# Patient Record
Sex: MALE | Race: WHITE | Employment: OTHER | URBAN - NONMETROPOLITAN AREA
[De-identification: names, ages, dates, MRNs, and addresses within clinical notes are randomized per-mention and may not be internally consistent; named-entity substitution may affect disease eponyms.]

---

## 2020-02-18 ENCOUNTER — HOSPITAL ENCOUNTER (OUTPATIENT)
Dept: MRI IMAGING | Age: 65
Discharge: HOME | End: 2020-02-18
Payer: MEDICARE

## 2020-02-18 PROCEDURE — 73221 MRI JOINT UPR EXTREM W/O DYE: CPT

## 2020-04-23 ENCOUNTER — HOSPITAL ENCOUNTER (EMERGENCY)
Age: 65
Discharge: HOME | End: 2020-04-23
Attending: EMERGENCY MEDICINE
Payer: MEDICARE

## 2020-04-23 VITALS
DIASTOLIC BLOOD PRESSURE: 86 MMHG | WEIGHT: 160 LBS | HEIGHT: 65 IN | BODY MASS INDEX: 26.66 KG/M2 | RESPIRATION RATE: 19 BRPM | TEMPERATURE: 97.9 F | OXYGEN SATURATION: 98 % | SYSTOLIC BLOOD PRESSURE: 126 MMHG | HEART RATE: 73 BPM

## 2020-04-23 LAB
ALBUMIN SERPL-MCNC: 4.1 G/DL (ref 3.5–5.1)
ALP BLD-CCNC: 65 U/L (ref 38–126)
ALT SERPL-CCNC: 20 U/L (ref 11–66)
ANION GAP SERPL CALCULATED.3IONS-SCNC: 13 MEQ/L (ref 8–16)
AST SERPL-CCNC: 21 U/L (ref 5–40)
BASOPHILS # BLD: 0.4 %
BASOPHILS ABSOLUTE: 0 THOU/MM3 (ref 0–0.1)
BILIRUB SERPL-MCNC: 0.2 MG/DL (ref 0.3–1.2)
BILIRUBIN DIRECT: < 0.2 MG/DL (ref 0–0.3)
BUN BLDV-MCNC: 17 MG/DL (ref 7–22)
CALCIUM SERPL-MCNC: 8.7 MG/DL (ref 8.5–10.5)
CHLORIDE BLD-SCNC: 99 MEQ/L (ref 98–111)
CO2: 22 MEQ/L (ref 23–33)
CREAT SERPL-MCNC: 0.7 MG/DL (ref 0.4–1.2)
EKG ATRIAL RATE: 74 BPM
EKG P AXIS: 53 DEGREES
EKG P-R INTERVAL: 132 MS
EKG Q-T INTERVAL: 408 MS
EKG QRS DURATION: 98 MS
EKG QTC CALCULATION (BAZETT): 452 MS
EKG R AXIS: 25 DEGREES
EKG T AXIS: 87 DEGREES
EKG VENTRICULAR RATE: 74 BPM
EOSINOPHIL # BLD: 0.2 %
EOSINOPHILS ABSOLUTE: 0 THOU/MM3 (ref 0–0.4)
ERYTHROCYTE [DISTWIDTH] IN BLOOD BY AUTOMATED COUNT: 12.4 % (ref 11.5–14.5)
ERYTHROCYTE [DISTWIDTH] IN BLOOD BY AUTOMATED COUNT: 39.1 FL (ref 35–45)
GFR SERPL CREATININE-BSD FRML MDRD: > 90 ML/MIN/1.73M2
GLUCOSE BLD-MCNC: 242 MG/DL (ref 70–108)
HCT VFR BLD CALC: 47.5 % (ref 42–52)
HEMOGLOBIN: 16 GM/DL (ref 14–18)
IMMATURE GRANS (ABS): 0.05 THOU/MM3 (ref 0–0.07)
IMMATURE GRANULOCYTES: 0.4 %
LIPASE: 9.1 U/L (ref 5.6–51.3)
LYMPHOCYTES # BLD: 7.6 %
LYMPHOCYTES ABSOLUTE: 0.9 THOU/MM3 (ref 1–4.8)
MCH RBC QN AUTO: 29.5 PG (ref 26–33)
MCHC RBC AUTO-ENTMCNC: 33.7 GM/DL (ref 32.2–35.5)
MCV RBC AUTO: 87.6 FL (ref 80–94)
MONOCYTES # BLD: 3.7 %
MONOCYTES ABSOLUTE: 0.4 THOU/MM3 (ref 0.4–1.3)
NUCLEATED RED BLOOD CELLS: 0 /100 WBC
OSMOLALITY CALCULATION: 277.8 MOSMOL/KG (ref 275–300)
PLATELET # BLD: 210 THOU/MM3 (ref 130–400)
PLATELET ESTIMATE: ADEQUATE
PMV BLD AUTO: 9.6 FL (ref 9.4–12.4)
POTASSIUM REFLEX MAGNESIUM: 3.8 MEQ/L (ref 3.5–5.2)
PRO-BNP: 38.8 PG/ML (ref 0–900)
RBC # BLD: 5.42 MILL/MM3 (ref 4.7–6.1)
REASON FOR REJECTION: NORMAL
REJECTED TEST: NORMAL
SCAN OF BLOOD SMEAR: NORMAL
SEG NEUTROPHILS: 87.7 %
SEGMENTED NEUTROPHILS ABSOLUTE COUNT: 10 THOU/MM3 (ref 1.8–7.7)
SODIUM BLD-SCNC: 134 MEQ/L (ref 135–145)
TOTAL PROTEIN: 6.7 G/DL (ref 6.1–8)
TROPONIN T: < 0.01 NG/ML
WBC # BLD: 11.4 THOU/MM3 (ref 4.8–10.8)

## 2020-04-23 PROCEDURE — 85025 COMPLETE CBC W/AUTO DIFF WBC: CPT

## 2020-04-23 PROCEDURE — 83690 ASSAY OF LIPASE: CPT

## 2020-04-23 PROCEDURE — 84484 ASSAY OF TROPONIN QUANT: CPT

## 2020-04-23 PROCEDURE — 36415 COLL VENOUS BLD VENIPUNCTURE: CPT

## 2020-04-23 PROCEDURE — 93005 ELECTROCARDIOGRAM TRACING: CPT | Performed by: EMERGENCY MEDICINE

## 2020-04-23 PROCEDURE — 99282 EMERGENCY DEPT VISIT SF MDM: CPT

## 2020-04-23 PROCEDURE — 80048 BASIC METABOLIC PNL TOTAL CA: CPT

## 2020-04-23 PROCEDURE — 83880 ASSAY OF NATRIURETIC PEPTIDE: CPT

## 2020-04-23 PROCEDURE — 80076 HEPATIC FUNCTION PANEL: CPT

## 2020-04-23 PROCEDURE — 2709999900 HC NON-CHARGEABLE SUPPLY

## 2020-04-23 NOTE — ED PROVIDER NOTES
[address] [facility] [address] [address] EMERGENCY DEPT      CHIEF COMPLAINT       Chief Complaint   Patient presents with    Loss of Consciousness    Hypotension       Nurses Notes reviewed and I agree except as noted in the HPI. HISTORY OF PRESENT ILLNESS    Aldair Moncada is a 59 y.o. male who presents with complaint of lightheadedness, feeling like he is going to pass out. Patient that he has chronic neck and back pain. States that he took muscle relaxants, pain medicine, and did not eat or drink since yesterday afternoon. Patient said that he did not sleep well last night either, and has been extremely tired. He denies any chest pain, belly pain, did not fall or hit his head or neck. Onset: Acute  Duration: Prior to arrival  Timing: Constant  Location of Pain: Chronic neck and back pain  Intesity/severity: Moderate pain  Modifying Factors: Pain medicine, dehydration, low blood sugar, fatigue  Relieved by;  Previous Episodes; Tx Before arrival: Ate food prior to arrival.  REVIEW OF SYSTEMS      Review of Systems   Constitutional: Negative for fever, chills, diaphoresis and fatigue. HENT: Negative for congestion, drooling, facial swelling and sore throat. Eyes: Negative for photophobia, pain and discharge. Respiratory: Negative for cough, shortness of breath, wheezing and stridor. Cardiovascular: Negative for chest pain, palpitations and leg swelling. Transient lightheadedness. Gastrointestinal: Negative for abdominal pain, blood in stool and abdominal distention. Genitourinary: Negative for dysuria, urgency, hematuria and difficulty urinating. Musculoskeletal: Negative for gait problem, neck pain and neck stiffness. Skin; No rash, No itching  Neurological: Negative for seizures, weakness and numbness. Psychiatric/Behavioral: Negative for hallucinations, confusion and agitation.      PAST MEDICAL HISTORY    has a past medical history of Hiatal hernia, PUD (peptic ulcer disease), and Rectal

## 2020-04-24 PROCEDURE — 93010 ELECTROCARDIOGRAM REPORT: CPT | Performed by: INTERNAL MEDICINE

## 2020-04-24 NOTE — ED NOTES
Pt comes to the ED after a visit with his doctor. Pt states that while he was at the doctors appointment, he passed out and blood pressure dropped. Pt states that he does not remember this happening. EKG obtained. PT denies any pain. Pt states that he feels weak, but not dizzy. VS obtained. Blood collected and sent to lab. Will monitor.      Teo Orr RN  04/23/20 2011

## 2020-06-17 ENCOUNTER — HOSPITAL ENCOUNTER (OUTPATIENT)
Dept: MRI IMAGING | Age: 65
Discharge: HOME | End: 2020-06-17
Payer: MEDICARE

## 2020-06-17 PROCEDURE — 70551 MRI BRAIN STEM W/O DYE: CPT

## 2020-07-10 ENCOUNTER — HOSPITAL ENCOUNTER (OUTPATIENT)
Dept: MRI IMAGING | Age: 65
Discharge: HOME | End: 2020-07-10
Payer: MEDICARE

## 2020-07-10 PROCEDURE — 72148 MRI LUMBAR SPINE W/O DYE: CPT

## 2020-07-10 PROCEDURE — 72141 MRI NECK SPINE W/O DYE: CPT

## 2020-08-03 ENCOUNTER — HOSPITAL ENCOUNTER (OUTPATIENT)
Dept: INTERVENTIONAL RADIOLOGY/VASCULAR | Age: 65
Discharge: HOME | End: 2020-08-03
Payer: MEDICARE

## 2020-08-03 PROCEDURE — 93880 EXTRACRANIAL BILAT STUDY: CPT

## 2020-09-02 ENCOUNTER — HOSPITAL ENCOUNTER (OUTPATIENT)
Dept: ULTRASOUND IMAGING | Age: 65
Discharge: HOME | End: 2020-09-02
Payer: MEDICARE

## 2020-09-02 PROCEDURE — 76705 ECHO EXAM OF ABDOMEN: CPT

## 2020-09-02 PROCEDURE — 76857 US EXAM PELVIC LIMITED: CPT

## 2020-09-28 ENCOUNTER — HOSPITAL ENCOUNTER (OUTPATIENT)
Age: 65
Setting detail: SPECIMEN
Discharge: HOME | End: 2020-09-28
Payer: COMMERCIAL

## 2020-09-28 LAB
ABSOLUTE EOS #: 0.17 K/UL (ref 0–0.44)
ABSOLUTE IMMATURE GRANULOCYTE: <0.03 K/UL (ref 0–0.3)
ABSOLUTE LYMPH #: 1.82 K/UL (ref 1.1–3.7)
ABSOLUTE MONO #: 0.46 K/UL (ref 0.1–1.2)
ALBUMIN SERPL-MCNC: 4.4 G/DL (ref 3.5–5.2)
ALBUMIN/GLOBULIN RATIO: 1.5 (ref 1–2.5)
ALP BLD-CCNC: 65 U/L (ref 40–129)
ALT SERPL-CCNC: 23 U/L (ref 5–41)
ANION GAP SERPL CALCULATED.3IONS-SCNC: 13 MMOL/L (ref 9–17)
AST SERPL-CCNC: 25 U/L
BASOPHILS # BLD: 1 % (ref 0–2)
BASOPHILS ABSOLUTE: 0.06 K/UL (ref 0–0.2)
BILIRUB SERPL-MCNC: 0.61 MG/DL (ref 0.3–1.2)
BUN BLDV-MCNC: 19 MG/DL (ref 8–23)
BUN/CREAT BLD: NORMAL (ref 9–20)
CALCIUM SERPL-MCNC: 9.3 MG/DL (ref 8.6–10.4)
CHLORIDE BLD-SCNC: 105 MMOL/L (ref 98–107)
CHOLESTEROL/HDL RATIO: 5.2
CHOLESTEROL: 206 MG/DL
CO2: 22 MMOL/L (ref 20–31)
CREAT SERPL-MCNC: 0.84 MG/DL (ref 0.7–1.2)
DIFFERENTIAL TYPE: ABNORMAL
EOSINOPHILS RELATIVE PERCENT: 3 % (ref 1–4)
GFR AFRICAN AMERICAN: >60 ML/MIN
GFR NON-AFRICAN AMERICAN: >60 ML/MIN
GFR SERPL CREATININE-BSD FRML MDRD: NORMAL ML/MIN/{1.73_M2}
GFR SERPL CREATININE-BSD FRML MDRD: NORMAL ML/MIN/{1.73_M2}
GLUCOSE BLD-MCNC: 82 MG/DL (ref 70–99)
HCT VFR BLD CALC: 51.3 % (ref 40.7–50.3)
HDLC SERPL-MCNC: 40 MG/DL
HEMOGLOBIN: 16.5 G/DL (ref 13–17)
IMMATURE GRANULOCYTES: 0 %
LDL CHOLESTEROL: 136 MG/DL (ref 0–130)
LYMPHOCYTES # BLD: 33 % (ref 24–43)
MCH RBC QN AUTO: 28.9 PG (ref 25.2–33.5)
MCHC RBC AUTO-ENTMCNC: 32.2 G/DL (ref 28.4–34.8)
MCV RBC AUTO: 89.8 FL (ref 82.6–102.9)
MONOCYTES # BLD: 8 % (ref 3–12)
NRBC AUTOMATED: 0 PER 100 WBC
PDW BLD-RTO: 12.5 % (ref 11.8–14.4)
PLATELET # BLD: 252 K/UL (ref 138–453)
PLATELET ESTIMATE: ABNORMAL
PMV BLD AUTO: 9.9 FL (ref 8.1–13.5)
POTASSIUM SERPL-SCNC: 4.2 MMOL/L (ref 3.7–5.3)
PROSTATE SPECIFIC ANTIGEN: 1.96 UG/L
RBC # BLD: 5.71 M/UL (ref 4.21–5.77)
RBC # BLD: ABNORMAL 10*6/UL
SEG NEUTROPHILS: 55 % (ref 36–65)
SEGMENTED NEUTROPHILS ABSOLUTE COUNT: 2.99 K/UL (ref 1.5–8.1)
SODIUM BLD-SCNC: 140 MMOL/L (ref 135–144)
TOTAL PROTEIN: 7.3 G/DL (ref 6.4–8.3)
TRIGL SERPL-MCNC: 152 MG/DL
TSH SERPL DL<=0.05 MIU/L-ACNC: 1.43 MIU/L (ref 0.3–5)
VLDLC SERPL CALC-MCNC: ABNORMAL MG/DL (ref 1–30)
WBC # BLD: 5.5 K/UL (ref 3.5–11.3)
WBC # BLD: ABNORMAL 10*3/UL

## 2021-09-10 ENCOUNTER — APPOINTMENT (OUTPATIENT)
Dept: CT IMAGING | Age: 66
End: 2021-09-10
Payer: MEDICARE

## 2021-09-10 ENCOUNTER — HOSPITAL ENCOUNTER (EMERGENCY)
Age: 66
Discharge: HOME | End: 2021-09-10
Attending: FAMILY MEDICINE
Payer: MEDICARE

## 2021-09-10 ENCOUNTER — APPOINTMENT (OUTPATIENT)
Dept: GENERAL RADIOLOGY | Age: 66
End: 2021-09-10
Payer: MEDICARE

## 2021-09-10 VITALS
WEIGHT: 155 LBS | BODY MASS INDEX: 24.91 KG/M2 | OXYGEN SATURATION: 98 % | DIASTOLIC BLOOD PRESSURE: 88 MMHG | TEMPERATURE: 98.3 F | RESPIRATION RATE: 16 BRPM | SYSTOLIC BLOOD PRESSURE: 162 MMHG | HEART RATE: 62 BPM | HEIGHT: 66 IN

## 2021-09-10 DIAGNOSIS — R11.0 NAUSEA: ICD-10-CM

## 2021-09-10 DIAGNOSIS — I16.0 HYPERTENSIVE URGENCY: Primary | ICD-10-CM

## 2021-09-10 LAB
ALBUMIN SERPL-MCNC: 4.9 G/DL (ref 3.5–5.1)
ALP BLD-CCNC: 87 U/L (ref 38–126)
ALT SERPL-CCNC: 33 U/L (ref 11–66)
ANION GAP SERPL CALCULATED.3IONS-SCNC: 14 MEQ/L (ref 8–16)
APTT: 35.2 SECONDS (ref 22–38)
AST SERPL-CCNC: 28 U/L (ref 5–40)
BASOPHILS # BLD: 0.9 %
BASOPHILS ABSOLUTE: 0.1 THOU/MM3 (ref 0–0.1)
BILIRUB SERPL-MCNC: 0.4 MG/DL (ref 0.3–1.2)
BILIRUBIN URINE: NEGATIVE
BLOOD, URINE: NEGATIVE
BUN BLDV-MCNC: 19 MG/DL (ref 7–22)
CALCIUM SERPL-MCNC: 9.9 MG/DL (ref 8.5–10.5)
CHARACTER, URINE: CLEAR
CHLORIDE BLD-SCNC: 103 MEQ/L (ref 98–111)
CO2: 22 MEQ/L (ref 23–33)
COLOR: YELLOW
CREAT SERPL-MCNC: 0.8 MG/DL (ref 0.4–1.2)
EKG ATRIAL RATE: 68 BPM
EKG P AXIS: 40 DEGREES
EKG P-R INTERVAL: 140 MS
EKG Q-T INTERVAL: 420 MS
EKG QRS DURATION: 94 MS
EKG QTC CALCULATION (BAZETT): 446 MS
EKG R AXIS: 12 DEGREES
EKG T AXIS: 79 DEGREES
EKG VENTRICULAR RATE: 68 BPM
EOSINOPHIL # BLD: 1.2 %
EOSINOPHILS ABSOLUTE: 0.1 THOU/MM3 (ref 0–0.4)
ERYTHROCYTE [DISTWIDTH] IN BLOOD BY AUTOMATED COUNT: 12.7 % (ref 11.5–14.5)
ERYTHROCYTE [DISTWIDTH] IN BLOOD BY AUTOMATED COUNT: 39.9 FL (ref 35–45)
GFR SERPL CREATININE-BSD FRML MDRD: > 90 ML/MIN/1.73M2
GLUCOSE BLD-MCNC: 105 MG/DL (ref 70–108)
GLUCOSE URINE: NEGATIVE MG/DL
HCT VFR BLD CALC: 53 % (ref 42–52)
HEMOGLOBIN: 17.8 GM/DL (ref 14–18)
IMMATURE GRANS (ABS): 0.08 THOU/MM3 (ref 0–0.07)
IMMATURE GRANULOCYTES: 1.2 %
INR BLD: 1.02 (ref 0.85–1.13)
KETONES, URINE: NEGATIVE
LEUKOCYTE ESTERASE, URINE: NEGATIVE
LIPASE: 32.7 U/L (ref 5.6–51.3)
LYMPHOCYTES # BLD: 21.7 %
LYMPHOCYTES ABSOLUTE: 1.4 THOU/MM3 (ref 1–4.8)
MCH RBC QN AUTO: 29.1 PG (ref 26–33)
MCHC RBC AUTO-ENTMCNC: 33.6 GM/DL (ref 32.2–35.5)
MCV RBC AUTO: 86.7 FL (ref 80–94)
MONOCYTES # BLD: 7.1 %
MONOCYTES ABSOLUTE: 0.5 THOU/MM3 (ref 0.4–1.3)
NITRITE, URINE: NEGATIVE
NUCLEATED RED BLOOD CELLS: 0 /100 WBC
OSMOLALITY CALCULATION: 280.2 MOSMOL/KG (ref 275–300)
PH UA: 7 (ref 5–9)
PLATELET # BLD: 213 THOU/MM3 (ref 130–400)
PMV BLD AUTO: 8.6 FL (ref 9.4–12.4)
POTASSIUM REFLEX MAGNESIUM: 3.9 MEQ/L (ref 3.5–5.2)
PRO-BNP: 23 PG/ML (ref 0–900)
PROTEIN UA: NEGATIVE
RBC # BLD: 6.11 MILL/MM3 (ref 4.7–6.1)
SEG NEUTROPHILS: 67.9 %
SEGMENTED NEUTROPHILS ABSOLUTE COUNT: 4.5 THOU/MM3 (ref 1.8–7.7)
SODIUM BLD-SCNC: 139 MEQ/L (ref 135–145)
SPECIFIC GRAVITY, URINE: 1.01 (ref 1–1.03)
TOTAL PROTEIN: 7.8 G/DL (ref 6.1–8)
TROPONIN T: < 0.01 NG/ML
TROPONIN T: < 0.01 NG/ML
UROBILINOGEN, URINE: 0.2 EU/DL (ref 0–1)
WBC # BLD: 6.6 THOU/MM3 (ref 4.8–10.8)

## 2021-09-10 PROCEDURE — 85025 COMPLETE CBC W/AUTO DIFF WBC: CPT

## 2021-09-10 PROCEDURE — 81003 URINALYSIS AUTO W/O SCOPE: CPT

## 2021-09-10 PROCEDURE — 2580000003 HC RX 258: Performed by: FAMILY MEDICINE

## 2021-09-10 PROCEDURE — 85730 THROMBOPLASTIN TIME PARTIAL: CPT

## 2021-09-10 PROCEDURE — 96372 THER/PROPH/DIAG INJ SC/IM: CPT

## 2021-09-10 PROCEDURE — 83880 ASSAY OF NATRIURETIC PEPTIDE: CPT

## 2021-09-10 PROCEDURE — 93005 ELECTROCARDIOGRAM TRACING: CPT | Performed by: FAMILY MEDICINE

## 2021-09-10 PROCEDURE — 96361 HYDRATE IV INFUSION ADD-ON: CPT

## 2021-09-10 PROCEDURE — 80053 COMPREHEN METABOLIC PANEL: CPT

## 2021-09-10 PROCEDURE — 85610 PROTHROMBIN TIME: CPT

## 2021-09-10 PROCEDURE — 70498 CT ANGIOGRAPHY NECK: CPT

## 2021-09-10 PROCEDURE — 6360000004 HC RX CONTRAST MEDICATION: Performed by: FAMILY MEDICINE

## 2021-09-10 PROCEDURE — 84484 ASSAY OF TROPONIN QUANT: CPT

## 2021-09-10 PROCEDURE — 83690 ASSAY OF LIPASE: CPT

## 2021-09-10 PROCEDURE — 6370000000 HC RX 637 (ALT 250 FOR IP): Performed by: FAMILY MEDICINE

## 2021-09-10 PROCEDURE — 70450 CT HEAD/BRAIN W/O DYE: CPT

## 2021-09-10 PROCEDURE — 96375 TX/PRO/DX INJ NEW DRUG ADDON: CPT

## 2021-09-10 PROCEDURE — 2500000003 HC RX 250 WO HCPCS: Performed by: FAMILY MEDICINE

## 2021-09-10 PROCEDURE — 70496 CT ANGIOGRAPHY HEAD: CPT

## 2021-09-10 PROCEDURE — 6360000002 HC RX W HCPCS: Performed by: FAMILY MEDICINE

## 2021-09-10 PROCEDURE — 96374 THER/PROPH/DIAG INJ IV PUSH: CPT

## 2021-09-10 PROCEDURE — 36415 COLL VENOUS BLD VENIPUNCTURE: CPT

## 2021-09-10 PROCEDURE — 99284 EMERGENCY DEPT VISIT MOD MDM: CPT

## 2021-09-10 PROCEDURE — 71045 X-RAY EXAM CHEST 1 VIEW: CPT

## 2021-09-10 RX ORDER — 0.9 % SODIUM CHLORIDE 0.9 %
1000 INTRAVENOUS SOLUTION INTRAVENOUS ONCE
Status: COMPLETED | OUTPATIENT
Start: 2021-09-10 | End: 2021-09-10

## 2021-09-10 RX ORDER — PROMETHAZINE HYDROCHLORIDE 25 MG/ML
25 INJECTION, SOLUTION INTRAMUSCULAR; INTRAVENOUS ONCE
Status: COMPLETED | OUTPATIENT
Start: 2021-09-10 | End: 2021-09-10

## 2021-09-10 RX ORDER — ONDANSETRON 2 MG/ML
4 INJECTION INTRAMUSCULAR; INTRAVENOUS ONCE
Status: COMPLETED | OUTPATIENT
Start: 2021-09-10 | End: 2021-09-10

## 2021-09-10 RX ORDER — LABETALOL 20 MG/4 ML (5 MG/ML) INTRAVENOUS SYRINGE
10 ONCE
Status: COMPLETED | OUTPATIENT
Start: 2021-09-10 | End: 2021-09-10

## 2021-09-10 RX ORDER — CYCLOBENZAPRINE HCL 10 MG
10 TABLET ORAL ONCE
Status: COMPLETED | OUTPATIENT
Start: 2021-09-10 | End: 2021-09-10

## 2021-09-10 RX ADMIN — CYCLOBENZAPRINE HYDROCHLORIDE 10 MG: 10 TABLET, FILM COATED ORAL at 10:29

## 2021-09-10 RX ADMIN — IOPAMIDOL 80 ML: 755 INJECTION, SOLUTION INTRAVENOUS at 10:39

## 2021-09-10 RX ADMIN — ONDANSETRON 4 MG: 2 INJECTION INTRAMUSCULAR; INTRAVENOUS at 13:15

## 2021-09-10 RX ADMIN — SODIUM CHLORIDE 1000 ML: 9 INJECTION, SOLUTION INTRAVENOUS at 14:32

## 2021-09-10 RX ADMIN — PROMETHAZINE HYDROCHLORIDE 25 MG: 25 INJECTION INTRAMUSCULAR; INTRAVENOUS at 14:34

## 2021-09-10 RX ADMIN — LABETALOL 20 MG/4 ML (5 MG/ML) INTRAVENOUS SYRINGE 10 MG: at 13:15

## 2021-09-10 NOTE — ED NOTES
Pt resting in bed watching television, respirations unlabored. Call light in reach.      Joyce Moser RN  09/10/21 7195

## 2021-09-10 NOTE — ED NOTES
Presents to ER with complaints of nausea and fatigue that has been ongoing for 3 days. Pt states the nausea really increased this morning along with his  BP. States he checks his BP occasionally at home and it normally runs 529 systolic. Denies taking any BP medications. EKG completed.        Kathleen Moser, RN  09/10/21 6421

## 2021-09-10 NOTE — ED PROVIDER NOTES
EMERGENCY DEPARTMENT ENCOUNTER     CHIEF COMPLAINT   Chief Complaint   Patient presents with    Nausea    Hypertension        HPI   Lavell Gilmore is a 77 y.o. male with no prior medical issues (no hypertension) who presents with headache, nausea and not feeling well in general, and denying chest pain or sob, onset was today. The duration has been ongoing. Pt has no prior CAD or ACS. He has no prior long distance travel. His headache is more dizziness, and also is not worst of life or focal in nature. He has no vision disturbance. REVIEW OF SYSTEMS   Cardiac: denies dyspnea on exertion; neg Chest Pain, Denies syncope   Neuro: +headache, +dizziness; not feeling well  Respiratory: denies sob and dyspnea; Denies cough or hemoptysis   GI: +nausea; Denies Vomiting or Diarrhea   General: Denies Fever   All other review of systems are reviewed and are otherwise negative. PAST MEDICAL & SURGICAL HISTORY   Past Medical History:   Diagnosis Date    Hiatal hernia     EGD    PUD (peptic ulcer disease)     LONG TIME  AGO    Rectal bleeding     2o11- colonoscopy - dr Martha Gerardo      Past Surgical History:   Procedure Laterality Date    EYE SURGERY  plate in right eye    MANDIBLE SURGERY  1979    NECK SURGERY  2001    plate in neck         CURRENT MEDICATIONS   Current Outpatient Rx   Medication Sig Dispense Refill    gabapentin (NEURONTIN) 800 MG tablet Take 800 mg by mouth 3 times daily as needed      HYDROcodone-acetaminophen (NORCO) 7.5-325 MG per tablet Take 1 tablet by mouth daily as needed for Pain Take as needed for pain.  30 tablet 0    baclofen (LIORESAL) 10 MG tablet Take 1 tablet by mouth 3 times daily 90 tablet 5    butalbital-aspirin-caffeine (FIORINAL) -40 MG per capsule Take 1 capsule by mouth every 4 hours as needed for Headaches 180 capsule 5    vitamin B-12 (CYANOCOBALAMIN) 100 MCG tablet Take 50 mcg by mouth daily      omeprazole (PRILOSEC) 20 MG capsule take 1 capsule by mouth twice a day (Patient taking differently: Daily take 1 capsule by mouth twice a day) 60 capsule 11    gabapentin (NEURONTIN) 400 MG capsule Take 1 capsule by mouth 3 times daily 90 capsule 5    Handicap Placard MISC by Does not apply route Duration 5 years 1 each 0    acetaminophen (TYLENOL) 325 MG tablet Take 650 mg by mouth every 6 hours as needed for Pain      lidocaine (LIDODERM) 5 % Place 1 patch onto the skin daily 12 hours on, 12 hours off. 30 patch 5    sucralfate (CARAFATE) 1 GM/10ML suspension Take 10 mLs by mouth 2 times daily as needed 600 mL 2    cyclobenzaprine (FLEXERIL) 10 MG tablet Take 1 tablet by mouth every 8 hours as needed for Muscle spasms 30 tablet 5    Glucosamine-Chondroitin (MOVE FREE PO) Take by mouth 2 times daily      Multiple Vitamins-Minerals (THERAPEUTIC MULTIVITAMIN-MINERALS) tablet Take 1 tablet by mouth daily      Ascorbic Acid (VITAMIN C) 500 MG CAPS Take 500 mg by mouth daily      Cholecalciferol (VITAMIN D3) 2000 UNITS CAPS Take by mouth      CALCIUM & MAGNESIUM CARBONATES PO Take by mouth          ALLERGIES   No Known Allergies     SOCIAL & FAMILY HISTORY   Social History     Socioeconomic History    Marital status:      Spouse name: None    Number of children: None    Years of education: None    Highest education level: None   Occupational History    None   Tobacco Use    Smoking status: Never Smoker    Smokeless tobacco: Never Used   Substance and Sexual Activity    Alcohol use: No    Drug use: No    Sexual activity: None   Other Topics Concern    None   Social History Narrative    None     Social Determinants of Health     Financial Resource Strain:     Difficulty of Paying Living Expenses:    Food Insecurity:     Worried About Running Out of Food in the Last Year:     Ran Out of Food in the Last Year:    Transportation Needs:     Lack of Transportation (Medical):      Lack of Transportation (Non-Medical):    Physical Activity:     Days of Exercise per Week:     Minutes of Exercise per Session:    Stress:     Feeling of Stress :    Social Connections:     Frequency of Communication with Friends and Family:     Frequency of Social Gatherings with Friends and Family:     Attends Restorationist Services:     Active Member of Clubs or Organizations:     Attends Club or Organization Meetings:     Marital Status:    Intimate Partner Violence:     Fear of Current or Ex-Partner:     Emotionally Abused:     Physically Abused:     Sexually Abused:       Family History   Problem Relation Age of Onset    Heart Disease Mother     Heart Disease Father     Other Father         CHF        PHYSICAL EXAM   VITAL SIGNS: BP (!) 162/88   Pulse 62   Temp 98.3 °F (36.8 °C) (Oral)   Resp 16   Ht 5' 6\" (1.676 m)   Wt 155 lb (70.3 kg)   SpO2 98%   BMI 25.02 kg/m²    Constitutional: Well developed, well nourished, mild acute distress   HENT: Atraumatic, dry mucus membranes   Eyes: no nystagmus noted; neg alternating eye cover exam  Neck: supple, no JVD   Respiratory: Lungs Clear, no retractions   Cardiovascular: Reg rate, normal rhythm, no murmurs   Vascular: Radial pulses 2+ equal bilaterally   GI: Soft, nontender, normal bowel sounds   Musculoskeletal: No edema, no deformities   Integument: Skin warm and dry, no petechiae   Neurologic: Alert & oriented, normal speech, no facial droop, no slurred speech, moving all extremities, no truncal ataxia noted  Psych: Pleasant affect, no hallucinations     EKG (interpreted by me) 9/10/21 9:11  Rhythm: Sinus   Rate: 68 BPM   Axis: normal   Conduction: normal   ST/T Segments: nonacute without obvious signs of ischemia    RADIOLOGY/PROCEDURES   CT Head WO Contrast   Final Result    Normal CT appearance of the brain. **This report has been created using voice recognition software. It may contain minor errors which are inherent in voice recognition technology. **      Final report electronically signed by  Clayton Dominique on 9/10/2021 11:00 AM      CTA HEAD W WO CONTRAST   Final Result       1. Mild smooth atherosclerosis of the left carotid bulb. There is a 44% stenosis at the origin of the left internal carotid artery. 2. No significant stenosis of the right carotid bulb. 3. Normal CTA of the head. **This report has been created using voice recognition software. It may contain minor errors which are inherent in voice recognition technology. **      Final report electronically signed by Dr. Clayton Dominique on 9/10/2021 11:14 AM      CTA NECK W WO CONTRAST   Final Result       1. Mild smooth atherosclerosis of the left carotid bulb. There is a 44% stenosis at the origin of the left internal carotid artery. 2. No significant stenosis of the right carotid bulb. 3. Normal CTA of the head. **This report has been created using voice recognition software. It may contain minor errors which are inherent in voice recognition technology. **      Final report electronically signed by Dr. Clayton Dominique on 9/10/2021 11:14 AM      XR CHEST PORTABLE   Final Result   No acute cardiopulmonary disease            **This report has been created using voice recognition software. It may contain minor errors which are inherent in voice recognition technology. **      Final report electronically signed by Dr. Nilson Alfaro on 9/10/2021 10:47 AM           LABS   Labs Reviewed   COMPREHENSIVE METABOLIC PANEL W/ REFLEX TO MG FOR LOW K - Abnormal; Notable for the following components:       Result Value    CO2 22 (*)     All other components within normal limits   CBC WITH AUTO DIFFERENTIAL - Abnormal; Notable for the following components:    RBC 6.11 (*)     Hematocrit 53.0 (*)     MPV 8.6 (*)     Immature Grans (Abs) 0.08 (*)     All other components within normal limits   LIPASE   TROPONIN   URINE RT REFLEX TO CULTURE   PROTIME-INR   APTT   BRAIN NATRIURETIC PEPTIDE   ANION GAP   OSMOLALITY   GLOMERULAR FILTRATION RATE, ESTIMATED   TROPONIN        ED COURSE & MEDICAL DECISION MAKING   Pertinent Labs & Imaging studies reviewed and interpreted. (See chart for details)   See chart for details of medications given during the ED stay. Vitals:    09/10/21 1442   BP: (!) 162/88   Pulse: 62   Resp: 16   Temp:    SpO2: 98%        Differential Diagnosis: Acute Coronary Syndrome, Congestive Heart Failure, Myocardial Infarction, Pulmonary Embolus, Thoracic Dissection, Pneumonia, Pneumothorax, other. FINAL IMPRESSION   1. Hypertensive urgency    2. Nausea         Plan:  MDM - initially I had some concerns for a CVA event but pt did not have any focal deficits or obvious ataxia. But he felt dizzy and his blood pressure was over 347 mm Hg systolic, so CTA was ordered, with negative findings. At this time I have low suspicion for a posterior stroke. Pt will be discharged home now since his BP has normalized, nausea relieved and dc home with anticipatory guidance.     Electronically signed by: Rahul Jennings MD, 9/10/2021 4:36 PM   (This note was completed with a voice recognition program)         Nataly Monahan MD  09/10/21 5993
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
## 2021-09-14 ENCOUNTER — OFFICE VISIT (OUTPATIENT)
Dept: INTERNAL MEDICINE CLINIC | Age: 66
End: 2021-09-14
Payer: MEDICARE

## 2021-09-14 VITALS
SYSTOLIC BLOOD PRESSURE: 154 MMHG | TEMPERATURE: 97.6 F | BODY MASS INDEX: 24.86 KG/M2 | DIASTOLIC BLOOD PRESSURE: 88 MMHG | WEIGHT: 154 LBS | HEART RATE: 72 BPM

## 2021-09-14 DIAGNOSIS — M54.2 NECK PAIN: ICD-10-CM

## 2021-09-14 DIAGNOSIS — I10 ESSENTIAL HYPERTENSION: Primary | ICD-10-CM

## 2021-09-14 DIAGNOSIS — F41.9 ANXIETY: ICD-10-CM

## 2021-09-14 DIAGNOSIS — M53.3 PAIN IN THE COCCYX: ICD-10-CM

## 2021-09-14 PROCEDURE — G8420 CALC BMI NORM PARAMETERS: HCPCS | Performed by: STUDENT IN AN ORGANIZED HEALTH CARE EDUCATION/TRAINING PROGRAM

## 2021-09-14 PROCEDURE — 4040F PNEUMOC VAC/ADMIN/RCVD: CPT | Performed by: STUDENT IN AN ORGANIZED HEALTH CARE EDUCATION/TRAINING PROGRAM

## 2021-09-14 PROCEDURE — 99204 OFFICE O/P NEW MOD 45 MIN: CPT | Performed by: STUDENT IN AN ORGANIZED HEALTH CARE EDUCATION/TRAINING PROGRAM

## 2021-09-14 PROCEDURE — 3017F COLORECTAL CA SCREEN DOC REV: CPT | Performed by: STUDENT IN AN ORGANIZED HEALTH CARE EDUCATION/TRAINING PROGRAM

## 2021-09-14 PROCEDURE — 1036F TOBACCO NON-USER: CPT | Performed by: STUDENT IN AN ORGANIZED HEALTH CARE EDUCATION/TRAINING PROGRAM

## 2021-09-14 PROCEDURE — 1123F ACP DISCUSS/DSCN MKR DOCD: CPT | Performed by: STUDENT IN AN ORGANIZED HEALTH CARE EDUCATION/TRAINING PROGRAM

## 2021-09-14 PROCEDURE — G8427 DOCREV CUR MEDS BY ELIG CLIN: HCPCS | Performed by: STUDENT IN AN ORGANIZED HEALTH CARE EDUCATION/TRAINING PROGRAM

## 2021-09-14 RX ORDER — DULOXETIN HYDROCHLORIDE 30 MG/1
30 CAPSULE, DELAYED RELEASE ORAL DAILY
Qty: 60 CAPSULE | Refills: 0 | Status: SHIPPED | OUTPATIENT
Start: 2021-09-14 | End: 2021-09-27 | Stop reason: SDUPTHER

## 2021-09-14 RX ORDER — GABAPENTIN 600 MG/1
600 TABLET ORAL 2 TIMES DAILY
COMMUNITY

## 2021-09-14 RX ORDER — DONEPEZIL HYDROCHLORIDE 10 MG/1
10 TABLET, FILM COATED ORAL NIGHTLY
COMMUNITY

## 2021-09-14 SDOH — ECONOMIC STABILITY: FOOD INSECURITY: WITHIN THE PAST 12 MONTHS, THE FOOD YOU BOUGHT JUST DIDN'T LAST AND YOU DIDN'T HAVE MONEY TO GET MORE.: NEVER TRUE

## 2021-09-14 SDOH — ECONOMIC STABILITY: FOOD INSECURITY: WITHIN THE PAST 12 MONTHS, YOU WORRIED THAT YOUR FOOD WOULD RUN OUT BEFORE YOU GOT MONEY TO BUY MORE.: NEVER TRUE

## 2021-09-14 ASSESSMENT — ENCOUNTER SYMPTOMS
NAUSEA: 1
COUGH: 0
ABDOMINAL PAIN: 1
EYE ITCHING: 0
CONSTIPATION: 0
CHEST TIGHTNESS: 0
EYE PAIN: 0
TROUBLE SWALLOWING: 0
BACK PAIN: 1
SORE THROAT: 0
SINUS PRESSURE: 0
SHORTNESS OF BREATH: 0
BLOOD IN STOOL: 0
COLOR CHANGE: 0
DIARRHEA: 0
SINUS PAIN: 0

## 2021-09-14 ASSESSMENT — PATIENT HEALTH QUESTIONNAIRE - PHQ9
2. FEELING DOWN, DEPRESSED OR HOPELESS: 0
SUM OF ALL RESPONSES TO PHQ9 QUESTIONS 1 & 2: 0
1. LITTLE INTEREST OR PLEASURE IN DOING THINGS: 0
SUM OF ALL RESPONSES TO PHQ QUESTIONS 1-9: 0

## 2021-09-14 ASSESSMENT — SOCIAL DETERMINANTS OF HEALTH (SDOH): HOW HARD IS IT FOR YOU TO PAY FOR THE VERY BASICS LIKE FOOD, HOUSING, MEDICAL CARE, AND HEATING?: NOT VERY HARD

## 2021-09-23 ENCOUNTER — NURSE TRIAGE (OUTPATIENT)
Dept: OTHER | Facility: CLINIC | Age: 66
End: 2021-09-23

## 2021-09-23 NOTE — TELEPHONE ENCOUNTER
Reason for Disposition   Systolic BP  >= 193 OR Diastolic >= 162    Answer Assessment - Initial Assessment Questions  1. BLOOD PRESSURE: \"What is the blood pressure? \" \"Did you take at least two measurements 5 minutes apart? \"      Most recent 177/97 at 1520. Has consistently been between 140s-180s    2. ONSET: \"When did you take your blood pressure? \"      Has had problems with high blood pressure for a while- was seen in ED for hypertensive crisis    3. HOW: \"How did you obtain the blood pressure? \" (e.g., visiting nurse, automatic home BP monitor)      Automatic upper arm cuff    4. HISTORY: \"Do you have a history of high blood pressure? \"      History of HTN, not on blood pressure medications    5. MEDICATIONS: Gracy Sheppardr you taking any medications for blood pressure? \" \"Have you missed any doses recently? \"      Not on any medications    6. OTHER SYMPTOMS: \"Do you have any symptoms? \" (e.g., headache, chest pain, blurred vision, difficulty breathing, weakness)      Mild headache    7. PREGNANCY: \"Is there any chance you are pregnant? \" \"When was your last menstrual period? \"      n/a    Protocols used: HIGH BLOOD PRESSURE-ADULT-AH    Received call from Dondra Schirmer at [facility] with Red Flag Complaint. Brief description of triage: Patient experiencing elevated blood pressure readings. Was seen in ED 9/10/21 for hypertensive emergency and had follow up with PCP 09/14/21. Patient experiencing a lot of stress at home, states that is likely contributing to elevated blood pressures. Was started on Cymbalta to see if that helped symptoms but BP still elevated and pt still experiencing intermittent headaches and nausea. Triage indicates for patient to be seen within 24 hours/[facility] as back up    Care advice provided, patient verbalizes understanding; denies any other questions or concerns; instructed to call back for any new or worsening symptoms.     Writer provided warm transfer to Chaim Falk at [facility] for appointment scheduling. Attention Provider: Thank you for allowing me to participate in the care of your patient. The patient was connected to triage in response to information provided to the ECC/PSC. Please do not respond through this encounter as the response is not directed to a shared pool.

## 2021-09-24 ENCOUNTER — TELEPHONE (OUTPATIENT)
Dept: INTERNAL MEDICINE CLINIC | Age: 66
End: 2021-09-24

## 2021-09-24 NOTE — TELEPHONE ENCOUNTER
Patient was transferred from the [facility] ([facility]) stating the Nurse triage at [facility] wanting the patient to be  Scheduled for an appt with Dr. Michael Jarrell due to elevated BP. I scheduled the appt for 9/27/21 @ 2:20pm. When the patient was connected with me he stated he did not need an appt he was only calling to say his BP was still elevated and what should he do. I spoke with Guzman Johnson  and was told to tell the patient to go to the Urgent Care. I told the patient I spoke with Bettina Haynes and she stated he should go to the Urgent Care. The patient states he thinks he will make it thru the weekend for his scheduled appt.

## 2021-09-27 ENCOUNTER — OFFICE VISIT (OUTPATIENT)
Dept: INTERNAL MEDICINE CLINIC | Age: 66
End: 2021-09-27
Payer: MEDICARE

## 2021-09-27 VITALS
DIASTOLIC BLOOD PRESSURE: 98 MMHG | HEART RATE: 64 BPM | SYSTOLIC BLOOD PRESSURE: 158 MMHG | TEMPERATURE: 97.2 F | HEIGHT: 66 IN | WEIGHT: 158.2 LBS | BODY MASS INDEX: 25.43 KG/M2

## 2021-09-27 DIAGNOSIS — M53.3 PAIN IN THE COCCYX: ICD-10-CM

## 2021-09-27 DIAGNOSIS — I10 ESSENTIAL HYPERTENSION: Primary | ICD-10-CM

## 2021-09-27 DIAGNOSIS — F41.9 ANXIETY: ICD-10-CM

## 2021-09-27 DIAGNOSIS — M54.2 NECK PAIN: ICD-10-CM

## 2021-09-27 PROCEDURE — G8417 CALC BMI ABV UP PARAM F/U: HCPCS | Performed by: STUDENT IN AN ORGANIZED HEALTH CARE EDUCATION/TRAINING PROGRAM

## 2021-09-27 PROCEDURE — 1036F TOBACCO NON-USER: CPT | Performed by: STUDENT IN AN ORGANIZED HEALTH CARE EDUCATION/TRAINING PROGRAM

## 2021-09-27 PROCEDURE — G8427 DOCREV CUR MEDS BY ELIG CLIN: HCPCS | Performed by: STUDENT IN AN ORGANIZED HEALTH CARE EDUCATION/TRAINING PROGRAM

## 2021-09-27 PROCEDURE — 3017F COLORECTAL CA SCREEN DOC REV: CPT | Performed by: STUDENT IN AN ORGANIZED HEALTH CARE EDUCATION/TRAINING PROGRAM

## 2021-09-27 PROCEDURE — 4040F PNEUMOC VAC/ADMIN/RCVD: CPT | Performed by: STUDENT IN AN ORGANIZED HEALTH CARE EDUCATION/TRAINING PROGRAM

## 2021-09-27 PROCEDURE — 99214 OFFICE O/P EST MOD 30 MIN: CPT | Performed by: STUDENT IN AN ORGANIZED HEALTH CARE EDUCATION/TRAINING PROGRAM

## 2021-09-27 PROCEDURE — 1123F ACP DISCUSS/DSCN MKR DOCD: CPT | Performed by: STUDENT IN AN ORGANIZED HEALTH CARE EDUCATION/TRAINING PROGRAM

## 2021-09-27 RX ORDER — MULTIVITAMIN WITH IRON
500 TABLET ORAL DAILY
COMMUNITY

## 2021-09-27 RX ORDER — DULOXETIN HYDROCHLORIDE 30 MG/1
30 CAPSULE, DELAYED RELEASE ORAL DAILY
Qty: 90 CAPSULE | Refills: 1 | Status: SHIPPED | OUTPATIENT
Start: 2021-09-27 | End: 2022-03-14 | Stop reason: SDUPTHER

## 2021-09-27 RX ORDER — AMLODIPINE BESYLATE 5 MG/1
5 TABLET ORAL DAILY
Qty: 30 TABLET | Refills: 2 | Status: SHIPPED | OUTPATIENT
Start: 2021-09-27 | End: 2021-11-01

## 2021-09-27 RX ORDER — VITAMIN B COMPLEX
1 CAPSULE ORAL DAILY
COMMUNITY

## 2021-09-27 NOTE — PROGRESS NOTES
Attending supervising physicians attestation statement:      I was present with the resident physician during the history and exam.  I Discussed the findings and plans with the resident physician  personally   After examining the patient, and agree with the resident'S note as outlined . Pt with anxiety and HA and elevated BP, which was noted on several visits, and confirmed on the 2 nd BP reading today. So recommend pharmacotherapy, norvasc due to bradycardia and relaxation techniques recommended. Ambulatory BP readings recommended, and RTO 3 weeks . Physical exam unremarkable, check TSH, T4 levels.        Electronically signed by Darcie Ching MD on 9/27/2021 at 3:52 PM

## 2021-09-27 NOTE — PATIENT INSTRUCTIONS
Please get labs first thing in the morning   Come into office for blood pressure check Monday-Thursday 8-4

## 2021-09-27 NOTE — PROGRESS NOTES
mouth daily      b complex vitamins capsule Take 1 capsule by mouth daily      DULoxetine (CYMBALTA) 30 MG extended release capsule Take 1 capsule by mouth daily 90 capsule 1    amLODIPine (NORVASC) 5 MG tablet Take 1 tablet by mouth daily 30 tablet 2    gabapentin (NEURONTIN) 600 MG tablet Take 600 mg by mouth 2 times daily.  donepezil (ARICEPT) 10 MG tablet Take 10 mg by mouth nightly      omeprazole (PRILOSEC) 20 MG capsule take 1 capsule by mouth twice a day (Patient taking differently: Daily take 1 capsule by mouth twice a day) 60 capsule 11    acetaminophen (TYLENOL) 325 MG tablet Take 650 mg by mouth every 6 hours as needed for Pain      cyclobenzaprine (FLEXERIL) 10 MG tablet Take 1 tablet by mouth every 8 hours as needed for Muscle spasms 30 tablet 5    Glucosamine-Chondroitin (MOVE FREE PO) Take by mouth 2 times daily      Multiple Vitamins-Minerals (THERAPEUTIC MULTIVITAMIN-MINERALS) tablet Take 1 tablet by mouth daily      Ascorbic Acid (VITAMIN C) 500 MG CAPS Take 500 mg by mouth daily      Cholecalciferol (VITAMIN D3) 2000 UNITS CAPS Take by mouth      Handicap Placard MISC by Does not apply route Duration 5 years 1 each 0     No current facility-administered medications for this visit. No Known Allergies      Family History   Problem Relation Age of Onset    Heart Disease Mother     Heart Disease Father     Other Father         CHF       Review of Systems - General ROS: negative for - chills or fever  Psychological ROS: admits to anxiety without depression   Hematological and Lymphatic ROS: No history of blood clots or bleeding disorder.    Respiratory ROS: no cough, shortness of breath, or wheezing  Cardiovascular ROS: no chest pain or dyspnea on exertion, tolerates a flight of stairs, vacuuming  Gastrointestinal ROS: chronic abdominal pain, no change in bowel habits, or black or bloody stools  Genito-Urinary ROS: no dysuria, trouble voiding, or hematuria  Musculoskeletal ROS: negative for - muscle pain or muscular weakness   Neurological ROS: admits to headaches. Denies  numbness/tingling, seizures or weakness  Dermatological ROS: negative for - rash or skin lesion changes    Blood pressure (!) 158/98, pulse 64, temperature 97.2 °F (36.2 °C), height 5' 5.98\" (1.676 m), weight 158 lb 3.2 oz (71.8 kg). Physical Examination: General appearance -alert, well appearing, and in no distress  Mental status - alert, oriented to person, place, and time  Head - atraumatic, normocephalic  Eyes - pupils equal and reactive to light, extraocular muscles intact  Ears - external ears are normal, hearing is grossly normal  Mouth - oral pharynx clear, mucous membranes moist  Neck - supple, no significant adenopathy  Chest - clear to auscultation, no wheezes, rales or rhonchi, symmetric air entry  Heart - normal rate, regular rhythm, normal S1, S2, no murmurs, rubs, clicks or gallops  Abdomen -soft, nontender, nondistended  Neurological - alert, oriented,  motor and sensation are grossly intact bilateral upper and lower extremities. Extremities - peripheral pulses normal, no pedal edema, no clubbing or cyanosis  Skin - warm and dry    Diagnostic data:   I have reviewed recent diagnostic testing including labs with patient today. Assessment and Plan:     Diagnosis Orders   1. Essential hypertension  amLODIPine (NORVASC) 5 MG tablet    TSH    T4   2. Anxiety  DULoxetine (CYMBALTA) 30 MG extended release capsule    TSH    T4   3. Neck pain  DULoxetine (CYMBALTA) 30 MG extended release capsule   4. Pain in the coccyx  DULoxetine (CYMBALTA) 30 MG extended release capsule       Hypertension: Patient has 2 blood pressure cuffs at home. At home blood pressure highest 182/110 lowerst was 129/75. Patient admits to headaches with associated nauseas when systolic blood pressure > 170, this occurs 5 times a week. Currently in office 150/68.  Patient states he recently had stroke and brain ansyerym requiring stenting. Given heart rate of 64 and blood pressure 150/68 with elevated blood presures at home will start CCB. Decrease caffeine intake if possible. - Will start on Amlodipine 5 mg daily    - Come in once a week for blood pressure    - Will reassess in 3 weeks     Anxiety: Patient states he is upset at wife and further family issues. Patient was placed on Cymbalta 9/14/21. States he does not tell a difference yet but is willing to give current dose full three weeks before deciding if he wants to increase dose or change medication. Patient states he is trying deep breathing techniques to try calm himself down. Patient has increase anxiety with elevated blood pressure which further exacerbates anxiety. Patient has had difficulty sleeping for past 4 days, this is due to anxiety. Patient taking 10 mg melatonin without relief. - Continue Cymbalta 30 mg daily. Neck and Coccyx Pain   Patient reports previous coccyx pain that resolved for about a year and that came back after a fall 3 weeks ago. States that an x-ray obtained at [facility] did not show any fracture. Significantly improved since starting physical therapy. Patient is still receiving physical therapy treatments.    -Continue physical therapy     Follow up in 3 weeks for hypertension and anxiety     Electronically signed by Delilah Potts DO on 9/27/2021 at 3:40 PM   Internal medicine resident, PGY 2    This case was discussed with Dr. Stephanie Addison.

## 2021-11-01 ENCOUNTER — OFFICE VISIT (OUTPATIENT)
Dept: INTERNAL MEDICINE CLINIC | Age: 66
End: 2021-11-01
Payer: MEDICARE

## 2021-11-01 VITALS
HEIGHT: 66 IN | WEIGHT: 154.2 LBS | TEMPERATURE: 97.3 F | SYSTOLIC BLOOD PRESSURE: 142 MMHG | HEART RATE: 62 BPM | BODY MASS INDEX: 24.78 KG/M2 | DIASTOLIC BLOOD PRESSURE: 86 MMHG

## 2021-11-01 DIAGNOSIS — F41.9 ANXIETY: Primary | ICD-10-CM

## 2021-11-01 DIAGNOSIS — I10 ESSENTIAL HYPERTENSION: ICD-10-CM

## 2021-11-01 PROCEDURE — G8484 FLU IMMUNIZE NO ADMIN: HCPCS | Performed by: INTERNAL MEDICINE

## 2021-11-01 PROCEDURE — 3017F COLORECTAL CA SCREEN DOC REV: CPT | Performed by: INTERNAL MEDICINE

## 2021-11-01 PROCEDURE — G8427 DOCREV CUR MEDS BY ELIG CLIN: HCPCS | Performed by: INTERNAL MEDICINE

## 2021-11-01 PROCEDURE — 4040F PNEUMOC VAC/ADMIN/RCVD: CPT | Performed by: INTERNAL MEDICINE

## 2021-11-01 PROCEDURE — G8420 CALC BMI NORM PARAMETERS: HCPCS | Performed by: INTERNAL MEDICINE

## 2021-11-01 PROCEDURE — 99214 OFFICE O/P EST MOD 30 MIN: CPT | Performed by: INTERNAL MEDICINE

## 2021-11-01 PROCEDURE — 1036F TOBACCO NON-USER: CPT | Performed by: INTERNAL MEDICINE

## 2021-11-01 PROCEDURE — 1123F ACP DISCUSS/DSCN MKR DOCD: CPT | Performed by: INTERNAL MEDICINE

## 2021-11-01 NOTE — PATIENT INSTRUCTIONS
- May stop amlodipine   - Start taking Cymbalta 30 mg PO every other day instead of daily.  If not responding well, may resume back to daily   - Monitor BP with home cuff daily, lifestyle modification with exercise daily, DASH diet

## 2021-11-01 NOTE — PROGRESS NOTES
OFFICE VISIT NOTE     Patient - Tai Ramos   MRN -  [id number]      - 1955      Date of evaluation -  2021  Primary Care Physician - Emeli Pulido MD     Chief Complaint:      Chief Complaint   Patient presents with    Hypertension     3 week follow up--pt stoppped amlodipine d/t nausea    Anxiety      History Of Presenting Illness:     Tai Ramos is a 77 y.o. male with a past medical history of GERD, cervical & lumbar spondylosis, chronic neck and back pain who presents for follow up regarding hypertension & anxiety. He has no complaints today and states that this is the best he's ever felt before. He has not been compliant with his prescribed amlodipine that was started last visit. Does not want to take any BP meds at this time because he feels that he does not need them and does not feel like he runs high enough. Also, he tried amlodipine once and it made him severely nauseous that lasted 3-4 days. Patient checks his own BP at home with cuff and it usually runs [id number] systolic. Patient was placed on Cymbalta on 21 d/t long standing history of anxiety. TSH & T4 were normal since last visit. He tells me that the cymbalta has worked wonders for him and he's been very calm lately. He reports that he does not mind cutting back on it. Last visit he was more anxious because he \"had too much going on\" in his life. I told patient he may cut back on it if he'd like. He also has a chronic coccygeal pain that resolved for about a year and that came back after a fall in 2021. [facility] obtained at [facility] did not show any fracture. He tells me that this pain has significantly improved since starting physical therapy. Patient is still receiving physical therapy treatments and completes PT with his last session tomorrow 21.     Past Medical History    Past Medical History      Diagnosis Date    Hiatal hernia     EGD    PUD (peptic ulcer disease)     LONG TIME AGO    Rectal bleeding     2o11- colonoscopy - dr Lindsey Levin      Past Surgical History        Procedure Laterality Date    EYE SURGERY  plate in right eye   [address] [place] [address]    plate in neck      Medications    Current Medications   Current Outpatient Medications   Medication Sig Dispense Refill    magnesium (MAGNESIUM-OXIDE) 250 MG TABS tablet Take 500 mg by mouth daily      b complex vitamins capsule Take 1 capsule by mouth daily      DULoxetine (CYMBALTA) 30 MG extended release capsule Take 1 capsule by mouth daily 90 capsule 1    gabapentin (NEURONTIN) 600 MG tablet Take 600 mg by mouth 2 times daily.  donepezil (ARICEPT) 10 MG tablet Take 10 mg by mouth nightly      omeprazole (PRILOSEC) 20 MG capsule take 1 capsule by mouth twice a day (Patient taking differently: Daily take 1 capsule by mouth twice a day) 60 capsule 11    acetaminophen (TYLENOL) 325 MG tablet Take 650 mg by mouth every 6 hours as needed for Pain      cyclobenzaprine (FLEXERIL) 10 MG tablet Take 1 tablet by mouth every 8 hours as needed for Muscle spasms 30 tablet 5    Glucosamine-Chondroitin (MOVE FREE PO) Take by mouth 2 times daily      Multiple Vitamins-Minerals (THERAPEUTIC MULTIVITAMIN-MINERALS) tablet Take 1 tablet by mouth daily      Ascorbic Acid (VITAMIN C) 500 MG CAPS Take 500 mg by mouth daily      Cholecalciferol (VITAMIN D3) 2000 UNITS CAPS Take by mouth      Handicap Placard MISC by Does not apply route Duration 5 years 1 each 0     No current facility-administered medications for this visit. Allergies    Patient has no known allergies.     Review of Systems:   Constitutional:  No Weight Change, No Fever, No Chills, No Night Sweats, No Fatigue, No Malaise   ENT/Mouth:  No Ear Pain, No Nasal Congestion, No Sinus Pain, No Hoarseness, No sore throat, No Rhinorrhea, No Swallowing Difficulty   Eyes:  No Eye Pain, No Swelling, No Redness, No Foreign Body, No Discharge, No Vision Changes   Cardiovascular:  No Chest Pain, No Palpitations   Respiratory:  No Cough, No Sputum, No Wheezing, No SOB, No Dyspnea on Exertion, No Orthopnea,  Gastrointestinal:  No Nausea, No Vomiting, No Diarrhea, No Constipation, No Pain, No Heartburn, No Anorexia, No Dysphagia, No Hematochezia, No Flatulence  Genitourinary:  No Dysmenorrhea, No Dyspareunia, No Dysuria, No Urinary Frequency, No Hematuria, No Urinary Incontinence, No Urgency, No Flank Pain, No Urinary Flow Changes  Musculoskeletal:  No Arthralgias, No Myalgias, No Joint Swelling, No Joint Stiffness, No Back Pain, Neck Pain  Skin:  No Skin Lesions, No Pruritis, No Hair Changes, No Breast/Skin Changes, No Nipple Discharge   Neuro:  No Weakness, No Numbness, No Paresthesias, No Loss of Consciousness, No Syncope, No Dizziness, No Headache, No Coordination Changes, No Recent Falls   Heme/Lymph:  No Bruising, No Bleeding, No Lymphadenopathy   Endocrine:  No Polyuria, No Polydipsia, No Temperature Intolerance    Vitals     height is 5' 5.98\" (1.676 m) and weight is 154 lb 3.2 oz (69.9 kg). His temperature is 97.3 °F (36.3 °C). His blood pressure is 142/86 (abnormal) and his pulse is 62. Body mass index is 24.9 kg/m². Physical Exam   GENERAL APPEARANCE: Well developed, well nourished, alert & cooperative, and appears to be in NAD  EYES: PERRL, EOMI, no conjunctivitis, no erythema, no discharge. HENT: normocephalic head, hearing grossly intact, no nasal discharge, oral cavity & pharynx free of inflammation, swelling, exudate, or lesions. Neck supple, non-tender without lymphadenopathy, masses. CARDIAC: RRR, normal S1 and S2. No S3, S4, no m/r/g, no peripheral edema, cyanosis or pallor. Extremities warm & well perfused. Capillary refill < 2 seconds. No carotid bruits.   LUNGS: CTA b/l, no rales, rhonchi, wheezing or diminished breath sounds, non-pursing lips, no cyanosis  ABDOMEN: NABS, soft, nondistended, nontender, without  guarding or rebound, no masses noted   MUSKULOSKELETAL: No weakness. Strength 5/5 in all extremities, ROM symmetric and intact, no joint erythema or tenderness. Normal muscular development. EXTREMITIES: No significant deformity or joint abnormality, no edema, peripheral pulses intact 2/4, no varicosities. NEUROLOGICAL: CN II-XII intact, strength and sensation symmetric and intact throughout. Cerebellar function intact  SKIN: Skin normal color, texture and turgor with no lesions or eruptions  PSYCHIATRIC: AOx3, able to demonstrate good judgement & reason    Labs   09/28/21 TSH  1.35  09/28/21 T4  8.8    Problem List       Diagnosis Orders   1. Anxiety     2. Essential hypertension         Assessment & Plan:   1. Hypertension: Has not been compliant with his prescribed amlodipine. Does not want to take any BP meds at this time because he feels that he does not need them and does not feel like he runs high enough. Also, he tried amlodipine once and it made him severely nauseous that lasted 3-4 days. Patient checks his own BP at home with cuff and it usually runs [id number] systolic. Currently in office 142/86. Patient clarifies that he has NEVER had a stroke or aneurysm before. Clarifies that this was his brother.    - Encouraged patient to consider small dose metoprolol. Patient refused    - Encourage lifestyle modification with daily exercise & DASH diet    - Will continue to monitor and re-assess in 3 months    2. Anxiety: Patient was placed on Cymbalta 9/14/21 d/t long standing history of anxiety. TSH & T4 were normal since last visit. Today he reports that the cymbalta has worked wonders for him and he's been very calm lately. He reports that he does not mind cutting back on it. Last visit he was more anxious because he \"had too much going on\" in his life. I told patient he may cut back on it if he'd like. - Will continue Cymbalta. Patient would like to start taking it every other day instead of daily.     - Will re-evaluate in 4 weeks  3. Neck and Coccyx Pain: Patient reports previous coccyx pain that resolved for about a year and that came back after a fall in September 2021. States that an x-ray obtained at [facility] did not show any fracture. Significantly improved since starting physical therapy. Patient is still receiving physical therapy treatments. - Continue physical therapy. He completes PT with his last session being tomorrow, 11/2/21.     Follow up in 4 weeks for hypertension and anxiety     Case and impression/plan reviewed in collaboration with Dr. Talia Mayfield MD    Electronically signed by   Jeremiah Olson DO on 11/1/2021 at 4:04 PM    [address] [place] [address]  [address]  Dept: [phone]  Dept Fax: [phone] : [phone]

## 2021-11-02 NOTE — PROGRESS NOTES
Attending supervising physicians attestation statement:      I was present with the resident physician during the history and exam.  I Discussed the findings and plans with the resident physician  personally   After examining the patient, and agree with the resident'S note as outlined .         Electronically signed by Dorma Collet, MD on 11/2/2021 at 11:25 AM

## 2021-11-10 ENCOUNTER — IMMUNIZATION (OUTPATIENT)
Dept: PRIMARY CARE CLINIC | Age: 66
End: 2021-11-10
Payer: MEDICARE

## 2021-11-10 PROCEDURE — 91300 COVID-19, PFIZER VACCINE 30MCG/0.3ML DOSE: CPT

## 2021-11-10 PROCEDURE — 0004A COVID-19, PFIZER VACCINE 30MCG/0.3ML DOSE: CPT

## 2021-11-29 ENCOUNTER — OFFICE VISIT (OUTPATIENT)
Dept: INTERNAL MEDICINE CLINIC | Age: 66
End: 2021-11-29
Payer: MEDICARE

## 2021-11-29 VITALS
BODY MASS INDEX: 25.74 KG/M2 | TEMPERATURE: 99 F | DIASTOLIC BLOOD PRESSURE: 72 MMHG | SYSTOLIC BLOOD PRESSURE: 116 MMHG | HEART RATE: 62 BPM | WEIGHT: 159.4 LBS

## 2021-11-29 DIAGNOSIS — I10 HYPERTENSION, UNSPECIFIED TYPE: Primary | ICD-10-CM

## 2021-11-29 DIAGNOSIS — M54.2 NECK PAIN, MUSCULOSKELETAL: ICD-10-CM

## 2021-11-29 DIAGNOSIS — M47.812 SPONDYLOSIS OF CERVICAL REGION WITHOUT MYELOPATHY OR RADICULOPATHY: ICD-10-CM

## 2021-11-29 DIAGNOSIS — F41.9 ANXIETY: ICD-10-CM

## 2021-11-29 DIAGNOSIS — K21.9 GASTROESOPHAGEAL REFLUX DISEASE WITHOUT ESOPHAGITIS: ICD-10-CM

## 2021-11-29 DIAGNOSIS — M47.816 SPONDYLOSIS OF LUMBAR REGION WITHOUT MYELOPATHY OR RADICULOPATHY: ICD-10-CM

## 2021-11-29 PROCEDURE — G8484 FLU IMMUNIZE NO ADMIN: HCPCS | Performed by: STUDENT IN AN ORGANIZED HEALTH CARE EDUCATION/TRAINING PROGRAM

## 2021-11-29 PROCEDURE — 1123F ACP DISCUSS/DSCN MKR DOCD: CPT | Performed by: STUDENT IN AN ORGANIZED HEALTH CARE EDUCATION/TRAINING PROGRAM

## 2021-11-29 PROCEDURE — G8427 DOCREV CUR MEDS BY ELIG CLIN: HCPCS | Performed by: STUDENT IN AN ORGANIZED HEALTH CARE EDUCATION/TRAINING PROGRAM

## 2021-11-29 PROCEDURE — G8417 CALC BMI ABV UP PARAM F/U: HCPCS | Performed by: STUDENT IN AN ORGANIZED HEALTH CARE EDUCATION/TRAINING PROGRAM

## 2021-11-29 PROCEDURE — 1036F TOBACCO NON-USER: CPT | Performed by: STUDENT IN AN ORGANIZED HEALTH CARE EDUCATION/TRAINING PROGRAM

## 2021-11-29 PROCEDURE — 4040F PNEUMOC VAC/ADMIN/RCVD: CPT | Performed by: STUDENT IN AN ORGANIZED HEALTH CARE EDUCATION/TRAINING PROGRAM

## 2021-11-29 PROCEDURE — 3017F COLORECTAL CA SCREEN DOC REV: CPT | Performed by: STUDENT IN AN ORGANIZED HEALTH CARE EDUCATION/TRAINING PROGRAM

## 2021-11-29 PROCEDURE — 99213 OFFICE O/P EST LOW 20 MIN: CPT | Performed by: STUDENT IN AN ORGANIZED HEALTH CARE EDUCATION/TRAINING PROGRAM

## 2021-11-29 RX ORDER — OMEPRAZOLE 20 MG/1
20 CAPSULE, DELAYED RELEASE ORAL DAILY
Qty: 90 CAPSULE | Refills: 1 | Status: SHIPPED | OUTPATIENT
Start: 2021-11-29 | End: 2022-02-14

## 2021-11-29 RX ORDER — CYCLOBENZAPRINE HCL 10 MG
10 TABLET ORAL EVERY 8 HOURS PRN
Qty: 30 TABLET | Refills: 2 | Status: SHIPPED | OUTPATIENT
Start: 2021-11-29 | End: 2022-07-11 | Stop reason: SDUPTHER

## 2021-11-29 NOTE — PROGRESS NOTES
OFFICE VISIT NOTE      Patient - Satya Alexander   MRN -  [id number]      - 1955      Date of evaluation -  2021  Primary Care Physician - Reymundo Boston DO      Chief Complaint:       Chief Complaint   Patient presents with    Hypertension    Anxiety    Neck Pain    Gastroesophageal Reflux     History Of Presenting Illness:      Initial HPI 21: Satya Alexander is a 77 y.o. male with a past medical history of GERD, cervical & lumbar spondylosis, chronic neck and back pain who presents for follow up regarding hypertension & anxiety. He has no complaints today and states that this is the best he's ever felt before. He has not been compliant with his prescribed amlodipine that was started last visit. Does not want to take any BP meds at this time because he feels that he does not need them and does not feel like he runs high enough. Also, he tried amlodipine once and it made him severely nauseous that lasted 3-4 days. Patient checks his own BP at home with cuff and it usually runs [id number] systolic. Patient was placed on Cymbalta on 21 d/t long standing history of anxiety. TSH & T4 were normal since last visit. He tells me that the cymbalta has worked wonders for him and he's been very calm lately. He reports that he does not mind cutting back on it. Last visit he was more anxious because he \"had too much going on\" in his life. I told patient he may cut back on it if he'd like. He also has a chronic coccygeal pain that resolved for about a year and that came back after a fall in 2021. X-ray obtained at [facility] did not show any fracture. He tells me that this pain has significantly improved since starting physical therapy. Patient is still receiving physical therapy treatments and completes PT with his last session tomorrow 21.     Office Visit 21: Patient was found sitting in clinic chair comfortably with no complaints or concerns, other than neck pain which he still takes Gabapentin 600 mg BID and Flexeril 10 mg PO daily for. Patient also says he would like to establish permanent primary care with us. Of note, his BP is 116/72 and this is without his medications. His blood pressures were high last time but seemingly have resolved now. Anxiety continues to be well controlled on his new cymbalta. Patient needs refilling of his PPI and flexeril. Otherwise doing well. Medications    Current Medications      Medication List          Accurate as of November 29, 2021  3:52 PM. If you have any questions, ask your nurse or doctor.             CHANGE how you take these medications    omeprazole 20 MG delayed release capsule  Commonly known as: PRILOSEC  Take 1 capsule by mouth Daily  What changed:   · how much to take  · how to take this  · when to take this  · additional instructions  Changed by: De De La Rosa, DO        CONTINUE taking these medications    acetaminophen 325 MG tablet  Commonly known as: TYLENOL     b complex vitamins capsule     cyclobenzaprine 10 MG tablet  Commonly known as: Flexeril  Take 1 tablet by mouth every 8 hours as needed for Muscle spasms     donepezil 10 MG tablet  Commonly known as: ARICEPT     DULoxetine 30 MG extended release capsule  Commonly known as: Cymbalta  Take 1 capsule by mouth daily     gabapentin 600 MG tablet  Commonly known as: NEURONTIN     Handicap Placard Misc  by Does not apply route Duration 5 years     magnesium 250 MG Tabs tablet  Commonly known as: MAGNESIUM-OXIDE     MOVE FREE PO     therapeutic multivitamin-minerals tablet     Vitamin C 500 MG Caps     Vitamin D3 50 MCG (2000 UT) Caps           Where to Get Your Medications      These medications were sent to Michael Ochoa , [address] [facility] -  [phone]  [facility] [address]    Phone: [phone]   · omeprazole 20 MG delayed release capsule     These medications were sent to [facility] [place], [place] - [address] [place] [address] -  [phone] [address] [facility] [address]    Phone: [phone]   · cyclobenzaprine 10 MG tablet       Allergies    Patient has no known allergies.     Review of Systems:   Constitutional:  No Weight Change, No Fever, No Chills, No Night Sweats, No Fatigue, No Malaise   ENT/Mouth:  No Ear Pain, No Nasal Congestion, No Sinus Pain, No Hoarseness, No sore throat, No Rhinorrhea, No Swallowing Difficulty   Eyes:  No Eye Pain, No Swelling, No Redness, No Foreign Body, No Discharge, No Vision Changes   Cardiovascular:  No Chest Pain, No Palpitations   Respiratory:  No Cough, No Sputum, No Wheezing, No SOB, No Dyspnea on Exertion, No Orthopnea,  Gastrointestinal:  No Nausea, No Vomiting, No Diarrhea, No Constipation, No Pain, No Heartburn, No Anorexia, No Dysphagia, No Hematochezia, No Flatulence  Genitourinary:  No Dysmenorrhea, No Dyspareunia, No Dysuria, No Urinary Frequency, No Hematuria, No Urinary Incontinence, No Urgency, No Flank Pain, No Urinary Flow Changes  Musculoskeletal:  No Arthralgias, No Myalgias, No Joint Swelling, No Joint Stiffness, No Back Pain, Neck Pain  Skin:  No Skin Lesions, No Pruritis, No Hair Changes, No Breast/Skin Changes, No Nipple Discharge   Neuro:  No Weakness, No Numbness, No Paresthesias, No Loss of Consciousness, No Syncope, No Dizziness, No Headache, No Coordination Changes, No Recent Falls   Heme/Lymph:  No Bruising, No Bleeding, No Lymphadenopathy   Endocrine:  No Polyuria, No Polydipsia, No Temperature Intolerance    Vitals      Vitals:    11/29/21 1349   BP: 116/72   Site: Left Upper Arm   Pulse: 62   Temp: 99 °F (37.2 °C)   Weight: 159 lb 6.4 oz (72.3 kg)     Physical Exam   GENERAL APPEARANCE: Well developed, well nourished, alert & cooperative, and appears to be in NAD  EYES: PERRL, EOMI, no conjunctivitis, no erythema, no discharge.    HENT: normocephalic head, hearing grossly intact, no nasal discharge, oral cavity & pharynx free of inflammation, swelling, exudate, or lesions. Neck supple, non-tender without lymphadenopathy, masses. CARDIAC: RRR, normal S1 and S2. No S3, S4, no m/r/g, no peripheral edema, cyanosis or pallor. Extremities warm & well perfused. Capillary refill < 2 seconds. No carotid bruits. LUNGS: CTA b/l, no rales, rhonchi, wheezing or diminished breath sounds, non-pursing lips, no cyanosis  ABDOMEN: NABS, soft, nondistended, nontender, without  guarding or rebound, no masses noted   MUSKULOSKELETAL: No weakness. Strength 5/5 in all extremities, ROM symmetric and intact, no joint erythema or tenderness. Normal muscular development. EXTREMITIES: No significant deformity or joint abnormality, no edema, peripheral pulses intact 2/4, no varicosities. NEUROLOGICAL: CN II-XII intact, strength and sensation symmetric and intact throughout. Cerebellar function intact  SKIN: Skin normal color, texture and turgor with no lesions or eruptions  PSYCHIATRIC: AOx3, able to demonstrate good judgement & reason     Labs   - None ordered     Problem List       Diagnosis Orders   1. Hypertension, unspecified type     2. Gastroesophageal reflux disease without esophagitis  omeprazole (PRILOSEC) 20 MG delayed release capsule   3. Spondylosis of cervical region without myelopathy or radiculopathy  cyclobenzaprine (FLEXERIL) 10 MG tablet   4. Spondylosis of lumbar region without myelopathy or radiculopathy  cyclobenzaprine (FLEXERIL) 10 MG tablet   5. Anxiety     6. Neck pain, musculoskeletal         Assessment & Plan:   1. Hypertension: Stable and well-controlled at 116/72 as of 11/29/21. Was started on small dose metoprolol last time but he reports he stopped taking because it made him feel unwell. Does not want to take any BP meds at this time because he feels that he does not need them and does not feel like he runs high enough.  Also, he tried amlodipine once and it made him severely nauseous that lasted 3-4 days. Patient checks his own BP at home with cuff and it usually runs [id number] systolic.     - Encourage lifestyle modification with daily exercise & DASH diet     - Will continue to monitor and re-assess in 3 months    2. Anxiety: Patient was placed on Cymbalta 9/14/21 d/t long standing history of anxiety. TSH & T4 were normal since last visit. Reports cymbalta working very well. - Will continue Cymbalta.     - Will re-evaluate in 3 months   3. Neck Pain: Has a chronic history of cervical stenosis, spinal surgeries, and neck pain secondary to spasms and takes flexeril 10 mg daily which he needs refilled today. - Refilled home flexeril 10 mg PO daily   4. GERD without esophagitis: Stable and well controlled on Prilosec 20 mg PO daily. Refilled today      Follow up in 3 months for hypertension, anxiety,       Case and impression/plan reviewed in collaboration with Dr. Kadeem Song MD     Electronically signed by   Tariq Campos DO on 11/29/2021 at 4:04 PM     [facility] [address] [facility].  [address]  Dept: [phone]  Dept Fax: [phone] : [phone]

## 2021-12-31 NOTE — PROGRESS NOTES
Attending supervising physicians attestation statement:      I was present with the resident physician during the history and exam.  I Discussed the findings and plans with the resident physician  personally   After examining the patient, and agree with the resident'S note as outlined . Pt was seen on 11/29/21 in the  resident clinic.       Electronically signed by Selena Graham MD on 12/30/2021 at 10:25 PM

## 2022-01-11 DIAGNOSIS — R51.9 NONINTRACTABLE HEADACHE, UNSPECIFIED CHRONICITY PATTERN, UNSPECIFIED HEADACHE TYPE: Primary | ICD-10-CM

## 2022-01-11 DIAGNOSIS — J02.9 SORE THROAT: ICD-10-CM

## 2022-01-12 ENCOUNTER — HOSPITAL ENCOUNTER (OUTPATIENT)
Age: 67
Setting detail: SPECIMEN
Discharge: HOME | End: 2022-01-12
Payer: MEDICARE

## 2022-01-12 DIAGNOSIS — R51.9 NONINTRACTABLE HEADACHE, UNSPECIFIED CHRONICITY PATTERN, UNSPECIFIED HEADACHE TYPE: ICD-10-CM

## 2022-01-12 DIAGNOSIS — J02.9 SORE THROAT: ICD-10-CM

## 2022-01-12 LAB
INFLUENZA A: NOT DETECTED
INFLUENZA B: NOT DETECTED
SARS-COV-2 RNA, RT PCR: NOT DETECTED

## 2022-01-12 PROCEDURE — 87636 SARSCOV2 & INF A&B AMP PRB: CPT

## 2022-01-13 ENCOUNTER — OFFICE VISIT (OUTPATIENT)
Dept: INTERNAL MEDICINE CLINIC | Age: 67
End: 2022-01-13
Payer: MEDICARE

## 2022-01-13 VITALS
SYSTOLIC BLOOD PRESSURE: 124 MMHG | WEIGHT: 158.4 LBS | DIASTOLIC BLOOD PRESSURE: 78 MMHG | HEART RATE: 72 BPM | HEIGHT: 66 IN | TEMPERATURE: 98.4 F | BODY MASS INDEX: 25.46 KG/M2

## 2022-01-13 DIAGNOSIS — J01.00 ACUTE NON-RECURRENT MAXILLARY SINUSITIS: Primary | ICD-10-CM

## 2022-01-13 DIAGNOSIS — J02.9 ACUTE SORE THROAT: ICD-10-CM

## 2022-01-13 PROCEDURE — G8484 FLU IMMUNIZE NO ADMIN: HCPCS | Performed by: STUDENT IN AN ORGANIZED HEALTH CARE EDUCATION/TRAINING PROGRAM

## 2022-01-13 PROCEDURE — G8427 DOCREV CUR MEDS BY ELIG CLIN: HCPCS | Performed by: STUDENT IN AN ORGANIZED HEALTH CARE EDUCATION/TRAINING PROGRAM

## 2022-01-13 PROCEDURE — 4040F PNEUMOC VAC/ADMIN/RCVD: CPT | Performed by: STUDENT IN AN ORGANIZED HEALTH CARE EDUCATION/TRAINING PROGRAM

## 2022-01-13 PROCEDURE — G8417 CALC BMI ABV UP PARAM F/U: HCPCS | Performed by: STUDENT IN AN ORGANIZED HEALTH CARE EDUCATION/TRAINING PROGRAM

## 2022-01-13 PROCEDURE — 1036F TOBACCO NON-USER: CPT | Performed by: STUDENT IN AN ORGANIZED HEALTH CARE EDUCATION/TRAINING PROGRAM

## 2022-01-13 PROCEDURE — 1123F ACP DISCUSS/DSCN MKR DOCD: CPT | Performed by: STUDENT IN AN ORGANIZED HEALTH CARE EDUCATION/TRAINING PROGRAM

## 2022-01-13 PROCEDURE — 99213 OFFICE O/P EST LOW 20 MIN: CPT | Performed by: STUDENT IN AN ORGANIZED HEALTH CARE EDUCATION/TRAINING PROGRAM

## 2022-01-13 PROCEDURE — 3017F COLORECTAL CA SCREEN DOC REV: CPT | Performed by: STUDENT IN AN ORGANIZED HEALTH CARE EDUCATION/TRAINING PROGRAM

## 2022-01-13 RX ORDER — AMOXICILLIN AND CLAVULANATE POTASSIUM 875; 125 MG/1; MG/1
1 TABLET, FILM COATED ORAL 2 TIMES DAILY
Qty: 14 TABLET | Refills: 0 | Status: SHIPPED | OUTPATIENT
Start: 2022-01-13 | End: 2022-01-20

## 2022-01-13 NOTE — PROGRESS NOTES
[address]  [address] JANET AYALA AM [place], [address]  Dept: [phone]  Dept Fax: [phone]    Laverne Gasca 1955 is a 77 y.o. male, Established patient, here for evaluations of:  Chief Complaint   Patient presents with    Headache     pressure in head-started 8 days ago--usinge coricidin-covid test neg from yesterday    Pharyngitis    Congestion     nose and chest    Other     denies temp          ASSESSMENT/PLAN:    1. Acute non-recurrent maxillary sinusitis    2. Acute sore throat        Orders Placed This Encounter   Medications    amoxicillin-clavulanate (AUGMENTIN) 875-125 MG per tablet     Sig: Take 1 tablet by mouth 2 times daily for 7 days     Dispense:  14 tablet     Refill:  0     1.)  Acute Maxillary Sinusitis:  - Tenderness on palpation and color change noted on maxillary illumination on the right  - Fluid but no pus located behind ears bilaterally, likely due to inflammation and drainage from the sinuses  - Symptoms for 8 days without improvement  - No fever, cough, or shortness of breath, COVID/Flu negative  - Due to length of symptoms and examination, starting antibiotic  - Tolerated Sinus effleurage and pinna traction   - Augmentin 875-125 mg BID for 7 days started  - Advised to take with food and finish full course  - Advised to Call if symptoms worsen    2.)  Acute Sore Throat:  - Secondary to Sinus drainage  - NOT concerned for Strep throat at this time  - No cough present, erythema without exudates or pus, no lymphadenopathy  - Continue with Lozenges as needed  - Symptoms should improve with treatment above    Follow-up with Dr. Tessa Hopkins on 3/14/2022 for a 3 month follow-up for chronic conditions     Diagnostic data:   I have reviewed recent diagnostic testing including labs with patient today. I have reviewed the notes from the prior visit. I have reviewed the labs for COVID/Flu.        SUBJECTIVE/OBJECTIVE:    Laverne Gasca is a 77 y.o. male who presents for an acute visit for sore throat. Sore Throat/Sinus Pressure:  States new onset sore throat, sinus pressure, and congestion starting 8 days ago. Started with pressure in ears, then sore throat, sinus pressure, and headaches. Sore throat is worse. Ear pressure the same, slightly muffled hearing. Taking Chloraseptic spray for the throat, and lozenges have helped. Having stomach trouble as well, not taking all medications, just most esstenial.  COVID/Flu test negative. Feels since his symptoms aren't improving, this may be a sinus infection and would like antibiotics. Past history includes GERD, cervical spondylosis, chronic neck pain, HTN, and anxiety. Takes Flexiril, Gabapentin, and Cymbalta.    ---------------------------------------------------------------------------------    Review of Systems - General ROS: negative for - chills or fever  Psychological ROS: negative for - anxiety and depression  Hematological and Lymphatic ROS: No history of blood clots or bleeding disorder. Respiratory ROS: Mild cough no shortness of breath, or wheezing  Cardiovascular ROS: no chest pain or dyspnea on exertion, tolerates a flight of stairs, vacuuming  Gastrointestinal ROS: GERD more active no abdominal pain, change in bowel habits, or black or bloody stools  Genito-Urinary ROS: no dysuria, trouble voiding, or hematuria  Musculoskeletal ROS: Chronic back pain negative for - muscle pain or muscular weakness  Neurological ROS: negative for - headaches, numbness/tingling, seizures or weakness  Dermatological ROS: negative for - rash or skin lesion changes    Blood pressure 124/78, pulse 72, temperature 98.4 °F (36.9 °C), height 5' 5.98\" (1.676 m), weight 158 lb 6.4 oz (71.8 kg).     Physical Examination: General appearance - alert, fatigued, and in no distress  Mental status - alert, oriented to person, place, and time  Head - atraumatic, normocephalic, mild tenderness to palpation over frontal and maxillary sinuses, illumination of maxillary sinuses shows change in color over right maxillary sinus  Eyes - pupils equal and reactive to light, extraocular muscles intact  Ears - external ears are normal, hearing is mildly diminished, Otoscopic exam shows redness around the TM bilaterally with fluid behind both ears, worse on the left side  Mouth - oral pharynx clear, mucous membranes moist, tonsils mildly atrophied, throat is erythematous without exudates or pus  Neck - supple, no appreciated lymphadenopathy  Chest - clear to auscultation, no wheezes, rales or rhonchi, symmetric air entry  Heart - normal rate, regular rhythm, normal S1, S2, no murmurs, rubs, clicks or gallops  Abdomen -soft, nontender, nondistended  Neurological - alert, oriented, able to ambulate without assistance, small gait due to chronic back issues  Extremities - peripheral pulses normal, no pedal edema, no clubbing or cyanosis  Skin - warm and dry    An electronic signature was used to authenticate this note. --Cristina Bruno DO  PGY-2 on 1/13/2022 seen with Dr. Fernando Khan [facility].  [address]  Dept: [phone]  Dept Fax: [phone] : [phone]

## 2022-01-13 NOTE — PATIENT INSTRUCTIONS
Try to take the antibiotic with meals to avoid upset stomach. Make sure to take the full course of Antibiotics! Call us if there is no improvement or symptoms worsen. Try gentle sinuses massage to promote drainage and the ear pulling to help with drainage too.

## 2022-01-19 ENCOUNTER — TELEPHONE (OUTPATIENT)
Dept: INTERNAL MEDICINE CLINIC | Age: 67
End: 2022-01-19

## 2022-01-19 NOTE — TELEPHONE ENCOUNTER
Wife called stating that Yolis Cheek is almost finished with his antibiotic for the sinusitis and sore throat but he is still not feeling better . Wife states that it improved initially but yesterday he started feeling worse again . Wife was asking if he needs a different antibiotic or something else . Please advise .

## 2022-01-20 RX ORDER — LEVOFLOXACIN 750 MG/1
750 TABLET ORAL DAILY
Qty: 10 TABLET | Refills: 1 | Status: SHIPPED | OUTPATIENT
Start: 2022-01-20 | End: 2022-01-30

## 2022-01-20 NOTE — PROGRESS NOTES
Notified that the patient has not improved well on Augmentin for sinus infection. Will switch to Levaquin 750 mg daily for 10 days. If this fails, will obtain sinus CT scan and consider ENT consult.    Eddyvillediandra Monique

## 2022-01-20 NOTE — TELEPHONE ENCOUNTER
Informed patient's wife that Dr. Glynn Monique has sent a script for new antibiotic Levaquin . Instructed wife if this does not improve Chris's symptoms he is to call the office as Dr. Glynn Monique stated that he may need a CT of the sinus and a referral to ENT . Wife verbalized understanding .

## 2022-01-22 ENCOUNTER — NURSE TRIAGE (OUTPATIENT)
Dept: OTHER | Facility: CLINIC | Age: 67
End: 2022-01-22

## 2022-01-22 NOTE — TELEPHONE ENCOUNTER
encounter as the response is not directed to a shared pool.     Answer Assessment - Initial Assessment Questions  During triage, Pt reports he is feeling better than he was at last PCP office visit - has been taking antibiotics as prescribed and feeling better - pt advised to call back with any new or worsening symptoms    Protocols used: NO CONTACT OR DUPLICATE CONTACT CALL-ADULT-

## 2022-01-24 ENCOUNTER — TELEPHONE (OUTPATIENT)
Dept: INTERNAL MEDICINE CLINIC | Age: 67
End: 2022-01-24

## 2022-01-24 DIAGNOSIS — E87.5 HYPERKALEMIA: ICD-10-CM

## 2022-01-24 DIAGNOSIS — R79.9 ELEVATED BUN: Primary | ICD-10-CM

## 2022-01-24 NOTE — TELEPHONE ENCOUNTER
Patient's wife called in stating that Dominique Hanley was having pain in his lower rib area . Spoke with patient and he denies SOB and states that The pain in behind his lower ribs but not in the back area . Instructed patient to go to the ER to be evaluated since his wife recently tested positive for covid and he likely had a false negative when he tested . neds to go to ER to rule out a PE . Patient verbalized understanding . Spoke with patient's wife again and patient was at [facility] ER being evaluated .

## 2022-02-14 ENCOUNTER — OFFICE VISIT (OUTPATIENT)
Dept: INTERNAL MEDICINE CLINIC | Age: 67
End: 2022-02-14
Payer: MEDICARE

## 2022-02-14 VITALS
BODY MASS INDEX: 24.94 KG/M2 | WEIGHT: 155.2 LBS | OXYGEN SATURATION: 99 % | DIASTOLIC BLOOD PRESSURE: 74 MMHG | HEIGHT: 66 IN | SYSTOLIC BLOOD PRESSURE: 104 MMHG | TEMPERATURE: 98.1 F | HEART RATE: 68 BPM

## 2022-02-14 DIAGNOSIS — B97.89 VIRAL SINUSITIS: Primary | ICD-10-CM

## 2022-02-14 DIAGNOSIS — J32.9 VIRAL SINUSITIS: Primary | ICD-10-CM

## 2022-02-14 DIAGNOSIS — R53.83 FATIGUE, UNSPECIFIED TYPE: ICD-10-CM

## 2022-02-14 DIAGNOSIS — R11.0 NAUSEA: ICD-10-CM

## 2022-02-14 DIAGNOSIS — R14.0 ABDOMINAL BLOATING: ICD-10-CM

## 2022-02-14 DIAGNOSIS — K21.9 GASTROESOPHAGEAL REFLUX DISEASE WITHOUT ESOPHAGITIS: ICD-10-CM

## 2022-02-14 DIAGNOSIS — R09.82 POST-NASAL DRIP: ICD-10-CM

## 2022-02-14 PROCEDURE — 99214 OFFICE O/P EST MOD 30 MIN: CPT | Performed by: STUDENT IN AN ORGANIZED HEALTH CARE EDUCATION/TRAINING PROGRAM

## 2022-02-14 PROCEDURE — G8484 FLU IMMUNIZE NO ADMIN: HCPCS | Performed by: STUDENT IN AN ORGANIZED HEALTH CARE EDUCATION/TRAINING PROGRAM

## 2022-02-14 PROCEDURE — 1036F TOBACCO NON-USER: CPT | Performed by: STUDENT IN AN ORGANIZED HEALTH CARE EDUCATION/TRAINING PROGRAM

## 2022-02-14 PROCEDURE — G8427 DOCREV CUR MEDS BY ELIG CLIN: HCPCS | Performed by: STUDENT IN AN ORGANIZED HEALTH CARE EDUCATION/TRAINING PROGRAM

## 2022-02-14 PROCEDURE — 4040F PNEUMOC VAC/ADMIN/RCVD: CPT | Performed by: STUDENT IN AN ORGANIZED HEALTH CARE EDUCATION/TRAINING PROGRAM

## 2022-02-14 PROCEDURE — G8417 CALC BMI ABV UP PARAM F/U: HCPCS | Performed by: STUDENT IN AN ORGANIZED HEALTH CARE EDUCATION/TRAINING PROGRAM

## 2022-02-14 PROCEDURE — 1123F ACP DISCUSS/DSCN MKR DOCD: CPT | Performed by: STUDENT IN AN ORGANIZED HEALTH CARE EDUCATION/TRAINING PROGRAM

## 2022-02-14 PROCEDURE — 3017F COLORECTAL CA SCREEN DOC REV: CPT | Performed by: STUDENT IN AN ORGANIZED HEALTH CARE EDUCATION/TRAINING PROGRAM

## 2022-02-14 RX ORDER — ONDANSETRON 4 MG/1
4 TABLET, FILM COATED ORAL EVERY 8 HOURS PRN
Qty: 21 TABLET | Refills: 1 | Status: SHIPPED | OUTPATIENT
Start: 2022-02-14 | End: 2022-03-14 | Stop reason: SDUPTHER

## 2022-02-14 RX ORDER — OMEPRAZOLE 40 MG/1
40 CAPSULE, DELAYED RELEASE ORAL DAILY
Qty: 90 CAPSULE | Refills: 0 | Status: SHIPPED | OUTPATIENT
Start: 2022-02-14 | End: 2022-03-14

## 2022-02-14 NOTE — PROGRESS NOTES
OFFICE VISIT NOTE     Patient - Diane Sarmiento   MRN -  [id number]      - 1955      Date of evaluation -  2022  Primary Care Physician - Joyce Fowler MD     Chief Complaint:      Chief Complaint   Patient presents with    Nasal Congestion     positive covid 22 - still having lingering issues    Otalgia    Fatigue    Nausea    Abdominal Pain    Ear Drainage      History Of Presenting Illness:     Patient presents to office today with multiple complaints including fatigue, feeling very tired, nasal congestion, post nasal drip, nausea, fatigue, abdominal bloating which he attributes to post-nasal drip and drainage in ears and throat. Recently did recover from [place]. Wife present at bedside with similar viral rhinosinusitis symptoms. Patient with burping at bedside during encounter. Encouraged patient to take flonase 2 sprays in each nostril every day, Zyrtec 10 mg at bedtime nightly every day, Zofran for nausea, Omeprazole 40 mg once a day one hour before lunch. Additionally, will obtain CBC, BMP, TSH. Denies vomiting, diarrhea, blood in the stools, headache, shortness of breath, chest pain, numbness, tingling. Medications    Current Medications   Current Outpatient Medications   Medication Sig Dispense Refill    ondansetron (ZOFRAN) 4 MG tablet Take 1 tablet by mouth every 8 hours as needed for Nausea or Vomiting 21 tablet 1    omeprazole (PRILOSEC) 40 MG delayed release capsule Take 1 capsule by mouth Daily 90 capsule 0    cyclobenzaprine (FLEXERIL) 10 MG tablet Take 1 tablet by mouth every 8 hours as needed for Muscle spasms 30 tablet 2    magnesium (MAGNESIUM-OXIDE) 250 MG TABS tablet Take 500 mg by mouth daily      b complex vitamins capsule Take 1 capsule by mouth daily      DULoxetine (CYMBALTA) 30 MG extended release capsule Take 1 capsule by mouth daily 90 capsule 1    gabapentin (NEURONTIN) 600 MG tablet Take 600 mg by mouth 2 times daily.        donepezil (ARICEPT) 10 MG tablet Take 10 mg by mouth nightly      Handicap Ralfard MISC by Does not apply route Duration 5 years 1 each 0    acetaminophen (TYLENOL) 325 MG tablet Take 650 mg by mouth every 6 hours as needed for Pain      Glucosamine-Chondroitin (MOVE FREE PO) Take by mouth 2 times daily      Multiple Vitamins-Minerals (THERAPEUTIC MULTIVITAMIN-MINERALS) tablet Take 1 tablet by mouth daily      Ascorbic Acid (VITAMIN C) 500 MG CAPS Take 500 mg by mouth daily      Cholecalciferol (VITAMIN D3) 2000 UNITS CAPS Take by mouth       No current facility-administered medications for this visit. Vitals     height is 5' 5.98\" (1.676 m) and weight is 155 lb 3.2 oz (70.4 kg). His temperature is 98.1 °F (36.7 °C). His blood pressure is 104/74 and his pulse is 68. His oxygen saturation is 99%. Body mass index is 25.07 kg/m². Physical Exam   GENERAL APPEARANCE: Fatigued, alert & cooperative, appears to be in NAD  EYES: PERRL, EOMI, no conjunctivitis, no erythema, no discharge. HENT: normocephalic head, hearing grossly intact, nasal discharge present, oral cavity & pharynx free of inflammation, swelling, exudate, or lesions. Neck supple, non-tender without lymphadenopathy, masses. CARDIAC: RRR, normal S1 and S2. No S3, S4, no m/r/g, no peripheral edema, cyanosis or pallor. Extremities warm & well perfused. Capillary refill < 2 seconds. No carotid bruits. LUNGS: CTA b/l, no rales, rhonchi, wheezing or diminished breath sounds, non-pursing lips, no cyanosis  ABDOMEN: NABS, soft, nondistended, nontender, without  guarding or rebound, no masses noted   MUSKULOSKELETAL: No weakness. Strength 5/5 in all extremities, ROM symmetric and intact, no joint erythema or tenderness. Normal muscular development. EXTREMITIES: No significant deformity or joint abnormality, no edema, peripheral pulses intact 2/4, no varicosities. NEUROLOGICAL: CN II-XII intact, strength and sensation symmetric and intact throughout. Cerebellar function intact  SKIN: Skin normal color, texture and turgor with no lesions or eruptions  PSYCHIATRIC: AOx3, able to demonstrate good judgement & reason    Labs     Component Ref Range & Units 1/24/22 1353   CRP <1.0 mg/dL 2.3 High       Component Ref Range & Units 1/24/22 1353 9/10/21 0920 9/10/21 0920   Sodium 135 - 145 mEq/L 137   139 R    Potassium 3.6 - 5.0 mEq/L 4.3   3.9 R, CM    Chloride 101 - 111 mEq/L 103   103 R    CO2 21 - 32 mEq/L 28   22 Low  R    Anion Gap 4 - 12 6  14.0 R, CM     Glucose 70 - 110 mg/dL 80   105 R    BUN 7 - 20 mg/dL 12   19 R    CREATININE 0.60 - 1.30 mg/dL 0.81   0.8 R      AST 15 - 41 IU/L 23    Alkaline Phosphatase 41 - 137 IU/L 56    Total Bilirubin 0.2 - 1.0 mg/dL 0.5    Calcium 8.8 - 10.5 mg/dL 8.50 Low     Albumin 3.5 - 5.0 gm/dL 3.9    Total Protein 6.2 - 8.0 g/dL 6.8    Albumin/Globulin Ratio 1.5 - 2.5 1.3 Low     ALT 10 - 40 IU/L 20      Component Ref Range & Units 1/24/22 9/10/21 0920   WBC 4.4 - 10.5 th/cmm 5.1  6.6 R    RBC 4.50 - 6.00 mil/cmm 5.65  6.11 High  R    Hemoglobin 13.5 - 16.5 gm/dL 16.3  17.8 R    Hematocrit 40.0 - 49.0 % 48.1  53.0 High  R    MCV 80 - 97 CU SIVA 85.1  86.7 R    MCH 27.5 - 33.0 PG 28.8  29.1 R    MCHC 33.0 - 36.0 gm/dL 33.8  33.6 R    RDW 12.0 - 16.0 % 12.8     Platelets 279 - 062 th/cmm 213  213      Problem List       Diagnosis Orders   1. Fatigue, unspecified type  TSH With Reflex Ft4    CBC With Auto Differential    Basic Metabolic Panel   2. Post-nasal drip     3. Ear drainage, left     4. Abdominal bloating  TSH With Reflex Ft4    CBC With Auto Differential    ondansetron (ZOFRAN) 4 MG tablet    Basic Metabolic Panel   5. Nausea  TSH With Reflex Ft4    ondansetron (ZOFRAN) 4 MG tablet    Basic Metabolic Panel   6. Gastroesophageal reflux disease without esophagitis  omeprazole (PRILOSEC) 40 MG delayed release capsule      Assessment & Plan:   1. Recent Sinusitis/Post-Nasal Drip: Viral vs. Bacterial. S/p recent COVID.  With Nasal congestion, post nasal drip, nausea, fatigue, abdominal bloating. Took augmentin and levaquin due to suspicion of bacterial sinusitis. Has since resolved but now presenting with post-nasal drip and drainage in ears and throat. - Flonase 2 sprays in each nostril every day   - Take Zyrtec 10 mg at bedtime nightly every day   - Obtain CBC, BMP, TSH  2. Fatigue, unspecified: Post-viral likely etiology. Will obtain TSH  3. Nausea: Onset 1 month since onset of post-nasal drip s/p COVID infection and viral sinusitis. Patient complaining of nausea, bloating, burping, and mild epigastric pain that improves with eating.    - Omeprazole increased to 40 mg daily    - Start taking Zofran daily   4. Abdominal Bloating: Patient with burping at bedside during encounter. Will need referral to GI next visit if all workup negative and if not improving. Patient told to decrease carbonated beverages   5. GERD: In setting of new abdominal symptoms, will double omeprazole to 40 mg daily    Return to clinic in 4 weeks    Case and impression/plan reviewed in collaboration with Dr. Moody Almanza MD    Electronically signed by   Luis F Soto DO on 2/14/2022 at 3:15 PM    [address] [place] [address] W.  [address]  Dept: [phone]  Dept Fax: [phone] : [phone]

## 2022-02-14 NOTE — PATIENT INSTRUCTIONS
- Flonase 2 sprays in each nostril every day  - Take Zyrtec 10 mg at bedtime nightly every day  - Take Zofran 4 mg for nausea. May take 8 hours apart. No more than 3 total per day.  Can take scheduled for 1 week, then wean off    - Take Omeprazole 40 mg once a day one hour before lunch  - Obtain CBC, BMP, TSH

## 2022-02-15 NOTE — TELEPHONE ENCOUNTER
Wife called asking if Álvaro Montes could have a script for the generic Zyrtec . Dr. Leola Aparicio sent in a script for her but not Álvaro Montes and it will only cost them 52 cents with a script .

## 2022-02-20 RX ORDER — CETIRIZINE HYDROCHLORIDE 10 MG/1
10 TABLET ORAL DAILY
Qty: 30 TABLET | Refills: 2 | Status: SHIPPED | OUTPATIENT
Start: 2022-02-20 | End: 2022-03-14 | Stop reason: SDUPTHER

## 2022-03-14 ENCOUNTER — OFFICE VISIT (OUTPATIENT)
Dept: INTERNAL MEDICINE CLINIC | Age: 67
End: 2022-03-14
Payer: MEDICARE

## 2022-03-14 VITALS
TEMPERATURE: 97.2 F | DIASTOLIC BLOOD PRESSURE: 82 MMHG | BODY MASS INDEX: 25.19 KG/M2 | SYSTOLIC BLOOD PRESSURE: 114 MMHG | HEART RATE: 70 BPM | WEIGHT: 156 LBS

## 2022-03-14 DIAGNOSIS — J01.41 ACUTE RECURRENT PANSINUSITIS: ICD-10-CM

## 2022-03-14 DIAGNOSIS — K59.04 CHRONIC IDIOPATHIC CONSTIPATION: ICD-10-CM

## 2022-03-14 DIAGNOSIS — T73.2XXD FATIGUE DUE TO EXPOSURE, SUBSEQUENT ENCOUNTER: ICD-10-CM

## 2022-03-14 DIAGNOSIS — R14.0 ABDOMINAL BLOATING: ICD-10-CM

## 2022-03-14 DIAGNOSIS — Z87.19 HX OF HIATAL HERNIA: ICD-10-CM

## 2022-03-14 DIAGNOSIS — K21.9 GASTROESOPHAGEAL REFLUX DISEASE WITHOUT ESOPHAGITIS: Primary | ICD-10-CM

## 2022-03-14 PROCEDURE — G8484 FLU IMMUNIZE NO ADMIN: HCPCS | Performed by: STUDENT IN AN ORGANIZED HEALTH CARE EDUCATION/TRAINING PROGRAM

## 2022-03-14 PROCEDURE — 3017F COLORECTAL CA SCREEN DOC REV: CPT | Performed by: STUDENT IN AN ORGANIZED HEALTH CARE EDUCATION/TRAINING PROGRAM

## 2022-03-14 PROCEDURE — 99214 OFFICE O/P EST MOD 30 MIN: CPT | Performed by: STUDENT IN AN ORGANIZED HEALTH CARE EDUCATION/TRAINING PROGRAM

## 2022-03-14 PROCEDURE — G8417 CALC BMI ABV UP PARAM F/U: HCPCS | Performed by: STUDENT IN AN ORGANIZED HEALTH CARE EDUCATION/TRAINING PROGRAM

## 2022-03-14 PROCEDURE — 1123F ACP DISCUSS/DSCN MKR DOCD: CPT | Performed by: STUDENT IN AN ORGANIZED HEALTH CARE EDUCATION/TRAINING PROGRAM

## 2022-03-14 PROCEDURE — 4040F PNEUMOC VAC/ADMIN/RCVD: CPT | Performed by: STUDENT IN AN ORGANIZED HEALTH CARE EDUCATION/TRAINING PROGRAM

## 2022-03-14 PROCEDURE — 1036F TOBACCO NON-USER: CPT | Performed by: STUDENT IN AN ORGANIZED HEALTH CARE EDUCATION/TRAINING PROGRAM

## 2022-03-14 PROCEDURE — G8427 DOCREV CUR MEDS BY ELIG CLIN: HCPCS | Performed by: STUDENT IN AN ORGANIZED HEALTH CARE EDUCATION/TRAINING PROGRAM

## 2022-03-14 RX ORDER — OMEPRAZOLE 20 MG/1
CAPSULE, DELAYED RELEASE ORAL
COMMUNITY
Start: 2022-02-17 | End: 2022-07-11

## 2022-03-14 RX ORDER — ONDANSETRON 4 MG/1
4 TABLET, FILM COATED ORAL EVERY 8 HOURS PRN
Qty: 21 TABLET | Refills: 1 | Status: SHIPPED | OUTPATIENT
Start: 2022-03-14 | End: 2022-07-11

## 2022-03-14 RX ORDER — FLUTICASONE PROPIONATE 50 MCG
2 SPRAY, SUSPENSION (ML) NASAL DAILY
Qty: 16 G | Refills: 5 | Status: SHIPPED | OUTPATIENT
Start: 2022-03-14 | End: 2022-07-11

## 2022-03-14 RX ORDER — ASPIRIN 81 MG/1
TABLET ORAL
COMMUNITY
Start: 2022-01-24

## 2022-03-14 RX ORDER — CETIRIZINE HYDROCHLORIDE 10 MG/1
10 TABLET ORAL DAILY
Qty: 30 TABLET | Refills: 2 | Status: SHIPPED | OUTPATIENT
Start: 2022-03-14 | End: 2022-07-11

## 2022-03-14 RX ORDER — DULOXETIN HYDROCHLORIDE 30 MG/1
30 CAPSULE, DELAYED RELEASE ORAL DAILY
Qty: 90 CAPSULE | Refills: 1 | Status: SHIPPED | OUTPATIENT
Start: 2022-03-14 | End: 2022-07-11 | Stop reason: SDUPTHER

## 2022-03-14 NOTE — PATIENT INSTRUCTIONS
Start   - Peptobismol as directed  - Mylicon-80 as directed    Continue   - Omeprazole 40 mg daily  - Probiotic

## 2022-03-14 NOTE — PROGRESS NOTES
Patient Name: Bianca Kramer  YOB: 1955  MRN: [id number]  Office visit date: 3/14/2022    Chief Complaint: Hypertension, Gastroesophageal Reflux, Anxiety, and Other (Covid Long Hamiller)      Subjective/Objective:    HPI:     Bianca Kramer is a 77 y.o. male who presents for an established patient visit. He is here for evaluation of Hypertension, Gastroesophageal Reflux, Anxiety, and Other (Covid Long Hauler)    Gastroesophageal reflux disease without esophagitis: not in control. During last visit in Feb 2022, Omeprazole was increased from 20mg to 40mg BID. Patient reported his symptom was improved which he has been reduced Omeprazole to 20mg daily. Patient has been experienced worsening abdominal pain and burning sensation. Had seen by GI doctor Dr. Cole Jenkins in the past.    Abdominal bloating: worsening with food. Denied any diarrhea, blood in stool, mucus stool. Acute recurrent pansinusitis: reported a lot of nasal drip, cough, pan sinus pain. Denied any fever    Fatigue due to exposure, subsequent encounter: COVID positive Jan 2022, s/p 3 covid vaccine. Has been experience continue fatigue after recovery from COVID infection. Denied any fever, chill, hematochezia, hemoptysis, hematuria. Reported SOB with exertion which resolve with rest.    Chronic idiopathic constipation: reported 1 episode with blood in stool which hx of chronic constipation. Reported painful with bowel movement sometime. Denied any weight loss, family history with Gi cancer. Has been seen GI Dr. Cole Jenkins for years.  Reported colonoscopy in the past.      Past Medical History:   Diagnosis Date    Hiatal hernia     EGD    PUD (peptic ulcer disease)     LONG TIME  AGO    Rectal bleeding     2o11- colonoscopy - dr Suresh Rowley       Past Surgical History:   Procedure Laterality Date    EYE SURGERY  plate in right eye    MANDIBLE SURGERY  1979    NECK SURGERY  2001    plate in neck        Current Outpatient Medications   Medication Sig Dispense Refill    aspirin 81 MG EC tablet Aspirin (Ecotrin Low Strength) 81 mg Tablet,Delayed Release (Dr/Ec) Active 81 MG PO Daily January 24th, 2022 2:15pm      omeprazole (PRILOSEC) 20 MG delayed release capsule       UNABLE TO FIND Caprylic Acid capsule      cetirizine (ZYRTEC) 10 MG tablet Take 1 tablet by mouth daily 30 tablet 2    DULoxetine (CYMBALTA) 30 MG extended release capsule Take 1 capsule by mouth daily 90 capsule 1    ondansetron (ZOFRAN) 4 MG tablet Take 1 tablet by mouth every 8 hours as needed for Nausea or Vomiting 21 tablet 1    fluticasone (FLONASE) 50 MCG/ACT nasal spray 2 sprays by Each Nostril route daily 16 g 5    cyclobenzaprine (FLEXERIL) 10 MG tablet Take 1 tablet by mouth every 8 hours as needed for Muscle spasms 30 tablet 2    magnesium (MAGNESIUM-OXIDE) 250 MG TABS tablet Take 500 mg by mouth daily      b complex vitamins capsule Take 1 capsule by mouth daily      gabapentin (NEURONTIN) 600 MG tablet Take 600 mg by mouth 2 times daily.  donepezil (ARICEPT) 10 MG tablet Take 10 mg by mouth nightly      Handicap Placard MISC by Does not apply route Duration 5 years 1 each 0    acetaminophen (TYLENOL) 325 MG tablet Take 650 mg by mouth every 6 hours as needed for Pain      Glucosamine-Chondroitin (MOVE FREE PO) Take by mouth 2 times daily      Multiple Vitamins-Minerals (THERAPEUTIC MULTIVITAMIN-MINERALS) tablet Take 1 tablet by mouth daily      Ascorbic Acid (VITAMIN C) 500 MG CAPS Take 500 mg by mouth daily      Cholecalciferol (VITAMIN D3) 2000 UNITS CAPS Take by mouth       No current facility-administered medications for this visit.        No Known Allergies    Social History     Socioeconomic History    Marital status:      Spouse name: Not on file    Number of children: Not on file    Years of education: Not on file    Highest education level: Not on file   Occupational History    Not on file   Tobacco Use    Smoking status: Never Smoker    Smokeless tobacco: Never Used   Substance and Sexual Activity    Alcohol use: No     Comment: \"Once in a [employer]    Drug use: No    Sexual activity: Not on file   Other Topics Concern    Not on file   Social History Narrative    Not on file     Social Determinants of Health     Financial Resource Strain: Low Risk     Difficulty of Paying Living Expenses: Not very hard   Food Insecurity: No Food Insecurity    Worried About Running Out of Food in the Last Year: Never true    Elyse of Food in the Last Year: Never true   Transportation Needs:     Lack of Transportation (Medical): Not on file    Lack of Transportation (Non-Medical): Not on file   Physical Activity:     Days of Exercise per Week: Not on file    Minutes of Exercise per Session: Not on file   Stress:     Feeling of Stress : Not on file   Social Connections:     Frequency of Communication with Friends and Family: Not on file    Frequency of Social Gatherings with Friends and Family: Not on file    Attends Yazidism Services: Not on file    Active Member of [address] [place] [address] [employer] or Organizations: Not on file    Attends Club or Organization Meetings: Not on file    Marital Status: Not on file   Intimate Partner Violence:     Fear of Current or Ex-Partner: Not on file    Emotionally Abused: Not on file    Physically Abused: Not on file    Sexually Abused: Not on file   Housing Stability:     Unable to Pay for Housing in the Last Year: Not on file    Number of [place] in the Last Year: Not on file    Unstable Housing in the Last Year: Not on file       Family History   Problem Relation Age of Onset    Heart Disease Mother     Heart Disease Father     Other Father         CHF       Review of Systems  Constitutional: Negative for appetite change, chills, diaphoresis, fever and unexpected weight change.    HENT: Negative for congestion, dental problem, mouth sores, nosebleed  Respiratory: Negative for choking, chest tightness, wheezing and stridor. Cardiovascular: Negative for chest pain and palpitations. Gastrointestinal: Negative for anal bleeding, diarrhea, nausea and vomiting. Genitourinary: Negative for dysuria, flank pain, hematuria and urgency. Psychiatric/Behavioral: Negative for agitation, behavioral problems, confusion, decreased concentration, dysphoric mood and hallucinations. Vitals:    03/14/22 1309   BP: 114/82   Site: Right Upper Arm   Pulse: 70   Temp: 97.2 °F (36.2 °C)   Weight: 156 lb (70.8 kg)       Wt Readings from Last 3 Encounters:   03/14/22 156 lb (70.8 kg)   02/14/22 155 lb 3.2 oz (70.4 kg)   01/13/22 158 lb 6.4 oz (71.8 kg)       BP Readings from Last 3 Encounters:   03/14/22 114/82   02/14/22 104/74   01/13/22 124/78       Physical Exam  Vitals reviewed. Constitutional:       General: He is not in acute distress. Appearance: Normal appearance. He is normal weight. He is not ill-appearing, toxic-appearing or diaphoretic. HENT:      Head: Normocephalic and atraumatic. Nose: Rhinorrhea present. No congestion. Mouth: Mucous membranes are moist.      Pharynx: Oropharynx is clear. No oropharyngeal exudate or posterior oropharyngeal erythema. Neck:      Vascular: No carotid bruit. Cardiovascular:      Rate and Rhythm: Normal rate and regular rhythm. Pulses: Normal pulses. Heart sounds: Normal heart sounds. No murmur heard. No friction rub. No gallop. Pulmonary:      Effort: No respiratory distress. Breath sounds: No stridor, rales, wheezing or rhonchi. Chest:      Chest wall: No tenderness. Abdominal:      General: Abdomen is flat. Bowel sounds are normal. There is no distension. Palpations: Abdomen is soft. There is no mass. Tenderness: There is no abdominal tenderness. There is no right CVA tenderness, left CVA tenderness, guarding or rebound. Hernia: No hernia is present.    Musculoskeletal:         General: No swelling, tenderness, deformity or signs of injury. Normal range of motion. Cervical back: Normal range of motion and neck supple. No rigidity or tenderness. Right lower leg: No edema. Left lower leg: No edema. Lymphadenopathy:      Cervical: No cervical adenopathy. Diagnostic data:  I have reviewed recent diagnostic testing including labs with the patient today. No results found for this visit on 03/14/22. Controlled Substances Monitoring:       Laboratory/imaging studies ordered this visit: CBC, BMP, TSH within normal limited    Assessment/Plan:  1. Gastroesophageal reflux disease without esophagitis  -     Cont PPI 40mg daily  -     Follow up with GI Dr. Dylan Aguila     2. Abdominal bloating: start Pepto-bismal, mylicon. 4. Acute recurrent pansinusitis  -     cetirizine (ZYRTEC) 10 MG tablet; Take 1 tablet by mouth daily, Disp-30 tablet, R-2Normal  -     fluticasone (FLONASE) 50 MCG/ACT nasal spray; 2 sprays by Each Nostril route daily, Disp-16 g, R-5Normal  5. Fatigue due to exposure, subsequent encounter        -     CBC, BMP, TSH within normal limit  -     DULoxetine (CYMBALTA) 30 MG extended release capsule; Take 1 capsule by mouth daily, Disp-90 capsule, R-1Normal    6. Chronic idiopathic constipation       -    Encourage fluid intake, fiber intake       -    Continue fiber-corn, Probiotic       -    Recommend follow with GI Dr. Dylan Aguila    The patient is in agreement with and verbalizes understanding of the plan of care today and has no additional questions or complaints. The patient was instructed to follow-up in 3 months or to contact our office sooner if problems should arise. Laboratory studies will be completed prior to next visit.     All findings, labs and other data reviewed, assessment and plan created with review by Dr. Keira Leon MD.    Electronically signed by: Cynthia Marie DO on 3/14/2022 at 2:23 PM  (Please note that portions of this note were completed with a voice recognition program and electronically transcribed. Efforts were made to edit the dictations but occasionally words are mis-transcribed. This transcription may contain errors not detected in proofreading.  This transcription was electronically signed.)

## 2022-06-08 ENCOUNTER — HOSPITAL ENCOUNTER (OUTPATIENT)
Dept: INTERVENTIONAL RADIOLOGY/VASCULAR | Age: 67
Discharge: HOME | End: 2022-06-08
Payer: MEDICARE

## 2022-06-08 DIAGNOSIS — I65.23 BILATERAL CAROTID ARTERY OCCLUSION: ICD-10-CM

## 2022-06-08 PROCEDURE — 93880 EXTRACRANIAL BILAT STUDY: CPT

## 2022-07-11 ENCOUNTER — OFFICE VISIT (OUTPATIENT)
Dept: INTERNAL MEDICINE CLINIC | Age: 67
End: 2022-07-11
Payer: MEDICARE

## 2022-07-11 VITALS
BODY MASS INDEX: 25.07 KG/M2 | SYSTOLIC BLOOD PRESSURE: 122 MMHG | HEART RATE: 74 BPM | TEMPERATURE: 97.8 F | DIASTOLIC BLOOD PRESSURE: 80 MMHG | WEIGHT: 155.2 LBS

## 2022-07-11 DIAGNOSIS — Z76.89 NEED FOR REFERRAL TO PHYSICAL THERAPIST: ICD-10-CM

## 2022-07-11 DIAGNOSIS — F41.9 ANXIETY: ICD-10-CM

## 2022-07-11 DIAGNOSIS — M47.816 SPONDYLOSIS OF LUMBAR REGION WITHOUT MYELOPATHY OR RADICULOPATHY: ICD-10-CM

## 2022-07-11 DIAGNOSIS — M47.812 SPONDYLOSIS OF CERVICAL REGION WITHOUT MYELOPATHY OR RADICULOPATHY: Primary | ICD-10-CM

## 2022-07-11 PROCEDURE — 99213 OFFICE O/P EST LOW 20 MIN: CPT | Performed by: STUDENT IN AN ORGANIZED HEALTH CARE EDUCATION/TRAINING PROGRAM

## 2022-07-11 PROCEDURE — G8417 CALC BMI ABV UP PARAM F/U: HCPCS | Performed by: STUDENT IN AN ORGANIZED HEALTH CARE EDUCATION/TRAINING PROGRAM

## 2022-07-11 PROCEDURE — 1036F TOBACCO NON-USER: CPT | Performed by: STUDENT IN AN ORGANIZED HEALTH CARE EDUCATION/TRAINING PROGRAM

## 2022-07-11 PROCEDURE — 1123F ACP DISCUSS/DSCN MKR DOCD: CPT | Performed by: STUDENT IN AN ORGANIZED HEALTH CARE EDUCATION/TRAINING PROGRAM

## 2022-07-11 PROCEDURE — G8427 DOCREV CUR MEDS BY ELIG CLIN: HCPCS | Performed by: STUDENT IN AN ORGANIZED HEALTH CARE EDUCATION/TRAINING PROGRAM

## 2022-07-11 PROCEDURE — 3017F COLORECTAL CA SCREEN DOC REV: CPT | Performed by: STUDENT IN AN ORGANIZED HEALTH CARE EDUCATION/TRAINING PROGRAM

## 2022-07-11 RX ORDER — OMEPRAZOLE 40 MG/1
CAPSULE, DELAYED RELEASE ORAL
COMMUNITY
Start: 2022-06-13 | End: 2022-08-23 | Stop reason: DRUGHIGH

## 2022-07-11 RX ORDER — FAMOTIDINE 40 MG/1
TABLET, FILM COATED ORAL
COMMUNITY
Start: 2022-06-13

## 2022-07-11 RX ORDER — CYCLOBENZAPRINE HCL 10 MG
10 TABLET ORAL EVERY 8 HOURS PRN
Qty: 30 TABLET | Refills: 2 | Status: SHIPPED | OUTPATIENT
Start: 2022-07-11 | End: 2022-07-19 | Stop reason: SDUPTHER

## 2022-07-11 RX ORDER — DULOXETIN HYDROCHLORIDE 30 MG/1
30 CAPSULE, DELAYED RELEASE ORAL DAILY
Qty: 90 CAPSULE | Refills: 1 | Status: SHIPPED | OUTPATIENT
Start: 2022-07-11 | End: 2022-10-24 | Stop reason: SDUPTHER

## 2022-07-11 ASSESSMENT — PATIENT HEALTH QUESTIONNAIRE - PHQ9
SUM OF ALL RESPONSES TO PHQ QUESTIONS 1-9: 0
2. FEELING DOWN, DEPRESSED OR HOPELESS: 0
1. LITTLE INTEREST OR PLEASURE IN DOING THINGS: 0
SUM OF ALL RESPONSES TO PHQ9 QUESTIONS 1 & 2: 0
SUM OF ALL RESPONSES TO PHQ QUESTIONS 1-9: 0

## 2022-07-11 NOTE — PROGRESS NOTES
OFFICE VISIT NOTE     Patient - Christi Jimenez   MRN -  [id number]      - 1955      Date of evaluation -  2022  Primary Care Physician - Karl Hayes MD     Chief Complaint:      Chief Complaint   Patient presents with    Anxiety    Referral - General     physical therapy      History Of Presenting Illness:     Patient presents to office today for chronic disease visit. Says he's overall been doing very well but was working outside in the yard and strained his neck. At baseline he already has cervical stenosis and a history of lumbar and cervical pain. He is here today requesting a referral to physical therapy. He says he's worked on and off with them for almost 5 years now. Says the name of his physical therapist was Alina Standing down in [address] [place] [address] in [place], [place]. His BP today is 122/80 mmHg. He otherwise well with no complaints or concerns. Medications    Current Medications   Current Outpatient Medications   Medication Sig Dispense Refill    famotidine (PEPCID) 40 MG tablet take 1 tablet by mouth every evening      omeprazole (PRILOSEC) 40 MG delayed release capsule take 1 capsule by mouth every morning      cyclobenzaprine (FLEXERIL) 10 MG tablet Take 1 tablet by mouth every 8 hours as needed for Muscle spasms 30 tablet 2    DULoxetine (CYMBALTA) 30 MG extended release capsule Take 1 capsule by mouth daily 90 capsule 1    aspirin 81 MG EC tablet Aspirin (Ecotrin Low Strength) 81 mg Tablet,Delayed Release (Dr/Ec) Active 81 MG PO Daily 2022 2:15pm      magnesium (MAGNESIUM-OXIDE) 250 MG TABS tablet Take 500 mg by mouth daily      b complex vitamins capsule Take 1 capsule by mouth daily      gabapentin (NEURONTIN) 600 MG tablet Take 600 mg by mouth 2 times daily.        donepezil (ARICEPT) 10 MG tablet Take 10 mg by mouth nightly      Handicap Placard MISC by Does not apply route Duration 5 years 1 each 0    acetaminophen (TYLENOL) 325 MG tablet Take without myelopathy or radiculopathy  cyclobenzaprine (FLEXERIL) 10 MG tablet    External Referral To Physical Therapy   3. Anxiety     4. Need for referral to physical therapist        Assessment & Plan:     1. Spondylosis of Cervical and Lumbar: Will prescribe some Flexeril for muscle relaxing benefits. Patient will return to [facility] physical therapy center per his request for strength training and myofascial techniques on his neck. Previously saw Marixa Solis for this. 2. Anxiety: Continue home Cymbalta. Well controlled     Return to clinic in 3 months     Case and impression/plan reviewed in collaboration with Dr. Bar Ross MD    Electronically signed by   De De La Rosa DO on 7/11/2022 at 4:43 PM    [address]  [address]  Dept: [phone]  Dept Fax: [phone] : [phone]

## 2022-07-18 DIAGNOSIS — M47.816 SPONDYLOSIS OF LUMBAR REGION WITHOUT MYELOPATHY OR RADICULOPATHY: ICD-10-CM

## 2022-07-18 DIAGNOSIS — M47.812 SPONDYLOSIS OF CERVICAL REGION WITHOUT MYELOPATHY OR RADICULOPATHY: ICD-10-CM

## 2022-07-19 RX ORDER — CYCLOBENZAPRINE HCL 10 MG
10 TABLET ORAL EVERY 8 HOURS PRN
Qty: 30 TABLET | Refills: 2 | Status: SHIPPED | OUTPATIENT
Start: 2022-07-19 | End: 2022-10-24 | Stop reason: SDUPTHER

## 2022-08-15 ENCOUNTER — TELEPHONE (OUTPATIENT)
Dept: INTERNAL MEDICINE CLINIC | Age: 67
End: 2022-08-15

## 2022-08-15 NOTE — TELEPHONE ENCOUNTER
Wife called in saying that pt is having a lot of trouble with his neck and Dr Hari Slaughter had mentioned to them that if the therapy did not work then he would send a script in for some steroids. Pt would like that if that is still a recommendation. Do you want him brought in for another appt?

## 2022-08-16 RX ORDER — METHYLPREDNISOLONE 4 MG/1
TABLET ORAL
Qty: 1 KIT | Refills: 0 | Status: SHIPPED | OUTPATIENT
Start: 2022-08-16 | End: 2022-08-23 | Stop reason: ALTCHOICE

## 2022-08-18 NOTE — TELEPHONE ENCOUNTER
I spoke to pt and he has started medication and is helping.   I scheduled an appt with dr Kristin Jason on 8/23/22 as Dr Dannielle Torres schedule is full on 8/22/22

## 2022-08-23 ENCOUNTER — OFFICE VISIT (OUTPATIENT)
Dept: INTERNAL MEDICINE CLINIC | Age: 67
End: 2022-08-23
Payer: MEDICARE

## 2022-08-23 VITALS
DIASTOLIC BLOOD PRESSURE: 82 MMHG | HEART RATE: 76 BPM | WEIGHT: 161.2 LBS | TEMPERATURE: 98.2 F | SYSTOLIC BLOOD PRESSURE: 128 MMHG | HEIGHT: 66 IN | BODY MASS INDEX: 25.91 KG/M2

## 2022-08-23 DIAGNOSIS — M54.2 NECK PAIN, MUSCULOSKELETAL: ICD-10-CM

## 2022-08-23 DIAGNOSIS — F41.9 ANXIETY: ICD-10-CM

## 2022-08-23 DIAGNOSIS — M47.812 SPONDYLOSIS OF CERVICAL REGION WITHOUT MYELOPATHY OR RADICULOPATHY: Primary | ICD-10-CM

## 2022-08-23 PROCEDURE — G8417 CALC BMI ABV UP PARAM F/U: HCPCS | Performed by: STUDENT IN AN ORGANIZED HEALTH CARE EDUCATION/TRAINING PROGRAM

## 2022-08-23 PROCEDURE — 99214 OFFICE O/P EST MOD 30 MIN: CPT | Performed by: STUDENT IN AN ORGANIZED HEALTH CARE EDUCATION/TRAINING PROGRAM

## 2022-08-23 PROCEDURE — G8427 DOCREV CUR MEDS BY ELIG CLIN: HCPCS | Performed by: STUDENT IN AN ORGANIZED HEALTH CARE EDUCATION/TRAINING PROGRAM

## 2022-08-23 PROCEDURE — 3017F COLORECTAL CA SCREEN DOC REV: CPT | Performed by: STUDENT IN AN ORGANIZED HEALTH CARE EDUCATION/TRAINING PROGRAM

## 2022-08-23 PROCEDURE — 1123F ACP DISCUSS/DSCN MKR DOCD: CPT | Performed by: STUDENT IN AN ORGANIZED HEALTH CARE EDUCATION/TRAINING PROGRAM

## 2022-08-23 PROCEDURE — 1036F TOBACCO NON-USER: CPT | Performed by: STUDENT IN AN ORGANIZED HEALTH CARE EDUCATION/TRAINING PROGRAM

## 2022-08-23 RX ORDER — PREDNISONE 20 MG/1
20 TABLET ORAL PRN
Qty: 30 TABLET | Refills: 0 | Status: SHIPPED | OUTPATIENT
Start: 2022-08-23 | End: 2022-09-22

## 2022-08-23 RX ORDER — OMEPRAZOLE 20 MG/1
20 CAPSULE, DELAYED RELEASE ORAL EVERY MORNING
COMMUNITY

## 2022-08-23 ASSESSMENT — ENCOUNTER SYMPTOMS
VOMITING: 0
NAUSEA: 0
BACK PAIN: 0
CHEST TIGHTNESS: 0
SHORTNESS OF BREATH: 0
ABDOMINAL PAIN: 0
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
## 2022-10-24 ENCOUNTER — OFFICE VISIT (OUTPATIENT)
Dept: INTERNAL MEDICINE CLINIC | Age: 67
End: 2022-10-24
Payer: MEDICARE

## 2022-10-24 VITALS
TEMPERATURE: 97.8 F | BODY MASS INDEX: 25.81 KG/M2 | OXYGEN SATURATION: 99 % | SYSTOLIC BLOOD PRESSURE: 128 MMHG | HEART RATE: 68 BPM | WEIGHT: 159.8 LBS | DIASTOLIC BLOOD PRESSURE: 82 MMHG

## 2022-10-24 DIAGNOSIS — M47.812 SPONDYLOSIS OF CERVICAL REGION WITHOUT MYELOPATHY OR RADICULOPATHY: ICD-10-CM

## 2022-10-24 DIAGNOSIS — M47.816 SPONDYLOSIS OF LUMBAR REGION WITHOUT MYELOPATHY OR RADICULOPATHY: ICD-10-CM

## 2022-10-24 PROCEDURE — G8417 CALC BMI ABV UP PARAM F/U: HCPCS | Performed by: STUDENT IN AN ORGANIZED HEALTH CARE EDUCATION/TRAINING PROGRAM

## 2022-10-24 PROCEDURE — 1123F ACP DISCUSS/DSCN MKR DOCD: CPT | Performed by: STUDENT IN AN ORGANIZED HEALTH CARE EDUCATION/TRAINING PROGRAM

## 2022-10-24 PROCEDURE — G8427 DOCREV CUR MEDS BY ELIG CLIN: HCPCS | Performed by: STUDENT IN AN ORGANIZED HEALTH CARE EDUCATION/TRAINING PROGRAM

## 2022-10-24 PROCEDURE — 3017F COLORECTAL CA SCREEN DOC REV: CPT | Performed by: STUDENT IN AN ORGANIZED HEALTH CARE EDUCATION/TRAINING PROGRAM

## 2022-10-24 PROCEDURE — 99213 OFFICE O/P EST LOW 20 MIN: CPT | Performed by: STUDENT IN AN ORGANIZED HEALTH CARE EDUCATION/TRAINING PROGRAM

## 2022-10-24 PROCEDURE — 1036F TOBACCO NON-USER: CPT | Performed by: STUDENT IN AN ORGANIZED HEALTH CARE EDUCATION/TRAINING PROGRAM

## 2022-10-24 PROCEDURE — G8484 FLU IMMUNIZE NO ADMIN: HCPCS | Performed by: STUDENT IN AN ORGANIZED HEALTH CARE EDUCATION/TRAINING PROGRAM

## 2022-10-24 RX ORDER — DULOXETIN HYDROCHLORIDE 30 MG/1
30 CAPSULE, DELAYED RELEASE ORAL DAILY
Qty: 90 CAPSULE | Refills: 1 | Status: SHIPPED | OUTPATIENT
Start: 2022-10-24 | End: 2023-04-22

## 2022-10-24 RX ORDER — ACETAMINOPHEN AND CODEINE PHOSPHATE 300; 15 MG/1; MG/1
1 TABLET ORAL 2 TIMES DAILY PRN
Qty: 28 TABLET | Refills: 0 | Status: SHIPPED | OUTPATIENT
Start: 2022-10-24 | End: 2022-11-07

## 2022-10-24 RX ORDER — CYCLOBENZAPRINE HCL 10 MG
10 TABLET ORAL EVERY 8 HOURS PRN
Qty: 30 TABLET | Refills: 2 | Status: SHIPPED | OUTPATIENT
Start: 2022-10-24

## 2022-10-24 SDOH — ECONOMIC STABILITY: FOOD INSECURITY: WITHIN THE PAST 12 MONTHS, YOU WORRIED THAT YOUR FOOD WOULD RUN OUT BEFORE YOU GOT MONEY TO BUY MORE.: NEVER TRUE

## 2022-10-24 SDOH — ECONOMIC STABILITY: FOOD INSECURITY: WITHIN THE PAST 12 MONTHS, THE FOOD YOU BOUGHT JUST DIDN'T LAST AND YOU DIDN'T HAVE MONEY TO GET MORE.: NEVER TRUE

## 2022-10-24 ASSESSMENT — SOCIAL DETERMINANTS OF HEALTH (SDOH): HOW HARD IS IT FOR YOU TO PAY FOR THE VERY BASICS LIKE FOOD, HOUSING, MEDICAL CARE, AND HEATING?: NOT HARD AT ALL

## 2022-10-24 NOTE — PROGRESS NOTES
OFFICE VISIT NOTE     Patient - Douglas Pierre   MRN -  [id number]      - 1955      Date of evaluation -  10/24/2022  Primary Care Physician - Sonia Landaverde MD     Chief Complaint:      Chief Complaint   Patient presents with    Anxiety    Neck Pain    Other     Spondylosis of cervical region without myelopathy or radiculopathy      History Of Presenting Illness:     HPI:  Patient presents to office today for chronic disease visit. Says he's overall been doing very well but was working outside in the yard and strained his neck. At baseline he already has cervical stenosis and a history of lumbar and cervical pain. He is here today requesting a referral to physical therapy. He says he's worked on and off with them for almost 5 years now. Says the name of his physical therapist was Lillie reynolds in [address] [place] [address] in [place], [place]. His BP today is 122/80 mmHg. He otherwise well with no complaints or concerns. Office Visit 22:   See Dr. Miri Chavira note     Office Visit 10/24/22:   Patient here for continued neck pain. Was given trial of prednisone last time but he only intermittently took and says they did not help. Because of persistent symptoms, we will order C-spine MRI without contrast and Tylenol #2 for breakthrough pain and will reassess at next visit depending on what MRI shows, may need referral to Ortho for possible corticosteroid injections.      Medications    Current Medications   Current Outpatient Medications   Medication Sig Dispense Refill    omeprazole (PRILOSEC) 20 MG delayed release capsule Take 20 mg by mouth every morning      cyclobenzaprine (FLEXERIL) 10 MG tablet Take 1 tablet by mouth every 8 hours as needed for Muscle spasms 30 tablet 2    famotidine (PEPCID) 40 MG tablet take 1 tablet by mouth every evening      DULoxetine (CYMBALTA) 30 MG extended release capsule Take 1 capsule by mouth daily 90 capsule 1    aspirin 81 MG EC tablet Aspirin (Ecotrin Low Strength) 81 mg Tablet,Delayed Release (Dr/Ec) Active 81 MG PO Daily January 24th, 2022 2:15pm      magnesium (MAGNESIUM-OXIDE) 250 MG TABS tablet Take 500 mg by mouth daily      b complex vitamins capsule Take 1 capsule by mouth daily      gabapentin (NEURONTIN) 600 MG tablet Take 600 mg by mouth 2 times daily. donepezil (ARICEPT) 10 MG tablet Take 10 mg by mouth nightly      Handicap Placard MISC by Does not apply route Duration 5 years 1 each 0    acetaminophen (TYLENOL) 325 MG tablet Take 650 mg by mouth every 6 hours as needed for Pain      Glucosamine-Chondroitin (MOVE FREE PO) Take by mouth 2 times daily      Multiple Vitamins-Minerals (THERAPEUTIC MULTIVITAMIN-MINERALS) tablet Take 1 tablet by mouth daily       No current facility-administered medications for this visit. Vitals     weight is 159 lb 12.8 oz (72.5 kg). His temperature is 97.8 °F (36.6 °C). His blood pressure is 128/82 and his pulse is 68. His oxygen saturation is 99%. Body mass index is 25.81 kg/m². Physical Exam   GENERAL APPEARANCE:alert & cooperative, appears to be in NAD  EYES: PERRL, EOMI, no conjunctivitis, no erythema, no discharge. HENT: normocephalic head, hearing grossly intact, oral cavity & pharynx free of inflammation, swelling, exudate, or lesions. Neck supple, neck collar in place, non-tender without lymphadenopathy, masses. CARDIAC: RRR, normal S1 and S2. No S3, S4, no m/r/g, no peripheral edema, cyanosis or pallor. Extremities warm & well perfused. Capillary refill < 2 seconds. No carotid bruits. LUNGS: CTA b/l, no rales, rhonchi, wheezing or diminished breath sounds, non-pursing lips, no cyanosis  ABDOMEN: NABS, soft, nondistended, nontender, without  guarding or rebound, no masses noted   MUSKULOSKELETAL: No weakness. Strength 5/5 in all extremities, ROM symmetric and intact, no joint erythema or tenderness. Normal muscular development.   EXTREMITIES: No significant deformity or joint abnormality, no edema, peripheral pulses intact 2/4, no varicosities. NEUROLOGICAL: CN II-XII intact, strength and sensation symmetric and intact throughout. Cerebellar function intact  SKIN: Skin normal color, texture and turgor with no lesions or eruptions  PSYCHIATRIC: AOx3, able to demonstrate good judgement & reason    Problem List       Diagnosis Orders   1. Spondylosis of cervical region without myelopathy or radiculopathy        2. Spondylosis of lumbar region without myelopathy or radiculopathy           Assessment & Plan:     Spondylosis of Cervical and Lumbar: Continue Flexeril, physical therapy, prednisone. Will add tylenol #2 for breakthrough pain. Check MRI C-spine without contrast and reassess in 2 weeks. Depending on what MRI shows, may need to refer to Ortho at next visit. Anxiety: Continue home Cymbalta. Well controlled     Return to clinic in 2 weeks    Case and impression/plan reviewed in collaboration with Dr. Cristobal Gray MD    Electronically signed by   Jazlyn Oliver DO on 10/24/2022 at 1:35 PM    [address] [place] [address]  [address]  Dept: [phone]  Dept Fax: [phone] : [phone]

## 2022-10-25 ENCOUNTER — HOSPITAL ENCOUNTER (OUTPATIENT)
Dept: MRI IMAGING | Age: 67
Discharge: HOME | End: 2022-10-25
Payer: MEDICARE

## 2022-10-25 DIAGNOSIS — M47.812 SPONDYLOSIS OF CERVICAL REGION WITHOUT MYELOPATHY OR RADICULOPATHY: ICD-10-CM

## 2022-10-25 PROCEDURE — 72141 MRI NECK SPINE W/O DYE: CPT

## 2022-11-07 ENCOUNTER — OFFICE VISIT (OUTPATIENT)
Dept: INTERNAL MEDICINE CLINIC | Age: 67
End: 2022-11-07
Payer: MEDICARE

## 2022-11-07 VITALS
BODY MASS INDEX: 24.94 KG/M2 | WEIGHT: 155.2 LBS | SYSTOLIC BLOOD PRESSURE: 110 MMHG | DIASTOLIC BLOOD PRESSURE: 74 MMHG | HEART RATE: 62 BPM | HEIGHT: 66 IN | TEMPERATURE: 97.8 F

## 2022-11-07 DIAGNOSIS — M47.816 SPONDYLOSIS OF LUMBAR REGION WITHOUT MYELOPATHY OR RADICULOPATHY: ICD-10-CM

## 2022-11-07 DIAGNOSIS — M47.812 SPONDYLOSIS OF CERVICAL REGION WITHOUT MYELOPATHY OR RADICULOPATHY: Primary | ICD-10-CM

## 2022-11-07 DIAGNOSIS — G44.86 CERVICOGENIC HEADACHE: ICD-10-CM

## 2022-11-07 DIAGNOSIS — M48.02 CERVICAL STENOSIS OF SPINAL CANAL: ICD-10-CM

## 2022-11-07 DIAGNOSIS — K92.1 MELENA: ICD-10-CM

## 2022-11-07 DIAGNOSIS — B37.0 ORAL CANDIDIASIS: ICD-10-CM

## 2022-11-07 PROCEDURE — G8427 DOCREV CUR MEDS BY ELIG CLIN: HCPCS

## 2022-11-07 PROCEDURE — 1123F ACP DISCUSS/DSCN MKR DOCD: CPT

## 2022-11-07 PROCEDURE — G8417 CALC BMI ABV UP PARAM F/U: HCPCS

## 2022-11-07 PROCEDURE — 1036F TOBACCO NON-USER: CPT

## 2022-11-07 PROCEDURE — G8484 FLU IMMUNIZE NO ADMIN: HCPCS

## 2022-11-07 PROCEDURE — 3017F COLORECTAL CA SCREEN DOC REV: CPT

## 2022-11-07 PROCEDURE — 99214 OFFICE O/P EST MOD 30 MIN: CPT

## 2022-11-07 RX ORDER — ERGOTAMINE TARTRATE AND CAFFEINE 1; 100 MG/1; MG/1
TABLET, FILM COATED ORAL
Qty: 30 TABLET | Refills: 1 | Status: SHIPPED | OUTPATIENT
Start: 2022-11-07

## 2022-11-07 RX ORDER — LIDOCAINE 50 MG/G
1 PATCH TOPICAL DAILY
Qty: 15 PATCH | Refills: 0 | Status: SHIPPED | OUTPATIENT
Start: 2022-11-07 | End: 2022-11-17

## 2022-11-07 RX ORDER — CLOTRIMAZOLE 10 MG/1
10 LOZENGE ORAL; TOPICAL
Qty: 70 TABLET | Refills: 0 | Status: SHIPPED | OUTPATIENT
Start: 2022-11-07 | End: 2022-11-17

## 2022-11-07 NOTE — PATIENT INSTRUCTIONS
Start taking Mycelex lozenges, take 1 lozenge 5 times per day. Do this 14 days. Please call the office if your symptoms do not improve within 3-4 days. Please call in 1 week if the cafergot is not effective. Apply lidocaine patches to your neck to help with the pain. You have referred to orthopedic surgery at the [address] If you do not hear from their office, please call to schedule an appointment. Please return within 2 months. Please discuss your episode of dark stools with your GI doctor, Dr. Shahab Dorantes.

## 2022-11-07 NOTE — PROGRESS NOTES
1955    Chief Complaint   Patient presents with    2 Week Follow-Up     F/u MRI cervical spine        Pt is a 79 y.o. male who presents for a follow-up visit. Previously seen by Dr. Sadaf Mcdonald. Was last seen on 10/24 for cervical neck pain. Started on flexeril, prednisone, Tylenol #2. Says these did not help much. Says he had some black stools that started after taking the Tylenol #2, resolved after stopping the medication. Says he has a history of gastric ulcers. Reports a sore throat for the past month, pain with swallowing. Denies dysphagia for solid food or liquids. Reports persistent headaches associated with photophobia. Had MRI cervical spine on 10/25 showing moderate left and mild right-sided foraminal stenosis at C3-C4, mild canal stenosis and mild-to-moderate bilateral foraminal stenosis C2-C3, C4-C5. Will refer patient to [facility]. Past Medical History:   Diagnosis Date    Hiatal hernia     EGD    PUD (peptic ulcer disease)     LONG TIME  [facility]    Rectal bleeding     2o11- colonoscopy - dr Darwin Moreira       Past Surgical History:   Procedure Laterality Date    EYE SURGERY  plate in right eye    [address]    plate in neck        Current Outpatient Medications   Medication Sig Dispense Refill    lidocaine (LIDODERM) 5 % Place 1 patch onto the skin daily for 15 days 12 hours on, 12 hours off. 15 patch 0    clotrimazole (MYCELEX) 10 MG meri Take 1 tablet by mouth 5 times daily for 14 days 70 tablet 0    ergotamine-caffeine (CAFERGOT) 1-100 MG per tablet Two tablets at onset of attack; then 1 tablet every 30 minutes as needed; maximum: 6 tablets per attack; do not exceed 10 tablets/week.  30 tablet 1    cyclobenzaprine (FLEXERIL) 10 MG tablet Take 1 tablet by mouth every 8 hours as needed for Muscle spasms 30 tablet 2    DULoxetine (CYMBALTA) 30 MG extended release capsule Take 1 capsule by mouth daily 90 capsule 1    acetaminophen-codeine (TYLENOL #2) 300-15 MG per tablet Take 1 tablet by mouth 2 times daily as needed for Pain (severe neck pain) for up to 14 days. 28 tablet 0    omeprazole (PRILOSEC) 20 MG delayed release capsule Take 40 mg by mouth every morning      famotidine (PEPCID) 40 MG tablet take 1 tablet by mouth every evening      aspirin 81 MG EC tablet Aspirin (Ecotrin Low Strength) 81 mg Tablet,Delayed Release (Dr/Ec) Active 81 MG PO Daily January 24th, 2022 2:15pm      magnesium (MAGNESIUM-OXIDE) 250 MG TABS tablet Take 500 mg by mouth daily      b complex vitamins capsule Take 1 capsule by mouth daily      gabapentin (NEURONTIN) 600 MG tablet Take 600 mg by mouth 2 times daily. donepezil (ARICEPT) 10 MG tablet Take 10 mg by mouth nightly      Handicap Placard MISC by Does not apply route Duration 5 years 1 each 0    acetaminophen (TYLENOL) 325 MG tablet Take 650 mg by mouth every 6 hours as needed for Pain      Glucosamine-Chondroitin (MOVE FREE PO) Take by mouth 2 times daily      Multiple Vitamins-Minerals (THERAPEUTIC MULTIVITAMIN-MINERALS) tablet Take 1 tablet by mouth daily       No current facility-administered medications for this visit.        No Known Allergies    Social History     Socioeconomic History    Marital status:      Spouse name: Not on file    Number of children: Not on file    Years of education: Not on file    Highest education level: Not on file   Occupational History    Not on file   Tobacco Use    Smoking status: Never    Smokeless tobacco: Never   Substance and Sexual Activity    Alcohol use: No     Comment: \"Once in a [place] Moon\"    Drug use: No    Sexual activity: Not on file   Other Topics Concern    Not on file   Social History Narrative    Not on file     Social Determinants of Health     Financial Resource Strain: Low Risk     Difficulty of Paying Living Expenses: Not hard at all   Food Insecurity: No Food Insecurity    Worried About [address] in the Last Year: Never true    [address] in the Last Year: Never true   Transportation Needs: Not on file   Physical Activity: Not on file   Stress: Not on file   Social Connections: Not on file   Intimate Partner Violence: Not on file   Housing Stability: Not on file       Family History   Problem Relation Age of Onset    Heart Disease Mother     Heart Disease Father     Other Father         CHF       Review of Systems - General ROS: negative for - chills or fever  Psychological ROS: negative for - anxiety and depression  Hematological and Lymphatic ROS: No history of blood clots or bleeding disorder. Respiratory ROS: no cough, shortness of breath, or wheezing  Cardiovascular ROS: no chest pain or dyspnea on exertion, tolerates a flight of stairs, vacuuming  Gastrointestinal ROS: no abdominal pain, change in bowel habits. Reported black stools that resolved. Genito-Urinary ROS: no dysuria, trouble voiding, or hematuria  Musculoskeletal ROS: cervical neck pain  Neurological ROS: negative for - numbness/tingling, seizures or weakness. Positive for headaches  Dermatological ROS: negative for - rash or skin lesion changes    Blood pressure 110/74, pulse 62, temperature 97.8 °F (36.6 °C), height 5' 6\" (1.676 m), weight 155 lb 3.2 oz (70.4 kg). Physical Examination: General appearance -alert, well appearing, and in no distress. Accompanied by wife. Sitting on exam table comfortably. Mental status - alert, oriented to person, place, and time  Head - atraumatic, normocephalic  Eyes - pupils equal and reactive to light, extraocular muscles intact  Ears - external ears are normal, hearing is grossly normal  Mouth - oral pharynx white patches present, erythema, mucous membranes moist  Neck - supple, no significant adenopathy. Cervical collar in place.    Chest - clear to auscultation, no wheezes, rales or rhonchi, symmetric air entry  Heart - normal rate, regular rhythm, normal S1, S2, no murmurs, rubs, clicks or gallops  Abdomen -soft, nontender, nondistended  Neurological - alert, oriented, cranial nerves II-XII intact, motor and sensation are grossly intact bilateral upper and lower extremities. Extremities - peripheral pulses normal, no pedal edema, no clubbing or cyanosis  Skin - warm and dry    Diagnostic data:   I have reviewed recent diagnostic testing including labs with patient today. Assessment and Plan:     Diagnosis Orders   1. Spondylosis of cervical region without myelopathy or radiculopathy  External Referral To Orthopedic Surgery    lidocaine (LIDODERM) 5 %      2. Oral candidiasis  clotrimazole (MYCELEX) 10 MG meri      3. Cervical stenosis of spinal canal  External Referral To Orthopedic Surgery      4. Spondylosis of lumbar region without myelopathy or radiculopathy  External Referral To Orthopedic Surgery      5. Cervicogenic headache  ergotamine-caffeine (CAFERGOT) 1-100 MG per tablet      6. Melena          Spondylosis of cervical and lumbar region: patient states prednisone and tylenol #2 did not help. Developed melena after starting Tylenol #2 so he stopped taking. Advised patient to continue taking flexeril. Prescribed lidocaine patches. MRI cervical spine showing moderate left and mild right-sided foraminal stenosis at C3-C4, mild canal stenosis and mild-to-moderate bilateral foraminal stenosis C2-C3, C4-C5. Referred to [facility] for further evaluation and management. Oral candidiasis: prescribed Mycelex meri for 14 days. Advised patient to call if symptoms not improved within 3-4 days. Persistent headaches, likely related to cervical neck pain: prescribed Cafergot. Advised patient to call within 1 week if medication has not helped. Reported episode of melena, resolved: Had one episode of black stools after he started taking Tylenol #2, says resolved once he stopped medication, has a history of gastric ulcers. Advised patient to follow up with GI, Dr. Montero [facility]. Follow up within 2 months after seeing [facility]. Follows with Dr. Kiley Sanchez Lola San MD  PGY-1 Internal Medicine Resident      Staffed with: Dr. Hanh Nails. [address] [facility]INE  [address]  Dept: [phone]  Dept Fax: [phone]: [phone]

## 2022-11-10 ENCOUNTER — TELEPHONE (OUTPATIENT)
Dept: INTERNAL MEDICINE CLINIC | Age: 67
End: 2022-11-10

## 2022-11-10 NOTE — TELEPHONE ENCOUNTER
Insurance does not cover Lidoderm 5% patches. Wife Simran Bishop is going to buy 4% over the counter.

## 2022-11-14 ENCOUNTER — TELEPHONE (OUTPATIENT)
Dept: INTERNAL MEDICINE CLINIC | Age: 67
End: 2022-11-14

## 2022-11-14 NOTE — TELEPHONE ENCOUNTER
Patient was seen in the office 11/7 and dx: oral candidiasis, tx clotriamzole meri x14 days. Patient sore throat without change. Do you want to see him in the office this week?

## 2022-11-17 ENCOUNTER — OFFICE VISIT (OUTPATIENT)
Dept: INTERNAL MEDICINE CLINIC | Age: 67
End: 2022-11-17
Payer: MEDICARE

## 2022-11-17 VITALS
HEIGHT: 66 IN | TEMPERATURE: 98 F | BODY MASS INDEX: 25.75 KG/M2 | HEART RATE: 72 BPM | WEIGHT: 160.2 LBS | DIASTOLIC BLOOD PRESSURE: 76 MMHG | SYSTOLIC BLOOD PRESSURE: 130 MMHG

## 2022-11-17 DIAGNOSIS — B37.0 ORAL THRUSH: ICD-10-CM

## 2022-11-17 DIAGNOSIS — R13.10 DYSPHAGIA, UNSPECIFIED TYPE: Primary | ICD-10-CM

## 2022-11-17 PROCEDURE — G8417 CALC BMI ABV UP PARAM F/U: HCPCS | Performed by: STUDENT IN AN ORGANIZED HEALTH CARE EDUCATION/TRAINING PROGRAM

## 2022-11-17 PROCEDURE — 1123F ACP DISCUSS/DSCN MKR DOCD: CPT | Performed by: STUDENT IN AN ORGANIZED HEALTH CARE EDUCATION/TRAINING PROGRAM

## 2022-11-17 PROCEDURE — 99213 OFFICE O/P EST LOW 20 MIN: CPT | Performed by: STUDENT IN AN ORGANIZED HEALTH CARE EDUCATION/TRAINING PROGRAM

## 2022-11-17 PROCEDURE — 3017F COLORECTAL CA SCREEN DOC REV: CPT | Performed by: STUDENT IN AN ORGANIZED HEALTH CARE EDUCATION/TRAINING PROGRAM

## 2022-11-17 PROCEDURE — G8427 DOCREV CUR MEDS BY ELIG CLIN: HCPCS | Performed by: STUDENT IN AN ORGANIZED HEALTH CARE EDUCATION/TRAINING PROGRAM

## 2022-11-17 PROCEDURE — 1036F TOBACCO NON-USER: CPT | Performed by: STUDENT IN AN ORGANIZED HEALTH CARE EDUCATION/TRAINING PROGRAM

## 2022-11-17 PROCEDURE — G8484 FLU IMMUNIZE NO ADMIN: HCPCS | Performed by: STUDENT IN AN ORGANIZED HEALTH CARE EDUCATION/TRAINING PROGRAM

## 2022-11-17 RX ORDER — FLUCONAZOLE 100 MG/1
100 TABLET ORAL DAILY
Qty: 7 TABLET | Refills: 0 | Status: SHIPPED | OUTPATIENT
Start: 2022-11-17 | End: 2022-11-22

## 2022-11-17 NOTE — PROGRESS NOTES
[address] [facility]   [facility] [address]  [address] [facility], [address] [facility]  Dept: [phone]  Dept Fax: [phone]    This is an established patient, here for an evaluation of the following chief complaint(s):  Pharyngitis (Pt thinks he has thrush. Was on steroids for his neck)    ASSESSMENT AND PLAN     1. Dysphagia, unspecified type    2. Oral thrush      # Dysphagia/Globus Sensation -States to having a sensation of something being stuck in the back of his throat. Has had this for over a month now. Is able to eat and drink without issue. No unintentional weight loss or unexplained fevers. No associated dysphonia, nausea/vomiting. He presented earlier in the month with these symptoms and was found to have some yeast identified by black light in the oropharynx. Was prescribed Mycelex meri which did not alleviate his symptoms. Prior Tx/Therapy: N/A  Current Tx/Therapy: N/A    Plan: Will obtain a modified barium swallow study to assess swallowing and esophageal anatomical abnormalities. # Oral Thrush -Presented on 11/7/22 with symptoms of sore throat. Black light examination at that time revealed yeast in the oropharynx. He was prescribed Clotrimazole which did not improve symptoms. Examination during today's visit revealed persistence of fungus in the oropharynx along the soft palate. Prior Tx/Therapy: Clotrimazole meri (did not resolve symptoms)  Current Tx/Therapy: None    Plan: Will treat with Diflucan, 100 mg qd for 7 days of treatment. Orders Placed This Encounter   Procedures    FL MODIFIED BARIUM SWALLOW W VIDEO     New Prescriptions    FLUCONAZOLE (DIFLUCAN) 100 MG TABLET    Take 1 tablet by mouth daily for 7 days     The risks and benefits of my recommendations, as well as other treatment options (if indicated) were discussed with the patient today. Questions were answered. Follow up: 3 weeks and as needed.     DANDRE Corcoran (1955) is a pleasant 79 y.o. male here for a follow up visit for problems stated above. Interval History & Review of Systems  Lozenges have not helped   Right ear feels painful - this has been going on since the thrush - one month ago. Has hearing loss and tinnitus in both ears but this is chronic/no changes. The chiropractor has helped with his headaches     He states that he has a globus type sensation  No thyromegaly on physical exam  I discussed a barium swallow study - seems like this could be all esophageal   No family history of cancer. Non smoker. Social drinker. Wants a second opinion for the spine surgery. I told him to discuss this with Dr. Jessica Pascual    Current Medications:  Outpatient Medications Marked as Taking for the 11/17/22 encounter (Office Visit) with Aaron Aponte MD   Medication Sig Dispense Refill    fluconazole (DIFLUCAN) 100 MG tablet Take 1 tablet by mouth daily for 7 days 7 tablet 0    ergotamine-caffeine (CAFERGOT) 1-100 MG per tablet Two tablets at onset of attack; then 1 tablet every 30 minutes as needed; maximum: 6 tablets per attack; do not exceed 10 tablets/week. 30 tablet 1    cyclobenzaprine (FLEXERIL) 10 MG tablet Take 1 tablet by mouth every 8 hours as needed for Muscle spasms 30 tablet 2    DULoxetine (CYMBALTA) 30 MG extended release capsule Take 1 capsule by mouth daily 90 capsule 1    omeprazole (PRILOSEC) 20 MG delayed release capsule Take 40 mg by mouth every morning      famotidine (PEPCID) 40 MG tablet take 1 tablet by mouth every evening      aspirin 81 MG EC tablet Aspirin (Ecotrin Low Strength) 81 mg Tablet,Delayed Release (Dr/Ec) Active 81 MG PO Daily January 24th, 2022 2:15pm      magnesium (MAGNESIUM-OXIDE) 250 MG TABS tablet Take 500 mg by mouth daily      b complex vitamins capsule Take 1 capsule by mouth daily      gabapentin (NEURONTIN) 600 MG tablet Take 600 mg by mouth 2 times daily.        donepezil (ARICEPT) 10 MG tablet Take 10 mg by mouth nightly      acetaminophen (TYLENOL) 325 MG tablet Take 650 mg by mouth every 6 hours as needed for Pain      Glucosamine-Chondroitin (MOVE FREE PO) Take by mouth 2 times daily      Multiple Vitamins-Minerals (THERAPEUTIC MULTIVITAMIN-MINERALS) tablet Take 1 tablet by mouth daily       PMH: has a past medical history of Hiatal hernia, PUD (peptic ulcer disease), and Rectal bleeding. PSH: has a past surgical history that includes Mandible surgery (1979); eye surgery (plate in right eye); and Neck surgery (2001). FH:family history includes Heart Disease in his father and mother; Other in his father. SH: reports that he has never smoked. He has never used smokeless tobacco. He reports that he does not drink alcohol and does not use drugs. Allergies:has No Known Allergies. PHYSICAL EXAMINATION     Vitals:    11/17/22 1259   BP: 130/76   Pulse: 72   Temp: 98 °F (36.7 °C)     Body mass index is 25.87 kg/m². General appearance: alert, well appearing, and in no distress. Head; normocephalic, atraumatic. PHILLIP. ENT- ENT exam normal, no neck nodes or sinus tenderness. Oropharyngeal exam - mucous membranes moist, pharynx normal without lesions. Black light pen examination shows some fungi along the soft palate  Neck exam - supple, no significant adenopathy. CVS exam: normal rate, regular rhythm, normal S1, S2, no murmurs, rubs, clicks or gallops. Chest: clear to auscultation, no wheezes, rales or rhonchi, symmetric air entry. Abdominal exam: soft, nontender, nondistended, no masses or organomegaly. Exam of extremities: peripheral pulses normal, no pedal edema, no clubbing or cyanosis  Musculoskeletal exam: no joint tenderness, deformity or swelling. Neurological exam reveals alert, oriented, normal speech, no focal findings or movement disorder noted. Skin exam - normal coloration and turgor, no rashes, no suspicious skin lesions noted. Mental Status: alert, oriented to person, place, and time.     MOST RECENT DIAGNOSTIC DATA     The Following Labs Were Reviewed Today:      Radiology last 30 days:  MRI CERVICAL SPINE WO CONTRAST    Result Date: 10/25/2022   1. Status post anterior interbody fusion between C5 and C7 and straightening of the normal cervical lordosis. 2. Degenerative changes resulting in mild canal stenosis but no cord compression moderate left and mild right-sided foraminal stenosis at C3-4. 3. There is mild canal stenosis but no cord compression and mild-to-moderate bilateral foraminal stenosis at C2-C3 and C4-5. 4. Otherwise negative MRI scan of the cervical spine. . **This report has been created using voice recognition software. It may contain minor errors which are inherent in voice recognition technology. ** Final report electronically signed by DR Odom Page on 10/25/2022 8:41 AM    Electronically signed by Tien Barnard MD on 11/17/22  Internal Medicine Resident PGY-3  [address] [place] [facility], [address]  Case and plan reviewed with attending physician, Dr. Eliza Kang DO

## 2022-11-17 NOTE — PATIENT INSTRUCTIONS
If your ear pain gets worse or you start getting a fever, please call our office right away. If it is Friday or the weekend, please go to the ED. Speak with Dr. Zacarias Dozier and his office if you decide on getting a second opinion about your spine.

## 2022-11-22 ENCOUNTER — OFFICE VISIT (OUTPATIENT)
Dept: INTERNAL MEDICINE CLINIC | Age: 67
End: 2022-11-22
Payer: MEDICARE

## 2022-11-22 VITALS
DIASTOLIC BLOOD PRESSURE: 70 MMHG | SYSTOLIC BLOOD PRESSURE: 110 MMHG | TEMPERATURE: 97.3 F | BODY MASS INDEX: 25.71 KG/M2 | HEART RATE: 70 BPM | WEIGHT: 160 LBS | HEIGHT: 66 IN

## 2022-11-22 DIAGNOSIS — K21.9 GASTROESOPHAGEAL REFLUX DISEASE, UNSPECIFIED WHETHER ESOPHAGITIS PRESENT: ICD-10-CM

## 2022-11-22 DIAGNOSIS — B37.0 THRUSH: ICD-10-CM

## 2022-11-22 DIAGNOSIS — M48.02 CERVICAL STENOSIS OF SPINAL CANAL: ICD-10-CM

## 2022-11-22 DIAGNOSIS — K44.9 HIATAL HERNIA: ICD-10-CM

## 2022-11-22 DIAGNOSIS — J02.9 SORE THROAT: Primary | ICD-10-CM

## 2022-11-22 DIAGNOSIS — M47.812 SPONDYLOSIS OF CERVICAL REGION WITHOUT MYELOPATHY OR RADICULOPATHY: ICD-10-CM

## 2022-11-22 PROCEDURE — G8417 CALC BMI ABV UP PARAM F/U: HCPCS | Performed by: STUDENT IN AN ORGANIZED HEALTH CARE EDUCATION/TRAINING PROGRAM

## 2022-11-22 PROCEDURE — G8427 DOCREV CUR MEDS BY ELIG CLIN: HCPCS | Performed by: STUDENT IN AN ORGANIZED HEALTH CARE EDUCATION/TRAINING PROGRAM

## 2022-11-22 PROCEDURE — 99213 OFFICE O/P EST LOW 20 MIN: CPT | Performed by: STUDENT IN AN ORGANIZED HEALTH CARE EDUCATION/TRAINING PROGRAM

## 2022-11-22 PROCEDURE — 1036F TOBACCO NON-USER: CPT | Performed by: STUDENT IN AN ORGANIZED HEALTH CARE EDUCATION/TRAINING PROGRAM

## 2022-11-22 PROCEDURE — G8484 FLU IMMUNIZE NO ADMIN: HCPCS | Performed by: STUDENT IN AN ORGANIZED HEALTH CARE EDUCATION/TRAINING PROGRAM

## 2022-11-22 PROCEDURE — 1123F ACP DISCUSS/DSCN MKR DOCD: CPT | Performed by: STUDENT IN AN ORGANIZED HEALTH CARE EDUCATION/TRAINING PROGRAM

## 2022-11-22 PROCEDURE — 3017F COLORECTAL CA SCREEN DOC REV: CPT | Performed by: STUDENT IN AN ORGANIZED HEALTH CARE EDUCATION/TRAINING PROGRAM

## 2022-11-22 RX ORDER — AMOXICILLIN AND CLAVULANATE POTASSIUM 875; 125 MG/1; MG/1
1 TABLET, FILM COATED ORAL 2 TIMES DAILY
Qty: 14 TABLET | Refills: 0 | Status: SHIPPED | OUTPATIENT
Start: 2022-11-22 | End: 2022-11-29

## 2022-11-22 ASSESSMENT — ENCOUNTER SYMPTOMS
ABDOMINAL PAIN: 0
NAUSEA: 0
SHORTNESS OF BREATH: 0
SORE THROAT: 1
VOICE CHANGE: 0
TROUBLE SWALLOWING: 0
CONSTIPATION: 0
BACK PAIN: 0
VOMITING: 0
WHEEZING: 0
SINUS PRESSURE: 0
DIARRHEA: 0
COUGH: 0

## 2022-11-22 NOTE — PROGRESS NOTES
Vianney Parks (:  1955) is a 79 y.o. male,Established patient, here for evaluation of the following chief complaint(s): Thrush (Follow up from last week-no better)     ASSESSMENT/PLAN:  1. Sore throat  -     amoxicillin-clavulanate (AUGMENTIN) 875-125 MG per tablet; Take 1 tablet by mouth 2 times daily for 7 days, Disp-14 tablet, R-0Normal  -     Culture, Throat  2. Thrush  -     Culture, Throat  3. Spondylosis of cervical region without myelopathy or radiculopathy  -Return in 1 week for pre-operative assessement  4. Cervical stenosis of spinal canal  -Continue to wear C-collar as needed. 5. Gastroesophageal reflux disease, unspecified whether esophagitis present  -Continue omeprazole  6. Hiatal hernia  -Discussed referral to GI after C-spine surgery to address GERD and hiatal hernia. Return in about 1 week (around 2022). Subjective   SUBJECTIVE/OBJECTIVE:  DANDRE Parks is a 79 y.o. male who is here for a follow up visit for oral thrust/sore throat. He has been treated with Mycelex meri and diflucan without resolution. He is still complaining of sore throat. Denies fevers, chills, diaphoresis, night sweats, sinus pressure pain, sinus drainage, sneezing, dysphagia or trouble swallowing. No changes in appetite. Does report severe acid reflux which has been going on for years and has a known hiatal hernia. He is wearing a cervical collar as he has cervical stenosis and is scheduled for surgery with [facility] early 2022. He reports constant headaches for which he takes tylenol everyday. He was prescribed cafergot however has not been taking this. Denies debility from headaches. Review of Systems   Constitutional:  Negative for appetite change, chills, diaphoresis, fatigue and fever. HENT:  Positive for sore throat. Negative for congestion, sinus pressure, sneezing, trouble swallowing and voice change.     Respiratory:  Negative for cough, shortness of breath and wheezing. Cardiovascular:  Negative for chest pain, palpitations and leg swelling. Gastrointestinal:  Negative for abdominal pain, constipation, diarrhea, nausea and vomiting. Acid reflux   Genitourinary:  Negative for frequency, hematuria and urgency. Musculoskeletal:  Positive for neck pain and neck stiffness. Negative for back pain. Skin:  Negative for rash. Neurological:  Positive for headaches. Negative for dizziness, syncope, weakness, light-headedness and numbness. Psychiatric/Behavioral:  The patient is not nervous/anxious. Objective   Physical Exam  Vitals reviewed. Constitutional:       General: He is not in acute distress. Appearance: Normal appearance. He is normal weight. He is not ill-appearing, toxic-appearing or diaphoretic. HENT:      Head: Normocephalic and atraumatic. Right Ear: External ear normal.      Left Ear: External ear normal.      Nose: Nose normal.      Mouth/Throat:      Mouth: Mucous membranes are moist.      Pharynx: Oropharynx is clear. No oropharyngeal exudate or posterior oropharyngeal erythema. Eyes:      Extraocular Movements: Extraocular movements intact. Conjunctiva/sclera: Conjunctivae normal.      Pupils: Pupils are equal, round, and reactive to light. Neck:      Comments: Wearing a cervical collar, ROM not tested  Cardiovascular:      Rate and Rhythm: Normal rate and regular rhythm. Pulses: Normal pulses. Heart sounds: Normal heart sounds. No murmur heard. No friction rub. No gallop. Pulmonary:      Effort: Pulmonary effort is normal.      Breath sounds: No wheezing, rhonchi or rales. Abdominal:      General: Abdomen is flat. Bowel sounds are normal.      Palpations: Abdomen is soft. Tenderness: There is no abdominal tenderness. There is no guarding or rebound. Musculoskeletal:         General: Normal range of motion. Right lower leg: No edema. Left lower leg: No edema.    Skin: General: Skin is warm and dry. Capillary Refill: Capillary refill takes less than 2 seconds. Neurological:      General: No focal deficit present. Mental Status: He is alert and oriented to person, place, and time. Mental status is at baseline. Psychiatric:         Mood and Affect: Mood normal.         Behavior: Behavior normal.         Thought Content: Thought content normal.         Judgment: Judgment normal.        An electronic signature was used to authenticate this note.     --Onel Enter, DO   Case and plan developed in coordination with Dr. Chloe Agustin

## 2022-11-24 LAB — THROAT/NOSE CULTURE: NORMAL

## 2022-11-29 ENCOUNTER — OFFICE VISIT (OUTPATIENT)
Dept: INTERNAL MEDICINE CLINIC | Age: 67
End: 2022-11-29
Payer: MEDICARE

## 2022-11-29 VITALS
DIASTOLIC BLOOD PRESSURE: 84 MMHG | OXYGEN SATURATION: 96 % | SYSTOLIC BLOOD PRESSURE: 126 MMHG | HEART RATE: 80 BPM | TEMPERATURE: 98.1 F | WEIGHT: 160.4 LBS | BODY MASS INDEX: 25.9 KG/M2

## 2022-11-29 DIAGNOSIS — K21.9 GASTROESOPHAGEAL REFLUX DISEASE, UNSPECIFIED WHETHER ESOPHAGITIS PRESENT: ICD-10-CM

## 2022-11-29 DIAGNOSIS — J02.9 SORE THROAT: Primary | ICD-10-CM

## 2022-11-29 DIAGNOSIS — M48.02 CERVICAL STENOSIS OF SPINAL CANAL: ICD-10-CM

## 2022-11-29 PROBLEM — K27.9 PEPTIC ULCER: Status: ACTIVE | Noted: 2017-04-04

## 2022-11-29 PROBLEM — M25.512 PAIN IN LEFT SHOULDER: Status: ACTIVE | Noted: 2020-02-06

## 2022-11-29 PROBLEM — M43.00 SPONDYLOLYSIS: Status: ACTIVE | Noted: 2017-10-23

## 2022-11-29 PROBLEM — M99.59 INTERVERTEBRAL DISC STENOSIS OF NEURAL CANAL: Status: ACTIVE | Noted: 2018-02-05

## 2022-11-29 PROBLEM — M47.817 SPONDYLOSIS OF LUMBOSACRAL SPINE WITHOUT MYELOPATHY: Status: ACTIVE | Noted: 2017-04-04

## 2022-11-29 PROBLEM — U07.1 COVID-19: Status: ACTIVE | Noted: 2022-01-24

## 2022-11-29 PROCEDURE — 99213 OFFICE O/P EST LOW 20 MIN: CPT | Performed by: STUDENT IN AN ORGANIZED HEALTH CARE EDUCATION/TRAINING PROGRAM

## 2022-11-29 PROCEDURE — G8484 FLU IMMUNIZE NO ADMIN: HCPCS | Performed by: STUDENT IN AN ORGANIZED HEALTH CARE EDUCATION/TRAINING PROGRAM

## 2022-11-29 PROCEDURE — 3017F COLORECTAL CA SCREEN DOC REV: CPT | Performed by: STUDENT IN AN ORGANIZED HEALTH CARE EDUCATION/TRAINING PROGRAM

## 2022-11-29 PROCEDURE — 1123F ACP DISCUSS/DSCN MKR DOCD: CPT | Performed by: STUDENT IN AN ORGANIZED HEALTH CARE EDUCATION/TRAINING PROGRAM

## 2022-11-29 PROCEDURE — 1036F TOBACCO NON-USER: CPT | Performed by: STUDENT IN AN ORGANIZED HEALTH CARE EDUCATION/TRAINING PROGRAM

## 2022-11-29 PROCEDURE — G8427 DOCREV CUR MEDS BY ELIG CLIN: HCPCS | Performed by: STUDENT IN AN ORGANIZED HEALTH CARE EDUCATION/TRAINING PROGRAM

## 2022-11-29 PROCEDURE — G8417 CALC BMI ABV UP PARAM F/U: HCPCS | Performed by: STUDENT IN AN ORGANIZED HEALTH CARE EDUCATION/TRAINING PROGRAM

## 2022-11-29 ASSESSMENT — ENCOUNTER SYMPTOMS
WHEEZING: 0
SORE THROAT: 1
TROUBLE SWALLOWING: 0
RHINORRHEA: 0
ABDOMINAL PAIN: 0
COUGH: 0
ABDOMINAL DISTENTION: 0
DIARRHEA: 0
SHORTNESS OF BREATH: 0
CHOKING: 0
CONSTIPATION: 0
SINUS PRESSURE: 0
NAUSEA: 0
VOMITING: 0
VOICE CHANGE: 1

## 2022-11-29 NOTE — PROGRESS NOTES
Lanette Pastor (:  1955) is a 79 y.o. male,Established patient, here for evaluation of the following chief complaint(s):     ASSESSMENT/PLAN:  1. Sore throat  -     [facility]. [facility]ose [facility] Throat  2. Gastroesophageal reflux disease, unspecified whether esophagitis present  -Continue Pepcid and Prilosec. Recommended following up with GI for evaluation of GERD and hiatal hernia however he is currently resistant. 3. Cervical stenosis of spinal canal  Follow-up in 6 weeks after your surgery with Dr. Ulisses Nettles. Continue Tylenol for pain. Return in about 6 weeks (around 1/10/2023). Subjective   SUBJECTIVE/OBJECTIVE:  HPI    Lanette Pastor is a 79 y.o. male who is here for a follow up visit for his intractable sore throat. He has been treated with Mycelex meri, diflucan & augmentin over the last 4 weeks without resolution. He is still complaining of sore throat with a globus sensation and intermittent hoarse voice. Denies fevers, chills, diaphoresis, night sweats, sinus pressure pain, sinus drainage, sneezing, dysphagia or trouble swallowing. No changes in appetite. Does report severe acid reflux which has been going on for years and has a known hiatal hernia. He is wearing a cervical collar as he has cervical stenosis and is scheduled for surgery with Dr. Ulisses Nettles on 2022. He is very hesitant about getting this surgery and has a second opinion on 22 with Dr. Britany Bañuelos in [place], [place]. Review of Systems   Constitutional:  Negative for appetite change, chills, diaphoresis, fatigue, fever and unexpected weight change. HENT:  Positive for dental problem (has dental plate for front two tooth gaps), sore throat and voice change (intermittent hoarseness). Negative for congestion, drooling, rhinorrhea, sinus pressure, sneezing and trouble swallowing. Respiratory:  Negative for cough, choking, shortness of breath and wheezing.     Cardiovascular:  Negative for chest pain, palpitations and leg swelling. Gastrointestinal:  Negative for abdominal distention, abdominal pain, constipation, diarrhea, nausea and vomiting. Genitourinary:  Negative for dysuria, frequency, hematuria and urgency. Musculoskeletal:  Positive for neck pain and neck stiffness. Neurological:  Negative for dizziness, syncope, weakness, light-headedness, numbness and headaches. Psychiatric/Behavioral:  The patient is not nervous/anxious. Objective   Physical Exam  Vitals reviewed. Constitutional:       General: He is not in acute distress. Appearance: Normal appearance. He is normal weight. He is not ill-appearing, toxic-appearing or diaphoretic. HENT:      Head: Normocephalic and atraumatic. Right Ear: External ear normal.      Left Ear: External ear normal.      Nose: Nose normal. No congestion or rhinorrhea. Mouth/Throat:      Mouth: Mucous membranes are moist.      Pharynx: Oropharynx is clear. No oropharyngeal exudate or posterior oropharyngeal erythema. Comments: Intermittent hoarse voice noted  Eyes:      Extraocular Movements: Extraocular movements intact. Conjunctiva/sclera: Conjunctivae normal.      Pupils: Pupils are equal, round, and reactive to light. Neck:      Comments: Wearing neck brace  Cardiovascular:      Rate and Rhythm: Normal rate and regular rhythm. Pulses: Normal pulses. Heart sounds: Normal heart sounds. No murmur heard. No friction rub. No gallop. Pulmonary:      Effort: Pulmonary effort is normal. No respiratory distress. Breath sounds: Normal breath sounds. No stridor. No wheezing, rhonchi or rales. Abdominal:      General: Abdomen is flat. Bowel sounds are normal.      Palpations: Abdomen is soft. Tenderness: There is no abdominal tenderness. There is no guarding or rebound. Musculoskeletal:         General: Normal range of motion. Right lower leg: No edema. Left lower leg: No edema. Skin:     General: Skin is warm and dry. Capillary Refill: Capillary refill takes less than 2 seconds. Neurological:      General: No focal deficit present. Mental Status: He is alert and oriented to person, place, and time. Mental status is at baseline. Psychiatric:         Mood and Affect: Mood normal.         Behavior: Behavior normal.         Thought Content: Thought content normal.         Judgment: Judgment normal.        An electronic signature was used to authenticate this note.     --Taj Neumann DO   Case and plan developed in coordination with Dr. Enrique Moy

## 2022-12-06 ENCOUNTER — HOSPITAL ENCOUNTER (OUTPATIENT)
Dept: GENERAL RADIOLOGY | Age: 67
Discharge: HOME | End: 2022-12-06
Payer: MEDICARE

## 2022-12-06 DIAGNOSIS — R13.10 DYSPHAGIA, UNSPECIFIED TYPE: ICD-10-CM

## 2022-12-06 PROCEDURE — 92611 MOTION FLUOROSCOPY/SWALLOW: CPT

## 2022-12-06 PROCEDURE — 74230 X-RAY XM SWLNG FUNCJ C+: CPT

## 2022-12-06 PROCEDURE — 2500000003 HC RX 250 WO HCPCS: Performed by: STUDENT IN AN ORGANIZED HEALTH CARE EDUCATION/TRAINING PROGRAM

## 2022-12-06 RX ADMIN — BARIUM SULFATE 30 ML: 0.81 POWDER, FOR SUSPENSION ORAL at 09:06

## 2022-12-06 RX ADMIN — BARIUM SULFATE 10 ML: 400 PASTE ORAL at 09:06

## 2022-12-06 NOTE — DISCHARGE SUMMARY
[address] [facility] RADIOLOGY  Modified Barium Swallow    SLP Individual Minutes  Time In: 9322  Time Out: 5928  Minutes: 23  Timed Code Treatment Minutes: 0 Minutes       Date: 2022  Patient Name: Bel Wood      CSN: [id number]   : 1955  (79 y.o.)  Gender: male   Referring Physician:  Dr. Geoffrey Garcia   Diagnosis: Dysphagia, unspecified type   Precautions: Soft cervical collar in place  History of Present Illness/Injury: Patient with scheduled OP MBS at [facility] referred by Dr. Geoffrey Garcia. Per chart review of most recent medical records and patient report; patient with recent hx of \"losing voice inconsistently and airway swelling. \" Patient reporting, Randolph Rod can't figure it out. \" Patient confirmed hx of thrush with treatment; however, patient reporting \"now they don't think it is that. \" Patient also confirming, \"hx of GERD my whole life since I was a kid, I have been on omeprizol forever. \" Per physician order; patient reporting \"persistent globus sensation. \" Patient reporting, \"I had a neck surgery scheduled for 22 but I got a second opinion and now I am going to hold. I have and ENT appointment in February and I want that one first.\" Patient reporting, Criss Xiang the next neck surgery he was going to take out the old hardware for  and put new in the upper portion, but I decided not now. \" MBS ordered to further evaluate function and integrity of the swallow. Patient has a past medical history of GERD (gastroesophageal reflux disease), Hiatal hernia, PUD (peptic ulcer disease), and Rectal bleeding. Current Diet: Regular diet with thin liquids     Pain: No pain reported. SUBJECTIVE:  Patient ambulated independently within the fluorsoscopy suite; soft cervical collar in place. Patient reporting, he does not eat with soft collar in place; therefore, ST asked patient politely to remove it if patient feels comfortable.  Patient reporting, \"I only where it while driving and walker d/t the impact/compression of my cervical spine in the upper portion. \" Patient's wife Ana Juan with patient; education provided to both patient and wife Ana Juan to follow MBS. OBJECTIVE:    Respiratory Status:  Room Air    Behavioral Observation:  Alert and Cooperative; very engaged     PATIENT WAS EVALUATED USING:  Barium: Thin Liquids, Puree, Soft Solids, Coarse Solids, and Mixed Consistency    ORAL PHASE LUIS SCORE: (Dysphagia outcome and severity scale)  7 = Normal in all situations    PHARYNGEAL PHASE LUIS SCORE: (Dysphagia outcome and severity scale)  5 = Mild Dysphagia - may need one consistency restricted - May have one or more of the following: Aspiration with thin - cough to clear, Airway penetration midway to the vocal cords with one or more consistency or to the vocal folds with one consistency, but clears spontaneously - Residue in the pharynx clears spontaneously    EVIDENCE FOR LARYNGEAL PENETRATION AND/OR ASPIRATION:  No evidence of laryngeal penetration  No evidence of aspiration    PENETRATION-ASPIRATION SCALE (PAS): Thin Liquids: 1 = Material does not enter the airway  Puree:  1 = Material does not enter the airway  Soft Solid:  1 = Material does not enter the airway  Mixed Consistencies: 1 = Material does not enter the airway  Hard Solid: 1 = Material does not enter the airway    ESOPHAGEAL PHASE:   No significant findings and See Radiology Report for details    ATTEMPTED TECHNIQUES:  Small Bolus Size Effective    Straw Effective    Cup Effective    Large Drinks Effective    Consecutive Drinks Effective    Chin Tuck Not Attempted    Head Turn Not Attempted    Spoon Presentations Not Attempted    Volitional Cough Not Attempted    Spontaneous Cough Not Attempted           DIAGNOSTIC IMPRESSIONS:  [facility] and mild pharyngeal dysphagia as evidence by the findings outlined above.   Patient with evidence of hard ware beginning at C7 and lower (unable to visualize past C7); patient confirming Corinne Reyes is old from 2001. \" Patient confirming \"we are holding on the next neck surgery and taking care of this first.\" Patient with few missing teeth from upper dentition; patient reporting \"I tried to remove the upper plate I had thinking maybe that had something to do with the airway swelling as maybe it is an allergic reaction. \" Upon completion of OME; lingual/labial structures presented to be intact without edema and functional, pink in collar overall. Patient consumed PO trials demonstrating with functional oral mastication/bolus formation, timely swallow, no penetration or aspiration noted with all consistencies evaluated. WFL TBR, hyolaryngeal elevation/anterior excursion and epiglottic inversion. Old hardware did not impact function of the swallow or movement of the bolus. During consumption of hard solid texture; slow movement of bolus noted through the UES with evidence of reflex noted x1 of hard solid material- spontaneously cleared with additional swallow. Mild residue remaining within the valleculae following hard solids; cleared spontaneously with use of thin liquid wash. ST recommending regular diet with thin liquids. Patient did demonstrate with intermittent hoarse vocal quality before and after study was completed; ST highly recommending ongoing OP ENT to further evaluate vocal fold function, tissue integrity and airway. Patient's wife confirmed ENT appointment in February. Patient and wife highly appreciative of evaluation and ST education. No further speech therapy services recommended.      Diet Recommendations:  Regular diet with thin liquids   Strategies:  Full Upright Position, Small Bite/Sip, and Removal of soft collar when consuming PO intake     Rehabilitation Potential: good    EDUCATION:  Learner: Patient and Significant Other  Education:  Reviewed results and recommendations of this evaluation, Reviewed diet and strategies, Reviewed

## 2022-12-12 ENCOUNTER — TELEPHONE (OUTPATIENT)
Dept: INTERNAL MEDICINE CLINIC | Age: 67
End: 2022-12-12

## 2022-12-12 NOTE — TELEPHONE ENCOUNTER
Patients wife called and thrush has returned full force. Patient cannot get into ENT until February 2 at 9:30A. Advised patient to schedule with [facility]. Patient scheduled for 12/14/22.

## 2022-12-27 ENCOUNTER — TELEPHONE (OUTPATIENT)
Dept: INTERNAL MEDICINE CLINIC | Age: 67
End: 2022-12-27

## 2022-12-27 NOTE — TELEPHONE ENCOUNTER
Patient wife called stating that Juvenal Landon started having cough , fever , chills on 12/25 and he tested positive for Covid with a home Covid test . Patient is asking if Paxlovid can be called in for him . Please advise .

## 2023-01-04 ENCOUNTER — TELEPHONE (OUTPATIENT)
Dept: INTERNAL MEDICINE CLINIC | Age: 68
End: 2023-01-04

## 2023-01-04 NOTE — TELEPHONE ENCOUNTER
Received call from wife, patient is hoarse and has sore throat again. Patient does not see Dr. Cris Gonzales until 2/13. Wife is wondering if you have any suggestions or a different referral?     Please advise.

## 2023-01-05 NOTE — TELEPHONE ENCOUNTER
Wife called to update, ended up getting an appt with  -she can't remember name- at [facility] for this issue on 1/11. Cancelled appointment with Dr. Minna Pickard. She will update us on appt 1/12.

## 2023-01-15 ENCOUNTER — HOSPITAL ENCOUNTER (EMERGENCY)
Age: 68
Discharge: HOME | End: 2023-01-15
Attending: EMERGENCY MEDICINE
Payer: MEDICARE

## 2023-01-15 ENCOUNTER — APPOINTMENT (OUTPATIENT)
Dept: CT IMAGING | Age: 68
End: 2023-01-15
Payer: MEDICARE

## 2023-01-15 VITALS
RESPIRATION RATE: 18 BRPM | SYSTOLIC BLOOD PRESSURE: 116 MMHG | BODY MASS INDEX: 25.71 KG/M2 | HEIGHT: 66 IN | OXYGEN SATURATION: 99 % | DIASTOLIC BLOOD PRESSURE: 100 MMHG | HEART RATE: 80 BPM | WEIGHT: 160 LBS | TEMPERATURE: 97.5 F

## 2023-01-15 DIAGNOSIS — R10.32 LEFT LOWER QUADRANT ABDOMINAL PAIN: ICD-10-CM

## 2023-01-15 DIAGNOSIS — R11.2 NAUSEA AND VOMITING, UNSPECIFIED VOMITING TYPE: Primary | ICD-10-CM

## 2023-01-15 LAB
ALBUMIN SERPL-MCNC: 4.7 G/DL (ref 3.5–5.1)
ALP BLD-CCNC: 84 U/L (ref 38–126)
ALT SERPL-CCNC: 23 U/L (ref 11–66)
ANION GAP SERPL CALCULATED.3IONS-SCNC: 12 MEQ/L (ref 8–16)
AST SERPL-CCNC: 21 U/L (ref 5–40)
BASOPHILS # BLD: 0.6 %
BASOPHILS ABSOLUTE: 0.1 THOU/MM3 (ref 0–0.1)
BILIRUB SERPL-MCNC: 0.6 MG/DL (ref 0.3–1.2)
BILIRUBIN DIRECT: < 0.2 MG/DL (ref 0–0.3)
BUN BLDV-MCNC: 16 MG/DL (ref 7–22)
CALCIUM SERPL-MCNC: 9.6 MG/DL (ref 8.5–10.5)
CHLORIDE BLD-SCNC: 99 MEQ/L (ref 98–111)
CO2: 26 MEQ/L (ref 23–33)
CREAT SERPL-MCNC: 0.8 MG/DL (ref 0.4–1.2)
EOSINOPHIL # BLD: 0.6 %
EOSINOPHILS ABSOLUTE: 0.1 THOU/MM3 (ref 0–0.4)
ERYTHROCYTE [DISTWIDTH] IN BLOOD BY AUTOMATED COUNT: 11.9 % (ref 11.5–14.5)
ERYTHROCYTE [DISTWIDTH] IN BLOOD BY AUTOMATED COUNT: 38.1 FL (ref 35–45)
GFR SERPL CREATININE-BSD FRML MDRD: > 60 ML/MIN/1.73M2
GLUCOSE BLD-MCNC: 130 MG/DL (ref 70–108)
HCT VFR BLD CALC: 52.7 % (ref 42–52)
HEMOGLOBIN: 17.8 GM/DL (ref 14–18)
IMMATURE GRANS (ABS): 0.05 THOU/MM3 (ref 0–0.07)
IMMATURE GRANULOCYTES: 0.6 %
LIPASE: 18.2 U/L (ref 5.6–51.3)
LYMPHOCYTES # BLD: 12.4 %
LYMPHOCYTES ABSOLUTE: 1.1 THOU/MM3 (ref 1–4.8)
MCH RBC QN AUTO: 29.5 PG (ref 26–33)
MCHC RBC AUTO-ENTMCNC: 33.8 GM/DL (ref 32.2–35.5)
MCV RBC AUTO: 87.3 FL (ref 80–94)
MONOCYTES # BLD: 6 %
MONOCYTES ABSOLUTE: 0.5 THOU/MM3 (ref 0.4–1.3)
NUCLEATED RED BLOOD CELLS: 0 /100 WBC
OSMOLALITY CALCULATION: 276.8 MOSMOL/KG (ref 275–300)
PLATELET # BLD: 277 THOU/MM3 (ref 130–400)
PMV BLD AUTO: 8.7 FL (ref 9.4–12.4)
POTASSIUM SERPL-SCNC: 3.8 MEQ/L (ref 3.5–5.2)
RBC # BLD: 6.04 MILL/MM3 (ref 4.7–6.1)
SEG NEUTROPHILS: 79.8 %
SEGMENTED NEUTROPHILS ABSOLUTE COUNT: 7.1 THOU/MM3 (ref 1.8–7.7)
SODIUM BLD-SCNC: 137 MEQ/L (ref 135–145)
TOTAL PROTEIN: 7.7 G/DL (ref 6.1–8)
TROPONIN T: < 0.01 NG/ML
WBC # BLD: 8.9 THOU/MM3 (ref 4.8–10.8)

## 2023-01-15 PROCEDURE — 74177 CT ABD & PELVIS W/CONTRAST: CPT

## 2023-01-15 PROCEDURE — 36415 COLL VENOUS BLD VENIPUNCTURE: CPT

## 2023-01-15 PROCEDURE — 99285 EMERGENCY DEPT VISIT HI MDM: CPT

## 2023-01-15 PROCEDURE — 93010 ELECTROCARDIOGRAM REPORT: CPT | Performed by: NUCLEAR MEDICINE

## 2023-01-15 PROCEDURE — 6370000000 HC RX 637 (ALT 250 FOR IP): Performed by: EMERGENCY MEDICINE

## 2023-01-15 PROCEDURE — 6360000002 HC RX W HCPCS: Performed by: EMERGENCY MEDICINE

## 2023-01-15 PROCEDURE — 82248 BILIRUBIN DIRECT: CPT

## 2023-01-15 PROCEDURE — 80053 COMPREHEN METABOLIC PANEL: CPT

## 2023-01-15 PROCEDURE — 93005 ELECTROCARDIOGRAM TRACING: CPT | Performed by: EMERGENCY MEDICINE

## 2023-01-15 PROCEDURE — 96374 THER/PROPH/DIAG INJ IV PUSH: CPT

## 2023-01-15 PROCEDURE — 2500000003 HC RX 250 WO HCPCS: Performed by: EMERGENCY MEDICINE

## 2023-01-15 PROCEDURE — 85025 COMPLETE CBC W/AUTO DIFF WBC: CPT

## 2023-01-15 PROCEDURE — 84484 ASSAY OF TROPONIN QUANT: CPT

## 2023-01-15 PROCEDURE — 2580000003 HC RX 258: Performed by: EMERGENCY MEDICINE

## 2023-01-15 PROCEDURE — 96375 TX/PRO/DX INJ NEW DRUG ADDON: CPT

## 2023-01-15 PROCEDURE — A4216 STERILE WATER/SALINE, 10 ML: HCPCS | Performed by: EMERGENCY MEDICINE

## 2023-01-15 PROCEDURE — 6360000004 HC RX CONTRAST MEDICATION: Performed by: EMERGENCY MEDICINE

## 2023-01-15 PROCEDURE — 83690 ASSAY OF LIPASE: CPT

## 2023-01-15 RX ORDER — METOCLOPRAMIDE 10 MG/1
10 TABLET ORAL 3 TIMES DAILY PRN
Qty: 12 TABLET | Refills: 0 | Status: SHIPPED | OUTPATIENT
Start: 2023-01-15

## 2023-01-15 RX ORDER — ONDANSETRON 2 MG/ML
4 INJECTION INTRAMUSCULAR; INTRAVENOUS ONCE
Status: COMPLETED | OUTPATIENT
Start: 2023-01-15 | End: 2023-01-15

## 2023-01-15 RX ORDER — 0.9 % SODIUM CHLORIDE 0.9 %
1000 INTRAVENOUS SOLUTION INTRAVENOUS ONCE
Status: COMPLETED | OUTPATIENT
Start: 2023-01-15 | End: 2023-01-15

## 2023-01-15 RX ORDER — MORPHINE SULFATE 4 MG/ML
4 INJECTION, SOLUTION INTRAMUSCULAR; INTRAVENOUS ONCE
Status: COMPLETED | OUTPATIENT
Start: 2023-01-15 | End: 2023-01-15

## 2023-01-15 RX ORDER — SUCRALFATE 1 G/1
1 TABLET ORAL 4 TIMES DAILY
Qty: 30 TABLET | Refills: 0 | Status: SHIPPED | OUTPATIENT
Start: 2023-01-15

## 2023-01-15 RX ADMIN — ONDANSETRON 4 MG: 2 INJECTION INTRAMUSCULAR; INTRAVENOUS at 06:28

## 2023-01-15 RX ADMIN — Medication: at 06:42

## 2023-01-15 RX ADMIN — SODIUM CHLORIDE 1000 ML: 9 INJECTION, SOLUTION INTRAVENOUS at 07:30

## 2023-01-15 RX ADMIN — FAMOTIDINE 20 MG: 10 INJECTION INTRAVENOUS at 06:28

## 2023-01-15 RX ADMIN — IOPAMIDOL 80 ML: 755 INJECTION, SOLUTION INTRAVENOUS at 07:50

## 2023-01-15 RX ADMIN — MORPHINE SULFATE 4 MG: 4 INJECTION, SOLUTION INTRAMUSCULAR; INTRAVENOUS at 06:42

## 2023-01-15 ASSESSMENT — PAIN - FUNCTIONAL ASSESSMENT
PAIN_FUNCTIONAL_ASSESSMENT: 0-10
PAIN_FUNCTIONAL_ASSESSMENT: 0-10

## 2023-01-15 ASSESSMENT — PAIN SCALES - GENERAL: PAINLEVEL_OUTOF10: 4

## 2023-01-15 NOTE — ED TRIAGE NOTES
Patient presents to ED with c/o abdominal pain and vomiting. Patient states he has acid reflux and gallbladder issues. Patient reports he had an episode of green/yellow emesis and has had changes in bowel habbits. Patient alert and oriented x4.

## 2023-01-15 NOTE — ED PROVIDER NOTES
[facility],7[address] DEPT  EMERGENCY DEPARTMENT ENCOUNTER          Pt Name: Fela Link  MRN: [id number]  [place] 1955  Date of evaluation: 1/15/2023  Physician: Aundrea Vazquez MD, Steve Roy [facility]      CHIEF COMPLAINT       Chief Complaint   Patient presents with    Abdominal Pain         HISTORY OF PRESENT ILLNESS    HPI  Fela Link is a 79 y.o. male who presents to the emergency department from home, as a walk in to the ED lobby for evaluation of vomiting and abdominal pain. Patient states he has chronic nausea, abdominal pain, sensation of reflux and has seen PCP and ENT, and received treatment for thrush but states his symptoms have continued. Today he woke up, had a small breakfast and immediately started vomiting multiple times. After vomiting states his abdominal pain got slightly worse. He is unable to tell me where his pain is exactly. The patient has no other acute complaints at this time.       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    Spondylolysis M43.00    Spondylosis of lumbosacral spine without myelopathy M47.817     Past Medical History:   Diagnosis Date    GERD (gastroesophageal reflux disease)     Hiatal hernia     EGD    PUD (peptic ulcer disease)     LONG TIME  AGO    Rectal bleeding     2o11- colonoscopy - dr Amisha Forrester     Past Surgical History:   Procedure Laterality Date    EYE SURGERY  plate in right eye    [address]    plate in neck          MEDICATIONS Current Facility-Administered Medications:     0.9 % sodium chloride bolus, 1,000 mL, IntraVENous, Once, Sarah Abarca MD    Current Outpatient Medications:     ergotamine-caffeine (CAFERGOT) 1-100 MG per tablet, Two tablets at onset of attack; then 1 tablet every 30 minutes as needed; maximum: 6 tablets per attack; do not exceed 10 tablets/week., Disp: 30 tablet, Rfl: 1    cyclobenzaprine (FLEXERIL) 10 MG tablet, Take 1 tablet by mouth every 8 hours as needed for Muscle spasms, Disp: 30 tablet, Rfl: 2    DULoxetine (CYMBALTA) 30 MG extended release capsule, Take 1 capsule by mouth daily, Disp: 90 capsule, Rfl: 1    omeprazole (PRILOSEC) 20 MG delayed release capsule, Take 40 mg by mouth every morning, Disp: , Rfl:     famotidine (PEPCID) 40 MG tablet, take 1 tablet by mouth every evening (Patient not taking: Reported on 12/5/2022), Disp: , Rfl:     aspirin 81 MG EC tablet, Aspirin (Ecotrin Low Strength) 81 mg Tablet,Delayed Release (Dr/Ec) Active 81 MG PO Daily January 24th, 2022 2:15pm (Patient not taking: No sig reported), Disp: , Rfl:     magnesium (MAGNESIUM-OXIDE) 250 MG TABS tablet, Take 500 mg by mouth daily, Disp: , Rfl:     b complex vitamins capsule, Take 1 capsule by mouth daily, Disp: , Rfl:     gabapentin (NEURONTIN) 600 MG tablet, Take 600 mg by mouth 2 times daily.  , Disp: , Rfl:     donepezil (ARICEPT) 10 MG tablet, Take 10 mg by mouth nightly, Disp: , Rfl:     Handicap Placard MISC, by Does not apply route Duration 5 years, Disp: 1 each, Rfl: 0    acetaminophen (TYLENOL) 325 MG tablet, Take 650 mg by mouth every 6 hours as needed for Pain, Disp: , Rfl:     Glucosamine-Chondroitin (MOVE FREE PO), Take by mouth 2 times daily, Disp: , Rfl:     Multiple Vitamins-Minerals (THERAPEUTIC MULTIVITAMIN-MINERALS) tablet, Take 1 tablet by mouth daily, Disp: , Rfl:     Previous Medications    ACETAMINOPHEN (TYLENOL) 325 MG TABLET    Take 650 mg by mouth every 6 hours as needed for Pain    ASPIRIN 81 MG EC TABLET    Aspirin (Ecotrin Low Strength) 81 mg Tablet,Delayed Release (Dr/Ec) Active 81 MG PO Daily January 24th, 2022 2:15pm    B COMPLEX VITAMINS CAPSULE    Take 1 capsule by mouth daily    CYCLOBENZAPRINE (FLEXERIL) 10 MG TABLET    Take 1 tablet by mouth every 8 hours as needed for Muscle spasms    DONEPEZIL (ARICEPT) 10 MG TABLET    Take 10 mg by mouth nightly    DULOXETINE (CYMBALTA) 30 MG EXTENDED RELEASE CAPSULE    Take 1 capsule by mouth daily    ERGOTAMINE-CAFFEINE (CAFERGOT) 1-100 MG PER TABLET    Two tablets at onset of attack; then 1 tablet every 30 minutes as needed; maximum: 6 tablets per attack; do not exceed 10 tablets/week. FAMOTIDINE (PEPCID) 40 MG TABLET    take 1 tablet by mouth every evening    GABAPENTIN (NEURONTIN) 600 MG TABLET    Take 600 mg by mouth 2 times daily.      GLUCOSAMINE-CHONDROITIN (MOVE FREE PO)    Take by mouth 2 times daily    HANDICAP PLACARD MISC    by Does not apply route Duration 5 years    MAGNESIUM (MAGNESIUM-OXIDE) 250 MG TABS TABLET    Take 500 mg by mouth daily    MULTIPLE VITAMINS-MINERALS (THERAPEUTIC MULTIVITAMIN-MINERALS) TABLET    Take 1 tablet by mouth daily    OMEPRAZOLE (PRILOSEC) 20 MG DELAYED RELEASE CAPSULE    Take 40 mg by mouth every morning         SOCIAL HISTORY     Social History     Social History Narrative    Not on file     Social History     Tobacco Use    Smoking status: Never    Smokeless tobacco: Never   Substance Use Topics    Alcohol use: No     Comment: \"Once in a [employer]"    Drug use: No         ALLERGIES   No Known Allergies      FAMILY HISTORY     Family History   Problem Relation Age of Onset    Heart Disease Mother     Heart Disease Father     Other Father         CHF         PHYSICAL EXAM     ED Triage Vitals [01/15/23 0530]   BP Temp Temp Source Heart Rate Resp SpO2 Height Weight   (!) 144/96 97.5 °F (36.4 °C) Oral 74 17 99 % 5' 6\" (1.676 m) 160 lb (72.6 kg)     Initial vital signs and nursing assessment reviewed and abnormal from hypertension . Body mass index is 25.82 kg/m². Pulsoximetry is normal per my interpretation. Additional Vital Signs:  Vitals:    01/15/23 0637   BP: (!) 182/94   Pulse: 64   Resp: 17   Temp:    SpO2: 96%       Physical Exam  Vitals and nursing note reviewed. Constitutional:       General: He is not in acute distress. Appearance: He is well-developed. Comments: Hoarse voice   HENT:      Head: Normocephalic and atraumatic. Right Ear: External ear normal.      Left Ear: External ear normal.      Nose: Nose normal.      Mouth/Throat:      Mouth: Mucous membranes are moist.   Eyes:      Conjunctiva/sclera: Conjunctivae normal.      Pupils: Pupils are equal, round, and reactive to light. Cardiovascular:      Rate and Rhythm: Normal rate and regular rhythm. Heart sounds: Normal heart sounds. No murmur heard. No friction rub. No gallop. Pulmonary:      Effort: Pulmonary effort is normal. No respiratory distress. Breath sounds: Normal breath sounds. No stridor. No wheezing or rales. Abdominal:      General: Abdomen is flat. Bowel sounds are normal.      Palpations: Abdomen is soft. Tenderness: There is abdominal tenderness in the left lower quadrant. There is no guarding or rebound. Negative signs include Betancourt's sign. Hernia: No hernia is present. Musculoskeletal:      Cervical back: Neck supple. Skin:     General: Skin is warm and dry. Neurological:      Mental Status: He is alert and oriented to person, place, and time. ED RESULTS   Laboratory results (none if blank):  Labs Reviewed   CBC WITH AUTO DIFFERENTIAL - Abnormal; Notable for the following components:       Result Value    Hematocrit 52.7 (*)     MPV 8.7 (*)     All other components within normal limits   BASIC METABOLIC PANEL   HEPATIC FUNCTION PANEL   LIPASE   TROPONIN     All laboratory results are individually reviewed and interpreted by me.   See ED course below for results interpretation if applicable. (Any cultures that may have been sent were not resulted at the time of this patient ED visit)      Radiologic studies results available at the moment of this note (None if blank):  CT ABDOMEN PELVIS W IV CONTRAST Additional Contrast? None    (Results Pending)     See ED course below for my interpretation if applicable. All radiology images independently reviewed by me in addition to interpretation provided by the radiologist.      EKG interpretation (none if blank):  normal EKG, normal sinus rhythm, No ectopy, unchanged from previous tracings on external record review  All EKG results are individually reviewed and interpreted by me. All EKGs are also interpreted by our Cardiology department, final interpretation may not be available as of the writing of this note. MEDICAL DECISION MAKING   Initial Assessment Summary:   79years old male with undifferentiated abdominal pain that is not able to characterize and multiple episodes of vomiting after eating breakfast today. Symptoms may be concerning for diverticulitis of the patient attributes his symptoms to reflux or gallbladder disease. He has no pain in the right upper quadrant today. Please see ED course section below for continuation and resolution of this initial assessment if applicable.   MDM  Number of Diagnoses or Management Options  Left lower quadrant abdominal pain: new, needed workup     Amount and/or Complexity of Data Reviewed  Clinical lab tests: ordered and reviewed  Tests in the radiology section of CPT®: ordered and reviewed  Tests in the medicine section of CPT®: ordered and reviewed  Decide to obtain previous medical records or to obtain history from someone other than the patient: yes (Patient's wife)  Obtain history from someone other than the patient: yes  Review and summarize past medical records: yes  Discuss the patient with other providers: no  Independent visualization of images, tracings, or specimens: yes    Risk of Complications, Morbidity, and/or Mortality  Presenting problems: moderate  Diagnostic procedures: moderate  Management options: moderate           Comorbid conditions pertinent to this ED encounter:  GERD      Differential Diagnosis includes but is not limited to: Undifferentiated abdominal pain  Rule out diverticulitis  Possible gastroenteritis  Possible GERD       Plan:   Imaging  IV line, labs  IV fluids, symptomatic treatment with analgesics, antihistamine and antiemetics       Decision Rules/Clinical Scores utilized:  BMI. Code Status:  Not addressed during this ED visit    Social determinants of health impacting treatment or disposition:  Not Applicable. PREVIOUS RECORDS  AND EXTERNAL INFORMATION REVIEWED   History obtained from: spouse, chart review, and the patient. Pertinent previous and/or external records reviewed: This is this patient's [facility] visit to [facility] ED, no previous records available on EMR. Patient called his PCP on January 4, 2023 for hoarse voice. He has several calls to his PCP for similar symptoms on November 29, November 22.     Case discussed with specialties other than Emergency Medicine: Not Applicable      ED COURSE   ED Medications administered this visit (None if left blank):   Medications   0.9 % sodium chloride bolus (has no administration in time range)   famotidine (PEPCID) 20 mg in sodium chloride (PF) 0.9 % 10 mL injection (20 mg IntraVENous Given 1/15/23 7182)   ondansetron (ZOFRAN) injection 4 mg (4 mg IntraVENous Given 1/15/23 1832)   aluminum & magnesium hydroxide-simethicone (MAALOX) 30 mL, lidocaine viscous hcl (XYLOCAINE) 5 mL (GI COCKTAIL) ( Oral Given 1/15/23 7327)   morphine injection 4 mg (4 mg IntraVENous Given 1/15/23 5579)                 CRITICAL CARE:  None    PROCEDURES: (None if blank)  Procedures:       MEDICATION CHANGES     New Prescriptions    No medications on file         FINAL DISPOSITION     Shared Decision-Making was performed, disposition discussed with the patient/family and questions answered. Outpatient follow up (If applicable):  No follow-up provider specified. Not applicable          FINAL DIAGNOSES:  Final diagnoses:   Left lower quadrant abdominal pain       Condition: condition: good  Dispo: Transfer of care to Dr. Mary Norris, pending lab results, imaging and reevaluation for final disposition      This transcription was electronically signed. It was dictated by use of voice recognition software and electronically transcribed. The transcription may contain errors not detected in proofreading.         Jacqueline Xiong MD  01/15/23 8390

## 2023-01-15 NOTE — DISCHARGE INSTRUCTIONS
Return the ED if you have any new or changing symptoms such as abdominal pain, fever, chills, vomiting, blood in your stool, or you have any other concerns.

## 2023-01-16 ENCOUNTER — TELEPHONE (OUTPATIENT)
Dept: INTERNAL MEDICINE CLINIC | Age: 68
End: 2023-01-16

## 2023-01-16 LAB
EKG ATRIAL RATE: 61 BPM
EKG P AXIS: 38 DEGREES
EKG P-R INTERVAL: 144 MS
EKG Q-T INTERVAL: 446 MS
EKG QRS DURATION: 98 MS
EKG QTC CALCULATION (BAZETT): 448 MS
EKG R AXIS: 22 DEGREES
EKG T AXIS: 89 DEGREES
EKG VENTRICULAR RATE: 61 BPM

## 2023-01-17 ENCOUNTER — TELEPHONE (OUTPATIENT)
Dept: INTERNAL MEDICINE CLINIC | Age: 68
End: 2023-01-17

## 2023-02-06 NOTE — H&P
[facility]  Sedation/Analgesia History & Physical    Patient: Gregory Barrios: 1955  [facility] Rec#: [id number] Acc#: [id number]   Provider Performing Procedure: Eugenia Lopez MD  Primary Care Physician: Kolby Rodriguez MD    PRE-PROCEDURE   Brief History/Pre-Procedure Diagnosis:The patient is a 79 y.o.,  male with significant past medical history of chronic GERD. Getting  BRAVO test today off of PPI's. MEDICAL HISTORY  []CAD/Valve  []Liver Disease  []Lung Disease []Diabetes  []Hypertension []Renal Disease  [x]Additional information:       has a past medical history of GERD (gastroesophageal reflux disease), Hiatal hernia, PUD (peptic ulcer disease), and Rectal bleeding. SURGICAL HISTORY   has a past surgical history that includes Mandible surgery (1979); eye surgery (plate in right eye); and Neck surgery (2001). Additional information:       ALLERGIES   Allergies as of 01/23/2023    (No Known Allergies)     Additional information:       MEDICATIONS       Current Facility-Administered Medications:     sodium chloride flush 0.9 % injection 5-40 mL, 5-40 mL, IntraVENous, 2 times per day, Brijesh Morgan MD    sodium chloride flush 0.9 % injection 5-40 mL, 5-40 mL, IntraVENous, PRN, Brijesh Morgan MD    0.9 % sodium chloride infusion, 25 mL, IntraVENous, PRN, Brijesh Morgan MD    lactated ringers IV soln infusion, , IntraVENous, Continuous, Brijesh Morgan MD, Last Rate: 75 mL/hr at 02/10/23 0846, New Bag at 02/10/23 0846    Facility-Administered Medications Ordered in Other Encounters:     ondansetron (ZOFRAN) injection, , IntraVENous, PRN, MARGAUX Bourne CRNA, 4 mg at 02/10/23 6011  Prior to Admission medications    Medication Sig Start Date End Date Taking? Authorizing Provider   sucralfate (CARAFATE) 1 GM tablet Take 1 tablet by mouth 4 times daily Dissolve into a slurry in 4 ounces of liquid.  2/9/23   Marcos Tanner, DO   metoclopramide (REGLAN) 10 MG tablet Take 1 tablet by mouth 3 times daily as needed (nausea and vomiting) 1/15/23   Vicky Duncan,    ergotamine-caffeine (CAFERGOT) 1-100 MG per tablet Two tablets at onset of attack; then 1 tablet every 30 minutes as needed; maximum: 6 tablets per attack; do not exceed 10 tablets/week. Patient not taking: Reported on 2/9/2023 11/7/22   Azalea Fisher MD   cyclobenzaprine (FLEXERIL) 10 MG tablet Take 1 tablet by mouth every 8 hours as needed for Muscle spasms  Patient not taking: Reported on 2/9/2023 10/24/22   Esa Travis DO   DULoxetine (CYMBALTA) 30 MG extended release capsule Take 1 capsule by mouth daily  Patient not taking: Reported on 2/9/2023 10/24/22 4/22/23  Esa Travis DO   omeprazole (PRILOSEC) 20 MG delayed release capsule Take 40 mg by mouth every morning    Historical Provider, MD   famotidine (PEPCID) 40 MG tablet  6/13/22   Historical Provider, MD   aspirin 81 MG EC tablet  1/24/22   Historical Provider, MD   magnesium (MAGNESIUM-OXIDE) 250 MG TABS tablet Take 500 mg by mouth daily    Historical Provider, MD   b complex vitamins capsule Take 1 capsule by mouth daily  Patient not taking: Reported on 2/9/2023    Historical Provider, MD   gabapentin (NEURONTIN) 600 MG tablet Take 600 mg by mouth 2 times daily.    Patient not taking: Reported on 2/9/2023    Historical Provider, MD   donepezil (ARICEPT) 10 MG tablet Take 10 mg by mouth nightly  Patient not taking: Reported on 2/9/2023    Historical Provider, MD   Handicap Placard [address] [facility] by Does not apply route Duration 5 years 8/16/16   Juliana Wells MD   acetaminophen (TYLENOL) 325 MG tablet Take 650 mg by mouth every 6 hours as needed for Pain  Patient not taking: Reported on 2/9/2023    Historical Provider, MD   Glucosamine-Chondroitin (MOVE FREE PO) Take by mouth 2 times daily  Patient not taking: Reported on 2/9/2023    Historical Provider, MD   Multiple Vitamins-Minerals (THERAPEUTIC MULTIVITAMIN-MINERALS) tablet Take 1 tablet by mouth daily  Patient not taking: No sig reported    Historical Provider, MD     Additional information:       PHYSICAL:    height is 5' 6\" (1.676 m) and weight is 156 lb 14.4 oz (71.2 kg). His temporal temperature is 97.6 °F (36.4 °C). His blood pressure is 144/93 (abnormal) and his pulse is 65. His respiration is 18 and oxygen saturation is 99%. Heart:  [x]Regular rate and rhythm  []Other:    Lungs:  [x]Clear    []Other:    Abdomen: [x]Soft    []Other:    Mental Status: [x]Alert & Oriented  []Other:      VITAL SIGNS   Patient Vitals for the past 24 hrs:   BP Temp Temp src Pulse Resp SpO2 Height Weight   02/10/23 0820 (!) 144/93 97.6 °F (36.4 °C) Temporal 65 18 99 % 5' 6\" (1.676 m) 156 lb 14.4 oz (71.2 kg)       PLANNED PROCEDURE   [x]EGD  []Colonoscopy []Flex Sigmoid  []ERCP []EUS   []Cystoscopy  [] CATH [] BRONCH       Consent: I have discussed with the patient and/or the patient representative the indication, alternatives, and the possible risks and/or complications of the planned procedure and the anesthesia methods. The patient and/or patient representative appear to understand and agree to proceed. SEDATION ( see Anesthesia note)    ASA Classification: Class 2 - A normal healthy patient with mild systemic disease    Airway Assessment: normal    Monitoring and Safety: The patient will be placed on a cardiac monitor and vital signs, pulse oximetry and level of consciousness will be continuously evaluated throughout the procedure. The patient will be closely monitored until recovery from the medications is complete and the patient has returned to baseline status. Respiratory therapy will be on standby during the procedure. [x]Pre-procedure diagnostic studies complete and results available. Comment:    [x]Previous sedation/anesthesia experiences assessed. Comment:    [x]The patient is an appropriate candidate to undergo the planned procedure sedation and anesthesia.  (Refer to nursing sedation/analgesia documentation record)  [x]Formulation and discussion of sedation/procedure plan, risks, and expectations with patient and/or responsible adult completed. [x]Patient examined immediately prior to the procedure.  (Refer to nursing sedation/analgesia documentation record)    Isaura Lorenzana MD   Electronically signed 2/10/2023 at 9:49 AM

## 2023-02-06 NOTE — PROCEDURES
[address] [address] Endoscopy     EGD with BRAVO Report    Patient: Lore Fitch  : 1955  Acct#: [de-identified]     BRIEF HISTORY AND INDICATIONS:    The patient is a 79 y.o.,  male with significant past medical history of chronic GERD. He has a large hiatal hernia and has been off of PPI's x one week, here for BRAVO today. PREMEDICATION: GENERAL anesthesia was used, see Anesthesia note for details. INSTRUMENT:  Olympus GIF H-180 gastroscope    The risks and benefits of upper endoscopy with biopsy and dilation were  described to the patient, including but not limited to bleeding, infection, poking a hole someplace requiring surgery to fix it, having reaction to medication, and death. The patient understood these risks and provided informed consent. The patient was placed in the left lateral decubitus position. Conscious sedation was administered . The patient was continuously monitored to ensure adequate sedation and patient safety. A forward-viewing Olympus endoscope was lubricated and inserted through the mouth into the oropharynx. Under direct visualization, the upper esophagus was intubated. The scope was advanced to the esophagus and stomach to second portion of duodenum. Scope was slowly withdrawn with good views of mucosal surfaces. The scope was retroflexed in the fundus. Findings and maneuvers are listed in impression below. The patient tolerated the procedure well. The scope was removed. The patient was removed to the recovery area. There were no immediate complications. ESOPHAGUS:  The Bravo probe catheter was placed at 31 cm  from the incisors. Confirmation with EGD scope. The Bravo capsule was deployed  And EGD confirmed placement of the caspule in the esophagus.    Esophagus revealed a hiatal hernia    STOMACH:  Normal       DUODENUM:  The pylorus was normal and the duodenal bulb distended well and  appeared normal.  Post bulbar area was well-visualized and was normal.          IMPRESSION:      1. Three cm sliding hiatal hernia, with NO esophagitis. He did have a hx of esophagitis on EGD last month. Marley Bernal has been having nausea, and has not been referred yet to a Surgeon for [facility] procedure, although, he is interested in it. No evidence of Gastroparesis. 2.   A capsule was placed in the distal esophagus at 31 cm from the incisors, as the gastroesophageal junction tip of gastric folds was 37 cm.       3.   Otherwise , normal exam to the 2nd portion of the duodenum. RECOMMENDATIONS:    1. Follow directions for BRAVO, 48 hours Ambulatory probe detector. 2. Make a follow up appt. With Pawan Cooper CNP in one month , not earlier than that to go over results . 3. If probe is negative , then pt. May have other cause of symptoms and may benefit from hiatal hernia repair as he is doing better with PPI's and Sucralfate.      Specimens: were not obtained    EBL : NONE    (The following sections must be completed)  Post-Sedation Vital Signs: Vital signs were reviewed and were stable after the procedure (see flow sheet for vitals)            Post-Sedation Exam: Lungs: clear to auscultation bilaterally and Cardiovascular: regular rate and rhythm           Complications: none        Eric Biswas MD, Tom Zabala

## 2023-02-09 ENCOUNTER — OFFICE VISIT (OUTPATIENT)
Dept: INTERNAL MEDICINE CLINIC | Age: 68
End: 2023-02-09

## 2023-02-09 ENCOUNTER — HOSPITAL ENCOUNTER (OUTPATIENT)
Age: 68
Discharge: HOME | End: 2023-02-09
Payer: MEDICARE

## 2023-02-09 ENCOUNTER — HOSPITAL ENCOUNTER (OUTPATIENT)
Dept: GENERAL RADIOLOGY | Age: 68
Discharge: HOME | End: 2023-02-09
Payer: MEDICARE

## 2023-02-09 VITALS
SYSTOLIC BLOOD PRESSURE: 104 MMHG | WEIGHT: 159.8 LBS | BODY MASS INDEX: 25.68 KG/M2 | HEIGHT: 66 IN | DIASTOLIC BLOOD PRESSURE: 64 MMHG | HEART RATE: 80 BPM

## 2023-02-09 DIAGNOSIS — K21.9 GASTROESOPHAGEAL REFLUX DISEASE, UNSPECIFIED WHETHER ESOPHAGITIS PRESENT: ICD-10-CM

## 2023-02-09 DIAGNOSIS — D68.9 CLOTTING DISORDER (HCC): ICD-10-CM

## 2023-02-09 DIAGNOSIS — Z01.818 PRE-OP EXAM: ICD-10-CM

## 2023-02-09 DIAGNOSIS — Z01.818 PRE-OP EXAM: Primary | ICD-10-CM

## 2023-02-09 LAB
ANION GAP SERPL CALC-SCNC: 12 MEQ/L (ref 8–16)
APTT PPP: 33.3 SECONDS (ref 22–38)
BASOPHILS ABSOLUTE: 0.1 THOU/MM3 (ref 0–0.1)
BASOPHILS NFR BLD AUTO: 0.9 %
BUN SERPL-MCNC: 16 MG/DL (ref 7–22)
CALCIUM SERPL-MCNC: 9.5 MG/DL (ref 8.5–10.5)
CHLORIDE SERPL-SCNC: 103 MEQ/L (ref 98–111)
CO2 SERPL-SCNC: 26 MEQ/L (ref 23–33)
CREAT SERPL-MCNC: 1 MG/DL (ref 0.4–1.2)
DEPRECATED RDW RBC AUTO: 38.6 FL (ref 35–45)
EOSINOPHIL NFR BLD AUTO: 1.3 %
EOSINOPHILS ABSOLUTE: 0.1 THOU/MM3 (ref 0–0.4)
ERYTHROCYTE [DISTWIDTH] IN BLOOD BY AUTOMATED COUNT: 12.3 % (ref 11.5–14.5)
GFR SERPL CREATININE-BSD FRML MDRD: > 60 ML/MIN/1.73M2
GLUCOSE SERPL-MCNC: 91 MG/DL (ref 70–108)
HCT VFR BLD AUTO: 48.6 % (ref 42–52)
HGB BLD-MCNC: 16.7 GM/DL (ref 14–18)
IMM GRANULOCYTES # BLD AUTO: 0.04 THOU/MM3 (ref 0–0.07)
IMM GRANULOCYTES NFR BLD AUTO: 0.6 %
INR PPP: 1.02 (ref 0.85–1.13)
LYMPHOCYTES ABSOLUTE: 1.8 THOU/MM3 (ref 1–4.8)
LYMPHOCYTES NFR BLD AUTO: 27.3 %
MAGNESIUM SERPL-MCNC: 2.5 MG/DL (ref 1.6–2.4)
MCH RBC QN AUTO: 29.7 PG (ref 26–33)
MCHC RBC AUTO-ENTMCNC: 34.4 GM/DL (ref 32.2–35.5)
MCV RBC AUTO: 86.3 FL (ref 80–94)
MONOCYTES ABSOLUTE: 0.6 THOU/MM3 (ref 0.4–1.3)
MONOCYTES NFR BLD AUTO: 9.5 %
NEUTROPHILS NFR BLD AUTO: 60.4 %
NRBC BLD AUTO-RTO: 0 /100 WBC
PLATELET # BLD AUTO: 252 THOU/MM3 (ref 130–400)
PMV BLD AUTO: 8.5 FL (ref 9.4–12.4)
POTASSIUM SERPL-SCNC: 4.2 MEQ/L (ref 3.5–5.2)
RBC # BLD AUTO: 5.63 MILL/MM3 (ref 4.7–6.1)
SEGMENTED NEUTROPHILS ABSOLUTE COUNT: 4 THOU/MM3 (ref 1.8–7.7)
SODIUM SERPL-SCNC: 141 MEQ/L (ref 135–145)
WBC # BLD AUTO: 6.7 THOU/MM3 (ref 4.8–10.8)

## 2023-02-09 PROCEDURE — 80048 BASIC METABOLIC PNL TOTAL CA: CPT

## 2023-02-09 PROCEDURE — 36415 COLL VENOUS BLD VENIPUNCTURE: CPT

## 2023-02-09 PROCEDURE — 85730 THROMBOPLASTIN TIME PARTIAL: CPT

## 2023-02-09 PROCEDURE — 85610 PROTHROMBIN TIME: CPT

## 2023-02-09 PROCEDURE — 71046 X-RAY EXAM CHEST 2 VIEWS: CPT

## 2023-02-09 PROCEDURE — 83735 ASSAY OF MAGNESIUM: CPT

## 2023-02-09 PROCEDURE — 85025 COMPLETE CBC W/AUTO DIFF WBC: CPT

## 2023-02-09 RX ORDER — SUCRALFATE 1 G/1
1 TABLET ORAL 4 TIMES DAILY
Qty: 30 TABLET | Refills: 0 | Status: SHIPPED | OUTPATIENT
Start: 2023-02-09

## 2023-02-09 SDOH — ECONOMIC STABILITY: FOOD INSECURITY: WITHIN THE PAST 12 MONTHS, THE FOOD YOU BOUGHT JUST DIDN'T LAST AND YOU DIDN'T HAVE MONEY TO GET MORE.: NEVER TRUE

## 2023-02-09 SDOH — ECONOMIC STABILITY: HOUSING INSECURITY
IN THE LAST 12 MONTHS, WAS THERE A TIME WHEN YOU DID NOT HAVE A STEADY PLACE TO SLEEP OR SLEPT IN A SHELTER (INCLUDING NOW)?: NO

## 2023-02-09 SDOH — ECONOMIC STABILITY: FOOD INSECURITY: WITHIN THE PAST 12 MONTHS, YOU WORRIED THAT YOUR FOOD WOULD RUN OUT BEFORE YOU GOT MONEY TO BUY MORE.: NEVER TRUE

## 2023-02-09 SDOH — ECONOMIC STABILITY: INCOME INSECURITY: HOW HARD IS IT FOR YOU TO PAY FOR THE VERY BASICS LIKE FOOD, HOUSING, MEDICAL CARE, AND HEATING?: NOT HARD AT ALL

## 2023-02-09 ASSESSMENT — PATIENT HEALTH QUESTIONNAIRE - PHQ9
SUM OF ALL RESPONSES TO PHQ9 QUESTIONS 1 & 2: 0
2. FEELING DOWN, DEPRESSED OR HOPELESS: 0
SUM OF ALL RESPONSES TO PHQ QUESTIONS 1-9: 0
1. LITTLE INTEREST OR PLEASURE IN DOING THINGS: 0
SUM OF ALL RESPONSES TO PHQ QUESTIONS 1-9: 0

## 2023-02-09 NOTE — PROGRESS NOTES
1955    Chief Complaint   Patient presents with    Pre-op Exam     Dr. Fantasma Vigil / cervical fusion 2/20/23 / [facility]        Pt is a 79 y.o. male who presents for a preoperative medical consultation at the request of Dr. Hanley Burkitt prior to Anterior Cervical Fusion of C3-C5. Pt complains of neck pain which is debilitating. Pain is improved minimally. Pain is worsened by activity but is constant. Pt has failed conservative measures such as anti-inflammatory and pain medications and physical therapy, therefore she wishes to proceed with surgical intervention. He is scheduled to have an EGD tomorrow 2/10/23 to have his worsening reflux evaluated with Dr. Jak Camacho. He does have a history of hiatal hernia which are likely contributing to his symptoms. Reactions to anesthesia: nauseous - requires IV anti-emetic prior. History of excessive bleeding: none    History of blood clots: none    History of blood transfusions: none      Reactions to blood transfusion: none     Past Medical History:   Diagnosis Date    GERD (gastroesophageal reflux disease)     Hiatal hernia     EGD    PUD (peptic ulcer disease)     LONG TIME  AGO    Rectal bleeding     2o11- colonoscopy - dr Deana Bernheim       Past Surgical History:   Procedure Laterality Date    EYE SURGERY  plate in right eye    [address]    plate in neck        Current Outpatient Medications   Medication Sig Dispense Refill    sucralfate (CARAFATE) 1 GM tablet Take 1 tablet by mouth 4 times daily Dissolve into a slurry in 4 ounces of liquid.  30 tablet 0    metoclopramide (REGLAN) 10 MG tablet Take 1 tablet by mouth 3 times daily as needed (nausea and vomiting) 12 tablet 0    omeprazole (PRILOSEC) 20 MG delayed release capsule Take 40 mg by mouth every morning      famotidine (PEPCID) 40 MG tablet       Handicap Placard MISC by Does not apply route Duration 5 years 1 each 0    ergotamine-caffeine (CAFERGOT) 1-100 MG per tablet Two tablets at onset of attack; then 1 tablet every 30 minutes as needed; maximum: 6 tablets per attack; do not exceed 10 tablets/week. (Patient not taking: Reported on 2/9/2023) 30 tablet 1    cyclobenzaprine (FLEXERIL) 10 MG tablet Take 1 tablet by mouth every 8 hours as needed for Muscle spasms (Patient not taking: Reported on 2/9/2023) 30 tablet 2    DULoxetine (CYMBALTA) 30 MG extended release capsule Take 1 capsule by mouth daily (Patient not taking: Reported on 2/9/2023) 90 capsule 1    aspirin 81 MG EC tablet Aspirin (Ecotrin Low Strength) 81 mg Tablet,Delayed Release (Dr/Ec) Active 81 MG PO Daily January 24th, 2022 2:15pm (Patient not taking: Reported on 2/9/2023)      magnesium (MAGNESIUM-OXIDE) 250 MG TABS tablet Take 500 mg by mouth daily (Patient not taking: Reported on 2/9/2023)      b complex vitamins capsule Take 1 capsule by mouth daily (Patient not taking: Reported on 2/9/2023)      gabapentin (NEURONTIN) 600 MG tablet Take 600 mg by mouth 2 times daily. (Patient not taking: Reported on 2/9/2023)      donepezil (ARICEPT) 10 MG tablet Take 10 mg by mouth nightly (Patient not taking: Reported on 2/9/2023)      acetaminophen (TYLENOL) 325 MG tablet Take 650 mg by mouth every 6 hours as needed for Pain (Patient not taking: Reported on 2/9/2023)      Glucosamine-Chondroitin (MOVE FREE PO) Take by mouth 2 times daily (Patient not taking: Reported on 2/9/2023)      Multiple Vitamins-Minerals (THERAPEUTIC MULTIVITAMIN-MINERALS) tablet Take 1 tablet by mouth daily (Patient not taking: Reported on 2/9/2023)       No current facility-administered medications for this visit.        No Known Allergies    Social History     Socioeconomic History    Marital status:      Spouse name: Not on file    Number of children: Not on file    Years of education: Not on file    Highest education level: Not on file   Occupational History    Not on file   Tobacco Use    Smoking status: Never    Smokeless tobacco: Never   Substance and Sexual Activity    Alcohol use: No     Comment: \"Once in a [place] Moon\"    Drug use: No    Sexual activity: Not on file   Other Topics Concern    Not on file   Social History Narrative    Not on file     Social Determinants of Health     Financial Resource Strain: Low Risk     Difficulty of Paying Living Expenses: Not hard at all   Food Insecurity: No Food Insecurity    Worried About [address] in the Last Year: Never true    [address] in the Last Year: Never true   Transportation Needs: Unknown    Lack of Transportation (Medical): Not on file    Lack of Transportation (Non-Medical): No   Physical Activity: Not on file   Stress: Not on file   Social Connections: Not on file   Intimate Partner Violence: Not on file   Housing Stability: Unknown    Unable to Pay for Housing in the Last Year: Not on file    Number of Places Lived in the Last Year: Not on file    Unstable Housing in the Last Year: No       Family History   Problem Relation Age of Onset    Heart Disease Mother     Heart Disease Father     Other Father         CHF       Review of Systems - General ROS: negative for - chills or fever  Psychological ROS: negative for - anxiety and depression  Hematological and Lymphatic ROS: No history of blood clots or bleeding disorder.    Respiratory ROS: no cough, shortness of breath, or wheezing  Cardiovascular ROS: no chest pain or dyspnea on exertion, tolerates a flight of stairs, limited on household chores due to neck pain   Gastrointestinal ROS: endorses abdominal pain, no change in bowel habits, or black or bloody stools  Genito-Urinary ROS: no dysuria, trouble voiding, or hematuria  Musculoskeletal ROS: negative for - muscle pain or muscular weakness, positive neck pain  Neurological ROS: negative for - headaches, numbness/tingling, seizures or weakness  Dermatological ROS: negative for - rash or skin lesion changes    Blood pressure 104/64, pulse 80, height 5' 6\" (1.676 m), weight 159 lb 12.8 oz (72.5 kg). Physical Examination: General appearance -alert, well appearing, and in no distress, wearing neck brace   Mental status - alert, oriented to person, place, and time  Head - atraumatic, normocephalic  Eyes - pupils equal and reactive to light, extraocular muscles intact  Ears - external ears are normal, hearing is grossly normal  Mouth - oral pharynx clear, mucous membranes moist  Neck - supple, no significant adenopathy; limited extension of neck   Chest - clear to auscultation, no wheezes, rales or rhonchi, symmetric air entry  Heart - normal rate, regular rhythm, normal S1, S2, no murmurs, rubs, clicks or gallops  Abdomen -soft, nontender, nondistended  Neurological - alert, oriented, cranial nerves II-XII intact, motor and sensation are grossly intact bilateral upper and lower extremities. Extremities - peripheral pulses normal, no pedal edema, no clubbing or cyanosis  Skin - warm and dry    Diagnostic data:   I have reviewed recent diagnostic testing including labs, EKG. Please see EKG for interpretation. Assessment and Plan:    Patient is an acceptable surgical candidate for planned procedure pending labs and cxr. Cardiac risk assessment:  Patient has minimal clinical predictors of cardiac risk and tolerates less than 4 mets of activity. Patient is scheduled for an elective intermediate risk surgery and is considered at an acceptable cardiac risk based on ACC/AHA criteria. Diagnosis Orders   1. Pre-op exam  EKG 12 Lead - Clinic Performed    CBC with Auto Differential    Basic Metabolic Panel    Magnesium    APTT    Protime-INR    XR CHEST STANDARD (2 VW)      2. Clotting disorder (HCC)  APTT    Protime-INR      3. Gastroesophageal reflux disease, unspecified whether esophagitis present  sucralfate (CARAFATE) 1 GM tablet        Defer DVT prophylaxis to primary surgical service. Allergies: Patient has no known allergies. Electronically signed by Shawnee Zepeda DO on 2/9/2023 at 3:50 PM  Case and plan was discussed with Dr. Kiko Mendosa.

## 2023-02-10 ENCOUNTER — ANESTHESIA (OUTPATIENT)
Dept: ENDOSCOPY | Age: 68
End: 2023-02-10
Payer: MEDICARE

## 2023-02-10 ENCOUNTER — ANESTHESIA EVENT (OUTPATIENT)
Dept: ENDOSCOPY | Age: 68
End: 2023-02-10
Payer: MEDICARE

## 2023-02-10 ENCOUNTER — HOSPITAL ENCOUNTER (OUTPATIENT)
Age: 68
Setting detail: OUTPATIENT SURGERY
Discharge: HOME | End: 2023-02-10
Attending: INTERNAL MEDICINE | Admitting: INTERNAL MEDICINE
Payer: MEDICARE

## 2023-02-10 VITALS
TEMPERATURE: 96.4 F | BODY MASS INDEX: 25.22 KG/M2 | WEIGHT: 156.9 LBS | HEIGHT: 66 IN | HEART RATE: 66 BPM | DIASTOLIC BLOOD PRESSURE: 97 MMHG | OXYGEN SATURATION: 93 % | SYSTOLIC BLOOD PRESSURE: 171 MMHG | RESPIRATION RATE: 16 BRPM

## 2023-02-10 PROCEDURE — 3609017100 HC EGD: Performed by: INTERNAL MEDICINE

## 2023-02-10 PROCEDURE — 7100000001 HC PACU RECOVERY - ADDTL 15 MIN: Performed by: INTERNAL MEDICINE

## 2023-02-10 PROCEDURE — 3700000001 HC ADD 15 MINUTES (ANESTHESIA): Performed by: INTERNAL MEDICINE

## 2023-02-10 PROCEDURE — 7100000010 HC PHASE II RECOVERY - FIRST 15 MIN: Performed by: INTERNAL MEDICINE

## 2023-02-10 PROCEDURE — 2580000003 HC RX 258: Performed by: INTERNAL MEDICINE

## 2023-02-10 PROCEDURE — 2500000003 HC RX 250 WO HCPCS: Performed by: NURSE ANESTHETIST, CERTIFIED REGISTERED

## 2023-02-10 PROCEDURE — 6360000002 HC RX W HCPCS

## 2023-02-10 PROCEDURE — 7100000000 HC PACU RECOVERY - FIRST 15 MIN: Performed by: INTERNAL MEDICINE

## 2023-02-10 PROCEDURE — 3700000000 HC ANESTHESIA ATTENDED CARE: Performed by: INTERNAL MEDICINE

## 2023-02-10 PROCEDURE — 2580000003 HC RX 258: Performed by: NURSE ANESTHETIST, CERTIFIED REGISTERED

## 2023-02-10 PROCEDURE — 6360000002 HC RX W HCPCS: Performed by: NURSE ANESTHETIST, CERTIFIED REGISTERED

## 2023-02-10 RX ORDER — ONDANSETRON 2 MG/ML
INJECTION INTRAMUSCULAR; INTRAVENOUS PRN
Status: DISCONTINUED | OUTPATIENT
Start: 2023-02-10 | End: 2023-02-10 | Stop reason: SDUPTHER

## 2023-02-10 RX ORDER — DEXAMETHASONE SODIUM PHOSPHATE 4 MG/ML
INJECTION, SOLUTION INTRA-ARTICULAR; INTRALESIONAL; INTRAMUSCULAR; INTRAVENOUS; SOFT TISSUE PRN
Status: DISCONTINUED | OUTPATIENT
Start: 2023-02-10 | End: 2023-02-10 | Stop reason: SDUPTHER

## 2023-02-10 RX ORDER — LIDOCAINE HYDROCHLORIDE 20 MG/ML
INJECTION, SOLUTION INFILTRATION; PERINEURAL PRN
Status: DISCONTINUED | OUTPATIENT
Start: 2023-02-10 | End: 2023-02-10 | Stop reason: SDUPTHER

## 2023-02-10 RX ORDER — SODIUM CHLORIDE 9 MG/ML
25 INJECTION, SOLUTION INTRAVENOUS PRN
Status: DISCONTINUED | OUTPATIENT
Start: 2023-02-10 | End: 2023-02-10 | Stop reason: HOSPADM

## 2023-02-10 RX ORDER — DROPERIDOL 2.5 MG/ML
0.62 INJECTION, SOLUTION INTRAMUSCULAR; INTRAVENOUS ONCE
Status: COMPLETED | OUTPATIENT
Start: 2023-02-10 | End: 2023-02-10

## 2023-02-10 RX ORDER — PROPOFOL 10 MG/ML
INJECTION, EMULSION INTRAVENOUS PRN
Status: DISCONTINUED | OUTPATIENT
Start: 2023-02-10 | End: 2023-02-10 | Stop reason: SDUPTHER

## 2023-02-10 RX ORDER — SODIUM CHLORIDE, SODIUM LACTATE, POTASSIUM CHLORIDE, CALCIUM CHLORIDE 600; 310; 30; 20 MG/100ML; MG/100ML; MG/100ML; MG/100ML
INJECTION, SOLUTION INTRAVENOUS CONTINUOUS
Status: DISCONTINUED | OUTPATIENT
Start: 2023-02-10 | End: 2023-02-10 | Stop reason: HOSPADM

## 2023-02-10 RX ORDER — SUCCINYLCHOLINE/SOD CL,ISO/PF 200MG/10ML
SYRINGE (ML) INTRAVENOUS PRN
Status: DISCONTINUED | OUTPATIENT
Start: 2023-02-10 | End: 2023-02-10 | Stop reason: SDUPTHER

## 2023-02-10 RX ORDER — SODIUM CHLORIDE 9 MG/ML
INJECTION, SOLUTION INTRAVENOUS CONTINUOUS PRN
Status: DISCONTINUED | OUTPATIENT
Start: 2023-02-10 | End: 2023-02-10 | Stop reason: SDUPTHER

## 2023-02-10 RX ORDER — SODIUM CHLORIDE 0.9 % (FLUSH) 0.9 %
5-40 SYRINGE (ML) INJECTION PRN
Status: DISCONTINUED | OUTPATIENT
Start: 2023-02-10 | End: 2023-02-10 | Stop reason: HOSPADM

## 2023-02-10 RX ORDER — DROPERIDOL 2.5 MG/ML
INJECTION, SOLUTION INTRAMUSCULAR; INTRAVENOUS
Status: COMPLETED
Start: 2023-02-10 | End: 2023-02-10

## 2023-02-10 RX ORDER — SODIUM CHLORIDE 0.9 % (FLUSH) 0.9 %
5-40 SYRINGE (ML) INJECTION EVERY 12 HOURS SCHEDULED
Status: DISCONTINUED | OUTPATIENT
Start: 2023-02-10 | End: 2023-02-10 | Stop reason: HOSPADM

## 2023-02-10 RX ADMIN — SODIUM CHLORIDE, POTASSIUM CHLORIDE, SODIUM LACTATE AND CALCIUM CHLORIDE: 600; 310; 30; 20 INJECTION, SOLUTION INTRAVENOUS at 08:46

## 2023-02-10 RX ADMIN — LIDOCAINE HYDROCHLORIDE 100 MG: 20 INJECTION, SOLUTION INFILTRATION; PERINEURAL at 09:51

## 2023-02-10 RX ADMIN — DEXAMETHASONE SODIUM PHOSPHATE 8 MG: 4 INJECTION, SOLUTION INTRAMUSCULAR; INTRAVENOUS at 09:57

## 2023-02-10 RX ADMIN — DROPERIDOL 0.62 MG: 2.5 INJECTION, SOLUTION INTRAMUSCULAR; INTRAVENOUS at 10:15

## 2023-02-10 RX ADMIN — Medication 140 MG: at 09:51

## 2023-02-10 RX ADMIN — ONDANSETRON 4 MG: 2 INJECTION INTRAMUSCULAR; INTRAVENOUS at 09:43

## 2023-02-10 RX ADMIN — SODIUM CHLORIDE: 9 INJECTION, SOLUTION INTRAVENOUS at 09:42

## 2023-02-10 RX ADMIN — PROPOFOL 200 MG: 10 INJECTION, EMULSION INTRAVENOUS at 09:51

## 2023-02-10 ASSESSMENT — PAIN SCALES - GENERAL: PAINLEVEL_OUTOF10: 3

## 2023-02-10 ASSESSMENT — PAIN DESCRIPTION - LOCATION
LOCATION: CHEST
LOCATION: CHEST

## 2023-02-10 ASSESSMENT — PAIN - FUNCTIONAL ASSESSMENT: PAIN_FUNCTIONAL_ASSESSMENT: 0-10

## 2023-02-10 NOTE — ANESTHESIA POSTPROCEDURE EVALUATION
Department of Anesthesiology  Postprocedure Note    Patient: Javon Kenney  MRN: [id number]  YOB: 1955  Date of evaluation: 2/10/2023      Procedure Summary     Date: 02/10/23 Room / Location: [place] [address] / [facility]    Anesthesia Start: 0942 Anesthesia Stop: 1021    Procedure: EGD, POSS BIOPSIES,  BRAVO Diagnosis:       Hiatal hernia      (Hiatal hernia [K44.9])    Surgeons: Marisol Guerrreo MD Responsible Provider: Hola Xiao MD    Anesthesia Type: General ASA Status: 2          Anesthesia Type: General    Gael Phase I: Gael Score: 10    Gael Phase II:        Anesthesia Post Evaluation    Patient location during evaluation: bedside  Patient participation: complete - patient participated  Level of consciousness: awake  Pain score: 7  Airway patency: patent  Nausea & Vomiting: no nausea and no vomiting  Complications: no  Cardiovascular status: hemodynamically stable  Respiratory status: acceptable  Hydration status: stable

## 2023-02-10 NOTE — PROGRESS NOTES
EGD complete, photos taken, no specimens taken by pt tolerated procedure well was done with general anesthesia    Bravo capsule placed at  31 cm scj  Pt sent home with [place] 01 monitor    Scope Number   used.       Pt taken to pacu and report given  to pacu rn

## 2023-02-10 NOTE — PROGRESS NOTES
Recovery mode. Denies discomfort, passing gas, taking fluids. Dr. Jak Camacho discussed findings and plan of care with patient and . Discharge instructions provided, understanding verbalized.

## 2023-02-10 NOTE — DISCHARGE INSTRUCTIONS
IMPRESSION:      1. Three cm sliding hiatal hernia, with NO esophagitis. He did have a hx of esophagitis on EGD last month. Violet Moreno has been having nausea, and has not been referred yet to a Surgeon for [facility] procedure, although, he is interested in it. No evidence of Gastroparesis. 2.   A capsule was placed in the distal esophagus at 31 cm from the incisors, as the gastroesophageal junction tip of gastric folds was 37 cm.       3.   Otherwise , normal exam to the 2nd portion of the duodenum. RECOMMENDATIONS:    1. Follow directions for BRAVO, 48 hours Ambulatory probe detector. 2. Make a follow up appt. With Adelso Saenz CNP in one month , not earlier than that to go over results . 3. If probe is negative , then pt. May have other cause of symptoms or may be symptomatic from paraesophageal hernia.

## 2023-02-10 NOTE — PROGRESS NOTES
Pt admitted to endo room 5. Consent signed and verified. Recorder number [id number] given to patient. Holter monitor loan agreement signed.

## 2023-02-10 NOTE — PROGRESS NOTES
1012 Awake and oriented on arrival to PACU , HOB elevated , denies any pain but c/o nausea and having dry heaves  1015 medicated with Droperidol 0.625 mg IV  1030 nausea better no longer having dry heaves  1045 pt denies any pain or nausea   1050 meets criteria for discharge , transported to Endo

## 2023-02-10 NOTE — ANESTHESIA PRE PROCEDURE
Department of Anesthesiology  Preprocedure Note       Name:  Nancy Beebe   Age:  79 y.o.  :  1955                                          MRN:  [id number]         Date:  2/10/2023      Surgeon: Monique Lane):  Willian Arambula MD    Procedure: Procedure(s):  EGD, POSS BIOPSIES,  BRAVO    Medications prior to admission:   Prior to Admission medications    Medication Sig Start Date End Date Taking? Authorizing Provider   sucralfate (CARAFATE) 1 GM tablet Take 1 tablet by mouth 4 times daily Dissolve into a slurry in 4 ounces of liquid. 23   Marcos Tanner, DO   metoclopramide (REGLAN) 10 MG tablet Take 1 tablet by mouth 3 times daily as needed (nausea and vomiting) 1/15/23   Lesa Esqueda, DO   ergotamine-caffeine (CAFERGOT) 1-100 MG per tablet Two tablets at onset of attack; then 1 tablet every 30 minutes as needed; maximum: 6 tablets per attack; do not exceed 10 tablets/week.   Patient not taking: Reported on 2023   Paolo Doctor, MD   cyclobenzaprine (FLEXERIL) 10 MG tablet Take 1 tablet by mouth every 8 hours as needed for Muscle spasms  Patient not taking: Reported on 2023 10/24/22   Radha Miranda DO   DULoxetine (CYMBALTA) 30 MG extended release capsule Take 1 capsule by mouth daily  Patient not taking: Reported on 2023 10/24/22 4/22/23  Radha Miranda DO   omeprazole (PRILOSEC) 20 MG delayed release capsule Take 40 mg by mouth every morning    Historical Provider, MD   famotidine (PEPCID) 40 MG tablet  22   Historical Provider, MD   aspirin 81 MG EC tablet Aspirin (Ecotrin Low Strength) 81 mg Tablet,Delayed Release (Dr/Ec) Active 81 MG PO Daily 2022 2:15pm  Patient not taking: Reported on 2023   Historical Provider, MD   magnesium (MAGNESIUM-OXIDE) 250 MG TABS tablet Take 500 mg by mouth daily  Patient not taking: Reported on 2023    Historical Provider, MD   b complex vitamins capsule Take 1 capsule by mouth daily  Patient not taking: Reported on 2/9/2023    Historical Provider, MD   gabapentin (NEURONTIN) 600 MG tablet Take 600 mg by mouth 2 times daily. Patient not taking: Reported on 2/9/2023    Historical Provider, MD   donepezil (ARICEPT) 10 MG tablet Take 10 mg by mouth nightly  Patient not taking: Reported on 2/9/2023    Historical Provider, MD   Broderick Ambriz [address] [facility] by Does not apply route Duration 5 years 8/16/16   Stacy Fuchs MD   acetaminophen (TYLENOL) 325 MG tablet Take 650 mg by mouth every 6 hours as needed for Pain  Patient not taking: Reported on 2/9/2023    Historical Provider, MD   Glucosamine-Chondroitin (MOVE FREE PO) Take by mouth 2 times daily  Patient not taking: Reported on 2/9/2023    Historical Provider, MD   Multiple Vitamins-Minerals (THERAPEUTIC MULTIVITAMIN-MINERALS) tablet Take 1 tablet by mouth daily  Patient not taking: Reported on 2/9/2023    Historical Provider, MD       Current medications:    No current facility-administered medications for this encounter.        Allergies:  No Known Allergies    Problem List:    Patient Active Problem List   Diagnosis Code    Spondylosis of cervical region without myelopathy or radiculopathy M47.812    Spondylosis of lumbar region without myelopathy or radiculopathy M47.816    Peptic ulcer K27.9    Gastroesophageal reflux disease K21.9    Cervicogenic headache G44.86    Anxiety F41.9    Body mass index 25.0-25.9, adult Z68.25    COVID-19 U07.1    Pain in left shoulder M25.512    Intervertebral disc stenosis of neural canal M99.59    Spondylolysis M43.00    Spondylosis of lumbosacral spine without myelopathy M47.817       Past Medical History:        Diagnosis Date    GERD (gastroesophageal reflux disease)     Hiatal hernia     EGD    PUD (peptic ulcer disease)     LONG TIME  AGO    Rectal bleeding     2o11- colonoscopy - dr Jeanne Ojeda       Past Surgical History:        Procedure Laterality Date    EYE SURGERY  plate in right eye    MANDIBLE SURGERY  1979    NECK SURGERY  2001    plate in neck        Social History:    Social History     Tobacco Use    Smoking status: Never    Smokeless tobacco: Never   Substance Use Topics    Alcohol use: No     Comment: \"Once in a [place]\"                                Counseling given: Not Answered      Vital Signs (Current): There were no vitals filed for this visit. BP Readings from Last 3 Encounters:   02/09/23 104/64   01/15/23 (!) 116/100   11/29/22 126/84       NPO Status:                                                                                 BMI:   Wt Readings from Last 3 Encounters:   02/09/23 159 lb 12.8 oz (72.5 kg)   01/15/23 160 lb (72.6 kg)   11/29/22 160 lb 6.4 oz (72.8 kg)     There is no height or weight on file to calculate BMI.    CBC:   Lab Results   Component Value Date/Time    WBC 6.7 02/09/2023 04:26 PM    RBC 5.63 02/09/2023 04:26 PM    RBC 5.20 07/09/2011 08:24 AM    HGB 16.7 02/09/2023 04:26 PM    HCT 48.6 02/09/2023 04:26 PM    MCV 86.3 02/09/2023 04:26 PM    RDW 12.8 01/24/2022 01:00 PM     02/09/2023 04:26 PM       CMP:   Lab Results   Component Value Date/Time     02/09/2023 04:26 PM    K 4.2 02/09/2023 04:26 PM    K 3.9 09/10/2021 09:20 AM     02/09/2023 04:26 PM    CO2 26 02/09/2023 04:26 PM    BUN 16 02/09/2023 04:26 PM    CREATININE 1.0 02/09/2023 04:26 PM    GFRAA >60 09/28/2020 09:31 AM    AGRATIO 1.3 01/24/2022 01:53 PM    LABGLOM >60 02/09/2023 04:26 PM    GLUCOSE 91 02/09/2023 04:26 PM    GLUCOSE 80 01/24/2022 01:53 PM    PROT 7.7 01/15/2023 05:53 AM    CALCIUM 9.5 02/09/2023 04:26 PM    BILITOT 0.6 01/15/2023 05:53 AM    ALKPHOS 84 01/15/2023 05:53 AM    AST 21 01/15/2023 05:53 AM    ALT 23 01/15/2023 05:53 AM       POC Tests: No results for input(s): POCGLU, POCNA, POCK, POCCL, POCBUN, POCHEMO, POCHCT in the last 72 hours.     Coags:   Lab Results   Component Value Date/Time    INR 1.02 02/09/2023 04:26 PM    APTT 33.3 02/09/2023 04:26 PM       HCG (If Applicable): No results found for: PREGTESTUR, PREGSERUM, HCG, HCGQUANT     ABGs: No results found for: PHART, PO2ART, DXY5PTJ, SNO8DRU, BEART, G2TVMIIH     Type & Screen (If Applicable):  No results found for: LABABO, LABRH    Drug/Infectious Status (If Applicable):  No results found for: HIV, HEPCAB    COVID-19 Screening (If Applicable):   Lab Results   Component Value Date/Time    COVID19 Positive 01/24/2022 01:07 PM    COVID19 NOT DETECTED 01/12/2022 12:15 PM           Anesthesia Evaluation  Patient summary reviewed  Airway: Mallampati: II  TM distance: >3 FB   Neck ROM: full  Mouth opening: > = 3 FB   Dental: normal exam         Pulmonary:normal exam  breath sounds clear to auscultation                             Cardiovascular:  Exercise tolerance: good (>4 METS),           Rhythm: regular  Rate: normal                    Neuro/Psych:   (+) headaches: migraine headaches,             GI/Hepatic/Renal:   (+) GERD: no interval change,           Endo/Other:              Pt had no PAT visit       Abdominal:       Abdomen: soft. Vascular: Other Findings:           Anesthesia Plan      MAC     ASA 2       Induction: intravenous. Anesthetic plan and risks discussed with patient. Plan discussed with CRNA.                     MARGAUX Dyer - SEAN   2/10/2023

## 2023-03-03 ENCOUNTER — OFFICE VISIT (OUTPATIENT)
Dept: BARIATRICS/WEIGHT MGMT | Age: 68
End: 2023-03-03
Payer: MEDICARE

## 2023-03-03 VITALS
DIASTOLIC BLOOD PRESSURE: 78 MMHG | SYSTOLIC BLOOD PRESSURE: 116 MMHG | HEART RATE: 88 BPM | WEIGHT: 153 LBS | BODY MASS INDEX: 24.59 KG/M2 | TEMPERATURE: 97.9 F | HEIGHT: 66 IN

## 2023-03-03 DIAGNOSIS — K21.9 GASTROESOPHAGEAL REFLUX DISEASE WITHOUT ESOPHAGITIS: Primary | ICD-10-CM

## 2023-03-03 DIAGNOSIS — R10.31 RIGHT LOWER QUADRANT ABDOMINAL PAIN: ICD-10-CM

## 2023-03-03 DIAGNOSIS — R10.11 RUQ ABDOMINAL PAIN: ICD-10-CM

## 2023-03-03 DIAGNOSIS — R11.2 NAUSEA AND VOMITING, UNSPECIFIED VOMITING TYPE: ICD-10-CM

## 2023-03-03 PROCEDURE — G8427 DOCREV CUR MEDS BY ELIG CLIN: HCPCS | Performed by: SURGERY

## 2023-03-03 PROCEDURE — 1036F TOBACCO NON-USER: CPT | Performed by: SURGERY

## 2023-03-03 PROCEDURE — G8420 CALC BMI NORM PARAMETERS: HCPCS | Performed by: SURGERY

## 2023-03-03 PROCEDURE — 3017F COLORECTAL CA SCREEN DOC REV: CPT | Performed by: SURGERY

## 2023-03-03 PROCEDURE — 99203 OFFICE O/P NEW LOW 30 MIN: CPT | Performed by: SURGERY

## 2023-03-03 PROCEDURE — G8484 FLU IMMUNIZE NO ADMIN: HCPCS | Performed by: SURGERY

## 2023-03-03 PROCEDURE — 1123F ACP DISCUSS/DSCN MKR DOCD: CPT | Performed by: SURGERY

## 2023-03-03 RX ORDER — PANTOPRAZOLE SODIUM 40 MG/1
TABLET, DELAYED RELEASE ORAL
COMMUNITY
Start: 2023-02-09

## 2023-03-03 RX ORDER — ONDANSETRON 4 MG/1
TABLET, FILM COATED ORAL EVERY 8 HOURS PRN
COMMUNITY

## 2023-03-05 ASSESSMENT — ENCOUNTER SYMPTOMS
WHEEZING: 0
EYE REDNESS: 0
SHORTNESS OF BREATH: 0
CONSTIPATION: 0
STRIDOR: 0
CHOKING: 0
RHINORRHEA: 0
CHEST TIGHTNESS: 0
COUGH: 1
SINUS PRESSURE: 0
PHOTOPHOBIA: 0
TROUBLE SWALLOWING: 1
RECTAL PAIN: 0
BLOOD IN STOOL: 0
COLOR CHANGE: 0
APNEA: 0
VOMITING: 0
SORE THROAT: 1
DIARRHEA: 0
BACK PAIN: 1
ALLERGIC/IMMUNOLOGIC NEGATIVE: 1
ABDOMINAL DISTENTION: 0
ABDOMINAL PAIN: 1
EYE PAIN: 0
ANAL BLEEDING: 0
VOICE CHANGE: 1
EYE ITCHING: 0
FACIAL SWELLING: 0
EYE DISCHARGE: 0
NAUSEA: 1

## 2023-03-05 NOTE — PROGRESS NOTES
Margo Gamble (:  1955)     ASSESSMENT:  1. Hiatal Hernia  2. GERD  3. Nausea  4. Epigastric & Right-sided abdominal pain    PLAN:  Gallbladder ultrasound and HIDA scan  Esophagram with marshmallow/bagel challenge  Follow-up with GI as directed  Reviewed pH Bravo testing with patient. Question if patient may need to redo the test given concern for accuracy. Continue PPI/antacid regimen as directed by GI service. The pros and cons of hiatal hernia repair discussed in detail with patient. Robotic, laparoscopic and open techniques discussed. Pros and cons of mesh insertion discussed. Also discussed magnetic sphincter augmentation insertion (Linx) as well as fundoplication in detail. Await further testing results prior to final recommendations. Follow-up after further testing and imaging completed  Discussed dietary and lifestyle modification/restrictions with patient in regards to GERD and hiatal hernia. Signs and symptoms reviewed with patient that would be concerning and need him to return to office for re-evaluation. Patient states He will call if He has questions or concerns. SUBJECTIVE/OBJECTIVE:    Chief Complaint   Patient presents with    New Patient     LINX consult     HPI  Danis Donovan is a 49-year-old male who presents for initial evaluation secondary to symptomatic hiatal hernia. He states he has had GERD since he was a child. Sore throat. Reflux. Change in voice. Intermittent dysphagia. Recently underwent neck surgery and currently has a hard collar on. Has never had work-up for gallbladder. Has some nausea. Epigastric discomfort. Worse after eating. Has been on omeprazole for years. Also taking Pepcid and Carafate as needed. Currently on Protonix daily. Has been being followed by GI service. Found to have at least a 3 cm hiatal hernia. History of esophagitis per patient. Had underwent Bravo pH study that did not have a significant DeMeester score.   This was only 0.3. Health-related quality of life questionnaire score 30. He states it is worse after meals. Worse with lying down. Feels bloated and distended. He feels his symptoms daily even with medication. Denies tobacco abuse. Feels that spicy foods are the worst.  No generalized abdominal pain. No hematochezia or melena. No new urinary complaints. He is interested in hiatal hernia surgery repair in hopes to improve some of his symptoms. Review of Systems   Constitutional:  Positive for fatigue. Negative for activity change, appetite change, chills, diaphoresis, fever and unexpected weight change. HENT:  Positive for hearing loss, sore throat, tinnitus, trouble swallowing and voice change. Negative for congestion, dental problem, drooling, ear discharge, ear pain, facial swelling, mouth sores, nosebleeds, postnasal drip, rhinorrhea, sinus pressure and sneezing. Eyes:  Negative for photophobia, pain, discharge, redness, itching and visual disturbance. Respiratory:  Positive for cough. Negative for apnea, choking, chest tightness, shortness of breath, wheezing and stridor. Cardiovascular:  Negative for chest pain, palpitations and leg swelling. Gastrointestinal:  Positive for abdominal pain and nausea. Negative for abdominal distention, anal bleeding, blood in stool, constipation, diarrhea, rectal pain and vomiting. Endocrine: Negative. Genitourinary:  Negative for decreased urine volume, difficulty urinating, dysuria, enuresis, flank pain, frequency, genital sores, hematuria, penile discharge, penile pain, penile swelling, scrotal swelling, testicular pain and urgency. Musculoskeletal:  Positive for back pain, myalgias and neck pain. Negative for arthralgias, gait problem, joint swelling and neck stiffness. Skin:  Negative for color change, pallor, rash and wound. Allergic/Immunologic: Negative. Neurological:  Positive for dizziness, weakness and headaches.  Negative for tremors, seizures, syncope, facial asymmetry, speech difficulty, light-headedness and numbness. Hematological:  Negative for adenopathy. Does not bruise/bleed easily. Psychiatric/Behavioral:  Negative for agitation, behavioral problems, confusion, decreased concentration, dysphoric mood, hallucinations, self-injury, sleep disturbance and suicidal ideas. The patient is nervous/anxious. The patient is not hyperactive. Past Medical History:   Diagnosis Date    Anxiety     Arthritis     GERD (gastroesophageal reflux disease)     Hiatal hernia     EGD    PUD (peptic ulcer disease)     LONG TIME  AGO    Rectal bleeding     2o11- colonoscopy - dr Fer Segovia       Past Surgical History:   Procedure Laterality Date    EYE SURGERY  plate in right eye    [address]    plate in neck     UPPER GASTROINTESTINAL ENDOSCOPY N/A 2/10/2023    EGD, POSS BIOPSIES,  MEJÍA performed by Ronie Meigs, MD at [facility]       Current Outpatient Medications   Medication Sig Dispense Refill    ondansetron (ZOFRAN) 4 MG tablet Take by mouth every 8 hours as needed      pantoprazole (PROTONIX) 40 MG tablet TAKE 1 TABLET BY MOUTH TWICE DAILY      sucralfate (CARAFATE) 1 GM tablet Take 1 tablet by mouth 4 times daily Dissolve into a slurry in 4 ounces of liquid.  30 tablet 0    cyclobenzaprine (FLEXERIL) 10 MG tablet Take 1 tablet by mouth every 8 hours as needed for Muscle spasms 30 tablet 2    DULoxetine (CYMBALTA) 30 MG extended release capsule Take 1 capsule by mouth daily 90 capsule 1    famotidine (PEPCID) 40 MG tablet       magnesium (MAGNESIUM-OXIDE) 250 MG TABS tablet Take 500 mg by mouth daily      donepezil (ARICEPT) 10 MG tablet Take 10 mg by mouth nightly      Handicap Placard MISC by Does not apply route Duration 5 years 1 each 0    acetaminophen (TYLENOL) 325 MG tablet Take 650 mg by mouth every 6 hours as needed for Pain      Glucosamine-Chondroitin (MOVE FREE PO) Take by mouth 2 times daily Multiple Vitamins-Minerals (THERAPEUTIC MULTIVITAMIN-MINERALS) tablet Take 1 tablet by mouth daily      metoclopramide (REGLAN) 10 MG tablet Take 1 tablet by mouth 3 times daily as needed (nausea and vomiting) (Patient not taking: Reported on 3/3/2023) 12 tablet 0    ergotamine-caffeine (CAFERGOT) 1-100 MG per tablet Two tablets at onset of attack; then 1 tablet every 30 minutes as needed; maximum: 6 tablets per attack; do not exceed 10 tablets/week. (Patient not taking: No sig reported) 30 tablet 1    omeprazole (PRILOSEC) 20 MG delayed release capsule Take 40 mg by mouth every morning      aspirin 81 MG EC tablet  (Patient not taking: Reported on 3/3/2023)      b complex vitamins capsule Take 1 capsule by mouth daily (Patient not taking: No sig reported)       No current facility-administered medications for this visit. No Known Allergies    Family History   Problem Relation Age of Onset    Asthma Mother     Heart Disease Mother     Heart Disease Father     Other Father         CHF    Stroke Other     Heart Disease Other        Social History     Socioeconomic History    Marital status:      Spouse name: Not on file    Number of children: Not on file    Years of education: Not on file    Highest education level: Not on file   Occupational History    Not on file   Tobacco Use    Smoking status: Never    Smokeless tobacco: Never   Substance and Sexual Activity    Alcohol use: No     Comment: \"Once in a [place]\"    Drug use: No    Sexual activity: Not on file   Other Topics Concern    Not on file   Social History Narrative    Not on file     Social Determinants of Health     Financial Resource Strain: Low Risk     Difficulty of Paying Living Expenses: Not hard at all   Food Insecurity: No Food Insecurity    Worried About [employer] [employer] in the Last Year: Never true    [address]  [employer] in the Last Year: Never true   Transportation Needs: Unknown    Lack of Transportation (Medical):  Not on file Lack of Transportation (Non-Medical): No   Physical Activity: Not on file   Stress: Not on file   Social Connections: Not on file   Intimate Partner Violence: Not on file   Housing Stability: Unknown    Unable to Pay for Housing in the Last Year: Not on file    Number of Places Lived in the Last Year: Not on file    Unstable Housing in the Last Year: No     Vitals:    03/03/23 0951   BP: 116/78   Site: Right Upper Arm   Position: Sitting   Cuff Size: Large Adult   Pulse: 88   Temp: 97.9 °F (36.6 °C)   TempSrc: Infrared   Weight: 153 lb (69.4 kg)   Height: 5' 6\" (1.676 m)     Body mass index is 24.69 kg/m². Wt Readings from Last 3 Encounters:   03/03/23 153 lb (69.4 kg)   02/10/23 156 lb 14.4 oz (71.2 kg)   02/09/23 159 lb 12.8 oz (72.5 kg)     Physical Exam  Vitals reviewed. Constitutional:       General: He is not in acute distress. Appearance: He is well-developed. He is not diaphoretic. HENT:      Head: Normocephalic and atraumatic. Right Ear: External ear normal.      Left Ear: External ear normal.      Nose: Nose normal.   Eyes:      General: No scleral icterus. Right eye: No discharge. Left eye: No discharge. Conjunctiva/sclera: Conjunctivae normal.   Neck:      Comments: Hard collar in place  Cardiovascular:      Rate and Rhythm: Normal rate and regular rhythm. Heart sounds: Normal heart sounds. Pulmonary:      Effort: Pulmonary effort is normal. No respiratory distress. Breath sounds: Normal breath sounds. No wheezing or rales. Chest:      Chest wall: No tenderness. Abdominal:      General: Bowel sounds are normal. There is no distension. Palpations: Abdomen is soft. There is no mass. Tenderness: There is no abdominal tenderness. There is no guarding or rebound. Musculoskeletal:         General: No tenderness. Normal range of motion. Cervical back: Normal range of motion and neck supple. Skin:     General: Skin is warm and dry. Coloration: Skin is not pale. Findings: No erythema or rash. Neurological:      Mental Status: He is alert and oriented to person, place, and time. Cranial Nerves: No cranial nerve deficit. Psychiatric:         Behavior: Behavior normal.         Thought Content: Thought content normal.         Judgment: Judgment normal.     Lab Results   Component Value Date    WBC 6.7 02/09/2023    HGB 16.7 02/09/2023    HCT 48.6 02/09/2023    MCV 86.3 02/09/2023     02/09/2023     Lab Results   Component Value Date     02/09/2023    K 4.2 02/09/2023     02/09/2023    CO2 26 02/09/2023     Lab Results   Component Value Date    CREATININE 1.0 02/09/2023     Lab Results   Component Value Date    ALT 23 01/15/2023    AST 21 01/15/2023    ALKPHOS 84 01/15/2023    BILITOT 0.6 01/15/2023     Lab Results   Component Value Date    LIPASE 18.2 01/15/2023       Patient Active Problem List   Diagnosis    Spondylosis of cervical region without myelopathy or radiculopathy    Spondylosis of lumbar region without myelopathy or radiculopathy    Peptic ulcer    Gastroesophageal reflux disease    Cervicogenic headache    Anxiety    Body mass index 25.0-25.9, adult    COVID-19    Pain in left shoulder    Intervertebral disc stenosis of neural canal    Spondylolysis    Spondylosis of lumbosacral spine without myelopathy     Narrative   PROCEDURE: CT ABDOMEN PELVIS W IV CONTRAST       CLINICAL INFORMATION: Left lower quadrant abdominal pain       TECHNIQUE: CT of the abdomen and pelvis was performed following administration of 80 mL Isovue-370 intravenous contrast only. Axial images as well as coronal and sagittal reconstructions were obtained. All CT scans at this facility use dose modulation, iterative reconstruction, and/or weight-based dosing when appropriate to reduce radiation dose to as low as reasonably achievable.        COMPARISON: CT abdomen and pelvis 7/23/2000       FINDINGS:        Lower thorax: Visualized lung bases are clear. There is no pleural effusion. Abdomen: There is no free intraperitoneal air or fluid. Bowel is normal in course and caliber without evidence of obstruction. The liver, gallbladder, pancreas, spleen, adrenal glands, kidneys and abdominal aorta are normal. There is no mesenteric or    retroperitoneal lymphadenopathy. Degenerative changes are present in the lumbar spine without evidence of aggressive osseous lesions. Pelvis: The urinary bladder is unremarkable. The prostate gland is mildly enlarged. Bilateral inguinal hernias contain fat. There is no pelvic or inguinal lymphadenopathy. No aggressive osseous lesions are identified. Impression       No acute intra-abdominal or intrapelvic findings. Final report electronically signed by Dr. Ronak Luis on 1/15/2023 8:15 AM     Narrative   PROCEDURE: FL MODIFIED BARIUM SWALLOW W VIDEO       CLINICAL INFORMATION: Dysphasia       TECHNIQUE: Fluoroscopy was provided for modified barium swallowing study performed by speech therapy. With the patient in the lateral projection, swallowing mechanism was evaluated using barium of various consistencies. The radiologist was available for    the entire examination. 11 video clips were obtained. Total fluoroscopy time 1 minute 12 seconds. Speech therapist: Lazara Robles       COMPARISON: None. FINDINGS: Oral, pharyngeal and esophageal structures appear normal. There is no laryngeal penetration or aspiration of any barium consistency. Impression   1. No laryngeal penetration or aspiration of barium. 2. Additional recommendations from the speech therapist will follow. **This report has been created using voice recognition software. It may contain minor errors which are inherent in voice recognition technology. **       Final report electronically signed by Dr. Ronak Luis on 12/6/2022 10:50 AM                                      [facility] [address] [place] [address] Endoscopy                                                               EGD with BRAVO Report     Patient: Hever Arnett                      : 1955                      Acct#: [de-identified]                 BRIEF HISTORY AND INDICATIONS:     The patient is a 79 y.o.,  male with significant past medical history of chronic GERD. He has a large hiatal hernia and has been off of PPI's x one week, here for BRAVO today. PREMEDICATION: GENERAL anesthesia was used, see Anesthesia note for details. INSTRUMENT:  Olympus GIF H-180 gastroscope     The risks and benefits of upper endoscopy with biopsy and dilation were  described to the patient, including but not limited to bleeding, infection, poking a hole someplace requiring surgery to fix it, having reaction to medication, and death. The patient understood these risks and provided informed consent. The patient was placed in the left lateral decubitus position. Conscious sedation was administered . The patient was continuously monitored to ensure adequate sedation and patient safety. A forward-viewing Olympus endoscope was lubricated and inserted through the mouth into the oropharynx. Under direct visualization, the upper esophagus was intubated. The scope was advanced to the esophagus and stomach to second portion of duodenum. Scope was slowly withdrawn with good views of mucosal surfaces. The scope was retroflexed in the fundus. Findings and maneuvers are listed in impression below. The patient tolerated the procedure well. The scope was removed. The patient was removed to the recovery area. There were no immediate complications. ESOPHAGUS:  The Bravo probe catheter was placed at 31 cm  from the incisors. Confirmation with EGD scope. The Bravo capsule was deployed  And EGD confirmed placement of the caspule in the esophagus.    Esophagus revealed a hiatal hernia     STOMACH:  Normal     DUODENUM:  The pylorus was normal and the duodenal bulb distended well and  appeared normal.  Post bulbar area was well-visualized and was normal.      IMPRESSION:      1. Three cm sliding hiatal hernia, with NO esophagitis. He did have a hx of esophagitis on EGD last month. Emery Mcmanus has been having nausea, and has not been referred yet to a Surgeon for [facility] procedure, although, he is interested in it. No evidence of Gastroparesis. 2.   A capsule was placed in the distal esophagus at 31 cm from the incisors, as the gastroesophageal junction tip of gastric folds was 37 cm.       3.   Otherwise , normal exam to the 2nd portion of the duodenum. RECOMMENDATIONS:    1. Follow directions for BRAVO, 48 hours Ambulatory probe detector. 2. Make a follow up appt. With Quirino Sylvester CNP in one month , not earlier than that to go over results . 3. If probe is negative , then pt. May have other cause of symptoms and may benefit from hiatal hernia repair as he is doing better with PPI's and Sucralfate. Specimens: were not obtained     EBL : NONE     (The following sections must be completed)  Post-Sedation Vital Signs: Vital signs were reviewed and were stable after the procedure (see flow sheet for vitals)            Post-Sedation Exam: Lungs: clear to auscultation bilaterally and Cardiovascular: regular rate and rhythm           Complications: none     Chari Chavez MD, Russell Rodriguez       An electronic signature was used to authenticate this note.     --Cameron Winter MD

## 2023-03-16 ENCOUNTER — OFFICE VISIT (OUTPATIENT)
Dept: INTERNAL MEDICINE CLINIC | Age: 68
End: 2023-03-16

## 2023-03-16 VITALS
TEMPERATURE: 98.3 F | HEIGHT: 66 IN | SYSTOLIC BLOOD PRESSURE: 124 MMHG | BODY MASS INDEX: 24.85 KG/M2 | WEIGHT: 154.6 LBS | DIASTOLIC BLOOD PRESSURE: 80 MMHG | HEART RATE: 74 BPM

## 2023-03-16 DIAGNOSIS — R11.2 NAUSEA AND VOMITING, UNSPECIFIED VOMITING TYPE: Primary | ICD-10-CM

## 2023-03-16 PROBLEM — G95.20 SPINAL CORD COMPRESSION (HCC): Status: ACTIVE | Noted: 2023-02-20

## 2023-03-16 RX ORDER — PROMETHAZINE HYDROCHLORIDE 25 MG/1
25 TABLET ORAL EVERY 8 HOURS
Qty: 42 TABLET | Refills: 0 | Status: SHIPPED | OUTPATIENT
Start: 2023-03-16 | End: 2023-04-11 | Stop reason: SDUPTHER

## 2023-03-16 RX ORDER — CALCIUM CARBONATE 300MG(750)
TABLET,CHEWABLE ORAL DAILY
COMMUNITY
End: 2023-05-18

## 2023-03-16 RX ORDER — PANTOPRAZOLE SODIUM 40 MG/1
40 TABLET, DELAYED RELEASE ORAL
COMMUNITY
End: 2023-05-02 | Stop reason: ALTCHOICE

## 2023-03-23 ENCOUNTER — TELEPHONE (OUTPATIENT)
Dept: BARIATRICS/WEIGHT MGMT | Age: 68
End: 2023-03-23

## 2023-03-23 NOTE — TELEPHONE ENCOUNTER
Maty Alonso calls -he needs phone # for [facility] - [phone] schedule MMB esophagram, USGB, HIDA EF. When those are scheduled he will call me back and get scheduled to see Dr. Nando Bray to review tests results. He does not want to repeat pH testing- he does want to proceed with hernia repair and Nissen fundoplication as explained by Dr. Nando Bray at 3/3/2023 ov.

## 2023-03-28 ENCOUNTER — TELEPHONE (OUTPATIENT)
Dept: BARIATRICS/WEIGHT MGMT | Age: 68
End: 2023-03-28

## 2023-03-28 DIAGNOSIS — K21.9 GASTROESOPHAGEAL REFLUX DISEASE WITHOUT ESOPHAGITIS: Primary | ICD-10-CM

## 2023-03-28 NOTE — TELEPHONE ENCOUNTER
Carol Min calls requesting to have 24 hr pH testing ordered-I will order today and fax to  ENDO unit. Procedure was explained to him and he voiced understanding.

## 2023-04-13 ENCOUNTER — HOSPITAL ENCOUNTER (OUTPATIENT)
Age: 68
Setting detail: OUTPATIENT SURGERY
Discharge: HOME | End: 2023-04-13
Attending: INTERNAL MEDICINE | Admitting: INTERNAL MEDICINE
Payer: MEDICARE

## 2023-04-13 VITALS
OXYGEN SATURATION: 99 % | SYSTOLIC BLOOD PRESSURE: 142 MMHG | HEIGHT: 65 IN | RESPIRATION RATE: 16 BRPM | WEIGHT: 152 LBS | BODY MASS INDEX: 25.33 KG/M2 | HEART RATE: 80 BPM | DIASTOLIC BLOOD PRESSURE: 84 MMHG

## 2023-04-13 PROCEDURE — 3609019000 HC EGD CAPSULE ENDOSCOPY: Performed by: INTERNAL MEDICINE

## 2023-04-13 PROCEDURE — 6370000000 HC RX 637 (ALT 250 FOR IP): Performed by: INTERNAL MEDICINE

## 2023-04-13 PROCEDURE — 2709999900 HC NON-CHARGEABLE SUPPLY: Performed by: INTERNAL MEDICINE

## 2023-04-13 RX ORDER — LIDOCAINE HYDROCHLORIDE 20 MG/ML
JELLY TOPICAL PRN
Status: DISCONTINUED | OUTPATIENT
Start: 2023-04-13 | End: 2023-04-13 | Stop reason: HOSPADM

## 2023-04-13 RX ADMIN — LIDOCAINE HYDROCHLORIDE: 20 JELLY TOPICAL at 08:48

## 2023-04-13 NOTE — PROGRESS NOTES
Patient admitted to endo dept for EFT/24 hr ph.  Consent signed. Manometry probe passed through the L nare into the stomach.    Liquid swallows performed at 49 cm  2 swallows were performed and pt began to vomit and cough was unable to tolerate   Probe pulled   Discharge instructions given to patient   Understanding verbalized   Pt discharged home per self     Dr  office notified

## 2023-04-13 NOTE — PROGRESS NOTES
Patient admitted to endo dept for EFT/24 hr ph.  Consent signed. Manometry probe passed through the left nare into the stomach. Liquid swallows performed at 50 cm  Patient unable to tolerate procedure, patient vomiting and would like to try again at a later time.    Discharge instructions given to patient   Understanding verbalized   Pt discharged home per self

## 2023-04-21 ENCOUNTER — HOSPITAL ENCOUNTER (OUTPATIENT)
Dept: NUCLEAR MEDICINE | Age: 68
Discharge: HOME | End: 2023-04-21
Payer: MEDICARE

## 2023-04-21 DIAGNOSIS — R11.2 NAUSEA AND VOMITING, UNSPECIFIED VOMITING TYPE: ICD-10-CM

## 2023-04-21 DIAGNOSIS — R10.11 RUQ ABDOMINAL PAIN: ICD-10-CM

## 2023-04-21 DIAGNOSIS — K21.9 GASTROESOPHAGEAL REFLUX DISEASE WITHOUT ESOPHAGITIS: ICD-10-CM

## 2023-04-21 PROCEDURE — 78226 HEPATOBILIARY SYSTEM IMAGING: CPT

## 2023-04-21 PROCEDURE — 3430000000 HC RX DIAGNOSTIC RADIOPHARMACEUTICAL: Performed by: SURGERY

## 2023-04-21 PROCEDURE — A9537 TC99M MEBROFENIN: HCPCS | Performed by: SURGERY

## 2023-04-21 RX ADMIN — Medication 8.5 MILLICURIE: at 10:17

## 2023-04-24 ENCOUNTER — TELEPHONE (OUTPATIENT)
Dept: BARIATRICS/WEIGHT MGMT | Age: 68
End: 2023-04-24

## 2023-04-24 NOTE — TELEPHONE ENCOUNTER
----- Message from Marina Bergeron MD sent at 4/21/2023  5:49 PM EDT -----  Casie Garcia. Thanks for the update. Curious to see how he does with all this.  ----- Message -----  From: Trever Dowell RN  Sent: 4/21/2023   4:49 PM EDT  To: Marina Bergeron MD    USGB & HIDA \"normal\" - I will call patient =I have [facility]. Could not tolerate 24hr pH probe- Demeester bravo pH score was SUPER LOW.  Thanks Shikha Caceres

## 2023-04-24 NOTE — TELEPHONE ENCOUNTER
Call to Ryland Durbin -\" both GBUS and HIDA scan are normal\" -no indication GB needs to be removed. I clarified 5-8 surgery procedure as HH repair, Nissen. Ryland Durbin says \"I just want the hernia repaired not the Nissen\". I explained to him just pulling the stomach down and not wrapping the esophagus -is very likely this HH will reoccur. Ryland Durbin asked, \"would he put in the [facility] even though my pH is normal?\" I am taking my PPI every other day - instructed to take daily -not doing that . \"And I can feel acid in my throat\". I will message Dr. Geovanny Sutton and get back with him. Pt voiced understanding.

## 2023-04-28 ENCOUNTER — OFFICE VISIT (OUTPATIENT)
Dept: BARIATRICS/WEIGHT MGMT | Age: 68
End: 2023-04-28
Payer: MEDICARE

## 2023-04-28 VITALS
TEMPERATURE: 97.7 F | HEIGHT: 65 IN | SYSTOLIC BLOOD PRESSURE: 124 MMHG | WEIGHT: 158 LBS | BODY MASS INDEX: 26.33 KG/M2 | HEART RATE: 80 BPM | DIASTOLIC BLOOD PRESSURE: 72 MMHG

## 2023-04-28 DIAGNOSIS — K21.9 GASTROESOPHAGEAL REFLUX DISEASE WITHOUT ESOPHAGITIS: Primary | ICD-10-CM

## 2023-04-28 PROCEDURE — 99213 OFFICE O/P EST LOW 20 MIN: CPT | Performed by: SURGERY

## 2023-04-28 PROCEDURE — G8417 CALC BMI ABV UP PARAM F/U: HCPCS | Performed by: SURGERY

## 2023-04-28 PROCEDURE — G8427 DOCREV CUR MEDS BY ELIG CLIN: HCPCS | Performed by: SURGERY

## 2023-04-28 PROCEDURE — 1123F ACP DISCUSS/DSCN MKR DOCD: CPT | Performed by: SURGERY

## 2023-04-28 PROCEDURE — 1036F TOBACCO NON-USER: CPT | Performed by: SURGERY

## 2023-04-28 PROCEDURE — 3017F COLORECTAL CA SCREEN DOC REV: CPT | Performed by: SURGERY

## 2023-04-28 RX ORDER — PROMETHAZINE HYDROCHLORIDE 25 MG/1
25 TABLET ORAL EVERY 6 HOURS PRN
COMMUNITY

## 2023-05-01 ASSESSMENT — ENCOUNTER SYMPTOMS
CHEST TIGHTNESS: 0
TROUBLE SWALLOWING: 1
RECTAL PAIN: 0
STRIDOR: 0
VOMITING: 0
EYE ITCHING: 0
ANAL BLEEDING: 0
SORE THROAT: 1
SHORTNESS OF BREATH: 0
BLOOD IN STOOL: 0
COUGH: 0
FACIAL SWELLING: 0
CHOKING: 0
CONSTIPATION: 0
NAUSEA: 1
PHOTOPHOBIA: 0
ABDOMINAL DISTENTION: 0
RHINORRHEA: 0
COLOR CHANGE: 0
ABDOMINAL PAIN: 1
DIARRHEA: 0
EYE PAIN: 0
BACK PAIN: 1
VOICE CHANGE: 1
EYE DISCHARGE: 0
SINUS PRESSURE: 0
EYE REDNESS: 0
APNEA: 0
WHEEZING: 0

## 2023-05-01 NOTE — PROGRESS NOTES
swelling, testicular pain and urgency. Musculoskeletal:  Positive for back pain, neck pain and neck stiffness. Negative for arthralgias, gait problem, joint swelling and myalgias. Skin:  Negative for color change, pallor, rash and wound. Neurological:  Positive for speech difficulty. Negative for dizziness, tremors, seizures, syncope, facial asymmetry, weakness, light-headedness, numbness and headaches. Hematological:  Negative for adenopathy. Does not bruise/bleed easily. Psychiatric/Behavioral:  Negative for agitation, behavioral problems, confusion, decreased concentration, dysphoric mood, hallucinations, self-injury, sleep disturbance and suicidal ideas. The patient is not nervous/anxious and is not hyperactive.       Past Medical History:   Diagnosis Date    Anxiety     Arthritis     GERD (gastroesophageal reflux disease)     Hiatal hernia     EGD    PUD (peptic ulcer disease)     LONG TIME  AGO    Rectal bleeding     2o11- colonoscopy - dr Cristel Rainey       Past Surgical History:   Procedure Laterality Date    EYE SURGERY  plate in right eye    [address]    plate in neck     UPPER GASTROINTESTINAL ENDOSCOPY N/A 2/10/2023    EGD, POSS BIOPSIES,  MEJÍA performed by Wilbert Johnson MD at [address] [place] [address] 4/13/2023    EGD ESOPHAGEAL MANOMETRY AND PH MONITORING 24 HR performed by Minerva Ruby MD at [facility]       Current Outpatient Medications   Medication Sig Dispense Refill    promethazine (PHENERGAN) 25 MG tablet Take 1 tablet by mouth every 6 hours as needed for Nausea      cyclobenzaprine (FLEXERIL) 10 MG tablet Take 1 tablet by mouth every 8 hours as needed for Muscle spasms 30 tablet 2    donepezil (ARICEPT) 10 MG tablet Take 1 tablet by mouth nightly      Handicap Placard MISC by Does not apply route Duration 5 years 1 each 0    Glucosamine-Chondroitin (MOVE FREE PO) Take by mouth daily      Multiple

## 2023-05-02 RX ORDER — OMEPRAZOLE 20 MG/1
20 CAPSULE, DELAYED RELEASE ORAL DAILY
COMMUNITY

## 2023-05-02 RX ORDER — ACETAMINOPHEN 500 MG
500 TABLET ORAL EVERY 6 HOURS PRN
COMMUNITY

## 2023-05-04 ENCOUNTER — PREP FOR PROCEDURE (OUTPATIENT)
Dept: BARIATRICS/WEIGHT MGMT | Age: 68
End: 2023-05-04

## 2023-05-05 RX ORDER — SODIUM CHLORIDE 0.9 % (FLUSH) 0.9 %
5-40 SYRINGE (ML) INJECTION PRN
Status: CANCELLED | OUTPATIENT
Start: 2023-05-08

## 2023-05-05 RX ORDER — SODIUM CHLORIDE 9 MG/ML
INJECTION, SOLUTION INTRAVENOUS PRN
Status: CANCELLED | OUTPATIENT
Start: 2023-05-08

## 2023-05-05 RX ORDER — SODIUM CHLORIDE 0.9 % (FLUSH) 0.9 %
5-40 SYRINGE (ML) INJECTION EVERY 12 HOURS SCHEDULED
Status: CANCELLED | OUTPATIENT
Start: 2023-05-08

## 2023-05-08 ENCOUNTER — ANESTHESIA (OUTPATIENT)
Dept: OPERATING ROOM | Age: 68
End: 2023-05-08
Payer: MEDICARE

## 2023-05-08 ENCOUNTER — ANESTHESIA EVENT (OUTPATIENT)
Dept: OPERATING ROOM | Age: 68
End: 2023-05-08
Payer: MEDICARE

## 2023-05-08 ENCOUNTER — HOSPITAL ENCOUNTER (OUTPATIENT)
Age: 68
Discharge: HOME | End: 2023-05-09
Attending: SURGERY | Admitting: SURGERY
Payer: MEDICARE

## 2023-05-08 PROBLEM — K44.9 HIATAL HERNIA: Status: ACTIVE | Noted: 2023-05-08

## 2023-05-08 LAB
DEPRECATED RDW RBC AUTO: 39.9 FL (ref 35–45)
ERYTHROCYTE [DISTWIDTH] IN BLOOD BY AUTOMATED COUNT: 12.4 % (ref 11.5–14.5)
HCT VFR BLD AUTO: 49.7 % (ref 42–52)
HGB BLD-MCNC: 16.2 GM/DL (ref 14–18)
MCH RBC QN AUTO: 28.7 PG (ref 26–33)
MCHC RBC AUTO-ENTMCNC: 32.6 GM/DL (ref 32.2–35.5)
MCV RBC AUTO: 88.1 FL (ref 80–94)
PLATELET # BLD AUTO: 248 THOU/MM3 (ref 130–400)
PMV BLD AUTO: 8.3 FL (ref 9.4–12.4)
RBC # BLD AUTO: 5.64 MILL/MM3 (ref 4.7–6.1)
WBC # BLD AUTO: 6.9 THOU/MM3 (ref 4.8–10.8)

## 2023-05-08 PROCEDURE — 3700000000 HC ANESTHESIA ATTENDED CARE: Performed by: SURGERY

## 2023-05-08 PROCEDURE — 7100000000 HC PACU RECOVERY - FIRST 15 MIN: Performed by: SURGERY

## 2023-05-08 PROCEDURE — 2720000010 HC SURG SUPPLY STERILE: Performed by: SURGERY

## 2023-05-08 PROCEDURE — 2580000003 HC RX 258: Performed by: SURGERY

## 2023-05-08 PROCEDURE — 7100000001 HC PACU RECOVERY - ADDTL 15 MIN: Performed by: SURGERY

## 2023-05-08 PROCEDURE — 6360000002 HC RX W HCPCS: Performed by: SURGERY

## 2023-05-08 PROCEDURE — 85027 COMPLETE CBC AUTOMATED: CPT

## 2023-05-08 PROCEDURE — 6360000002 HC RX W HCPCS: Performed by: NURSE ANESTHETIST, CERTIFIED REGISTERED

## 2023-05-08 PROCEDURE — 36415 COLL VENOUS BLD VENIPUNCTURE: CPT

## 2023-05-08 PROCEDURE — 2580000003 HC RX 258

## 2023-05-08 PROCEDURE — 2500000003 HC RX 250 WO HCPCS: Performed by: SURGERY

## 2023-05-08 PROCEDURE — 2500000003 HC RX 250 WO HCPCS

## 2023-05-08 PROCEDURE — 3600000019 HC SURGERY ROBOT ADDTL 15MIN: Performed by: SURGERY

## 2023-05-08 PROCEDURE — 2709999900 HC NON-CHARGEABLE SUPPLY: Performed by: SURGERY

## 2023-05-08 PROCEDURE — 6360000002 HC RX W HCPCS

## 2023-05-08 PROCEDURE — 6370000000 HC RX 637 (ALT 250 FOR IP): Performed by: SURGERY

## 2023-05-08 PROCEDURE — S2900 ROBOTIC SURGICAL SYSTEM: HCPCS | Performed by: SURGERY

## 2023-05-08 PROCEDURE — 6370000000 HC RX 637 (ALT 250 FOR IP): Performed by: ANESTHESIOLOGY

## 2023-05-08 PROCEDURE — 2500000003 HC RX 250 WO HCPCS: Performed by: NURSE ANESTHETIST, CERTIFIED REGISTERED

## 2023-05-08 PROCEDURE — 3700000001 HC ADD 15 MINUTES (ANESTHESIA): Performed by: SURGERY

## 2023-05-08 PROCEDURE — 6360000002 HC RX W HCPCS: Performed by: ANESTHESIOLOGY

## 2023-05-08 PROCEDURE — 3600000009 HC SURGERY ROBOT BASE: Performed by: SURGERY

## 2023-05-08 PROCEDURE — 43282 LAP PARAESOPH HER RPR W/MESH: CPT | Performed by: SURGERY

## 2023-05-08 RX ORDER — LIDOCAINE HYDROCHLORIDE 20 MG/ML
INJECTION, SOLUTION INTRAVENOUS PRN
Status: DISCONTINUED | OUTPATIENT
Start: 2023-05-08 | End: 2023-05-08 | Stop reason: SDUPTHER

## 2023-05-08 RX ORDER — LORAZEPAM 2 MG/ML
INJECTION INTRAMUSCULAR
Status: COMPLETED
Start: 2023-05-08 | End: 2023-05-08

## 2023-05-08 RX ORDER — MEPERIDINE HYDROCHLORIDE 25 MG/ML
12.5 INJECTION INTRAMUSCULAR; INTRAVENOUS; SUBCUTANEOUS EVERY 5 MIN PRN
Status: DISCONTINUED | OUTPATIENT
Start: 2023-05-08 | End: 2023-05-08 | Stop reason: HOSPADM

## 2023-05-08 RX ORDER — SCOLOPAMINE TRANSDERMAL SYSTEM 1 MG/1
1 PATCH, EXTENDED RELEASE TRANSDERMAL ONCE
Status: DISCONTINUED | OUTPATIENT
Start: 2023-05-08 | End: 2023-05-09 | Stop reason: HOSPADM

## 2023-05-08 RX ORDER — SODIUM CHLORIDE 0.9 % (FLUSH) 0.9 %
5-40 SYRINGE (ML) INJECTION PRN
Status: DISCONTINUED | OUTPATIENT
Start: 2023-05-08 | End: 2023-05-09 | Stop reason: HOSPADM

## 2023-05-08 RX ORDER — ENOXAPARIN SODIUM 100 MG/ML
40 INJECTION SUBCUTANEOUS DAILY
Status: DISCONTINUED | OUTPATIENT
Start: 2023-05-09 | End: 2023-05-09 | Stop reason: HOSPADM

## 2023-05-08 RX ORDER — SODIUM CHLORIDE 0.9 % (FLUSH) 0.9 %
5-40 SYRINGE (ML) INJECTION EVERY 12 HOURS SCHEDULED
Status: DISCONTINUED | OUTPATIENT
Start: 2023-05-08 | End: 2023-05-09 | Stop reason: HOSPADM

## 2023-05-08 RX ORDER — SODIUM CHLORIDE 9 MG/ML
INJECTION, SOLUTION INTRAVENOUS PRN
Status: DISCONTINUED | OUTPATIENT
Start: 2023-05-08 | End: 2023-05-08 | Stop reason: HOSPADM

## 2023-05-08 RX ORDER — SODIUM CHLORIDE 9 MG/ML
INJECTION, SOLUTION INTRAVENOUS CONTINUOUS PRN
Status: DISCONTINUED | OUTPATIENT
Start: 2023-05-08 | End: 2023-05-08 | Stop reason: SDUPTHER

## 2023-05-08 RX ORDER — OXYCODONE HYDROCHLORIDE 5 MG/1
5 TABLET ORAL EVERY 4 HOURS PRN
Status: DISCONTINUED | OUTPATIENT
Start: 2023-05-08 | End: 2023-05-09 | Stop reason: HOSPADM

## 2023-05-08 RX ORDER — HYDROMORPHONE HCL 110MG/55ML
PATIENT CONTROLLED ANALGESIA SYRINGE INTRAVENOUS PRN
Status: DISCONTINUED | OUTPATIENT
Start: 2023-05-08 | End: 2023-05-08 | Stop reason: SDUPTHER

## 2023-05-08 RX ORDER — PROPOFOL 10 MG/ML
INJECTION, EMULSION INTRAVENOUS PRN
Status: DISCONTINUED | OUTPATIENT
Start: 2023-05-08 | End: 2023-05-08 | Stop reason: SDUPTHER

## 2023-05-08 RX ORDER — CYCLOBENZAPRINE HCL 10 MG
10 TABLET ORAL EVERY 8 HOURS PRN
Status: DISCONTINUED | OUTPATIENT
Start: 2023-05-08 | End: 2023-05-09 | Stop reason: HOSPADM

## 2023-05-08 RX ORDER — ONDANSETRON 2 MG/ML
4 INJECTION INTRAMUSCULAR; INTRAVENOUS EVERY 6 HOURS PRN
Status: DISCONTINUED | OUTPATIENT
Start: 2023-05-08 | End: 2023-05-09 | Stop reason: HOSPADM

## 2023-05-08 RX ORDER — SODIUM CHLORIDE 9 MG/ML
INJECTION, SOLUTION INTRAVENOUS CONTINUOUS
Status: DISCONTINUED | OUTPATIENT
Start: 2023-05-08 | End: 2023-05-09 | Stop reason: HOSPADM

## 2023-05-08 RX ORDER — SODIUM CHLORIDE 0.9 % (FLUSH) 0.9 %
5-40 SYRINGE (ML) INJECTION PRN
Status: DISCONTINUED | OUTPATIENT
Start: 2023-05-08 | End: 2023-05-08 | Stop reason: HOSPADM

## 2023-05-08 RX ORDER — KETOROLAC TROMETHAMINE 30 MG/ML
15 INJECTION, SOLUTION INTRAMUSCULAR; INTRAVENOUS EVERY 6 HOURS
Status: DISCONTINUED | OUTPATIENT
Start: 2023-05-08 | End: 2023-05-09 | Stop reason: HOSPADM

## 2023-05-08 RX ORDER — MORPHINE SULFATE 4 MG/ML
4 INJECTION, SOLUTION INTRAMUSCULAR; INTRAVENOUS
Status: DISCONTINUED | OUTPATIENT
Start: 2023-05-08 | End: 2023-05-09 | Stop reason: HOSPADM

## 2023-05-08 RX ORDER — FENTANYL CITRATE 50 UG/ML
INJECTION, SOLUTION INTRAMUSCULAR; INTRAVENOUS PRN
Status: DISCONTINUED | OUTPATIENT
Start: 2023-05-08 | End: 2023-05-08 | Stop reason: SDUPTHER

## 2023-05-08 RX ORDER — SODIUM CHLORIDE 0.9 % (FLUSH) 0.9 %
5-40 SYRINGE (ML) INJECTION EVERY 12 HOURS SCHEDULED
Status: DISCONTINUED | OUTPATIENT
Start: 2023-05-08 | End: 2023-05-08 | Stop reason: HOSPADM

## 2023-05-08 RX ORDER — ROCURONIUM BROMIDE 10 MG/ML
INJECTION, SOLUTION INTRAVENOUS PRN
Status: DISCONTINUED | OUTPATIENT
Start: 2023-05-08 | End: 2023-05-08 | Stop reason: SDUPTHER

## 2023-05-08 RX ORDER — DULOXETIN HYDROCHLORIDE 30 MG/1
30 CAPSULE, DELAYED RELEASE ORAL DAILY
Status: DISCONTINUED | OUTPATIENT
Start: 2023-05-08 | End: 2023-05-09 | Stop reason: HOSPADM

## 2023-05-08 RX ORDER — ONDANSETRON 2 MG/ML
4 INJECTION INTRAMUSCULAR; INTRAVENOUS
Status: DISCONTINUED | OUTPATIENT
Start: 2023-05-08 | End: 2023-05-08 | Stop reason: HOSPADM

## 2023-05-08 RX ORDER — FENTANYL CITRATE 50 UG/ML
50 INJECTION, SOLUTION INTRAMUSCULAR; INTRAVENOUS EVERY 5 MIN PRN
Status: COMPLETED | OUTPATIENT
Start: 2023-05-08 | End: 2023-05-08

## 2023-05-08 RX ORDER — DONEPEZIL HYDROCHLORIDE 10 MG/1
10 TABLET, FILM COATED ORAL NIGHTLY
Status: DISCONTINUED | OUTPATIENT
Start: 2023-05-08 | End: 2023-05-09 | Stop reason: HOSPADM

## 2023-05-08 RX ORDER — SODIUM CHLORIDE 9 MG/ML
INJECTION, SOLUTION INTRAVENOUS PRN
Status: DISCONTINUED | OUTPATIENT
Start: 2023-05-08 | End: 2023-05-09 | Stop reason: HOSPADM

## 2023-05-08 RX ORDER — LORAZEPAM 2 MG/ML
1 INJECTION INTRAMUSCULAR ONCE
Status: COMPLETED | OUTPATIENT
Start: 2023-05-08 | End: 2023-05-08

## 2023-05-08 RX ORDER — OXYCODONE HYDROCHLORIDE 5 MG/1
10 TABLET ORAL EVERY 4 HOURS PRN
Status: DISCONTINUED | OUTPATIENT
Start: 2023-05-08 | End: 2023-05-09 | Stop reason: HOSPADM

## 2023-05-08 RX ORDER — FENTANYL CITRATE 50 UG/ML
50 INJECTION, SOLUTION INTRAMUSCULAR; INTRAVENOUS EVERY 5 MIN PRN
Status: DISCONTINUED | OUTPATIENT
Start: 2023-05-08 | End: 2023-05-08 | Stop reason: HOSPADM

## 2023-05-08 RX ORDER — BUPIVACAINE HYDROCHLORIDE AND EPINEPHRINE 5; 5 MG/ML; UG/ML
INJECTION, SOLUTION EPIDURAL; INTRACAUDAL; PERINEURAL PRN
Status: DISCONTINUED | OUTPATIENT
Start: 2023-05-08 | End: 2023-05-08 | Stop reason: ALTCHOICE

## 2023-05-08 RX ORDER — ONDANSETRON 4 MG/1
4 TABLET, ORALLY DISINTEGRATING ORAL EVERY 8 HOURS PRN
Status: DISCONTINUED | OUTPATIENT
Start: 2023-05-08 | End: 2023-05-09 | Stop reason: HOSPADM

## 2023-05-08 RX ORDER — MORPHINE SULFATE 2 MG/ML
2 INJECTION, SOLUTION INTRAMUSCULAR; INTRAVENOUS
Status: DISCONTINUED | OUTPATIENT
Start: 2023-05-08 | End: 2023-05-09 | Stop reason: HOSPADM

## 2023-05-08 RX ORDER — ONDANSETRON 2 MG/ML
INJECTION INTRAMUSCULAR; INTRAVENOUS PRN
Status: DISCONTINUED | OUTPATIENT
Start: 2023-05-08 | End: 2023-05-08 | Stop reason: SDUPTHER

## 2023-05-08 RX ORDER — DEXAMETHASONE SODIUM PHOSPHATE 10 MG/ML
INJECTION, EMULSION INTRAMUSCULAR; INTRAVENOUS PRN
Status: DISCONTINUED | OUTPATIENT
Start: 2023-05-08 | End: 2023-05-08 | Stop reason: SDUPTHER

## 2023-05-08 RX ADMIN — HYDROMORPHONE HYDROCHLORIDE 1 MG: 2 INJECTION INTRAMUSCULAR; INTRAVENOUS; SUBCUTANEOUS at 16:31

## 2023-05-08 RX ADMIN — FENTANYL CITRATE 50 MCG: 50 INJECTION, SOLUTION INTRAMUSCULAR; INTRAVENOUS at 15:41

## 2023-05-08 RX ADMIN — HYDROMORPHONE HYDROCHLORIDE 0.4 MG: 2 INJECTION INTRAMUSCULAR; INTRAVENOUS; SUBCUTANEOUS at 16:05

## 2023-05-08 RX ADMIN — ONDANSETRON 4 MG: 2 INJECTION INTRAMUSCULAR; INTRAVENOUS at 16:01

## 2023-05-08 RX ADMIN — FENTANYL CITRATE 50 MCG: 50 INJECTION, SOLUTION INTRAMUSCULAR; INTRAVENOUS at 15:04

## 2023-05-08 RX ADMIN — PHENYLEPHRINE HYDROCHLORIDE 200 MCG: 10 INJECTION INTRAVENOUS at 15:45

## 2023-05-08 RX ADMIN — ROCURONIUM BROMIDE 50 MG: 10 INJECTION INTRAVENOUS at 14:41

## 2023-05-08 RX ADMIN — DULOXETINE HYDROCHLORIDE 30 MG: 30 CAPSULE, DELAYED RELEASE ORAL at 19:39

## 2023-05-08 RX ADMIN — SODIUM CHLORIDE: 9 INJECTION, SOLUTION INTRAVENOUS at 13:26

## 2023-05-08 RX ADMIN — SODIUM CHLORIDE: 9 INJECTION, SOLUTION INTRAVENOUS at 19:19

## 2023-05-08 RX ADMIN — ROCURONIUM BROMIDE 50 MG: 10 INJECTION INTRAVENOUS at 15:37

## 2023-05-08 RX ADMIN — SODIUM CHLORIDE: 9 INJECTION, SOLUTION INTRAVENOUS at 14:34

## 2023-05-08 RX ADMIN — LORAZEPAM 1 MG: 2 INJECTION INTRAMUSCULAR; INTRAVENOUS at 16:35

## 2023-05-08 RX ADMIN — PROPOFOL 200 MG: 10 INJECTION, EMULSION INTRAVENOUS at 14:41

## 2023-05-08 RX ADMIN — FENTANYL CITRATE 50 MCG: 50 INJECTION, SOLUTION INTRAMUSCULAR; INTRAVENOUS at 16:06

## 2023-05-08 RX ADMIN — FENTANYL CITRATE 100 MCG: 50 INJECTION, SOLUTION INTRAMUSCULAR; INTRAVENOUS at 16:29

## 2023-05-08 RX ADMIN — KETOROLAC TROMETHAMINE 15 MG: 30 INJECTION, SOLUTION INTRAMUSCULAR; INTRAVENOUS at 19:16

## 2023-05-08 RX ADMIN — LORAZEPAM 1 MG: 2 INJECTION INTRAMUSCULAR at 16:35

## 2023-05-08 RX ADMIN — DEXAMETHASONE SODIUM PHOSPHATE 10 MG: 10 INJECTION, EMULSION INTRAMUSCULAR; INTRAVENOUS at 14:47

## 2023-05-08 RX ADMIN — FENTANYL CITRATE 50 MCG: 50 INJECTION, SOLUTION INTRAMUSCULAR; INTRAVENOUS at 16:56

## 2023-05-08 RX ADMIN — FENTANYL CITRATE 50 MCG: 50 INJECTION, SOLUTION INTRAMUSCULAR; INTRAVENOUS at 17:01

## 2023-05-08 RX ADMIN — DONEPEZIL HYDROCHLORIDE 10 MG: 10 TABLET, FILM COATED ORAL at 19:39

## 2023-05-08 RX ADMIN — HYDROMORPHONE HYDROCHLORIDE 1.6 MG: 2 INJECTION INTRAMUSCULAR; INTRAVENOUS; SUBCUTANEOUS at 16:17

## 2023-05-08 RX ADMIN — SUGAMMADEX 200 MG: 100 INJECTION, SOLUTION INTRAVENOUS at 16:01

## 2023-05-08 RX ADMIN — FENTANYL CITRATE 50 MCG: 50 INJECTION, SOLUTION INTRAMUSCULAR; INTRAVENOUS at 14:41

## 2023-05-08 RX ADMIN — PHENYLEPHRINE HYDROCHLORIDE 200 MCG: 10 INJECTION INTRAVENOUS at 15:07

## 2023-05-08 RX ADMIN — LIDOCAINE HYDROCHLORIDE 100 MG: 20 INJECTION INTRAVENOUS at 14:41

## 2023-05-08 RX ADMIN — CEFOXITIN 2000 MG: 2 INJECTION, POWDER, FOR SOLUTION INTRAVENOUS at 14:48

## 2023-05-08 RX ADMIN — CEFOXITIN 200 MG: 2 INJECTION, POWDER, FOR SOLUTION INTRAVENOUS at 15:26

## 2023-05-08 ASSESSMENT — PAIN SCALES - GENERAL
PAINLEVEL_OUTOF10: 0
PAINLEVEL_OUTOF10: 10
PAINLEVEL_OUTOF10: 0
PAINLEVEL_OUTOF10: 4
PAINLEVEL_OUTOF10: 0
PAINLEVEL_OUTOF10: 7
PAINLEVEL_OUTOF10: 7

## 2023-05-08 ASSESSMENT — PAIN - FUNCTIONAL ASSESSMENT: PAIN_FUNCTIONAL_ASSESSMENT: ACTIVITIES ARE NOT PREVENTED

## 2023-05-08 ASSESSMENT — PAIN DESCRIPTION - LOCATION
LOCATION: ABDOMEN

## 2023-05-08 ASSESSMENT — PAIN DESCRIPTION - ORIENTATION
ORIENTATION: MID
ORIENTATION: MID
ORIENTATION: MID;LOWER

## 2023-05-08 ASSESSMENT — PAIN DESCRIPTION - ONSET: ONSET: ON-GOING

## 2023-05-08 ASSESSMENT — PAIN DESCRIPTION - FREQUENCY: FREQUENCY: CONTINUOUS

## 2023-05-08 ASSESSMENT — PAIN DESCRIPTION - DESCRIPTORS
DESCRIPTORS: ACHING;STABBING
DESCRIPTORS: ACHING
DESCRIPTORS: ACHING

## 2023-05-08 ASSESSMENT — PAIN DESCRIPTION - PAIN TYPE
TYPE: SURGICAL PAIN
TYPE: SURGICAL PAIN

## 2023-05-08 NOTE — PROGRESS NOTES
ADMITTED TO [facility] AND ORIENTED TO UNIT. SCDS ON. FALL BAND ON. PT VERBALIZED APPROVAL FOR FIRST NAME, LAST INITIAL AND PHYSICIAN NAME ON UNIT WHITEBOARD. Wife, Danette Door is with the patient.

## 2023-05-08 NOTE — BRIEF OP NOTE
Brief Postoperative Note      Patient: Zuleyma Hurst  YOB: 1955  MRN: [id number]    Date of Procedure: 5/8/2023    Pre-Op Diagnosis Codes:     * Hiatal hernia [K44.9]     * Gastroesophageal reflux disease, unspecified whether esophagitis present [K21.9]    Post-Op Diagnosis: Same       Procedure(s):  ROBOTIC HIATAL HERNIA REPAIR AND NISSEN FUNDOPLICATION    Surgeon(s):  Saud Sotelo MD    Assistant:  First Assistant: Jayesh Giron RN    Anesthesia: General/local    Estimated Blood Loss (mL): 95VU    Complications: None    Specimens:   * No specimens in log *    Implants:  * No implants in log *      Drains: * No LDAs found *    Findings: as above - see op note for details       Electronically signed by Saud Sotelo MD on 5/8/2023 at 4:05 PM

## 2023-05-08 NOTE — ANESTHESIA POSTPROCEDURE EVALUATION
Department of Anesthesiology  Postprocedure Note    Patient: Arabella Ireland  MRN: [id number]  YOB: 1955  Date of evaluation: 5/8/2023      Procedure Summary     Date: 05/08/23 Room / Location: [address] [place] [address]    Anesthesia Start: 5601 Anesthesia Stop: 1633    Procedure: ROBOTIC HIATAL HERNIA REPAIR AND NISSEN FUNDOPLICATION (Abdomen) Diagnosis:       Hiatal hernia      Gastroesophageal reflux disease, unspecified whether esophagitis present      (Hiatal hernia [K44.9])      (Gastroesophageal reflux disease, unspecified whether esophagitis present [K21.9])    Surgeons: Cornelio Phoenix MD Responsible Provider: Rock Dillard MD    Anesthesia Type: general ASA Status: 2          Anesthesia Type: No value filed. Gael Phase I: Gael Score: 9    Gael Phase II:        Anesthesia Post Evaluation    Patient location during evaluation: PACU  Patient participation: complete - patient participated  Level of consciousness: awake and alert  Airway patency: patent  Nausea & Vomiting: no nausea and no vomiting  Complications: no  Cardiovascular status: hemodynamically stable  Respiratory status: acceptable  Hydration status: euvolemic      [address]  POST-ANESTHESIA NOTE       Name:  Arabella Ireland                                         Age:  79 y.o.   MRN:  [id number]      Last Vitals:  BP (!) 148/79   Pulse (!) 107   Temp 97.7 °F (36.5 °C) (Oral)   Resp 15   Ht 5' 5\" (1.651 m)   Wt 151 lb 3.2 oz (68.6 kg)   SpO2 96%   BMI 25.16 kg/m²   Patient Vitals for the past 4 hrs:   BP Temp Temp src Pulse Resp SpO2   05/08/23 1930 (!) 148/79 97.7 °F (36.5 °C) Oral (!) 107 15 96 %   05/08/23 1900 (!) 147/68 -- -- (!) 106 16 95 %   05/08/23 1845 (!) 162/86 -- -- (!) 111 16 93 %   05/08/23 1830 (!) 152/76 -- -- (!) 108 16 96 %   05/08/23 1815 (!) 144/74 -- -- (!) 104 16 94 %   05/08/23 1800 (!) 145/74 -- -- (!) 106 16 98 %   05/08/23 1745 (!) 153/86 97.7 °F (36.5 °C) Oral (!) 109 16

## 2023-05-08 NOTE — ANESTHESIA PRE PROCEDURE
Department of Anesthesiology  Preprocedure Note       Name:  Zaira Arboleda   Age:  79 y.o.  :  1955                                          MRN:  [id number]         Date:  2023      Surgeon: Amaris Mladonado):  Brinda Murray MD    Procedure: Procedure(s):  ROBOTIC HIATAL HERNIA REPAIR AND NISSEN FUNDOPLICATION, POSS OPEN, POSS MESH    Medications prior to admission:   Prior to Admission medications    Medication Sig Start Date End Date Taking?  Authorizing Provider   acetaminophen (TYLENOL) 500 MG tablet Take 1 tablet by mouth every 6 hours as needed for Pain   Yes Historical Provider, MD   omeprazole (PRILOSEC) 20 MG delayed release capsule Take 1 capsule by mouth daily   Yes Historical Provider, MD   promethazine (PHENERGAN) 25 MG tablet Take 1 tablet by mouth every 6 hours as needed for Nausea    Historical Provider, MD   Magnesium 400 MG TABS Take by mouth daily  Patient not taking: Reported on 2023    Historical Provider, MD   cyclobenzaprine (FLEXERIL) 10 MG tablet Take 1 tablet by mouth every 8 hours as needed for Muscle spasms 10/24/22   Rolo Fairbanks DO   DULoxetine (CYMBALTA) 30 MG extended release capsule Take 1 capsule by mouth daily 10/24/22 5/2/23  Rolo Fairbanks DO   donepezil (ARICEPT) 10 MG tablet Take 1 tablet by mouth nightly    Historical Provider, MD   Handicap Placard MISC by Does not apply route Duration 5 years 16   Amanda Ellison MD   acetaminophen (TYLENOL) 325 MG tablet Take 2 tablets by mouth every 6 hours as needed for Pain  Patient not taking: Reported on 2023    Historical Provider, MD   Glucosamine-Chondroitin (MOVE FREE PO) Take by mouth daily    Historical Provider, MD   Multiple Vitamins-Minerals (THERAPEUTIC MULTIVITAMIN-MINERALS) tablet Take 1 tablet by mouth daily    Historical Provider, MD       Current medications:    Current Facility-Administered Medications   Medication Dose Route Frequency Provider Last Rate Last Admin   

## 2023-05-08 NOTE — INTERVAL H&P NOTE
Update History & Physical    The patient's History and Physical was reviewed with the patient and I examined the patient. There was no change. The surgical site was confirmed by the patient and me. Plan: The risks, benefits, expected outcome, and alternative to the recommended procedure have been discussed with the patient. Patient understands and wants to proceed with the procedure.      Electronically signed by Cornelio Phoenix MD on 5/8/2023 at 12:53 PM

## 2023-05-08 NOTE — PROGRESS NOTES
1627 pt arrived to PACU, awake and shaking in pain. VSS. Incision sites CDI x6. Abdomen soft and non distended  1629 medicated with fentanyl by Alphonse Dear CRNA  1631 medicated with dilaudid by Alphonse Dear CRNA  1635 medicated with 1 mg Ativan   1656 orders for fentanyl by Dr Sherin Salomon, medicated with 50 mcg fentanyl at this time. VSS  1701 no change in pain status, medicated with 50 mcg fentanyl  1706 pt resting, resp easy  1711 pt resting, resp easy. Report called to [facility]  2179 pt states pain 5/10 and tolerable. Abdomen soft and non distended. Meets criteria for discharge from PACU  1725 transported to K16 in stable condition.  Called [facility] to send family to room

## 2023-05-09 ENCOUNTER — APPOINTMENT (OUTPATIENT)
Dept: GENERAL RADIOLOGY | Age: 68
End: 2023-05-09
Attending: SURGERY
Payer: MEDICARE

## 2023-05-09 VITALS
RESPIRATION RATE: 18 BRPM | SYSTOLIC BLOOD PRESSURE: 146 MMHG | OXYGEN SATURATION: 94 % | DIASTOLIC BLOOD PRESSURE: 89 MMHG | HEIGHT: 65 IN | BODY MASS INDEX: 25.19 KG/M2 | HEART RATE: 77 BPM | TEMPERATURE: 98.6 F | WEIGHT: 151.2 LBS

## 2023-05-09 LAB
ANION GAP SERPL CALC-SCNC: 10 MEQ/L (ref 8–16)
BUN SERPL-MCNC: 17 MG/DL (ref 7–22)
CALCIUM SERPL-MCNC: 8.5 MG/DL (ref 8.5–10.5)
CHLORIDE SERPL-SCNC: 107 MEQ/L (ref 98–111)
CO2 SERPL-SCNC: 21 MEQ/L (ref 23–33)
CREAT SERPL-MCNC: 0.7 MG/DL (ref 0.4–1.2)
GFR SERPL CREATININE-BSD FRML MDRD: > 60 ML/MIN/1.73M2
GLUCOSE SERPL-MCNC: 99 MG/DL (ref 70–108)
HCT VFR BLD AUTO: 43.4 % (ref 42–52)
HGB BLD-MCNC: 14.1 GM/DL (ref 14–18)
POTASSIUM SERPL-SCNC: 4.4 MEQ/L (ref 3.5–5.2)
SODIUM SERPL-SCNC: 138 MEQ/L (ref 135–145)

## 2023-05-09 PROCEDURE — 6360000002 HC RX W HCPCS: Performed by: SURGERY

## 2023-05-09 PROCEDURE — 6360000004 HC RX CONTRAST MEDICATION: Performed by: SURGERY

## 2023-05-09 PROCEDURE — 85018 HEMOGLOBIN: CPT

## 2023-05-09 PROCEDURE — 85014 HEMATOCRIT: CPT

## 2023-05-09 PROCEDURE — 80048 BASIC METABOLIC PNL TOTAL CA: CPT

## 2023-05-09 PROCEDURE — 99024 POSTOP FOLLOW-UP VISIT: CPT | Performed by: NURSE PRACTITIONER

## 2023-05-09 PROCEDURE — 2580000003 HC RX 258: Performed by: SURGERY

## 2023-05-09 PROCEDURE — 74240 X-RAY XM UPR GI TRC 1CNTRST: CPT

## 2023-05-09 PROCEDURE — APPSS30 APP SPLIT SHARED TIME 16-30 MINUTES: Performed by: NURSE PRACTITIONER

## 2023-05-09 PROCEDURE — 6370000000 HC RX 637 (ALT 250 FOR IP): Performed by: NURSE PRACTITIONER

## 2023-05-09 PROCEDURE — A4641 RADIOPHARM DX AGENT NOC: HCPCS | Performed by: SURGERY

## 2023-05-09 PROCEDURE — 36415 COLL VENOUS BLD VENIPUNCTURE: CPT

## 2023-05-09 PROCEDURE — 6370000000 HC RX 637 (ALT 250 FOR IP): Performed by: SURGERY

## 2023-05-09 RX ORDER — KETOROLAC TROMETHAMINE 10 MG/1
10 TABLET, FILM COATED ORAL EVERY 6 HOURS PRN
Qty: 15 TABLET | Refills: 0 | Status: SHIPPED | OUTPATIENT
Start: 2023-05-09

## 2023-05-09 RX ORDER — PANTOPRAZOLE SODIUM 40 MG/1
40 TABLET, DELAYED RELEASE ORAL
Status: DISCONTINUED | OUTPATIENT
Start: 2023-05-10 | End: 2023-05-09 | Stop reason: HOSPADM

## 2023-05-09 RX ORDER — ACETAMINOPHEN 325 MG/1
650 TABLET ORAL EVERY 4 HOURS PRN
Status: DISCONTINUED | OUTPATIENT
Start: 2023-05-09 | End: 2023-05-09 | Stop reason: HOSPADM

## 2023-05-09 RX ORDER — ONDANSETRON 4 MG/1
4 TABLET, ORALLY DISINTEGRATING ORAL EVERY 8 HOURS PRN
Qty: 15 TABLET | Refills: 0 | Status: SHIPPED | OUTPATIENT
Start: 2023-05-09

## 2023-05-09 RX ADMIN — KETOROLAC TROMETHAMINE 15 MG: 30 INJECTION, SOLUTION INTRAMUSCULAR; INTRAVENOUS at 06:28

## 2023-05-09 RX ADMIN — KETOROLAC TROMETHAMINE 15 MG: 30 INJECTION, SOLUTION INTRAMUSCULAR; INTRAVENOUS at 12:04

## 2023-05-09 RX ADMIN — ENOXAPARIN SODIUM 40 MG: 100 INJECTION SUBCUTANEOUS at 09:44

## 2023-05-09 RX ADMIN — SODIUM CHLORIDE: 9 INJECTION, SOLUTION INTRAVENOUS at 09:42

## 2023-05-09 RX ADMIN — ONDANSETRON 4 MG: 2 INJECTION INTRAMUSCULAR; INTRAVENOUS at 12:04

## 2023-05-09 RX ADMIN — KETOROLAC TROMETHAMINE 15 MG: 30 INJECTION, SOLUTION INTRAMUSCULAR; INTRAVENOUS at 00:13

## 2023-05-09 RX ADMIN — BARIUM SULFATE 50 ML: 0.6 SUSPENSION ORAL at 08:16

## 2023-05-09 RX ADMIN — DULOXETINE HYDROCHLORIDE 30 MG: 30 CAPSULE, DELAYED RELEASE ORAL at 09:44

## 2023-05-09 RX ADMIN — Medication 4.8 MG: at 12:04

## 2023-05-09 RX ADMIN — IOHEXOL 50 ML: 240 INJECTION, SOLUTION INTRATHECAL; INTRAVASCULAR; INTRAVENOUS; ORAL at 08:16

## 2023-05-09 ASSESSMENT — PAIN DESCRIPTION - ORIENTATION
ORIENTATION: MID

## 2023-05-09 ASSESSMENT — PAIN DESCRIPTION - DESCRIPTORS
DESCRIPTORS: ACHING
DESCRIPTORS: ACHING
DESCRIPTORS: ACHING;DISCOMFORT

## 2023-05-09 ASSESSMENT — PAIN DESCRIPTION - LOCATION
LOCATION: ABDOMEN

## 2023-05-09 ASSESSMENT — PAIN - FUNCTIONAL ASSESSMENT
PAIN_FUNCTIONAL_ASSESSMENT: ACTIVITIES ARE NOT PREVENTED
PAIN_FUNCTIONAL_ASSESSMENT: ACTIVITIES ARE NOT PREVENTED

## 2023-05-09 ASSESSMENT — PAIN SCALES - GENERAL
PAINLEVEL_OUTOF10: 5
PAINLEVEL_OUTOF10: 3
PAINLEVEL_OUTOF10: 3
PAINLEVEL_OUTOF10: 5

## 2023-05-09 ASSESSMENT — PAIN DESCRIPTION - PAIN TYPE: TYPE: SURGICAL PAIN

## 2023-05-09 NOTE — PROGRESS NOTES
[address] [place] [address] for Dr. Hamida Lopez Surgery Postoperative Progress Note    Pt Name: Vivek Lloyd Record Number: [id number]  Date of Birth 1955   Today's Date: 5/9/2023    ASSESSMENT   POD # 1 Status post robotic repair moderate-sized paraesophageal hiatal hernia (6 cm defect) with Nissen fundoplication   GERD   has a past medical history of Anxiety, Arthritis, Cancer ([address] GERD (gastroesophageal reflux disease), Hiatal hernia, PONV (postoperative nausea and vomiting), PUD (peptic ulcer disease), and Rectal bleeding. PLAN   On clears liquids. Upper GI reviewed. No evidence of leak or obstruction. Okay to advance to full liquids as tolerated. Soft diet education reviewed and discussed with patient. Handout given. Decrease IV fluids  Resume home medications  Cepacol lozenges PRN  Pain control  GI & DVT prophylaxis  Incisional care  Labs reviewed   Clinically, looks pretty good. Will see how he does through lunch/supper. Possible discharge later today if continuing to do well. SUBJECTIVE   Patient was stable overnight. Chart reviewed. Updated by nursing staff. VS stable. Hoarseness/sore throat which is normal for patient for the last 6 months. States it is no worse than it has been prior to surgery. Denies chest discomfort or dyspnea. No dysphagia or epigastric pain. No gas pressure. No N/V; (+) belching, but no flatus or BM. Tolerating clear liquids. Pain controlled without any narcotics. Up with assistance. No urinary complaints. Incisions healing well.    CURRENT MEDICATIONS   Scheduled Meds:   scopolamine  1 patch TransDERmal Once    ketorolac  15 mg IntraVENous Q6H    donepezil  10 mg Oral Nightly    DULoxetine  30 mg Oral Daily    sodium chloride flush  5-40 mL IntraVENous 2 times per day    enoxaparin  40 mg SubCUTAneous Daily     Continuous Infusions:   sodium chloride      sodium chloride 75 mL/hr at 05/09/23 0942     PRN

## 2023-05-09 NOTE — PROGRESS NOTES
05/09/23 1425   Encounter Summary   Encounter Overview/Reason  Initial Encounter;Spiritual/Emotional Needs   Service Provided For: Patient and family together   Referral/Consult From: Rounding   Last Encounter  05/09/23   Complexity of Encounter Moderate   Begin Time 1415   End Time  1425   Total Time Calculated 10 min   Encounter    Type Initial Screen/Assessment   Spiritual/Emotional needs   Type Spiritual Support   Assessment/Intervention/Outcome   Assessment Calm;Coping   Intervention Active listening;Nurtured Hope;Sustaining Presence/Ministry of presence   Outcome Comfort;Coping     Assessment: In my encounter with the 79 yr old patient the pt's family was supportively present. While rounding the unit 5K,  I provided spiritual care to patient and their family through conversation, I also came to assess their spiritual needs present. The pt was admitted due to hiatal hernia. Interventions:  I provided, prayer, emotional support and words of comfort.  provided a listening presence and encouraged pt to share their beliefs and how they support him during their hospitalization. Outcomes: The patient was encouraged and didn't share any further spiritual needs at this time. The pt remains optimistic and hopeful. The pt shared that they were appreciative for the support. Plan:  Chaplains will follow-up at a later time for assessment of any spiritual care needs present.

## 2023-05-09 NOTE — PLAN OF CARE
Problem: Discharge Planning  Goal: Discharge to home or other facility with appropriate resources  Outcome: Progressing  Flowsheets (Taken 5/9/2023 0805)  Discharge to home or other facility with appropriate resources:   Identify barriers to discharge with patient and caregiver   Arrange for needed discharge resources and transportation as appropriate   Identify discharge learning needs (meds, wound care, etc)  Note: Plan is to discharge home with wife. Problem: Pain  Goal: Verbalizes/displays adequate comfort level or baseline comfort level  Outcome: Progressing  Flowsheets (Taken 5/9/2023 0805)  Verbalizes/displays adequate comfort level or baseline comfort level:   Encourage patient to monitor pain and request assistance   Assess pain using appropriate pain scale   Administer analgesics based on type and severity of pain and evaluate response   Implement non-pharmacological measures as appropriate and evaluate response  Note: Patient states relief from scheduled toradol. Problem: Safety - Adult  Goal: Free from fall injury  Outcome: Progressing  Flowsheets (Taken 5/9/2023 0805)  Free From Fall Injury: Instruct family/caregiver on patient safety  Note: Fall assessment completed. Patient using call light appropriately to call for assistance with ambulation to bathroom. Personal items within reach. Patient is also compliant with use of non-skid slippers. Problem: Skin/Tissue Integrity  Goal: Absence of new skin breakdown  Description: 1. Monitor for areas of redness and/or skin breakdown  2. Assess vascular access sites hourly  3. Every 4-6 hours minimum:  Change oxygen saturation probe site  4. Every 4-6 hours:  If on nasal continuous positive airway pressure, respiratory therapy assess nares and determine need for appliance change or resting period. Outcome: Progressing  Note: No skin breakdown this shift. Patient turns self in bed.  Patients states understanding of repositioning every two

## 2023-05-09 NOTE — PROGRESS NOTES
Discharge summary reviewed with patient and wife at bedside. No questions verbalized. All belongings packed and taken with family. IV was removed. No other concerns.

## 2023-05-09 NOTE — DISCHARGE INSTRUCTIONS
May remove to shower and at night. ABDOMINAL/LAPAROSCOPIC SURGERY  [x]You are encouraged to get up and move around as this helps with circulation, prevents blood clots from forming and speeds up the healing process. Call the office if you develop pain or swelling in your legs. Do not massage sore muscles in the legs. [x]Breath deeply and cough from time to time. This helps to clear your lungs and helps prevent pneumonia. [x]Supporting your incision with a pillow or your hand helps to minimize discomfort and pain. [x]Laparoscopic patients may develop shoulder pain in the first 48 hours from the gas used during the procedure a heating pad may help alleviate this discomfort. FOLLOW-UP CARE. SPECIFICALLY WATCH FOR:   Fever over 101 degrees by mouth   Increased redness, warmth, hardness at operative site. Blood soaked dressing (small amounts of oozing may be normal.)   Increased or progressive drainage from the surgical area   Inability to urinate or blood in the urine   Pain not relieved by the medications ordered   Persistent nausea and/or vomiting, unable to retain fluids. Pain or swelling in your legs. Shortness of breath. Call the office if you develop any of the above symptoms. FOLLOW-UP APPOINTMENT   []1 week   [x]2 weeks:    []Other    Call my office if you have any problem that concerns you [phone]. After hours, you can reach the answering service via the office phone number. IF YOU NEED IMMEDIATE ATTENTION, GO TO THE EMERGENCY ROOM AND YOUR DOCTOR WILL BE CONTACTED. Prepared by Roslyn Jimenez CNP for   Noa Jeong MD FACS  [facility]. [phone]

## 2023-05-09 NOTE — PROGRESS NOTES
Patient educated and instructed to use incentive spirometry every hour,ambulating, CHG soap daily to prevent post op complications. Patient verbalized understanding.

## 2023-05-09 NOTE — PLAN OF CARE
Problem: Discharge Planning  Goal: Discharge to home or other facility with appropriate resources  5/9/2023 1003 by Jacques Rocha RN  Outcome: Progressing  Flowsheets (Taken 5/9/2023 4719)  Discharge to home or other facility with appropriate resources: Identify barriers to discharge with patient and caregiver     Problem: Pain  Goal: Verbalizes/displays adequate comfort level or baseline comfort level  5/9/2023 1003 by Jacques Rocha RN  Outcome: Progressing  Pain Assessment: 0-10  Pain Level: 3   Patient's Stated Pain Goal: 0 - No pain   Is pain goal met at this time? No     Non-Pharmaceutical Pain Intervention(s): Massage, Repositioned, Rest    Problem: Safety - Adult  Goal: Free from fall injury  5/9/2023 1003 by Jacques Rocha RN  Outcome: Progressing   Fall assessment completed. Patient using call light appropriately to call for assistance with ambulation to bathroom. Personal items within reach. Patient is also compliant with use of non-skid slippers. Problem: Skin/Tissue Integrity  Goal: Absence of new skin breakdown  Description: 1. Monitor for areas of redness and/or skin breakdown  2. Assess vascular access sites hourly  3. Every 4-6 hours minimum:  Change oxygen saturation probe site  4. Every 4-6 hours:  If on nasal continuous positive airway pressure, respiratory therapy assess nares and determine need for appliance change or resting period.   5/9/2023 1003 by Jacques Rocha RN  Outcome: Progressing     Problem: Skin/Tissue Integrity - Adult  Goal: Skin integrity remains intact  5/9/2023 1003 by Jacques Rocha RN  Outcome: Progressing  Flowsheets (Taken 5/9/2023 4144)  Skin Integrity Remains Intact: Monitor for areas of redness and/or skin breakdown     Problem: Skin/Tissue Integrity - Adult  Goal: Incisions, wounds, or drain sites healing without S/S of infection  5/9/2023 1003 by Jacques Rocha RN  Outcome: Progressing  Flowsheets (Taken 5/9/2023 7887)  Incisions, Wounds, or Drain

## 2023-05-09 NOTE — OP NOTE
[address] [id number]                                OPERATIVE REPORT    PATIENT NAME: Viri Stafford                :        1955  MED REC NO:   [id number]                           ROOM:       0016  ACCOUNT NO:   [de-identified]                           ADMIT DATE: 2023  PROVIDER:     Marcos Workman M.D.    DATE OF PROCEDURE:  2023    PREOPERATIVE DIAGNOSES:  1.  Moderate-sized hiatal hernia. 2.  Gastroesophageal reflux disease. POSTOPERATIVE DIAGNOSES:  1.  Moderate-sized hiatal hernia. 2.  Gastroesophageal reflux disease. PROCEDURE:  Robotic repair moderate-sized paraesophageal hiatal hernia  (6 cm defect) with Nissen fundoplication. SURGEON:  Marcos Workman MD    ASSISTANT:  BRIAN Vuong    ANESTHESIA:  General/local.    ESTIMATED BLOOD LOSS:  10 mL. DRAINS:  None. COMPLICATIONS:  None. DISPOSITION:  Stable to the recovery room. INDICATIONS FOR PROCEDURE:  The patient is a 58-year-old male who I had  seen in the office secondary to medical refractory gastroesophageal  reflux disease associated with a hiatal hernia. Both operative and  nonoperative intervention plans were discussed. Risks of surgery were  further discussed. Some of the risks included but were not limited to  bleeding, infection, the need for reoperation, severe chronic  postoperative pain or numbness, major vascular or nerve injury,  cardiopulmonary complications, anesthetic complications, seroma-hematoma  formation, wound breakdown, trocar site herniation, major esophageal or  vagal nerve injury, recurrence of the hernia, chronic dysphagia, chronic  pain, and death. After all of the questions were answered in their  entirety and the patient was completely aware of the current situation,  he wished to proceed with surgery.     DESCRIPTION OF PROCEDURE:  After informed consent was signed and placed  on the

## 2023-05-18 ENCOUNTER — OFFICE VISIT (OUTPATIENT)
Dept: INTERNAL MEDICINE CLINIC | Age: 68
End: 2023-05-18

## 2023-05-18 VITALS
OXYGEN SATURATION: 97 % | HEIGHT: 65 IN | HEART RATE: 80 BPM | SYSTOLIC BLOOD PRESSURE: 132 MMHG | DIASTOLIC BLOOD PRESSURE: 86 MMHG | WEIGHT: 151.8 LBS | BODY MASS INDEX: 25.29 KG/M2

## 2023-05-18 DIAGNOSIS — Z09 HOSPITAL DISCHARGE FOLLOW-UP: Primary | ICD-10-CM

## 2023-05-18 DIAGNOSIS — R49.0 HOARSENESS OF VOICE: ICD-10-CM

## 2023-05-18 RX ORDER — FLUTICASONE PROPIONATE 50 MCG
2 SPRAY, SUSPENSION (ML) NASAL DAILY
Qty: 16 G | Refills: 5 | Status: SHIPPED | OUTPATIENT
Start: 2023-05-18

## 2023-05-24 ENCOUNTER — OFFICE VISIT (OUTPATIENT)
Dept: SURGERY | Age: 68
End: 2023-05-24

## 2023-05-24 VITALS
RESPIRATION RATE: 16 BRPM | OXYGEN SATURATION: 98 % | SYSTOLIC BLOOD PRESSURE: 122 MMHG | HEART RATE: 75 BPM | TEMPERATURE: 97.7 F | HEIGHT: 65 IN | WEIGHT: 147.7 LBS | DIASTOLIC BLOOD PRESSURE: 74 MMHG | BODY MASS INDEX: 24.61 KG/M2

## 2023-05-24 DIAGNOSIS — Z98.890 S/P NISSEN FUNDOPLICATION (WITHOUT GASTROSTOMY TUBE) PROCEDURE: Primary | ICD-10-CM

## 2023-05-24 PROCEDURE — 99024 POSTOP FOLLOW-UP VISIT: CPT | Performed by: NURSE PRACTITIONER

## 2023-05-29 ASSESSMENT — ENCOUNTER SYMPTOMS
ALLERGIC/IMMUNOLOGIC NEGATIVE: 1
EYE ITCHING: 0
EYE DISCHARGE: 0
WHEEZING: 0
SINUS PRESSURE: 0
APNEA: 0
CHOKING: 0
BACK PAIN: 0
EYE PAIN: 0
VOMITING: 0
CHEST TIGHTNESS: 0
VOICE CHANGE: 1
NAUSEA: 0
DIARRHEA: 0
BLOOD IN STOOL: 0
COLOR CHANGE: 0
SORE THROAT: 0
PHOTOPHOBIA: 0
CONSTIPATION: 0
ABDOMINAL DISTENTION: 0
COUGH: 0
ANAL BLEEDING: 0
SHORTNESS OF BREATH: 0
ABDOMINAL PAIN: 1
RHINORRHEA: 0

## 2023-06-05 ENCOUNTER — OFFICE VISIT (OUTPATIENT)
Dept: ENT CLINIC | Age: 68
End: 2023-06-05
Payer: MEDICARE

## 2023-06-05 VITALS
HEIGHT: 66 IN | HEART RATE: 93 BPM | OXYGEN SATURATION: 98 % | WEIGHT: 145.6 LBS | DIASTOLIC BLOOD PRESSURE: 82 MMHG | BODY MASS INDEX: 23.4 KG/M2 | SYSTOLIC BLOOD PRESSURE: 122 MMHG | RESPIRATION RATE: 16 BRPM | TEMPERATURE: 98.1 F

## 2023-06-05 DIAGNOSIS — R13.14 PHARYNGOESOPHAGEAL DYSPHAGIA: Primary | ICD-10-CM

## 2023-06-05 DIAGNOSIS — H92.01 REFERRED OTALGIA OF RIGHT EAR: ICD-10-CM

## 2023-06-05 DIAGNOSIS — R49.0 HOARSENESS: ICD-10-CM

## 2023-06-05 DIAGNOSIS — J35.8 TONSILLAR MASS: ICD-10-CM

## 2023-06-05 PROCEDURE — 1123F ACP DISCUSS/DSCN MKR DOCD: CPT | Performed by: OTOLARYNGOLOGY

## 2023-06-05 PROCEDURE — G8420 CALC BMI NORM PARAMETERS: HCPCS | Performed by: OTOLARYNGOLOGY

## 2023-06-05 PROCEDURE — G8427 DOCREV CUR MEDS BY ELIG CLIN: HCPCS | Performed by: OTOLARYNGOLOGY

## 2023-06-05 PROCEDURE — 99204 OFFICE O/P NEW MOD 45 MIN: CPT | Performed by: OTOLARYNGOLOGY

## 2023-06-05 PROCEDURE — 92511 NASOPHARYNGOSCOPY: CPT | Performed by: OTOLARYNGOLOGY

## 2023-06-05 PROCEDURE — 1036F TOBACCO NON-USER: CPT | Performed by: OTOLARYNGOLOGY

## 2023-06-05 PROCEDURE — 3017F COLORECTAL CA SCREEN DOC REV: CPT | Performed by: OTOLARYNGOLOGY

## 2023-06-05 NOTE — PROGRESS NOTES
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 His nasopharynx was free of lesions. His vocal folds were fully mobile. His supraglottis was within normal limits. He occasionally demonstrated hyper adduction. There was no pooling in either piriform sinus. Nasopharyngoscopy images:                                            FL [facility]    Result Date: 5/9/2023  No evidence of obstruction or leak.  Final report electronically signed by Dr. Ivelisse Ireland on 5/9/2023 9:03 AM     Lab Results   Component Value Date/Time     05/09/2023 05:02 AM     02/09/2023 04:26 PM     01/15/2023 05:53 AM    K 4.4 05/09/2023 05:02 AM    K 4.2 02/09/2023 04:26 PM    K 3.8 01/15/2023 05:53 AM    K 4.3 01/24/2022 01:53 PM    K 3.9 09/10/2021 09:20 AM    K 3.8 04/23/2020 06:40 PM     05/09/2023 05:02 AM     02/09/2023 04:26 PM    CL 99 01/15/2023 05:53 AM    CO2 21 05/09/2023 05:02 AM    CO2 26 02/09/2023 04:26 PM    CO2 26 01/15/2023 05:53 AM    BUN 17 05/09/2023 05:02 AM    BUN 16 02/09/2023 04:26 PM    BUN 16 01/15/2023 05:53 AM    CREATININE 0.7 05/09/2023 05:02 AM    CREATININE 1.0 02/09/2023 04:26 PM    CREATININE 0.8 01/15/2023 05:53 AM    CALCIUM 8.5 05/09/2023 05:02 AM    CALCIUM 9.5 02/09/2023 04:26 PM    CALCIUM 9.6 01/15/2023 05:53 AM    PROT 7.7 01/15/2023 05:53 AM    PROT 6.8 01/24/2022 01:53 PM    PROT 7.8 09/10/2021 09:20 AM    LABALBU 4.7 01/15/2023 05:53 AM    LABALBU 3.9 01/24/2022 01:53 PM    LABALBU 4.9 09/10/2021 09:20 AM    LABALBU 4.3 07/09/2011 08:24 AM    BILITOT 0.6 01/15/2023 05:53 AM    BILITOT 0.5 01/24/2022 01:53 PM    BILITOT 0.4 09/10/2021 09:20 AM    ALKPHOS 84 01/15/2023 05:53 AM    ALKPHOS 56 01/24/2022 01:53 PM    ALKPHOS 87 09/10/2021 09:20 AM    AST 21 01/15/2023 05:53 AM    AST 23 01/24/2022 01:53 PM    AST 28 09/10/2021 09:20 AM    ALT 23 01/15/2023 05:53 AM    ALT 20 01/24/2022

## 2023-06-06 ENCOUNTER — TELEPHONE (OUTPATIENT)
Dept: SURGERY | Age: 68
End: 2023-06-06

## 2023-06-06 NOTE — TELEPHONE ENCOUNTER
Spouse called back and was advised to continue taking pain meds, tylenol, motrin. Take it easy the next few days because he may have pulled a stitch. Monitor pain for now. If it does not get any better, then call the office.

## 2023-06-06 NOTE — TELEPHONE ENCOUNTER
Pt called the office stating he thinks he might have pulled something in his abdomen. 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 After that, he has noticed increased stomach pain, belching. He states he did have some blood in his saliva, but that seems better. He is eating fine. He has taken some of the pain pills left over from surgery which seem to help.

## 2023-06-07 ENCOUNTER — HOSPITAL ENCOUNTER (OUTPATIENT)
Age: 68
Discharge: HOME | End: 2023-06-07
Attending: OTOLARYNGOLOGY
Payer: MEDICARE

## 2023-06-07 ENCOUNTER — HOSPITAL ENCOUNTER (OUTPATIENT)
Dept: CT IMAGING | Age: 68
Discharge: HOME | End: 2023-06-07
Attending: OTOLARYNGOLOGY
Payer: MEDICARE

## 2023-06-07 DIAGNOSIS — H92.01 REFERRED OTALGIA OF RIGHT EAR: ICD-10-CM

## 2023-06-07 DIAGNOSIS — R49.0 HOARSENESS: ICD-10-CM

## 2023-06-07 DIAGNOSIS — J35.8 TONSILLAR MASS: ICD-10-CM

## 2023-06-07 LAB
ALBUMIN SERPL BCG-MCNC: 4.6 G/DL (ref 3.5–5.1)
ALP SERPL-CCNC: 83 U/L (ref 38–126)
ALT SERPL W/O P-5'-P-CCNC: 27 U/L (ref 11–66)
ANION GAP SERPL CALC-SCNC: 12 MEQ/L (ref 8–16)
AST SERPL-CCNC: 26 U/L (ref 5–40)
BASOPHILS ABSOLUTE: 0 THOU/MM3 (ref 0–0.1)
BASOPHILS NFR BLD AUTO: 0.6 %
BILIRUB SERPL-MCNC: 0.5 MG/DL (ref 0.3–1.2)
BUN SERPL-MCNC: 18 MG/DL (ref 7–22)
CALCIUM SERPL-MCNC: 9.5 MG/DL (ref 8.5–10.5)
CHLORIDE SERPL-SCNC: 103 MEQ/L (ref 98–111)
CO2 SERPL-SCNC: 27 MEQ/L (ref 23–33)
CREAT SERPL-MCNC: 0.9 MG/DL (ref 0.4–1.2)
DEPRECATED RDW RBC AUTO: 41 FL (ref 35–45)
EOSINOPHIL NFR BLD AUTO: 2.3 %
EOSINOPHILS ABSOLUTE: 0.2 THOU/MM3 (ref 0–0.4)
ERYTHROCYTE [DISTWIDTH] IN BLOOD BY AUTOMATED COUNT: 12.8 % (ref 11.5–14.5)
GFR SERPL CREATININE-BSD FRML MDRD: > 60 ML/MIN/1.73M2
GLUCOSE SERPL-MCNC: 88 MG/DL (ref 70–108)
HCT VFR BLD AUTO: 52.7 % (ref 42–52)
HGB BLD-MCNC: 17.3 GM/DL (ref 14–18)
IMM GRANULOCYTES # BLD AUTO: 0.04 THOU/MM3 (ref 0–0.07)
IMM GRANULOCYTES NFR BLD AUTO: 0.6 %
LYMPHOCYTES ABSOLUTE: 1.6 THOU/MM3 (ref 1–4.8)
LYMPHOCYTES NFR BLD AUTO: 22.6 %
MCH RBC QN AUTO: 28.9 PG (ref 26–33)
MCHC RBC AUTO-ENTMCNC: 32.8 GM/DL (ref 32.2–35.5)
MCV RBC AUTO: 88 FL (ref 80–94)
MONOCYTES ABSOLUTE: 0.5 THOU/MM3 (ref 0.4–1.3)
MONOCYTES NFR BLD AUTO: 7.6 %
NEUTROPHILS NFR BLD AUTO: 66.3 %
NRBC BLD AUTO-RTO: 0 /100 WBC
PLATELET # BLD AUTO: 251 THOU/MM3 (ref 130–400)
PMV BLD AUTO: 9.1 FL (ref 9.4–12.4)
POTASSIUM SERPL-SCNC: 4.1 MEQ/L (ref 3.5–5.2)
PROT SERPL-MCNC: 7.2 G/DL (ref 6.1–8)
RBC # BLD AUTO: 5.99 MILL/MM3 (ref 4.7–6.1)
SEGMENTED NEUTROPHILS ABSOLUTE COUNT: 4.6 THOU/MM3 (ref 1.8–7.7)
SODIUM SERPL-SCNC: 142 MEQ/L (ref 135–145)
WBC # BLD AUTO: 6.9 THOU/MM3 (ref 4.8–10.8)

## 2023-06-07 PROCEDURE — 80053 COMPREHEN METABOLIC PANEL: CPT

## 2023-06-07 PROCEDURE — 36415 COLL VENOUS BLD VENIPUNCTURE: CPT

## 2023-06-07 PROCEDURE — 70491 CT SOFT TISSUE NECK W/DYE: CPT

## 2023-06-07 PROCEDURE — 85025 COMPLETE CBC W/AUTO DIFF WBC: CPT

## 2023-06-08 RX ORDER — KETOROLAC TROMETHAMINE 10 MG/1
10 TABLET, FILM COATED ORAL EVERY 6 HOURS PRN
Qty: 15 TABLET | Refills: 0 | Status: SHIPPED | OUTPATIENT
Start: 2023-06-08 | End: 2023-06-14 | Stop reason: SDUPTHER

## 2023-06-08 NOTE — TELEPHONE ENCOUNTER
I called pt and he states he is still having pain. He ran out of his toradol which seemed to be helping. He could not tolerate the narcotics he states he had left over from another surgery. They upset his stomach. The ice also seems to be helping. He wants to know if you can send him in some more toradol to [facility]?

## 2023-06-09 ENCOUNTER — HOSPITAL ENCOUNTER (EMERGENCY)
Age: 68
Discharge: HOME | End: 2023-06-09
Attending: EMERGENCY MEDICINE
Payer: MEDICARE

## 2023-06-09 ENCOUNTER — APPOINTMENT (OUTPATIENT)
Dept: CT IMAGING | Age: 68
End: 2023-06-09
Payer: MEDICARE

## 2023-06-09 VITALS
RESPIRATION RATE: 19 BRPM | SYSTOLIC BLOOD PRESSURE: 173 MMHG | DIASTOLIC BLOOD PRESSURE: 105 MMHG | OXYGEN SATURATION: 97 % | HEART RATE: 98 BPM

## 2023-06-09 DIAGNOSIS — N30.00 ACUTE CYSTITIS WITHOUT HEMATURIA: Primary | ICD-10-CM

## 2023-06-09 DIAGNOSIS — T14.8XXA MUSCLE STRAIN: ICD-10-CM

## 2023-06-09 LAB
ALBUMIN SERPL BCG-MCNC: 5 G/DL (ref 3.5–5.1)
ALP SERPL-CCNC: 94 U/L (ref 38–126)
ALT SERPL W/O P-5'-P-CCNC: 27 U/L (ref 11–66)
ANION GAP SERPL CALC-SCNC: 15 MEQ/L (ref 8–16)
AST SERPL-CCNC: 25 U/L (ref 5–40)
BASOPHILS ABSOLUTE: 0 THOU/MM3 (ref 0–0.1)
BASOPHILS NFR BLD AUTO: 0.6 %
BILIRUB CONJ SERPL-MCNC: < 0.2 MG/DL (ref 0–0.3)
BILIRUB SERPL-MCNC: 0.4 MG/DL (ref 0.3–1.2)
BILIRUB UR QL STRIP: NEGATIVE
BUN SERPL-MCNC: 11 MG/DL (ref 7–22)
CALCIUM SERPL-MCNC: 9.8 MG/DL (ref 8.5–10.5)
CHARACTER UR: CLEAR
CHLORIDE SERPL-SCNC: 99 MEQ/L (ref 98–111)
CO2 SERPL-SCNC: 26 MEQ/L (ref 23–33)
COLOR UR: YELLOW
CREAT SERPL-MCNC: 0.7 MG/DL (ref 0.4–1.2)
DEPRECATED RDW RBC AUTO: 40.1 FL (ref 35–45)
EOSINOPHIL NFR BLD AUTO: 2.8 %
EOSINOPHILS ABSOLUTE: 0.2 THOU/MM3 (ref 0–0.4)
ERYTHROCYTE [DISTWIDTH] IN BLOOD BY AUTOMATED COUNT: 12.4 % (ref 11.5–14.5)
GFR SERPL CREATININE-BSD FRML MDRD: > 60 ML/MIN/1.73M2
GLUCOSE SERPL-MCNC: 86 MG/DL (ref 70–108)
GLUCOSE UR QL STRIP.AUTO: NEGATIVE MG/DL
HCT VFR BLD AUTO: 50.3 % (ref 42–52)
HGB BLD-MCNC: 16.8 GM/DL (ref 14–18)
HGB UR QL STRIP.AUTO: NEGATIVE
IMM GRANULOCYTES # BLD AUTO: 0.03 THOU/MM3 (ref 0–0.07)
IMM GRANULOCYTES NFR BLD AUTO: 0.4 %
KETONES UR QL STRIP.AUTO: NEGATIVE
LACTATE SERPL-SCNC: 1.8 MMOL/L (ref 0.5–2)
LEUKOCYTE ESTERASE UR QL STRIP.AUTO: NEGATIVE
LIPASE SERPL-CCNC: 23.7 U/L (ref 5.6–51.3)
LYMPHOCYTES ABSOLUTE: 2.2 THOU/MM3 (ref 1–4.8)
LYMPHOCYTES NFR BLD AUTO: 30 %
MCH RBC QN AUTO: 29.4 PG (ref 26–33)
MCHC RBC AUTO-ENTMCNC: 33.4 GM/DL (ref 32.2–35.5)
MCV RBC AUTO: 88.1 FL (ref 80–94)
MONOCYTES ABSOLUTE: 0.7 THOU/MM3 (ref 0.4–1.3)
MONOCYTES NFR BLD AUTO: 9.5 %
NEUTROPHILS NFR BLD AUTO: 56.7 %
NITRITE UR QL STRIP.AUTO: NEGATIVE
NRBC BLD AUTO-RTO: 0 /100 WBC
OSMOLALITY SERPL CALC.SUM OF ELEC: 278.1 MOSMOL/KG (ref 275–300)
PH UR STRIP.AUTO: 7 [PH] (ref 5–9)
PLATELET # BLD AUTO: 247 THOU/MM3 (ref 130–400)
PMV BLD AUTO: 8.7 FL (ref 9.4–12.4)
POTASSIUM SERPL-SCNC: 3.5 MEQ/L (ref 3.5–5.2)
PROT SERPL-MCNC: 8.1 G/DL (ref 6.1–8)
PROT UR STRIP.AUTO-MCNC: NEGATIVE MG/DL
RBC # BLD AUTO: 5.71 MILL/MM3 (ref 4.7–6.1)
SEGMENTED NEUTROPHILS ABSOLUTE COUNT: 4.1 THOU/MM3 (ref 1.8–7.7)
SODIUM SERPL-SCNC: 140 MEQ/L (ref 135–145)
SP GR UR REFRACT.AUTO: 1 (ref 1–1.03)
UROBILINOGEN UR QL STRIP.AUTO: 0.2 EU/DL (ref 0–1)
WBC # BLD AUTO: 7.2 THOU/MM3 (ref 4.8–10.8)

## 2023-06-09 PROCEDURE — 6360000004 HC RX CONTRAST MEDICATION: Performed by: EMERGENCY MEDICINE

## 2023-06-09 PROCEDURE — 83605 ASSAY OF LACTIC ACID: CPT

## 2023-06-09 PROCEDURE — 81003 URINALYSIS AUTO W/O SCOPE: CPT

## 2023-06-09 PROCEDURE — 36415 COLL VENOUS BLD VENIPUNCTURE: CPT

## 2023-06-09 PROCEDURE — 80053 COMPREHEN METABOLIC PANEL: CPT

## 2023-06-09 PROCEDURE — 6360000002 HC RX W HCPCS: Performed by: STUDENT IN AN ORGANIZED HEALTH CARE EDUCATION/TRAINING PROGRAM

## 2023-06-09 PROCEDURE — 85025 COMPLETE CBC W/AUTO DIFF WBC: CPT

## 2023-06-09 PROCEDURE — 74177 CT ABD & PELVIS W/CONTRAST: CPT

## 2023-06-09 PROCEDURE — 6370000000 HC RX 637 (ALT 250 FOR IP): Performed by: STUDENT IN AN ORGANIZED HEALTH CARE EDUCATION/TRAINING PROGRAM

## 2023-06-09 PROCEDURE — 83690 ASSAY OF LIPASE: CPT

## 2023-06-09 PROCEDURE — 82248 BILIRUBIN DIRECT: CPT

## 2023-06-09 RX ORDER — LIDOCAINE 4 G/G
1 PATCH TOPICAL DAILY
Qty: 30 PATCH | Refills: 0 | Status: SHIPPED | OUTPATIENT
Start: 2023-06-09 | End: 2023-07-09

## 2023-06-09 RX ORDER — HYDROCODONE BITARTRATE AND ACETAMINOPHEN 5; 325 MG/1; MG/1
1 TABLET ORAL ONCE
Status: COMPLETED | OUTPATIENT
Start: 2023-06-10 | End: 2023-06-09

## 2023-06-09 RX ORDER — ONDANSETRON 2 MG/ML
4 INJECTION INTRAMUSCULAR; INTRAVENOUS ONCE
Status: COMPLETED | OUTPATIENT
Start: 2023-06-09 | End: 2023-06-09

## 2023-06-09 RX ORDER — FENTANYL CITRATE 50 UG/ML
50 INJECTION, SOLUTION INTRAMUSCULAR; INTRAVENOUS ONCE
Status: COMPLETED | OUTPATIENT
Start: 2023-06-09 | End: 2023-06-09

## 2023-06-09 RX ADMIN — ONDANSETRON 4 MG: 2 INJECTION INTRAMUSCULAR; INTRAVENOUS at 21:24

## 2023-06-09 RX ADMIN — IOPAMIDOL 80 ML: 755 INJECTION, SOLUTION INTRAVENOUS at 22:27

## 2023-06-09 RX ADMIN — HYDROCODONE BITARTRATE AND ACETAMINOPHEN 1 TABLET: 5; 325 TABLET ORAL at 23:40

## 2023-06-09 RX ADMIN — FENTANYL CITRATE 50 MCG: 0.05 INJECTION, SOLUTION INTRAMUSCULAR; INTRAVENOUS at 21:24

## 2023-06-09 ASSESSMENT — PAIN SCALES - GENERAL
PAINLEVEL_OUTOF10: 5
PAINLEVEL_OUTOF10: 10
PAINLEVEL_OUTOF10: 9
PAINLEVEL_OUTOF10: 9

## 2023-06-09 ASSESSMENT — PAIN - FUNCTIONAL ASSESSMENT: PAIN_FUNCTIONAL_ASSESSMENT: 0-10

## 2023-06-09 NOTE — ED TRIAGE NOTES
Pt arrives to ED from home with c/o post op pain, pt reports having hernia repair done in may, states he got up from the couch too fast a few days ago, and has not been able to control the pain since  Pt reports taking Toradol around 1pm today and tylenol around 7

## 2023-06-10 NOTE — ED NOTES
Patient resting in bed. Respirations easy and unlabored. No distress noted. Call light within reach.   Pt reports pain 9/10     Ame Guo, [address]  06/09/23 2017

## 2023-06-10 NOTE — DISCHARGE INSTRUCTIONS
Take your medications as prescribed. Follow-up with your family doctor next days for reevaluation of your symptoms. Return if you develop any new or worsening symptoms.

## 2023-06-10 NOTE — ED PROVIDER NOTES
H92.01    Pharyngoesophageal dysphagia R13.14    Hoarseness R49.0     SURGICAL HISTORY       Past Surgical History:   Procedure Laterality Date    EYE SURGERY  plate in right eye    GASTRIC FUNDOPLICATION N/A 985    ROBOTIC HIATAL HERNIA REPAIR AND NISSEN FUNDOPLICATION performed by Gerrianne Aase, MD at [facility]    plate in neck     UPPER GASTROINTESTINAL ENDOSCOPY N/A 2/10/2023    EGD, POSS BIOPSIES,  BRAVO performed by Cary Madera MD at [address] [place] [address] 2023    EGD ESOPHAGEAL MANOMETRY AND PH MONITORING 24 HR performed by Charline Castro MD at [facility]       Discharge Medication List as of 2023 11:31 PM        CONTINUE these medications which have NOT CHANGED    Details   ketorolac (TORADOL) 10 MG tablet Take 1 tablet by mouth every 6 hours as needed for Pain, Disp-15 tablet, R-0Normal      fluticasone (FLONASE) 50 MCG/ACT nasal spray 2 sprays by Each Nostril route daily, Disp-16 g, R-5Normal      acetaminophen (TYLENOL) 500 MG tablet Take 1 tablet by mouth every 6 hours as needed for PainHistorical Med      cyclobenzaprine (FLEXERIL) 10 MG tablet Take 1 tablet by mouth every 8 hours as needed for Muscle spasms, Disp-30 tablet, R-2Normal      donepezil (ARICEPT) 10 MG tablet Take 1 tablet by mouth nightlyHistorical Med      Handicap Placard MISC Starting 2016, Until Discontinued, Disp-1 each, R-0, PrintDuration 5 years      Glucosamine-Chondroitin (MOVE FREE PO) Take by mouth dailyHistorical Med      Multiple Vitamins-Minerals (THERAPEUTIC MULTIVITAMIN-MINERALS) tablet Take 1 tablet by mouth dailyHistorical Med             ALLERGIES     has No Known Allergies. FAMILY HISTORY     He indicated that his mother is . He indicated that his father is . He indicated that his brother is alive. He indicated that the status of his other is unknown.        SOCIAL

## 2023-06-15 ENCOUNTER — PREP FOR PROCEDURE (OUTPATIENT)
Dept: ENT CLINIC | Age: 68
End: 2023-06-15

## 2023-06-16 ENCOUNTER — TELEPHONE (OUTPATIENT)
Dept: SURGERY | Age: 68
End: 2023-06-16

## 2023-06-21 ENCOUNTER — ANESTHESIA (OUTPATIENT)
Dept: OPERATING ROOM | Age: 68
End: 2023-06-21
Payer: MEDICARE

## 2023-06-21 ENCOUNTER — HOSPITAL ENCOUNTER (OUTPATIENT)
Age: 68
Setting detail: OUTPATIENT SURGERY
Discharge: HOME | End: 2023-06-21
Attending: OTOLARYNGOLOGY | Admitting: OTOLARYNGOLOGY
Payer: MEDICARE

## 2023-06-21 ENCOUNTER — ANESTHESIA EVENT (OUTPATIENT)
Dept: OPERATING ROOM | Age: 68
End: 2023-06-21
Payer: MEDICARE

## 2023-06-21 ENCOUNTER — APPOINTMENT (OUTPATIENT)
Dept: GENERAL RADIOLOGY | Age: 68
End: 2023-06-21
Attending: OTOLARYNGOLOGY
Payer: MEDICARE

## 2023-06-21 VITALS
HEIGHT: 66 IN | TEMPERATURE: 97.6 F | WEIGHT: 148.6 LBS | HEART RATE: 109 BPM | RESPIRATION RATE: 20 BRPM | BODY MASS INDEX: 23.88 KG/M2 | SYSTOLIC BLOOD PRESSURE: 153 MMHG | OXYGEN SATURATION: 97 % | DIASTOLIC BLOOD PRESSURE: 90 MMHG

## 2023-06-21 DIAGNOSIS — G89.18 ACUTE POST-OPERATIVE PAIN: Primary | ICD-10-CM

## 2023-06-21 DIAGNOSIS — H92.01 REFERRED OTALGIA OF RIGHT EAR: ICD-10-CM

## 2023-06-21 DIAGNOSIS — R13.14 PHARYNGOESOPHAGEAL DYSPHAGIA: ICD-10-CM

## 2023-06-21 DIAGNOSIS — R49.0 HOARSENESS: ICD-10-CM

## 2023-06-21 DIAGNOSIS — J35.8 TONSILLAR MASS: ICD-10-CM

## 2023-06-21 PROCEDURE — 88305 TISSUE EXAM BY PATHOLOGIST: CPT

## 2023-06-21 PROCEDURE — 2580000003 HC RX 258: Performed by: OTOLARYNGOLOGY

## 2023-06-21 PROCEDURE — 7100000001 HC PACU RECOVERY - ADDTL 15 MIN: Performed by: OTOLARYNGOLOGY

## 2023-06-21 PROCEDURE — 3600000014 HC SURGERY LEVEL 4 ADDTL 15MIN: Performed by: OTOLARYNGOLOGY

## 2023-06-21 PROCEDURE — C1726 CATH, BAL DIL, NON-VASCULAR: HCPCS | Performed by: OTOLARYNGOLOGY

## 2023-06-21 PROCEDURE — 6360000002 HC RX W HCPCS: Performed by: STUDENT IN AN ORGANIZED HEALTH CARE EDUCATION/TRAINING PROGRAM

## 2023-06-21 PROCEDURE — 6360000002 HC RX W HCPCS: Performed by: OTOLARYNGOLOGY

## 2023-06-21 PROCEDURE — 7100000011 HC PHASE II RECOVERY - ADDTL 15 MIN: Performed by: OTOLARYNGOLOGY

## 2023-06-21 PROCEDURE — 88112 CYTOPATH CELL ENHANCE TECH: CPT

## 2023-06-21 PROCEDURE — 3700000001 HC ADD 15 MINUTES (ANESTHESIA): Performed by: OTOLARYNGOLOGY

## 2023-06-21 PROCEDURE — 3700000000 HC ANESTHESIA ATTENDED CARE: Performed by: OTOLARYNGOLOGY

## 2023-06-21 PROCEDURE — 6360000002 HC RX W HCPCS: Performed by: ANESTHESIOLOGY

## 2023-06-21 PROCEDURE — 6360000002 HC RX W HCPCS

## 2023-06-21 PROCEDURE — 2500000003 HC RX 250 WO HCPCS

## 2023-06-21 PROCEDURE — 7100000000 HC PACU RECOVERY - FIRST 15 MIN: Performed by: OTOLARYNGOLOGY

## 2023-06-21 PROCEDURE — 88304 TISSUE EXAM BY PATHOLOGIST: CPT

## 2023-06-21 PROCEDURE — 88341 IMHCHEM/IMCYTCHM EA ADD ANTB: CPT

## 2023-06-21 PROCEDURE — 2500000003 HC RX 250 WO HCPCS: Performed by: NURSE ANESTHETIST, CERTIFIED REGISTERED

## 2023-06-21 PROCEDURE — 31536 LARYNGOSCOPY W/BX & OP SCOPE: CPT | Performed by: OTOLARYNGOLOGY

## 2023-06-21 PROCEDURE — 7100000010 HC PHASE II RECOVERY - FIRST 15 MIN: Performed by: OTOLARYNGOLOGY

## 2023-06-21 PROCEDURE — 88342 IMHCHEM/IMCYTCHM 1ST ANTB: CPT

## 2023-06-21 PROCEDURE — 3600000004 HC SURGERY LEVEL 4 BASE: Performed by: OTOLARYNGOLOGY

## 2023-06-21 PROCEDURE — 2500000003 HC RX 250 WO HCPCS: Performed by: STUDENT IN AN ORGANIZED HEALTH CARE EDUCATION/TRAINING PROGRAM

## 2023-06-21 PROCEDURE — 6370000000 HC RX 637 (ALT 250 FOR IP): Performed by: OTOLARYNGOLOGY

## 2023-06-21 PROCEDURE — 31624 DX BRONCHOSCOPE/LAVAGE: CPT | Performed by: OTOLARYNGOLOGY

## 2023-06-21 PROCEDURE — 6360000002 HC RX W HCPCS: Performed by: NURSE ANESTHETIST, CERTIFIED REGISTERED

## 2023-06-21 PROCEDURE — 2709999900 HC NON-CHARGEABLE SUPPLY: Performed by: OTOLARYNGOLOGY

## 2023-06-21 PROCEDURE — 43202 ESOPHAGOSCOPY FLEX BIOPSY: CPT | Performed by: OTOLARYNGOLOGY

## 2023-06-21 PROCEDURE — 71045 X-RAY EXAM CHEST 1 VIEW: CPT

## 2023-06-21 RX ORDER — SODIUM CHLORIDE 9 MG/ML
INJECTION, SOLUTION INTRAVENOUS PRN
Status: CANCELLED | OUTPATIENT
Start: 2023-06-21

## 2023-06-21 RX ORDER — LIDOCAINE HYDROCHLORIDE 20 MG/ML
INJECTION, SOLUTION INTRAVENOUS PRN
Status: DISCONTINUED | OUTPATIENT
Start: 2023-06-21 | End: 2023-06-21 | Stop reason: SDUPTHER

## 2023-06-21 RX ORDER — SODIUM CHLORIDE 9 MG/ML
INJECTION, SOLUTION INTRAVENOUS PRN
Status: DISCONTINUED | OUTPATIENT
Start: 2023-06-21 | End: 2023-06-21 | Stop reason: HOSPADM

## 2023-06-21 RX ORDER — MIDAZOLAM HYDROCHLORIDE 1 MG/ML
INJECTION INTRAMUSCULAR; INTRAVENOUS PRN
Status: DISCONTINUED | OUTPATIENT
Start: 2023-06-21 | End: 2023-06-21 | Stop reason: SDUPTHER

## 2023-06-21 RX ORDER — PROPOFOL 10 MG/ML
INJECTION, EMULSION INTRAVENOUS PRN
Status: DISCONTINUED | OUTPATIENT
Start: 2023-06-21 | End: 2023-06-21 | Stop reason: SDUPTHER

## 2023-06-21 RX ORDER — SODIUM CHLORIDE 0.9 % (FLUSH) 0.9 %
5-40 SYRINGE (ML) INJECTION EVERY 12 HOURS SCHEDULED
Status: DISCONTINUED | OUTPATIENT
Start: 2023-06-21 | End: 2023-06-21 | Stop reason: HOSPADM

## 2023-06-21 RX ORDER — PROPOFOL 10 MG/ML
INJECTION, EMULSION INTRAVENOUS PRN
Status: DISCONTINUED | OUTPATIENT
Start: 2023-06-21 | End: 2023-06-21

## 2023-06-21 RX ORDER — SODIUM CHLORIDE 0.9 % (FLUSH) 0.9 %
5-40 SYRINGE (ML) INJECTION PRN
Status: CANCELLED | OUTPATIENT
Start: 2023-06-21

## 2023-06-21 RX ORDER — ROCURONIUM BROMIDE 10 MG/ML
INJECTION, SOLUTION INTRAVENOUS PRN
Status: DISCONTINUED | OUTPATIENT
Start: 2023-06-21 | End: 2023-06-21 | Stop reason: SDUPTHER

## 2023-06-21 RX ORDER — HYDRALAZINE HYDROCHLORIDE 20 MG/ML
10 INJECTION INTRAMUSCULAR; INTRAVENOUS
Status: DISCONTINUED | OUTPATIENT
Start: 2023-06-21 | End: 2023-06-21 | Stop reason: HOSPADM

## 2023-06-21 RX ORDER — FENTANYL CITRATE 50 UG/ML
25 INJECTION, SOLUTION INTRAMUSCULAR; INTRAVENOUS EVERY 5 MIN PRN
Status: DISCONTINUED | OUTPATIENT
Start: 2023-06-21 | End: 2023-06-21 | Stop reason: HOSPADM

## 2023-06-21 RX ORDER — FENTANYL CITRATE 50 UG/ML
INJECTION, SOLUTION INTRAMUSCULAR; INTRAVENOUS PRN
Status: DISCONTINUED | OUTPATIENT
Start: 2023-06-21 | End: 2023-06-21

## 2023-06-21 RX ORDER — DEXAMETHASONE SODIUM PHOSPHATE 10 MG/ML
INJECTION, EMULSION INTRAMUSCULAR; INTRAVENOUS PRN
Status: DISCONTINUED | OUTPATIENT
Start: 2023-06-21 | End: 2023-06-21 | Stop reason: SDUPTHER

## 2023-06-21 RX ORDER — SODIUM CHLORIDE 0.9 % (FLUSH) 0.9 %
5-40 SYRINGE (ML) INJECTION EVERY 12 HOURS SCHEDULED
Status: CANCELLED | OUTPATIENT
Start: 2023-06-21

## 2023-06-21 RX ORDER — SODIUM CHLORIDE 0.9 % (FLUSH) 0.9 %
5-40 SYRINGE (ML) INJECTION PRN
Status: DISCONTINUED | OUTPATIENT
Start: 2023-06-21 | End: 2023-06-21 | Stop reason: HOSPADM

## 2023-06-21 RX ORDER — HYDROCODONE BITARTRATE AND ACETAMINOPHEN 5; 325 MG/1; MG/1
1 TABLET ORAL EVERY 6 HOURS PRN
Qty: 20 TABLET | Refills: 0 | Status: SHIPPED | OUTPATIENT
Start: 2023-06-21 | End: 2023-06-26

## 2023-06-21 RX ORDER — HYDROCODONE BITARTRATE AND ACETAMINOPHEN 5; 325 MG/1; MG/1
1 TABLET ORAL ONCE
Status: COMPLETED | OUTPATIENT
Start: 2023-06-21 | End: 2023-06-21

## 2023-06-21 RX ORDER — HYDRALAZINE HYDROCHLORIDE 20 MG/ML
INJECTION INTRAMUSCULAR; INTRAVENOUS
Status: COMPLETED
Start: 2023-06-21 | End: 2023-06-21

## 2023-06-21 RX ORDER — FENTANYL CITRATE 50 UG/ML
50 INJECTION, SOLUTION INTRAMUSCULAR; INTRAVENOUS EVERY 5 MIN PRN
Status: COMPLETED | OUTPATIENT
Start: 2023-06-21 | End: 2023-06-21

## 2023-06-21 RX ORDER — MEPERIDINE HYDROCHLORIDE 25 MG/ML
12.5 INJECTION INTRAMUSCULAR; INTRAVENOUS; SUBCUTANEOUS EVERY 4 HOURS PRN
Status: COMPLETED | OUTPATIENT
Start: 2023-06-21 | End: 2023-06-21

## 2023-06-21 RX ORDER — ONDANSETRON 2 MG/ML
INJECTION INTRAMUSCULAR; INTRAVENOUS PRN
Status: DISCONTINUED | OUTPATIENT
Start: 2023-06-21 | End: 2023-06-21 | Stop reason: SDUPTHER

## 2023-06-21 RX ORDER — METOPROLOL TARTRATE 5 MG/5ML
INJECTION INTRAVENOUS PRN
Status: DISCONTINUED | OUTPATIENT
Start: 2023-06-21 | End: 2023-06-21 | Stop reason: SDUPTHER

## 2023-06-21 RX ORDER — MEPERIDINE HYDROCHLORIDE 25 MG/ML
INJECTION INTRAMUSCULAR; INTRAVENOUS; SUBCUTANEOUS
Status: COMPLETED
Start: 2023-06-21 | End: 2023-06-21

## 2023-06-21 RX ORDER — DEXAMETHASONE SODIUM PHOSPHATE 4 MG/ML
10 INJECTION, SOLUTION INTRA-ARTICULAR; INTRALESIONAL; INTRAMUSCULAR; INTRAVENOUS; SOFT TISSUE
Status: COMPLETED | OUTPATIENT
Start: 2023-06-21 | End: 2023-06-21

## 2023-06-21 RX ORDER — FENTANYL CITRATE 50 UG/ML
INJECTION, SOLUTION INTRAMUSCULAR; INTRAVENOUS PRN
Status: DISCONTINUED | OUTPATIENT
Start: 2023-06-21 | End: 2023-06-21 | Stop reason: SDUPTHER

## 2023-06-21 RX ORDER — LIDOCAINE HYDROCHLORIDE 20 MG/ML
INJECTION, SOLUTION INTRAVENOUS PRN
Status: DISCONTINUED | OUTPATIENT
Start: 2023-06-21 | End: 2023-06-21

## 2023-06-21 RX ADMIN — PROPOFOL 150 MG: 10 INJECTION, EMULSION INTRAVENOUS at 16:18

## 2023-06-21 RX ADMIN — HYDROMORPHONE HYDROCHLORIDE 0.5 MG: 1 INJECTION, SOLUTION INTRAMUSCULAR; INTRAVENOUS; SUBCUTANEOUS at 18:13

## 2023-06-21 RX ADMIN — HYDRALAZINE HYDROCHLORIDE 10 MG: 20 INJECTION INTRAMUSCULAR; INTRAVENOUS at 18:34

## 2023-06-21 RX ADMIN — FENTANYL CITRATE 50 MCG: 50 INJECTION, SOLUTION INTRAMUSCULAR; INTRAVENOUS at 18:06

## 2023-06-21 RX ADMIN — LIDOCAINE HYDROCHLORIDE 100 MG: 20 INJECTION, SOLUTION INTRAVENOUS at 16:18

## 2023-06-21 RX ADMIN — SODIUM CHLORIDE: 9 INJECTION, SOLUTION INTRAVENOUS at 14:53

## 2023-06-21 RX ADMIN — DEXAMETHASONE SODIUM PHOSPHATE 10 MG: 4 INJECTION, SOLUTION INTRA-ARTICULAR; INTRALESIONAL; INTRAMUSCULAR; INTRAVENOUS; SOFT TISSUE at 14:54

## 2023-06-21 RX ADMIN — FENTANYL CITRATE 50 MCG: 50 INJECTION, SOLUTION INTRAMUSCULAR; INTRAVENOUS at 17:55

## 2023-06-21 RX ADMIN — HYDROCODONE BITARTRATE AND ACETAMINOPHEN 1 TABLET: 5; 325 TABLET ORAL at 19:31

## 2023-06-21 RX ADMIN — PROPOFOL 100 MG: 10 INJECTION, EMULSION INTRAVENOUS at 17:21

## 2023-06-21 RX ADMIN — METOPROLOL TARTRATE 2.5 MG: 5 INJECTION INTRAVENOUS at 16:57

## 2023-06-21 RX ADMIN — HYDROMORPHONE HYDROCHLORIDE 0.5 MG: 1 INJECTION, SOLUTION INTRAMUSCULAR; INTRAVENOUS; SUBCUTANEOUS at 18:19

## 2023-06-21 RX ADMIN — FENTANYL CITRATE 100 MCG: 50 INJECTION, SOLUTION INTRAMUSCULAR; INTRAVENOUS at 16:18

## 2023-06-21 RX ADMIN — PROPOFOL 100 MG: 10 INJECTION, EMULSION INTRAVENOUS at 16:47

## 2023-06-21 RX ADMIN — MEPERIDINE HYDROCHLORIDE 12.5 MG: 25 INJECTION, SOLUTION INTRAMUSCULAR; INTRAVENOUS; SUBCUTANEOUS at 18:16

## 2023-06-21 RX ADMIN — ROCURONIUM BROMIDE 50 MG: 10 INJECTION INTRAVENOUS at 16:18

## 2023-06-21 RX ADMIN — ONDANSETRON 4 MG: 2 INJECTION INTRAMUSCULAR; INTRAVENOUS at 16:27

## 2023-06-21 RX ADMIN — Medication 2000 MG: at 16:25

## 2023-06-21 RX ADMIN — MEPERIDINE HYDROCHLORIDE 12.5 MG: 25 INJECTION, SOLUTION INTRAMUSCULAR; INTRAVENOUS; SUBCUTANEOUS at 18:19

## 2023-06-21 RX ADMIN — PROPOFOL 100 MG: 10 INJECTION, EMULSION INTRAVENOUS at 16:43

## 2023-06-21 RX ADMIN — PROPOFOL 50 MG: 10 INJECTION, EMULSION INTRAVENOUS at 16:40

## 2023-06-21 RX ADMIN — MIDAZOLAM 2 MG: 1 INJECTION INTRAMUSCULAR; INTRAVENOUS at 16:13

## 2023-06-21 RX ADMIN — FENTANYL CITRATE 100 MCG: 50 INJECTION, SOLUTION INTRAMUSCULAR; INTRAVENOUS at 16:39

## 2023-06-21 RX ADMIN — DEXAMETHASONE SODIUM PHOSPHATE 10 MG: 10 INJECTION, EMULSION INTRAMUSCULAR; INTRAVENOUS at 16:29

## 2023-06-21 RX ADMIN — ROCURONIUM BROMIDE 10 MG: 10 INJECTION INTRAVENOUS at 17:23

## 2023-06-21 ASSESSMENT — PAIN DESCRIPTION - DESCRIPTORS
DESCRIPTORS: ACHING;SORE
DESCRIPTORS: SORE

## 2023-06-21 ASSESSMENT — PAIN SCALES - GENERAL
PAINLEVEL_OUTOF10: 6
PAINLEVEL_OUTOF10: 6
PAINLEVEL_OUTOF10: 7
PAINLEVEL_OUTOF10: 5
PAINLEVEL_OUTOF10: 10
PAINLEVEL_OUTOF10: 6
PAINLEVEL_OUTOF10: 9

## 2023-06-21 ASSESSMENT — PAIN - FUNCTIONAL ASSESSMENT: PAIN_FUNCTIONAL_ASSESSMENT: NONE - DENIES PAIN

## 2023-06-21 ASSESSMENT — PAIN DESCRIPTION - LOCATION
LOCATION: THROAT
LOCATION: THROAT

## 2023-06-21 NOTE — ANESTHESIA POSTPROCEDURE EVALUATION
Department of Anesthesiology  Postprocedure Note    Patient: Jazlyn Manjarrez  MRN: [id number]  YOB: 1955  Date of evaluation: 6/21/2023      Procedure Summary     Date: 06/21/23 Room / Location: [address] [place]    Anesthesia Start: 1249 Anesthesia Stop: 1750    Procedures:       Laryngoscopy with Biopsy, Flexible Esophagoscopy - Diagnostic Bronchoscopy with Broncheal Alveolar Lavage, Biopsy of Oropharynx (Throat)      BRONCHOSCOPY (Throat) Diagnosis:       Hoarseness      Tonsillar mass      Referred otalgia of right ear      Pharyngoesophageal dysphagia      (Hoarseness [R49.0])      (Tonsillar mass [J35.8])      (Referred otalgia of right ear [H92.01])      (Pharyngoesophageal dysphagia [R13.14])    Surgeons: Chapis Hernandez MD Responsible Provider: Sandrine Schmitz DO    Anesthesia Type: general ASA Status: 2          Anesthesia Type: No value filed.     Gael Phase I: Gael Score: 9    Gael Phase II: Gael Score: 10      Anesthesia Post Evaluation    Patient location during evaluation: PACU  Patient participation: complete - patient participated  Level of consciousness: awake and alert  Airway patency: patent  Nausea & Vomiting: no vomiting and no nausea  Complications: no  Cardiovascular status: hemodynamically stable  Respiratory status: acceptable and nasal cannula  Hydration status: stable

## 2023-06-21 NOTE — PROGRESS NOTES
Pt returned to [facility] room 2. Vitals and assessment as charted. 0.9 infusing, @950ml to count from PACU. Pt has sherbert and water. Family at the bedside. Pt and family verbalized understanding of discharge criteria and call light use. Call light in reach.

## 2023-06-21 NOTE — H&P
Adapted from prior ENT note:    Pharyngoesophageal dysphagia, Tonsillar mass, Hoarseness, Referred right otalgia  No new symptoms    Past Medical History:   Diagnosis Date    Anxiety     Arthritis     neck, back, hands bilat    Cancer ([address] 2022    melinoma removed from face    GERD (gastroesophageal reflux disease)     Hiatal hernia     EGD    PONV (postoperative nausea and vomiting)     PUD (peptic ulcer disease)     LONG TIME  AGO    Rectal bleeding     2o11- colonoscopy - dr Cecilia Robles       Past Surgical History:   Procedure Laterality Date    EYE SURGERY  plate in right eye    GASTRIC FUNDOPLICATION N/A 4/9/5842    ROBOTIC HIATAL HERNIA REPAIR AND NISSEN FUNDOPLICATION performed by Angela Chaudhry MD at [address] [place] [address]    plate in neck     UPPER GASTROINTESTINAL ENDOSCOPY N/A 2/10/2023    EGD, POSS BIOPSIES,  MEJÍA performed by Angelina Hinds MD at [address] N/A 4/13/2023    EGD ESOPHAGEAL MANOMETRY AND PH MONITORING 24 HR performed by Cooper Berry MD at [facility]       No Known Allergies    No current facility-administered medications for this encounter. Current Outpatient Medications   Medication Sig Dispense Refill    ondansetron (ZOFRAN) 4 MG/5ML solution Take 5 mLs by mouth every 6 hours as needed for Nausea or Vomiting 60 mL 0    diazePAM (VALIUM) 2 MG tablet Take 1-2 tablets by mouth every 8 hours as needed (abdominal wall spasms/pain) for up to 12 days.  Max Daily Amount: 12 mg 10 tablet 0    ketorolac (TORADOL) 10 MG tablet Take 1 tablet by mouth every 8 hours as needed for Pain 12 tablet 0    lidocaine 4 % external patch Place 1 patch onto the skin daily 30 patch 0    acetaminophen (TYLENOL) 500 MG tablet Take 1 tablet by mouth every 6 hours as needed for Pain      cyclobenzaprine (FLEXERIL) 10 MG tablet Take 1 tablet by mouth every 8 hours as needed for Muscle spasms 30 tablet 2    donepezil (ARICEPT)

## 2023-06-21 NOTE — ANESTHESIA PRE PROCEDURE
Department of Anesthesiology  Preprocedure Note       Name:  Iona Bello   Age:  79 y.o.  :  1955                                          MRN:  [id number]         Date:  2023      Surgeon: Zhang Hernandez):  Iraj Ball MD    Procedure: Procedure(s):  Laryngoscopy with Biopsy, Flexible Esophagoscopy - Diagnostic Bronchoscopy with Broncheal Alveolar Lavage, Biopsy of Oropharynx  BRONCHOSCOPY    Medications prior to admission:   Prior to Admission medications    Medication Sig Start Date End Date Taking? Authorizing Provider   ondansetron [facility] 4 MG/5ML solution Take 5 mLs by mouth every 6 hours as needed for Nausea or Vomiting 23   MARGAUX Neves CNP   diazePAM (VALIUM) 2 MG tablet Take 1-2 tablets by mouth every 8 hours as needed (abdominal wall spasms/pain) for up to 12 days.  Max Daily Amount: 12 mg 23  MARGAUX Neves CNP   ketorolac (TORADOL) 10 MG tablet Take 1 tablet by mouth every 8 hours as needed for Pain 23   MARGAUX Neves CNP   lidocaine 4 % external patch Place 1 patch onto the skin daily 23  Saira Trejo MD   acetaminophen (TYLENOL) 500 MG tablet Take 1 tablet by mouth every 6 hours as needed for Pain    Historical Provider, MD   cyclobenzaprine (FLEXERIL) 10 MG tablet Take 1 tablet by mouth every 8 hours as needed for Muscle spasms 10/24/22   Darlyn Hernández DO   donepezil (ARICEPT) 10 MG tablet Take 1 tablet by mouth nightly    Historical Provider, MD   Handicap Placard MISC by Does not apply route Duration 5 years 16   Marbin Leon MD   Glucosamine-Chondroitin (MOVE FREE PO) Take by mouth daily    Historical Provider, MD   Multiple Vitamins-Minerals (THERAPEUTIC MULTIVITAMIN-MINERALS) tablet Take 1 tablet by mouth daily    Historical Provider, MD       Current medications:    Current Facility-Administered Medications   Medication Dose Route Frequency Provider Last Rate Last Admin   

## 2023-06-21 NOTE — DISCHARGE INSTRUCTIONS
- Stick with soft foods for the next few days  - You may use the pain medication as prescribed. If pain is only mild, Tylenol is okay to use instead. - Take it easy with activities the next few days, then you may resume regular activity. - Follow up in ENT office as scheduled.

## 2023-06-21 NOTE — PROGRESS NOTES
1747: pt arrives to pacu. OPA in place and removed. Pt responds to verbal stimulation. VSS. Respirations unlabored. 0906: pt given 50mcg of fentanyl   1806: pt given 50mcg of fentanyl   1809: xray at bedside   1813: pt given 0.5mg of dilaudid   1816: pt given 12.5mg of demerol   1819: pt given 0.5mg of dilaudid   1821: pt given 12.5mg of demerol   1827: pt sitting up eating ice chips   1831: pt urinated 300ml   1834: pt given 10mg of hydralazine   1848: pt urinated 300ml   1854: pt meets discharge criteria from pacu.  Pt transported to [facility] [place]

## 2023-06-22 NOTE — OP NOTE
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 Procedure(s):  Laryngoscopy with Biopsy, Flexible Esophagoscopy - Diagnostic Bronchoscopy with Broncheal Alveolar Lavage, Biopsy of Oropharynx  BRONCHOSCOPY    Surgeon(s):  Aggie Hamilton MD    [place] at surgery:   BRIAN Holland, APRN    Anesthesia: General    Estimated Blood Loss (mL): less than 50     Complications: None    Specimens:   ID Type Source Tests Collected by Time Destination   A : right tonsillar fossa mass Tissue Tonsil SURGICAL PATHOLOGY Aggie Hamilton MD 6/21/2023 1644    B : tubular esophagus @43 cm tongue of salmon colored mucosa rule out barretts esophagus Tissue Esophagus SURGICAL PATHOLOGY Aggie Hamilton MD 6/21/2023 1705    C : left lung BAL  Body Fluid Lung CYTOLOGY, NON-GYN Aggie Hamilton MD 6/21/2023 1724    D : right lung BAL Body Fluid Lung CYTOLOGY, NON-GYN Aggie Hamilton MD 6/21/2023 1730        Implants:  * No implants in log *      Drains: * No LDAs found *    Findings: 1. a nodular mass of the right tonsillar fossa was palpated and biopsied with cup forceps to obtain large specimen for histopathology  2. The remainder of the patient's pharynx and larynx were free of neoplastic pathology  3. 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 The left and right mainstem bronchi and lobar bronchi were free of mucosal lesions; separate bronchoalveolar lavage specimens were taken from the left and right sides for cytology.   5.  Following the biopsies I used suction cautery to

## 2023-06-22 NOTE — PROGRESS NOTES
Pt has met discharge criteria and states he is ready for discharge to home. IV removed, gauze and tape applied. Dressed in own clothes and personal belongings gathered. Discharge instructions (with opioid medication education information) given to pt and family; pt and family verbalized understanding of discharge instructions, prescriptions and follow up appointments. Pt transported to discharge lobby by [facility] staff.

## 2023-06-25 ENCOUNTER — TELEPHONE (OUTPATIENT)
Dept: OTOLARYNGOLOGY | Age: 68
End: 2023-06-25

## 2023-06-25 DIAGNOSIS — C09.9 PRIMARY TONSILLAR SQUAMOUS CELL CARCINOMA (HCC): Primary | ICD-10-CM

## 2023-06-25 DIAGNOSIS — J35.8 TONSILLAR MASS: ICD-10-CM

## 2023-06-25 PROBLEM — G89.18 ACUTE POST-OPERATIVE PAIN: Status: ACTIVE | Noted: 2023-06-25

## 2023-06-26 ENCOUNTER — TELEPHONE (OUTPATIENT)
Dept: ENT CLINIC | Age: 68
End: 2023-06-26

## 2023-06-26 DIAGNOSIS — C09.9 PRIMARY TONSILLAR SQUAMOUS CELL CARCINOMA (HCC): Primary | ICD-10-CM

## 2023-06-26 DIAGNOSIS — R10.9 ABDOMINAL SPASMS: ICD-10-CM

## 2023-06-26 RX ORDER — DIAZEPAM 2 MG/1
TABLET ORAL
Qty: 10 TABLET | OUTPATIENT
Start: 2023-06-26

## 2023-06-28 ENCOUNTER — OFFICE VISIT (OUTPATIENT)
Dept: SURGERY | Age: 68
End: 2023-06-28

## 2023-06-28 VITALS
SYSTOLIC BLOOD PRESSURE: 118 MMHG | RESPIRATION RATE: 16 BRPM | HEIGHT: 66 IN | WEIGHT: 149.5 LBS | TEMPERATURE: 97.7 F | OXYGEN SATURATION: 99 % | DIASTOLIC BLOOD PRESSURE: 60 MMHG | BODY MASS INDEX: 24.03 KG/M2 | HEART RATE: 85 BPM

## 2023-06-28 DIAGNOSIS — R10.9 ABDOMINAL SPASMS: Primary | ICD-10-CM

## 2023-06-28 DIAGNOSIS — Z98.890 S/P NISSEN FUNDOPLICATION (WITHOUT GASTROSTOMY TUBE) PROCEDURE: ICD-10-CM

## 2023-06-28 PROCEDURE — 99024 POSTOP FOLLOW-UP VISIT: CPT | Performed by: SURGERY

## 2023-06-28 RX ORDER — CLOPIDOGREL BISULFATE 75 MG/1
TABLET ORAL
COMMUNITY
Start: 2023-06-20

## 2023-06-30 ASSESSMENT — ENCOUNTER SYMPTOMS
BACK PAIN: 0
RECTAL PAIN: 0
SORE THROAT: 1
EYE REDNESS: 0
VOMITING: 0
ANAL BLEEDING: 0
ABDOMINAL DISTENTION: 0
EYE DISCHARGE: 0
EYE ITCHING: 0
STRIDOR: 0
COUGH: 0
ABDOMINAL PAIN: 1
WHEEZING: 0
PHOTOPHOBIA: 0
COLOR CHANGE: 0
BLOOD IN STOOL: 0
APNEA: 0
CONSTIPATION: 1
ALLERGIC/IMMUNOLOGIC NEGATIVE: 1
CHEST TIGHTNESS: 0
SINUS PRESSURE: 0
RHINORRHEA: 0
SHORTNESS OF BREATH: 0
VOICE CHANGE: 1
NAUSEA: 0
TROUBLE SWALLOWING: 1
FACIAL SWELLING: 0
EYE PAIN: 0
DIARRHEA: 0
CHOKING: 0

## 2023-07-03 ENCOUNTER — OFFICE VISIT (OUTPATIENT)
Dept: ENT CLINIC | Age: 68
End: 2023-07-03
Payer: MEDICARE

## 2023-07-03 ENCOUNTER — HOSPITAL ENCOUNTER (OUTPATIENT)
Dept: PET IMAGING | Age: 68
Discharge: HOME | End: 2023-07-03
Payer: MEDICARE

## 2023-07-03 VITALS
RESPIRATION RATE: 16 BRPM | OXYGEN SATURATION: 99 % | HEIGHT: 66 IN | BODY MASS INDEX: 23.25 KG/M2 | TEMPERATURE: 97.3 F | SYSTOLIC BLOOD PRESSURE: 134 MMHG | HEART RATE: 73 BPM | WEIGHT: 144.7 LBS | DIASTOLIC BLOOD PRESSURE: 78 MMHG

## 2023-07-03 DIAGNOSIS — C09.9 PRIMARY TONSILLAR SQUAMOUS CELL CARCINOMA (HCC): ICD-10-CM

## 2023-07-03 DIAGNOSIS — C09.9 PRIMARY TONSILLAR SQUAMOUS CELL CARCINOMA (HCC): Primary | ICD-10-CM

## 2023-07-03 DIAGNOSIS — H92.01 REFERRED OTALGIA OF RIGHT EAR: ICD-10-CM

## 2023-07-03 PROCEDURE — G8420 CALC BMI NORM PARAMETERS: HCPCS | Performed by: OTOLARYNGOLOGY

## 2023-07-03 PROCEDURE — 78815 PET IMAGE W/CT SKULL-THIGH: CPT

## 2023-07-03 PROCEDURE — 1123F ACP DISCUSS/DSCN MKR DOCD: CPT | Performed by: OTOLARYNGOLOGY

## 2023-07-03 PROCEDURE — 3017F COLORECTAL CA SCREEN DOC REV: CPT | Performed by: OTOLARYNGOLOGY

## 2023-07-03 PROCEDURE — G8427 DOCREV CUR MEDS BY ELIG CLIN: HCPCS | Performed by: OTOLARYNGOLOGY

## 2023-07-03 PROCEDURE — 3430000000 HC RX DIAGNOSTIC RADIOPHARMACEUTICAL: Performed by: NURSE PRACTITIONER

## 2023-07-03 PROCEDURE — 99215 OFFICE O/P EST HI 40 MIN: CPT | Performed by: OTOLARYNGOLOGY

## 2023-07-03 PROCEDURE — A9552 F18 FDG: HCPCS | Performed by: NURSE PRACTITIONER

## 2023-07-03 PROCEDURE — 1036F TOBACCO NON-USER: CPT | Performed by: OTOLARYNGOLOGY

## 2023-07-03 RX ORDER — FLUDEOXYGLUCOSE F 18 200 MCI/ML
12.6 INJECTION, SOLUTION INTRAVENOUS
Status: COMPLETED | OUTPATIENT
Start: 2023-07-03 | End: 2023-07-03

## 2023-07-03 RX ADMIN — FLUDEOXYGLUCOSE F 18 12.6 MILLICURIE: 200 INJECTION, SOLUTION INTRAVENOUS at 07:57

## 2023-07-03 NOTE — PROGRESS NOTES
[facility] [place] [facility]  [facility] EAR, NOSE AND THROAT  [address]  Dept: [phone]  Dept Fax: [phone]  Loc: [phone]    Frances Phillips is a 79 y.o. male who was referred by No ref. provider found for:  Chief Complaint   Patient presents with    Post-Op Check     Patient here for post op exam.        HPI:     Frances Phillips is a 79 y.o. 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 The results of this study that was performed earlier this morning are not yet available. The patient believes that he does have lymph nodes that are involved with the process just because he feels area of swelling in the upper neck concerning for this. In addition he also has bone pain in his jaw and he has ipsilateral ear pain that were his 2 presenting symptoms to his primary care clinician as far back as 6 months ago. 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 He stated at the beginning of our visit that he wants surgery to remove the cancer and the lymph nodes in the neck if necessary and only wants radiation therapy if he has to have it based on what is found at surgery. Should have initial consultation visits with radiation and medical oncology already in the system.          History:     No Known Allergies  Current Outpatient Medications   Medication Sig Dispense Refill    clopidogrel (PLAVIX) 75 MG tablet       acetaminophen (TYLENOL) 500 MG tablet Take 1 tablet by mouth every 6 hours as needed for Pain      cyclobenzaprine (FLEXERIL) 10 MG tablet Take 1 tablet by mouth every 8 hours as needed for Muscle spasms 30 tablet 2    donepezil (ARICEPT) 10 MG tablet Take 1 tablet by mouth nightly      Handicap Placard MISC by Does not apply route Duration 5 years 1 each 0

## 2023-07-04 ENCOUNTER — TELEPHONE (OUTPATIENT)
Dept: ENT CLINIC | Age: 68
End: 2023-07-04

## 2023-07-04 DIAGNOSIS — H92.01 REFERRED OTALGIA OF RIGHT EAR: ICD-10-CM

## 2023-07-04 DIAGNOSIS — C09.9 PRIMARY TONSILLAR SQUAMOUS CELL CARCINOMA (HCC): Primary | ICD-10-CM

## 2023-07-04 DIAGNOSIS — J35.8 TONSILLAR MASS: ICD-10-CM

## 2023-07-04 DIAGNOSIS — R13.14 PHARYNGOESOPHAGEAL DYSPHAGIA: ICD-10-CM

## 2023-07-04 NOTE — TELEPHONE ENCOUNTER
I contacted the patient by phone to go over with him the results of his PET CT scan. Not only do they show a clearly defined FDG avid lesion of the right palatine fossa with an SUV of 21, there is no gross evidence of mandibular involvement and no signs of metastatic disease to the neck. I will attempt to contact him by phone again either later today or tomorrow if he wishes to discuss any of this. We did talk over this possibility and this is the basis that I needed to decide if offering him primary resection with staging neck dissection to prove histologically whether or not he has any metastatic disease to his lymph nodes, would be the treatment option I would recommend and I would want if this disease were in my neck and throat. Gareth Joyce.  Manny Swan MD

## 2023-07-05 NOTE — TELEPHONE ENCOUNTER
I spoke to NIKKO by phone today. He received Dr Marlena River' voicemail and wants to proceed. I was holding surgery robot time on 8/11 but Dr Marlena River wanted me to try and move this forward. After some rearranging to allow for the staging procedure also, I was able to get him scheduled on:  7/21/23 - TORS Radical Tonsillectomy with pharyngeal advancement flap reconstruction. 7/28/23 - selective neck dissection. Please confirm and PLACE ORDERS. Thank you!

## 2023-07-12 ENCOUNTER — TELEPHONE (OUTPATIENT)
Dept: ENT CLINIC | Age: 68
End: 2023-07-12

## 2023-07-12 ENCOUNTER — PREP FOR PROCEDURE (OUTPATIENT)
Dept: ENT CLINIC | Age: 68
End: 2023-07-12

## 2023-07-12 NOTE — TELEPHONE ENCOUNTER
I spoke to Jazmin Nolen to make sure he had instructions to hold his Plavix for 7 days prior to surgery. He was aware.

## 2023-07-17 NOTE — PROGRESS NOTES
Follow all instructions given by your physician  NPO after midnight  Sips of water am of surgery with allowed medications  Bring insurance info and 's license  Wear clean comfortable , loose-fitting clothing  No jewelry or contact lenses to be worn day of surgery  No glue on dentures morning of surgery; you will be asked to remove them for surgery. Case for glasses. Shower the night before and the morning of surgery with a liquid antibacterial soap, dry with new fresh clean towel after each shower, no lotions, creams or powder. Clean sheets and pillowcase on bed night before surgery  Bring medications in original bottles  Bring CPAP/BIPAP machine if you have one ( you may be charged if one is needed in recovery room )    Our pharmacy has a Meds to [facility] program where they will deliver any new prescriptions you may have to your room before you leave. Our Pharmacy will clear it through your insurance; for example (same co pay). This enables you to take your new RX as soon as you need when you get home and avoids stop/wait delays on the way home. Please have a form of payment with you and have someone designated as your Pharmacy contact with their phone # as you may not feel well or still be under the influence of anesthesia. Please refer to the SSI-Surgical Site Infection Flyer you hopefully received in the mail-together we can prevent infections; signs and symptoms reviewed. When discharged be sure you understand how to care for your wound and that you have the Dr./office phone # to call if you have any concerns or questions about your wound.      needed at discharge and someone over 18 to stay with you for 24 hours overnight (surgery may be cancelled if you don't have this)  Report to [facility] on 2nd floor  If you would become ill prior to surgery, please call the surgeon  May have a visitor with you, we request that you limit to 2 visitors in pre-op area  Masks are recommended but not required, new

## 2023-07-17 NOTE — PROGRESS NOTES
PAT Call Date: 7/17   Surgery Date: 7/28    Surgeon: Maurizio Cantrell  Surgery: tonsillectomy     Is patient from a nursing home? No   Any Isolation Precautions? No   Any Pacemaker or ICD? No If YES, has it been checked recently and where? Has the rep been notified? No     On Snapboard? No  6/21/23 LARYNGO W. BX   Hard Copy on Chart  In EPIC Pending/Notes   Consent -   Within 30 days; signed, dated & timed by patient and physician     [x] On Arrival     [] Blood    Additional Consent Needs:     H&P - Within 30 days    [] Physician To Do     [] H&P Update - If H&P is older then 24 hours    Clearance -  Medical, Cardiac, Pulmonary, etc.       Orders - Signed and Dated    Copy Sent to Pharm []  pended  [] Physician To Do    Labs - Within 3 months   6/96/9  [x] CBC-ok    [x] BMP-ok   [x] GFR-ok   [] INR    [] PTT    [] Urine    [] Liver Enzymes    [] Kidney Function    [] MRSA Nasal   [] MSSA      Others:    Radiology Studies-   Within 1 year  6/2  [x] P.  Chest X-Ray-ok   [] MRI    [] CT    EKG -   Within 1 year, unless hx of HTN  2/9 SR   Cardiac Workup -   Stress Test, Echo, Cath within 18 months    [] Cath                                [] Stress Test                      [] Echo    [] Holter Monitor    [] CHER

## 2023-07-20 RX ORDER — SODIUM CHLORIDE 0.9 % (FLUSH) 0.9 %
5-40 SYRINGE (ML) INJECTION EVERY 12 HOURS SCHEDULED
Status: CANCELLED | OUTPATIENT
Start: 2023-07-20

## 2023-07-20 RX ORDER — SODIUM CHLORIDE 9 MG/ML
INJECTION, SOLUTION INTRAVENOUS PRN
Status: CANCELLED | OUTPATIENT
Start: 2023-07-20

## 2023-07-20 RX ORDER — DEXAMETHASONE SODIUM PHOSPHATE 10 MG/ML
10 INJECTION INTRAMUSCULAR; INTRAVENOUS ONCE
Status: CANCELLED | OUTPATIENT
Start: 2023-07-20 | End: 2023-07-20

## 2023-07-20 RX ORDER — SODIUM CHLORIDE 0.9 % (FLUSH) 0.9 %
5-40 SYRINGE (ML) INJECTION PRN
Status: CANCELLED | OUTPATIENT
Start: 2023-07-20

## 2023-07-20 NOTE — H&P
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  No new symptoms    Past Medical History:   Diagnosis Date    Anxiety     Arthritis     neck, back, hands bilat    Cancer ([address]) 2022    melinoma removed from face    GERD (gastroesophageal reflux disease)     Hiatal hernia     EGD    PONV (postoperative nausea and vomiting)     PUD (peptic ulcer disease)     LONG TIME  AGO    Rectal bleeding     2o11- colonoscopy - dr Tom Li       Past Surgical History:   Procedure Laterality Date    BRONCHOSCOPY N/A 6/21/2023    BRONCHOSCOPY performed by Mavis Meadows MD at [address] [place] [address] in right eye    GASTRIC FUNDOPLICATION N/A 9/7/2335    ROBOTIC HIATAL HERNIA REPAIR AND NISSEN FUNDOPLICATION performed by Karyn Nassar MD at [address] [facility] N/A 6/21/2023    Laryngoscopy with Biopsy, Flexible Esophagoscopy - Diagnostic Bronchoscopy with Broncheal Alveolar Lavage, Biopsy of Oropharynx performed by Mavis Meadows MD at [facility] in neck     UPPER GASTROINTESTINAL ENDOSCOPY N/A 2/10/2023    EGD, POSS BIOPSIES,  MEJÍA performed by Ginger Bella MD at [address] [facility] N/A 4/13/2023    EGD ESOPHAGEAL MANOMETRY AND PH MONITORING 24 HR performed by David Chandler MD at [facility]       No Known Allergies    No current facility-administered medications for this visit. No current outpatient medications on file.      Facility-Administered Medications Ordered in Other Visits   Medication Dose Route Frequency Provider Last Rate Last Admin    ampicillin-sulbactam (UNASYN) 3,000 mg in sodium chloride 0.9 % 100 mL IVPB (mini-bag)  3,000 mg IntraVENous Once Kathleen Salazar, APRN - CNP        sodium chloride flush 0.9 % injection 5-40 mL  5-40 mL IntraVENous 2 times per day Crystal LYLA Kendrickifon, APRN - CNP        sodium chloride flush 0.9 % injection 5-40 mL

## 2023-07-20 NOTE — H&P (VIEW-ONLY)
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  No new symptoms    Past Medical History:   Diagnosis Date    Anxiety     Arthritis     neck, back, hands bilat    Cancer ([address]) 2022    melinoma removed from face    GERD (gastroesophageal reflux disease)     Hiatal hernia     EGD    PONV (postoperative nausea and vomiting)     PUD (peptic ulcer disease)     LONG TIME  AGO    Rectal bleeding     2o11- colonoscopy - dr Olive Wang       Past Surgical History:   Procedure Laterality Date    BRONCHOSCOPY N/A 6/21/2023    BRONCHOSCOPY performed by Roger Matias MD at [facility]  plate in right eye    GASTRIC FUNDOPLICATION N/A 0/4/0282    ROBOTIC HIATAL HERNIA REPAIR AND NISSEN FUNDOPLICATION performed by Kristina Rocha MD at [address] [facility] N/A 6/21/2023    Laryngoscopy with Biopsy, Flexible Esophagoscopy - Diagnostic Bronchoscopy with Broncheal Alveolar Lavage, Biopsy of Oropharynx performed by Roger Matias MD at [address] [facility]    plate in neck     UPPER GASTROINTESTINAL ENDOSCOPY N/A 2/10/2023    EGD, POSS BIOPSIES,  MEJÍA performed by Margaretta Collet, MD at [address] [facility] N/A 4/13/2023    EGD ESOPHAGEAL MANOMETRY AND PH MONITORING 24 HR performed by Ceasar Gilmore MD at [facility]       No Known Allergies    No current facility-administered medications for this visit. No current outpatient medications on file.      Facility-Administered Medications Ordered in Other Visits   Medication Dose Route Frequency Provider Last Rate Last Admin    ampicillin-sulbactam (UNASYN) 3,000 mg in sodium chloride 0.9 % 100 mL IVPB (mini-bag)  3,000 mg IntraVENous Once Kathleen Salazar, APRN - CNP        sodium chloride flush 0.9 % injection 5-40 mL  5-40 mL IntraVENous 2 times per day Crystal LYLA Kendrickifon, APRN - CNP        sodium chloride flush 0.9 % injection 5-40 mL 8.5 05/09/2023 05:02 AM     PROT 8.1 06/09/2023 09:30 PM     PROT 7.2 06/07/2023 10:44 AM     PROT 7.7 01/15/2023 05:53 AM     LABALBU 5.0 06/09/2023 09:30 PM     LABALBU 4.6 06/07/2023 10:44 AM     LABALBU 4.7 01/15/2023 05:53 AM     LABALBU 4.3 07/09/2011 08:24 AM     BILITOT 0.4 06/09/2023 09:30 PM     BILITOT 0.5 06/07/2023 10:44 AM     BILITOT 0.6 01/15/2023 05:53 AM     ALKPHOS 94 06/09/2023 09:30 PM     ALKPHOS 83 06/07/2023 10:44 AM     ALKPHOS 84 01/15/2023 05:53 AM     AST 25 06/09/2023 09:30 PM     AST 26 06/07/2023 10:44 AM     AST 21 01/15/2023 05:53 AM     ALT 27 06/09/2023 09:30 PM     ALT 27 06/07/2023 10:44 AM     ALT 23 01/15/2023 05:53 AM         All of the past medical history, past surgical history, family history,social history, allergies and current medications were reviewed with the patient. Assessment & Plan   Diagnoses and all orders for this visit:       Diagnosis Orders   1. Primary tonsillar squamous cell carcinoma ([address])          2. Referred otalgia of right ear                         The findings were explained and his questions were answered. Based on the patient's history and physical findings today as well as the results of his PET CT scan which will be known later, the patient is likely to have a surgical option that would involve a radical tonsillectomy possibly extending up onto the soft tissues of the jaw but if the bone itself is involved he will need a partial mandibulectomy. If that is the case I will refer him to [facility] since he may need a free flap reconstruction of the soft tissue and bone as part of his care. July 4, 2023: After reviewing the patient's PET CT scan both the reading and my own review of the images, the patient has localized disease to the tonsillar fossa and no evidence of bony invasion. Certainly it is abutting the mandible. He has no findings in the neck consistent with metastatic lymphadenopathy either.      Based on these findings I will offer him

## 2023-07-21 ENCOUNTER — ANESTHESIA (OUTPATIENT)
Dept: OPERATING ROOM | Age: 68
End: 2023-07-21
Payer: MEDICARE

## 2023-07-21 ENCOUNTER — HOSPITAL ENCOUNTER (INPATIENT)
Age: 68
LOS: 12 days | Discharge: HOME HEALTH | End: 2023-08-03
Attending: OTOLARYNGOLOGY | Admitting: OTOLARYNGOLOGY
Payer: COMMERCIAL

## 2023-07-21 ENCOUNTER — APPOINTMENT (OUTPATIENT)
Dept: GENERAL RADIOLOGY | Age: 68
End: 2023-07-21
Attending: OTOLARYNGOLOGY
Payer: COMMERCIAL

## 2023-07-21 ENCOUNTER — ANESTHESIA EVENT (OUTPATIENT)
Dept: OPERATING ROOM | Age: 68
End: 2023-07-21
Payer: MEDICARE

## 2023-07-21 DIAGNOSIS — C09.9: ICD-10-CM

## 2023-07-21 DIAGNOSIS — C09.9 PRIMARY SQUAMOUS CELL CARCINOMA OF PALATINE TONSIL (HCC): ICD-10-CM

## 2023-07-21 DIAGNOSIS — G89.18 ACUTE POST-OPERATIVE PAIN: Primary | ICD-10-CM

## 2023-07-21 PROCEDURE — 41120 PARTIAL REMOVAL OF TONGUE: CPT | Performed by: OTOLARYNGOLOGY

## 2023-07-21 PROCEDURE — 3700000001 HC ADD 15 MINUTES (ANESTHESIA): Performed by: OTOLARYNGOLOGY

## 2023-07-21 PROCEDURE — 42225 RECONSTRUCT CLEFT PALATE: CPT | Performed by: OTOLARYNGOLOGY

## 2023-07-21 PROCEDURE — 2500000003 HC RX 250 WO HCPCS: Performed by: NURSE ANESTHETIST, CERTIFIED REGISTERED

## 2023-07-21 PROCEDURE — 3600000009 HC SURGERY ROBOT BASE: Performed by: OTOLARYNGOLOGY

## 2023-07-21 PROCEDURE — 42845 EXTENSIVE SURGERY OF THROAT: CPT | Performed by: NURSE PRACTITIONER

## 2023-07-21 PROCEDURE — 2580000003 HC RX 258: Performed by: NURSE PRACTITIONER

## 2023-07-21 PROCEDURE — 6370000000 HC RX 637 (ALT 250 FOR IP): Performed by: NURSE PRACTITIONER

## 2023-07-21 PROCEDURE — 6360000002 HC RX W HCPCS: Performed by: NURSE ANESTHETIST, CERTIFIED REGISTERED

## 2023-07-21 PROCEDURE — 0CQ30ZZ REPAIR SOFT PALATE, OPEN APPROACH: ICD-10-PCS | Performed by: OTOLARYNGOLOGY

## 2023-07-21 PROCEDURE — 6360000002 HC RX W HCPCS: Performed by: ANESTHESIOLOGY

## 2023-07-21 PROCEDURE — 88331 PATH CONSLTJ SURG 1 BLK 1SPC: CPT

## 2023-07-21 PROCEDURE — 6360000002 HC RX W HCPCS: Performed by: NURSE PRACTITIONER

## 2023-07-21 PROCEDURE — 88304 TISSUE EXAM BY PATHOLOGIST: CPT

## 2023-07-21 PROCEDURE — 7100000001 HC PACU RECOVERY - ADDTL 15 MIN: Performed by: OTOLARYNGOLOGY

## 2023-07-21 PROCEDURE — 88305 TISSUE EXAM BY PATHOLOGIST: CPT

## 2023-07-21 PROCEDURE — 42845 EXTENSIVE SURGERY OF THROAT: CPT | Performed by: OTOLARYNGOLOGY

## 2023-07-21 PROCEDURE — C9399 UNCLASSIFIED DRUGS OR BIOLOG: HCPCS | Performed by: NURSE ANESTHETIST, CERTIFIED REGISTERED

## 2023-07-21 PROCEDURE — 42225 RECONSTRUCT CLEFT PALATE: CPT | Performed by: NURSE PRACTITIONER

## 2023-07-21 PROCEDURE — 88309 TISSUE EXAM BY PATHOLOGIST: CPT

## 2023-07-21 PROCEDURE — 88342 IMHCHEM/IMCYTCHM 1ST ANTB: CPT

## 2023-07-21 PROCEDURE — 6360000002 HC RX W HCPCS

## 2023-07-21 PROCEDURE — 41120 PARTIAL REMOVAL OF TONGUE: CPT | Performed by: NURSE PRACTITIONER

## 2023-07-21 PROCEDURE — 88341 IMHCHEM/IMCYTCHM EA ADD ANTB: CPT

## 2023-07-21 PROCEDURE — 6370000000 HC RX 637 (ALT 250 FOR IP): Performed by: OTOLARYNGOLOGY

## 2023-07-21 PROCEDURE — 3700000000 HC ANESTHESIA ATTENDED CARE: Performed by: OTOLARYNGOLOGY

## 2023-07-21 PROCEDURE — 6360000002 HC RX W HCPCS: Performed by: OTOLARYNGOLOGY

## 2023-07-21 PROCEDURE — 2709999900 HC NON-CHARGEABLE SUPPLY: Performed by: OTOLARYNGOLOGY

## 2023-07-21 PROCEDURE — 2500000003 HC RX 250 WO HCPCS

## 2023-07-21 PROCEDURE — A4216 STERILE WATER/SALINE, 10 ML: HCPCS | Performed by: NURSE PRACTITIONER

## 2023-07-21 PROCEDURE — 0KX40Z1 TRANSFER TONGUE, PALATE, PHARYNX MUSCLE WITH SUBCUTANEOUS TISSUE, OPEN APPROACH: ICD-10-PCS | Performed by: OTOLARYNGOLOGY

## 2023-07-21 PROCEDURE — 74018 RADEX ABDOMEN 1 VIEW: CPT

## 2023-07-21 PROCEDURE — 7100000000 HC PACU RECOVERY - FIRST 15 MIN: Performed by: OTOLARYNGOLOGY

## 2023-07-21 PROCEDURE — 0CTPXZZ RESECTION OF TONSILS, EXTERNAL APPROACH: ICD-10-PCS | Performed by: OTOLARYNGOLOGY

## 2023-07-21 PROCEDURE — 2720000010 HC SURG SUPPLY STERILE: Performed by: OTOLARYNGOLOGY

## 2023-07-21 PROCEDURE — 2500000003 HC RX 250 WO HCPCS: Performed by: ANESTHESIOLOGY

## 2023-07-21 PROCEDURE — 3600000019 HC SURGERY ROBOT ADDTL 15MIN: Performed by: OTOLARYNGOLOGY

## 2023-07-21 PROCEDURE — S2900 ROBOTIC SURGICAL SYSTEM: HCPCS | Performed by: OTOLARYNGOLOGY

## 2023-07-21 PROCEDURE — 8E09XCZ ROBOTIC ASSISTED PROCEDURE OF HEAD AND NECK REGION: ICD-10-PCS | Performed by: OTOLARYNGOLOGY

## 2023-07-21 PROCEDURE — 2500000003 HC RX 250 WO HCPCS: Performed by: NURSE PRACTITIONER

## 2023-07-21 RX ORDER — SODIUM CHLORIDE 9 MG/ML
INJECTION, SOLUTION INTRAVENOUS CONTINUOUS
Status: DISCONTINUED | OUTPATIENT
Start: 2023-07-21 | End: 2023-08-03 | Stop reason: HOSPADM

## 2023-07-21 RX ORDER — ONDANSETRON 2 MG/ML
4 INJECTION INTRAMUSCULAR; INTRAVENOUS
Status: DISCONTINUED | OUTPATIENT
Start: 2023-07-21 | End: 2023-07-21 | Stop reason: HOSPADM

## 2023-07-21 RX ORDER — FENTANYL CITRATE 50 UG/ML
50 INJECTION, SOLUTION INTRAMUSCULAR; INTRAVENOUS EVERY 5 MIN PRN
Status: COMPLETED | OUTPATIENT
Start: 2023-07-21 | End: 2023-07-21

## 2023-07-21 RX ORDER — HYDRALAZINE HYDROCHLORIDE 20 MG/ML
INJECTION INTRAMUSCULAR; INTRAVENOUS
Status: COMPLETED
Start: 2023-07-21 | End: 2023-07-21

## 2023-07-21 RX ORDER — OXYCODONE HYDROCHLORIDE 5 MG/1
5 TABLET ORAL EVERY 4 HOURS PRN
Status: DISCONTINUED | OUTPATIENT
Start: 2023-07-21 | End: 2023-08-03 | Stop reason: HOSPADM

## 2023-07-21 RX ORDER — ONDANSETRON 2 MG/ML
INJECTION INTRAMUSCULAR; INTRAVENOUS PRN
Status: DISCONTINUED | OUTPATIENT
Start: 2023-07-21 | End: 2023-07-21 | Stop reason: SDUPTHER

## 2023-07-21 RX ORDER — DEXAMETHASONE SODIUM PHOSPHATE 10 MG/ML
10 INJECTION, EMULSION INTRAMUSCULAR; INTRAVENOUS ONCE
Status: COMPLETED | OUTPATIENT
Start: 2023-07-21 | End: 2023-07-21

## 2023-07-21 RX ORDER — SODIUM CHLORIDE 9 MG/ML
INJECTION, SOLUTION INTRAVENOUS PRN
Status: DISCONTINUED | OUTPATIENT
Start: 2023-07-21 | End: 2023-07-21 | Stop reason: HOSPADM

## 2023-07-21 RX ORDER — MEPERIDINE HYDROCHLORIDE 25 MG/ML
12.5 INJECTION INTRAMUSCULAR; INTRAVENOUS; SUBCUTANEOUS EVERY 5 MIN PRN
Status: DISCONTINUED | OUTPATIENT
Start: 2023-07-21 | End: 2023-07-21 | Stop reason: HOSPADM

## 2023-07-21 RX ORDER — PHENYLEPHRINE HYDROCHLORIDE 10 MG/ML
INJECTION INTRAVENOUS PRN
Status: DISCONTINUED | OUTPATIENT
Start: 2023-07-21 | End: 2023-07-21 | Stop reason: SDUPTHER

## 2023-07-21 RX ORDER — ACETAMINOPHEN 160 MG/5ML
650 SOLUTION ORAL EVERY 6 HOURS
Status: DISCONTINUED | OUTPATIENT
Start: 2023-07-21 | End: 2023-08-03 | Stop reason: HOSPADM

## 2023-07-21 RX ORDER — POLYETHYLENE GLYCOL 3350 17 G/17G
17 POWDER, FOR SOLUTION ORAL DAILY PRN
Status: DISCONTINUED | OUTPATIENT
Start: 2023-07-21 | End: 2023-08-03 | Stop reason: HOSPADM

## 2023-07-21 RX ORDER — SODIUM CHLORIDE 9 MG/ML
INJECTION, SOLUTION INTRAVENOUS PRN
Status: DISCONTINUED | OUTPATIENT
Start: 2023-07-21 | End: 2023-08-03 | Stop reason: HOSPADM

## 2023-07-21 RX ORDER — HYDRALAZINE HYDROCHLORIDE 20 MG/ML
10 INJECTION INTRAMUSCULAR; INTRAVENOUS EVERY 10 MIN PRN
Status: COMPLETED | OUTPATIENT
Start: 2023-07-21 | End: 2023-07-21

## 2023-07-21 RX ORDER — METOPROLOL TARTRATE 5 MG/5ML
INJECTION INTRAVENOUS
Status: COMPLETED
Start: 2023-07-21 | End: 2023-07-21

## 2023-07-21 RX ORDER — SODIUM CHLORIDE 0.9 % (FLUSH) 0.9 %
5-40 SYRINGE (ML) INJECTION EVERY 12 HOURS SCHEDULED
Status: DISCONTINUED | OUTPATIENT
Start: 2023-07-21 | End: 2023-08-03 | Stop reason: HOSPADM

## 2023-07-21 RX ORDER — CYCLOBENZAPRINE HCL 10 MG
10 TABLET ORAL EVERY 8 HOURS PRN
Status: DISCONTINUED | OUTPATIENT
Start: 2023-07-21 | End: 2023-08-03 | Stop reason: HOSPADM

## 2023-07-21 RX ORDER — METOPROLOL TARTRATE 5 MG/5ML
5 INJECTION INTRAVENOUS ONCE
Status: COMPLETED | OUTPATIENT
Start: 2023-07-21 | End: 2023-07-21

## 2023-07-21 RX ORDER — SCOLOPAMINE TRANSDERMAL SYSTEM 1 MG/1
PATCH, EXTENDED RELEASE TRANSDERMAL
Status: COMPLETED
Start: 2023-07-21 | End: 2023-07-21

## 2023-07-21 RX ORDER — SODIUM CHLORIDE 0.9 % (FLUSH) 0.9 %
5-40 SYRINGE (ML) INJECTION EVERY 12 HOURS SCHEDULED
Status: DISCONTINUED | OUTPATIENT
Start: 2023-07-21 | End: 2023-07-21 | Stop reason: HOSPADM

## 2023-07-21 RX ORDER — DONEPEZIL HYDROCHLORIDE 10 MG/1
20 TABLET, FILM COATED ORAL NIGHTLY
Status: DISCONTINUED | OUTPATIENT
Start: 2023-07-21 | End: 2023-08-03 | Stop reason: HOSPADM

## 2023-07-21 RX ORDER — LIDOCAINE HYDROCHLORIDE 20 MG/ML
INJECTION, SOLUTION INTRAVENOUS PRN
Status: DISCONTINUED | OUTPATIENT
Start: 2023-07-21 | End: 2023-07-21 | Stop reason: SDUPTHER

## 2023-07-21 RX ORDER — FENTANYL CITRATE 50 UG/ML
INJECTION, SOLUTION INTRAMUSCULAR; INTRAVENOUS
Status: COMPLETED
Start: 2023-07-21 | End: 2023-07-21

## 2023-07-21 RX ORDER — PROPOFOL 10 MG/ML
INJECTION, EMULSION INTRAVENOUS PRN
Status: DISCONTINUED | OUTPATIENT
Start: 2023-07-21 | End: 2023-07-21 | Stop reason: SDUPTHER

## 2023-07-21 RX ORDER — OXYMETAZOLINE HYDROCHLORIDE 0.05 G/100ML
SPRAY NASAL PRN
Status: DISCONTINUED | OUTPATIENT
Start: 2023-07-21 | End: 2023-07-21 | Stop reason: ALTCHOICE

## 2023-07-21 RX ORDER — SODIUM CHLORIDE 0.9 % (FLUSH) 0.9 %
5-40 SYRINGE (ML) INJECTION PRN
Status: DISCONTINUED | OUTPATIENT
Start: 2023-07-21 | End: 2023-07-21 | Stop reason: HOSPADM

## 2023-07-21 RX ORDER — ESMOLOL HYDROCHLORIDE 10 MG/ML
INJECTION INTRAVENOUS PRN
Status: DISCONTINUED | OUTPATIENT
Start: 2023-07-21 | End: 2023-07-21 | Stop reason: SDUPTHER

## 2023-07-21 RX ORDER — FENTANYL CITRATE 50 UG/ML
INJECTION, SOLUTION INTRAMUSCULAR; INTRAVENOUS PRN
Status: DISCONTINUED | OUTPATIENT
Start: 2023-07-21 | End: 2023-07-21 | Stop reason: SDUPTHER

## 2023-07-21 RX ORDER — EPINEPHRINE 1 MG/ML
INJECTION, SOLUTION, CONCENTRATE INTRAVENOUS PRN
Status: DISCONTINUED | OUTPATIENT
Start: 2023-07-21 | End: 2023-07-21 | Stop reason: ALTCHOICE

## 2023-07-21 RX ORDER — SODIUM CHLORIDE 0.9 % (FLUSH) 0.9 %
5-40 SYRINGE (ML) INJECTION PRN
Status: DISCONTINUED | OUTPATIENT
Start: 2023-07-21 | End: 2023-08-03 | Stop reason: HOSPADM

## 2023-07-21 RX ORDER — AMPICILLIN AND SULBACTAM 1; .5 G/1; G/1
INJECTION, POWDER, FOR SOLUTION INTRAMUSCULAR; INTRAVENOUS PRN
Status: DISCONTINUED | OUTPATIENT
Start: 2023-07-21 | End: 2023-07-21 | Stop reason: SDUPTHER

## 2023-07-21 RX ORDER — OXYCODONE HYDROCHLORIDE 5 MG/1
10 TABLET ORAL EVERY 4 HOURS PRN
Status: DISCONTINUED | OUTPATIENT
Start: 2023-07-21 | End: 2023-08-03 | Stop reason: HOSPADM

## 2023-07-21 RX ORDER — ONDANSETRON 2 MG/ML
4 INJECTION INTRAMUSCULAR; INTRAVENOUS EVERY 6 HOURS PRN
Status: DISCONTINUED | OUTPATIENT
Start: 2023-07-21 | End: 2023-08-03 | Stop reason: HOSPADM

## 2023-07-21 RX ORDER — ROCURONIUM BROMIDE 10 MG/ML
INJECTION, SOLUTION INTRAVENOUS PRN
Status: DISCONTINUED | OUTPATIENT
Start: 2023-07-21 | End: 2023-07-21 | Stop reason: SDUPTHER

## 2023-07-21 RX ADMIN — ONDANSETRON 4 MG: 2 INJECTION INTRAMUSCULAR; INTRAVENOUS at 09:39

## 2023-07-21 RX ADMIN — HYDRALAZINE HYDROCHLORIDE 10 MG: 20 INJECTION INTRAMUSCULAR; INTRAVENOUS at 13:00

## 2023-07-21 RX ADMIN — HYDROMORPHONE HYDROCHLORIDE 0.25 MG: 1 INJECTION, SOLUTION INTRAMUSCULAR; INTRAVENOUS; SUBCUTANEOUS at 12:35

## 2023-07-21 RX ADMIN — SODIUM CHLORIDE 3000 MG: 900 INJECTION INTRAVENOUS at 07:56

## 2023-07-21 RX ADMIN — ROCURONIUM BROMIDE 50 MG: 10 INJECTION INTRAVENOUS at 07:45

## 2023-07-21 RX ADMIN — HYDROMORPHONE HYDROCHLORIDE 0.5 MG: 1 INJECTION, SOLUTION INTRAMUSCULAR; INTRAVENOUS; SUBCUTANEOUS at 12:45

## 2023-07-21 RX ADMIN — HYDROMORPHONE HYDROCHLORIDE 0.5 MG: 1 INJECTION, SOLUTION INTRAMUSCULAR; INTRAVENOUS; SUBCUTANEOUS at 12:40

## 2023-07-21 RX ADMIN — PHENYLEPHRINE HYDROCHLORIDE 100 MCG: 10 INJECTION INTRAVENOUS at 09:17

## 2023-07-21 RX ADMIN — FENTANYL CITRATE 50 MCG: 50 INJECTION, SOLUTION INTRAMUSCULAR; INTRAVENOUS at 07:52

## 2023-07-21 RX ADMIN — OXYCODONE 10 MG: 5 TABLET ORAL at 18:13

## 2023-07-21 RX ADMIN — OXYCODONE 10 MG: 5 TABLET ORAL at 23:01

## 2023-07-21 RX ADMIN — FENTANYL CITRATE 50 MCG: 50 INJECTION, SOLUTION INTRAMUSCULAR; INTRAVENOUS at 07:45

## 2023-07-21 RX ADMIN — FENTANYL CITRATE 50 MCG: 50 INJECTION, SOLUTION INTRAMUSCULAR; INTRAVENOUS at 13:25

## 2023-07-21 RX ADMIN — HYDROMORPHONE HYDROCHLORIDE 0.5 MG: 1 INJECTION, SOLUTION INTRAMUSCULAR; INTRAVENOUS; SUBCUTANEOUS at 15:46

## 2023-07-21 RX ADMIN — SODIUM CHLORIDE: 9 INJECTION, SOLUTION INTRAVENOUS at 06:46

## 2023-07-21 RX ADMIN — PROPOFOL 50 MG: 10 INJECTION, EMULSION INTRAVENOUS at 07:52

## 2023-07-21 RX ADMIN — HYDRALAZINE HYDROCHLORIDE 10 MG: 20 INJECTION INTRAMUSCULAR; INTRAVENOUS at 12:50

## 2023-07-21 RX ADMIN — ACETAMINOPHEN 650 MG: 160 SOLUTION ORAL at 23:01

## 2023-07-21 RX ADMIN — SUGAMMADEX 200 MG: 100 INJECTION, SOLUTION INTRAVENOUS at 11:48

## 2023-07-21 RX ADMIN — ROCURONIUM BROMIDE 10 MG: 10 INJECTION INTRAVENOUS at 10:45

## 2023-07-21 RX ADMIN — FENTANYL CITRATE 50 MCG: 50 INJECTION, SOLUTION INTRAMUSCULAR; INTRAVENOUS at 13:10

## 2023-07-21 RX ADMIN — DONEPEZIL HYDROCHLORIDE 20 MG: 10 TABLET, FILM COATED ORAL at 20:51

## 2023-07-21 RX ADMIN — PROPOFOL 50 MG: 10 INJECTION, EMULSION INTRAVENOUS at 08:18

## 2023-07-21 RX ADMIN — PHENYLEPHRINE HYDROCHLORIDE 200 MCG: 10 INJECTION INTRAVENOUS at 11:32

## 2023-07-21 RX ADMIN — FENTANYL CITRATE 50 MCG: 50 INJECTION, SOLUTION INTRAMUSCULAR; INTRAVENOUS at 13:00

## 2023-07-21 RX ADMIN — LIDOCAINE HYDROCHLORIDE 100 MG: 20 INJECTION, SOLUTION INTRAVENOUS at 07:45

## 2023-07-21 RX ADMIN — METOPROLOL TARTRATE 5 MG: 5 INJECTION INTRAVENOUS at 13:20

## 2023-07-21 RX ADMIN — FENTANYL CITRATE 100 MCG: 50 INJECTION, SOLUTION INTRAMUSCULAR; INTRAVENOUS at 12:08

## 2023-07-21 RX ADMIN — FENTANYL CITRATE 50 MCG: 50 INJECTION, SOLUTION INTRAMUSCULAR; INTRAVENOUS at 12:50

## 2023-07-21 RX ADMIN — HYDROMORPHONE HYDROCHLORIDE 0.25 MG: 1 INJECTION, SOLUTION INTRAMUSCULAR; INTRAVENOUS; SUBCUTANEOUS at 12:30

## 2023-07-21 RX ADMIN — ESMOLOL HYDROCHLORIDE 20 MG: 10 INJECTION, SOLUTION INTRAVENOUS at 08:20

## 2023-07-21 RX ADMIN — FENTANYL CITRATE 50 MCG: 50 INJECTION, SOLUTION INTRAMUSCULAR; INTRAVENOUS at 12:25

## 2023-07-21 RX ADMIN — ROCURONIUM BROMIDE 20 MG: 10 INJECTION INTRAVENOUS at 08:47

## 2023-07-21 RX ADMIN — ESMOLOL HYDROCHLORIDE 10 MG: 10 INJECTION, SOLUTION INTRAVENOUS at 09:27

## 2023-07-21 RX ADMIN — ACETAMINOPHEN 650 MG: 160 SOLUTION ORAL at 18:12

## 2023-07-21 RX ADMIN — PHENYLEPHRINE HYDROCHLORIDE 100 MCG: 10 INJECTION INTRAVENOUS at 08:33

## 2023-07-21 RX ADMIN — FENTANYL CITRATE 50 MCG: 50 INJECTION, SOLUTION INTRAMUSCULAR; INTRAVENOUS at 08:16

## 2023-07-21 RX ADMIN — FENTANYL CITRATE 50 MCG: 50 INJECTION, SOLUTION INTRAMUSCULAR; INTRAVENOUS at 12:20

## 2023-07-21 RX ADMIN — SODIUM CHLORIDE: 9 INJECTION, SOLUTION INTRAVENOUS at 09:27

## 2023-07-21 RX ADMIN — ROCURONIUM BROMIDE 10 MG: 10 INJECTION INTRAVENOUS at 11:26

## 2023-07-21 RX ADMIN — PHENYLEPHRINE HYDROCHLORIDE 100 MCG: 10 INJECTION INTRAVENOUS at 09:00

## 2023-07-21 RX ADMIN — PROPOFOL 150 MG: 10 INJECTION, EMULSION INTRAVENOUS at 07:45

## 2023-07-21 RX ADMIN — PHENYLEPHRINE HYDROCHLORIDE 100 MCG: 10 INJECTION INTRAVENOUS at 08:40

## 2023-07-21 RX ADMIN — SODIUM CHLORIDE 3000 MG: 900 INJECTION INTRAVENOUS at 23:08

## 2023-07-21 RX ADMIN — SODIUM CHLORIDE 3000 MG: 900 INJECTION INTRAVENOUS at 18:12

## 2023-07-21 RX ADMIN — HYDROMORPHONE HYDROCHLORIDE 0.5 MG: 1 INJECTION, SOLUTION INTRAMUSCULAR; INTRAVENOUS; SUBCUTANEOUS at 12:55

## 2023-07-21 RX ADMIN — AMPICILLIN SODIUM AND SULBACTAM SODIUM 3 G: 1; .5 INJECTION, POWDER, FOR SOLUTION INTRAMUSCULAR; INTRAVENOUS at 10:45

## 2023-07-21 RX ADMIN — DEXAMETHASONE SODIUM PHOSPHATE 10 MG: 10 INJECTION, EMULSION INTRAMUSCULAR; INTRAVENOUS at 06:46

## 2023-07-21 RX ADMIN — FENTANYL CITRATE 50 MCG: 50 INJECTION, SOLUTION INTRAMUSCULAR; INTRAVENOUS at 12:03

## 2023-07-21 RX ADMIN — PROPOFOL 50 MG: 10 INJECTION, EMULSION INTRAVENOUS at 11:26

## 2023-07-21 RX ADMIN — SODIUM CHLORIDE: 9 INJECTION, SOLUTION INTRAVENOUS at 15:45

## 2023-07-21 RX ADMIN — SODIUM CHLORIDE, PRESERVATIVE FREE 20 MG: 5 INJECTION INTRAVENOUS at 20:48

## 2023-07-21 RX ADMIN — PHENYLEPHRINE HYDROCHLORIDE 100 MCG: 10 INJECTION INTRAVENOUS at 08:47

## 2023-07-21 ASSESSMENT — PAIN SCALES - GENERAL
PAINLEVEL_OUTOF10: 5
PAINLEVEL_OUTOF10: 7
PAINLEVEL_OUTOF10: 9
PAINLEVEL_OUTOF10: 5
PAINLEVEL_OUTOF10: 7
PAINLEVEL_OUTOF10: 6
PAINLEVEL_OUTOF10: 3
PAINLEVEL_OUTOF10: 6
PAINLEVEL_OUTOF10: 5
PAINLEVEL_OUTOF10: 6
PAINLEVEL_OUTOF10: 6
PAINLEVEL_OUTOF10: 4
PAINLEVEL_OUTOF10: 6
PAINLEVEL_OUTOF10: 7
PAINLEVEL_OUTOF10: 7
PAINLEVEL_OUTOF10: 4
PAINLEVEL_OUTOF10: 8
PAINLEVEL_OUTOF10: 7
PAINLEVEL_OUTOF10: 6
PAINLEVEL_OUTOF10: 6
PAINLEVEL_OUTOF10: 7

## 2023-07-21 ASSESSMENT — PAIN DESCRIPTION - LOCATION
LOCATION: THROAT
LOCATION: MOUTH;THROAT
LOCATION: NECK;THROAT
LOCATION: THROAT

## 2023-07-21 ASSESSMENT — PAIN DESCRIPTION - ORIENTATION
ORIENTATION: MID
ORIENTATION: MID
ORIENTATION: INNER
ORIENTATION: INNER
ORIENTATION: MID
ORIENTATION: INNER

## 2023-07-21 ASSESSMENT — PAIN DESCRIPTION - PAIN TYPE
TYPE: SURGICAL PAIN
TYPE: SURGICAL PAIN

## 2023-07-21 ASSESSMENT — PAIN DESCRIPTION - DESCRIPTORS
DESCRIPTORS: SORE
DESCRIPTORS: DISCOMFORT
DESCRIPTORS: PATIENT UNABLE TO DESCRIBE
DESCRIPTORS: SORE
DESCRIPTORS: ACHING;SORE
DESCRIPTORS: ACHING
DESCRIPTORS: THROBBING

## 2023-07-21 ASSESSMENT — PAIN DESCRIPTION - ONSET: ONSET: ON-GOING

## 2023-07-21 ASSESSMENT — PAIN DESCRIPTION - FREQUENCY: FREQUENCY: CONTINUOUS

## 2023-07-21 ASSESSMENT — PAIN - FUNCTIONAL ASSESSMENT
PAIN_FUNCTIONAL_ASSESSMENT: PREVENTS OR INTERFERES SOME ACTIVE ACTIVITIES AND ADLS
PAIN_FUNCTIONAL_ASSESSMENT: ACTIVITIES ARE NOT PREVENTED

## 2023-07-21 NOTE — PROGRESS NOTES
Comprehensive Nutrition Assessment    Type and Reason for Visit:  Initial, Consult (tube feeding ordering & management)    Nutrition Recommendations/Plan:      When able to start tube feeds via NGT,  initiate Pivot 60ml  first bolus then increase by 60ml each bolus as tolerated to goal 220ml,  6 times daily ( suggested times are 0600,0900,1200, 1500, 1800,2100)  If no IVF, suggest flush with 80ml free water before & after each tube feeding bolus. Monitor NPO status, tube feeding tolerance, labs & wt. Malnutrition Assessment:  Malnutrition Status:  Insufficient data (07/21/23 1625)    Context:  Acute Illness     Findings of the 6 clinical characteristics of malnutrition:  Energy Intake:  Mild decrease in energy intake (Comment) (pt reports poor appetite since OR. He denies poor appeitte prior to OR)  Weight Loss:   (6.3% wt loss in 2 1/2 months per EMR)     Body Fat Loss:  Mild body fat loss (possibly age related as well) Orbital   Muscle Mass Loss:  Unable to assess    Fluid Accumulation:  Unable to assess     Strength:  Not Performed    Nutrition Assessment:     Pt. nutritionally compromised AEB wounds. At risk for further nutrition compromise r/t increased nutrient needs for wound healing  ( see below), Primary Squamous Cell Carcinoma of Palantine Tonsil underlying medical condition (Hx GERD, Hiatal Hernia, PUD, Gastric Fundoplication 2/1/62, Mandible Surgery 1979). Nutrition Related Findings:      Pt. Report/Treatments/Miscellaneous: Pt seen POD#1, having pain level 6 mouth area & alerted nursing staff. Pt nods head yes/no to questions. GI Status: No BM  Pertinent Labs: reviewed  Pertinent Meds: Zofran, Glycolax     Wound Type: Surgical Incision ((7/21) transoral robotic assisted rt radical tonsillectomy)       Current Nutrition Intake & Therapies:    Average Meal Intake: NPO  Average Supplements Intake: NPO  Diet NPO  ADULT TUBE FEEDING; Nasogastric; Immune Enhancing;  Bolus; 6 Times Daily; 60; Biochemical Data, Chewing or Swallowing, GI Status, Nausea or Vomiting, Hemodynamic Status, Nutrition Focused Physical Findings, Skin, Weight    Discharge Planning:     Too soon to determine     Tray Lucio RD, LD  Contact: [phone]

## 2023-07-21 NOTE — ANESTHESIA POSTPROCEDURE EVALUATION
Department of Anesthesiology  Postprocedure Note    Patient: Angelo Garcia  MRN: [id number]  YOB: 1955  Date of evaluation: 7/21/2023      Procedure Summary     Date: 07/21/23 Room / Location: [address] [place] [address] / Constantine Patino    Anesthesia Start: 8781 Anesthesia Stop: 3270    Procedure: Transoral Robotic Radical Tonsillectomy, Right Partial Tongue Base Resection with Closure, Palatoplasty, Pharyngoplasty Diagnosis:       Primary squamous cell carcinoma of palatine tonsil ([address])      (Primary squamous cell carcinoma of palatine tonsil ([address]) [C09.9])    Surgeons: Mouna David MD Responsible Provider: Sandra Dobbs DO    Anesthesia Type: general ASA Status: 3          Anesthesia Type: No value filed.     Gael Phase I: Gael Score: 9    Gael Phase II:        Anesthesia Post Evaluation    Patient location during evaluation: PACU  Patient participation: complete - patient participated  Level of consciousness: awake and alert  Pain score: 2  Airway patency: patent  Nausea & Vomiting: no nausea and no vomiting  Complications: no  Cardiovascular status: hemodynamically stable, blood pressure returned to baseline and tachycardic (Pt given lopressor IV)  Respiratory status: spontaneous ventilation, acceptable and nasal cannula  Hydration status: stable

## 2023-07-21 NOTE — INTERVAL H&P NOTE
Update History & Physical    The patient's History and Physical of July 21, 2023 was reviewed with the patient and I examined the patient. There was no change. The surgical site was confirmed by the patient and me. Plan: The risks, benefits, expected outcome, and alternative to the recommended procedure have been discussed with the patient. Patient understands and wants to proceed with the procedure.      Electronically signed by Mouna David MD on 7/21/2023 at 7:37 AM

## 2023-07-21 NOTE — FLOWSHEET NOTE
07/21/23 1846   Safe Environment   Safety Measures Other (comment)  (VN completed admission at this time)     VN called into patients room and introduced myself and role. Patient answered and permitted video. Video activated. . Patient resting comfortably in bed. VN completed admission at this time. VN reviewed fall prevention. Patient voiced no needs or concerns at this time. Call light within reach.

## 2023-07-21 NOTE — PROGRESS NOTES
1206- pt to pacu, pt thrashing in bed, resp easy and unlabored, VSS, pt appears in pain, pt medicated for pain per CRNA  1210- pt mediated for pain per CRNA  1220- fentanyl given  1225- fentanyl given  1230- dilaudid given, pt tolerating  1235- dilaudid given  1240- dilaudid given  1245- dilaudid given, pt tolerating  1247- Spoke with Dr. Candie Stevenson due to pt's hypertension and tachycardia, states \"prefer to avoid labetolol and lopressor if possible\", spoke with Dr. John Acosta, orders for 500mL blous and hydralazine given  1250- hydralazine and fentanyl given  1255- dilaudid given  1300- hydralazine and fentanyl given, pt tolerating  1303- high flow nasal cannula initiated per Dr. Jeannette Ayala, pt tolerating  1310- fentanyl given  1315- Spoke with Dr. John Acosta due to no change in hypertension or tachycardia, orders for lopressor received, spoke with Dr. Jeannette Ayala and received clearance to given lopressor  1320- lopressor given  1325- fentanyl given  1340- pt resting in bed with eyes closed, resp easy and unlabored, VSS, pt states pain has improved but throat is still sore, tachycardia has improved, hypertension improved slightly, Dr. John Acosta made aware, no new orders at this time  1400- pt remains asleep and stable in bed, pt meets criteria for discharge from pacu, pt awaiting inpatient bed to be cleaned  1415- pt successfully used urinal  1425- Report called to 4K  1435- pt awaiting inpatient transport  1450- pt transported to floor by transport staff

## 2023-07-21 NOTE — PROGRESS NOTES
Patient admitted to [facility] room 11 with wife at bedside. Bed in low position side rails up call light in reach. Patient denies questions at this time.

## 2023-07-21 NOTE — OP NOTE
Operative Note      Patient: Bar Bermudez  YOB: 1955  MRN: [id number]    Date of Procedure: 7/21/2023    Pre-Op Diagnosis Codes:     * Primary squamous cell carcinoma of palatine tonsil ([address]) [C09.9]    Post-Op Diagnosis: Same       Procedure(s):  Transoral Robotic Radical Tonsillectomy, Right Partial Tongue Base Resection with Closure, Palatoplasty, Pharyngoplasty    Surgeon(s):  Deven Snyder MD    Assistant:   * No surgical staff found *    Anesthesia: General    Estimated Blood Loss (mL): less than 293     Complications: None    Specimens:   ID Type Source Tests Collected by Time Destination   A : Right tongue base margin rule out carcinoma Tissue Tongue SURGICAL PATHOLOGY Deven Snyder MD 7/21/2023 7490    B : Right Mandibular Margin tissue Rule out carcinoma Tissue Mouth SURGICAL PATHOLOGY Deven Snyder MD 7/21/2023 5978    C : Left [place] Tonsil Tissue Tonsil SURGICAL PATHOLOGY Deven Snyder MD 7/21/2023 7920    D : Right [place] Tonsil *2 clips on tongue base margin Tissue Tonsil SURGICAL PATHOLOGY Deven Snyder MD 7/21/2023 1155        Implants:  * No implants in log *      Drains: * No LDAs found *    Findings: 1. Extensive anterior and inferior infiltration of the tonsillar mass requiring buccal and tongue base resection as part of his EXTR patient  2. Difficult to cover pharyngeal defect resulted in some shearing of the advancement flap which was covered over with fibrin glue. 3.  Nasogastric feeding tube placed for likely prolonged interval of need for nonoral enteral feeding  4. 7.5 mm nasal trumpet placed for airway securing despite tongue edema           Detailed Description of Procedure: The patient was taken to the operating room awake and placed in supine position. General anesthesia was induced and the patient was intubated with a 6.0 MLT endotracheal tube through the left nasal airway without difficulty or vital sign instability.   The table was

## 2023-07-21 NOTE — BRIEF OP NOTE
Brief Postoperative Note      Patient: Roger Valadez  YOB: 1955  MRN: [id number]    Date of Procedure: 7/21/2023    Pre-Op Diagnosis Codes:     * Primary squamous cell carcinoma of palatine tonsil ([address]) [C09.9]    Post-Op Diagnosis: Same       Procedure(s):  Transoral Robotic Radical Tonsillectomy, Right Partial Tongue Base Resection with Closure, Palatoplasty, Pharyngoplasty    Surgeon(s):  Romana Hughs, MD    Assistant:  * No surgical staff found *    Anesthesia: General    Estimated Blood Loss (mL): less than 985     Complications: None    Specimens:   ID Type Source Tests Collected by Time Destination   A : Right tongue base margin rule out carcinoma Tissue Tongue SURGICAL PATHOLOGY Romana Hughs, MD 7/21/2023 9836    B : Right Mandibular Margin tissue Rule out carcinoma Tissue Mouth SURGICAL PATHOLOGY Romana Hughs, MD 7/21/2023 8397    C : Left [place] Tonsil Tissue Tonsil SURGICAL PATHOLOGY Romana Hughs, MD 7/21/2023 4600    D : Right [place] Tonsil *2 clips on tongue base margin Tissue Tonsil SURGICAL PATHOLOGY Romana Hughs, MD 7/21/2023 1155        Implants:  * No implants in log *      Drains: * No LDAs found *    Findings: 1. Extensive anterior and inferior infiltration of the tonsillar mass requiring buccal and tongue base resection as part of his EXTR patient  2. Difficult to cover pharyngeal defect resulted in some shearing of the advancement flap which was covered over with fibrin glue. 3.  Nasogastric feeding tube placed for likely prolonged interval of need for nonoral enteral feeding  4.   7.5 mm nasal trumpet placed for airway securing despite tongue edema      Electronically signed by Romana Hughs, MD on 7/21/2023 at 12:56 PM

## 2023-07-22 LAB
ANION GAP SERPL CALC-SCNC: 10 MEQ/L (ref 8–16)
BASOPHILS ABSOLUTE: 0 THOU/MM3 (ref 0–0.1)
BASOPHILS NFR BLD AUTO: 0.2 %
BUN SERPL-MCNC: 14 MG/DL (ref 7–22)
CALCIUM SERPL-MCNC: 7.9 MG/DL (ref 8.5–10.5)
CHLORIDE SERPL-SCNC: 107 MEQ/L (ref 98–111)
CO2 SERPL-SCNC: 25 MEQ/L (ref 23–33)
CREAT SERPL-MCNC: 0.9 MG/DL (ref 0.4–1.2)
DEPRECATED RDW RBC AUTO: 42.4 FL (ref 35–45)
EOSINOPHIL NFR BLD AUTO: 0 %
EOSINOPHILS ABSOLUTE: 0 THOU/MM3 (ref 0–0.4)
ERYTHROCYTE [DISTWIDTH] IN BLOOD BY AUTOMATED COUNT: 12.9 % (ref 11.5–14.5)
GFR SERPL CREATININE-BSD FRML MDRD: > 60 ML/MIN/1.73M2
GLUCOSE SERPL-MCNC: 134 MG/DL (ref 70–108)
HCT VFR BLD AUTO: 41.8 % (ref 42–52)
HGB BLD-MCNC: 13.4 GM/DL (ref 14–18)
IMM GRANULOCYTES # BLD AUTO: 0.07 THOU/MM3 (ref 0–0.07)
IMM GRANULOCYTES NFR BLD AUTO: 0.5 %
LYMPHOCYTES ABSOLUTE: 1.2 THOU/MM3 (ref 1–4.8)
LYMPHOCYTES NFR BLD AUTO: 7.9 %
MCH RBC QN AUTO: 29.3 PG (ref 26–33)
MCHC RBC AUTO-ENTMCNC: 32.1 GM/DL (ref 32.2–35.5)
MCV RBC AUTO: 91.3 FL (ref 80–94)
MONOCYTES ABSOLUTE: 0.6 THOU/MM3 (ref 0.4–1.3)
MONOCYTES NFR BLD AUTO: 4.2 %
NEUTROPHILS NFR BLD AUTO: 87.2 %
NRBC BLD AUTO-RTO: 0 /100 WBC
PLATELET # BLD AUTO: 184 THOU/MM3 (ref 130–400)
PMV BLD AUTO: 9 FL (ref 9.4–12.4)
POTASSIUM SERPL-SCNC: 3.8 MEQ/L (ref 3.5–5.2)
RBC # BLD AUTO: 4.58 MILL/MM3 (ref 4.7–6.1)
SEGMENTED NEUTROPHILS ABSOLUTE COUNT: 12.8 THOU/MM3 (ref 1.8–7.7)
SODIUM SERPL-SCNC: 142 MEQ/L (ref 135–145)
WBC # BLD AUTO: 14.7 THOU/MM3 (ref 4.8–10.8)

## 2023-07-22 PROCEDURE — 94669 MECHANICAL CHEST WALL OSCILL: CPT

## 2023-07-22 PROCEDURE — 6360000002 HC RX W HCPCS: Performed by: NURSE PRACTITIONER

## 2023-07-22 PROCEDURE — 80048 BASIC METABOLIC PNL TOTAL CA: CPT

## 2023-07-22 PROCEDURE — 94640 AIRWAY INHALATION TREATMENT: CPT

## 2023-07-22 PROCEDURE — 2580000003 HC RX 258: Performed by: NURSE PRACTITIONER

## 2023-07-22 PROCEDURE — A4216 STERILE WATER/SALINE, 10 ML: HCPCS | Performed by: NURSE PRACTITIONER

## 2023-07-22 PROCEDURE — 94761 N-INVAS EAR/PLS OXIMETRY MLT: CPT

## 2023-07-22 PROCEDURE — 2700000000 HC OXYGEN THERAPY PER DAY

## 2023-07-22 PROCEDURE — 6370000000 HC RX 637 (ALT 250 FOR IP): Performed by: NURSE PRACTITIONER

## 2023-07-22 PROCEDURE — 2060000000 HC ICU INTERMEDIATE R&B

## 2023-07-22 PROCEDURE — 36415 COLL VENOUS BLD VENIPUNCTURE: CPT

## 2023-07-22 PROCEDURE — 99024 POSTOP FOLLOW-UP VISIT: CPT | Performed by: NURSE PRACTITIONER

## 2023-07-22 PROCEDURE — 85025 COMPLETE CBC W/AUTO DIFF WBC: CPT

## 2023-07-22 PROCEDURE — 2500000003 HC RX 250 WO HCPCS: Performed by: NURSE PRACTITIONER

## 2023-07-22 RX ORDER — ALBUTEROL SULFATE 2.5 MG/3ML
2.5 SOLUTION RESPIRATORY (INHALATION)
Status: DISCONTINUED | OUTPATIENT
Start: 2023-07-22 | End: 2023-07-23

## 2023-07-22 RX ADMIN — SODIUM CHLORIDE, PRESERVATIVE FREE 20 MG: 5 INJECTION INTRAVENOUS at 20:49

## 2023-07-22 RX ADMIN — SODIUM CHLORIDE 3000 MG: 900 INJECTION INTRAVENOUS at 10:55

## 2023-07-22 RX ADMIN — DONEPEZIL HYDROCHLORIDE 20 MG: 10 TABLET, FILM COATED ORAL at 20:48

## 2023-07-22 RX ADMIN — HYDROMORPHONE HYDROCHLORIDE 0.5 MG: 1 INJECTION, SOLUTION INTRAMUSCULAR; INTRAVENOUS; SUBCUTANEOUS at 17:09

## 2023-07-22 RX ADMIN — SODIUM CHLORIDE: 9 INJECTION, SOLUTION INTRAVENOUS at 23:39

## 2023-07-22 RX ADMIN — SODIUM CHLORIDE, PRESERVATIVE FREE 20 MG: 5 INJECTION INTRAVENOUS at 08:18

## 2023-07-22 RX ADMIN — ALBUTEROL SULFATE 2.5 MG: 2.5 SOLUTION RESPIRATORY (INHALATION) at 23:29

## 2023-07-22 RX ADMIN — SODIUM CHLORIDE 3000 MG: 900 INJECTION INTRAVENOUS at 23:41

## 2023-07-22 RX ADMIN — ACETAMINOPHEN 650 MG: 160 SOLUTION ORAL at 23:41

## 2023-07-22 RX ADMIN — SODIUM CHLORIDE: 9 INJECTION, SOLUTION INTRAVENOUS at 03:44

## 2023-07-22 RX ADMIN — ACETAMINOPHEN 650 MG: 160 SOLUTION ORAL at 04:59

## 2023-07-22 RX ADMIN — SODIUM CHLORIDE 3000 MG: 900 INJECTION INTRAVENOUS at 17:11

## 2023-07-22 RX ADMIN — ACETAMINOPHEN 650 MG: 160 SOLUTION ORAL at 10:40

## 2023-07-22 RX ADMIN — OXYCODONE 10 MG: 5 TABLET ORAL at 15:07

## 2023-07-22 RX ADMIN — ACETAMINOPHEN 650 MG: 160 SOLUTION ORAL at 17:09

## 2023-07-22 RX ADMIN — OXYCODONE 10 MG: 5 TABLET ORAL at 10:41

## 2023-07-22 RX ADMIN — SODIUM CHLORIDE 3000 MG: 900 INJECTION INTRAVENOUS at 05:06

## 2023-07-22 RX ADMIN — CYCLOBENZAPRINE 10 MG: 10 TABLET, FILM COATED ORAL at 23:41

## 2023-07-22 RX ADMIN — OXYCODONE 10 MG: 5 TABLET ORAL at 04:59

## 2023-07-22 RX ADMIN — OXYCODONE 10 MG: 5 TABLET ORAL at 20:49

## 2023-07-22 ASSESSMENT — PAIN SCALES - GENERAL
PAINLEVEL_OUTOF10: 5
PAINLEVEL_OUTOF10: 2
PAINLEVEL_OUTOF10: 0
PAINLEVEL_OUTOF10: 8
PAINLEVEL_OUTOF10: 8
PAINLEVEL_OUTOF10: 7
PAINLEVEL_OUTOF10: 8
PAINLEVEL_OUTOF10: 7
PAINLEVEL_OUTOF10: 7

## 2023-07-22 ASSESSMENT — PAIN DESCRIPTION - FREQUENCY
FREQUENCY: CONTINUOUS
FREQUENCY: CONTINUOUS

## 2023-07-22 ASSESSMENT — PAIN DESCRIPTION - ORIENTATION
ORIENTATION: LOWER

## 2023-07-22 ASSESSMENT — PAIN DESCRIPTION - DESCRIPTORS
DESCRIPTORS: ACHING;BURNING;THROBBING
DESCRIPTORS: SORE
DESCRIPTORS: ACHING
DESCRIPTORS: ACHING

## 2023-07-22 ASSESSMENT — PAIN DESCRIPTION - ONSET
ONSET: ON-GOING
ONSET: ON-GOING

## 2023-07-22 ASSESSMENT — PAIN - FUNCTIONAL ASSESSMENT
PAIN_FUNCTIONAL_ASSESSMENT: PREVENTS OR INTERFERES SOME ACTIVE ACTIVITIES AND ADLS

## 2023-07-22 ASSESSMENT — PAIN DESCRIPTION - LOCATION
LOCATION: THROAT
LOCATION: MOUTH;THROAT
LOCATION: THROAT
LOCATION: THROAT

## 2023-07-22 ASSESSMENT — PAIN DESCRIPTION - PAIN TYPE
TYPE: SURGICAL PAIN
TYPE: SURGICAL PAIN

## 2023-07-22 NOTE — PLAN OF CARE
Problem: Discharge Planning  Goal: Discharge to home or other facility with appropriate resources  Outcome: Progressing     Problem: Pain  Goal: Verbalizes/displays adequate comfort level or baseline comfort level  Outcome: Progressing     Problem: ABCDS Injury Assessment  Goal: Absence of physical injury  Outcome: Progressing     Problem: Respiratory - Adult  Goal: Achieves optimal ventilation and oxygenation  Outcome: Progressing     Problem: Skin/Tissue Integrity - Adult  Goal: Skin integrity remains intact  Outcome: Progressing  Goal: Incisions, wounds, or drain sites healing without S/S of infection  Outcome: Progressing  Goal: Oral mucous membranes remain intact  Outcome: Progressing     Problem: Infection - Adult  Goal: Absence of infection at discharge  Outcome: Progressing  Goal: Absence of infection during hospitalization  Outcome: Progressing     Problem: Infection - Adult  Goal: Absence of infection at discharge  Outcome: Progressing  Goal: Absence of infection during hospitalization  Outcome: Progressing     Problem: Skin/Tissue Integrity  Goal: Absence of new skin breakdown  Description: 1. Monitor for areas of redness and/or skin breakdown  2. Assess vascular access sites hourly  3. Every 4-6 hours minimum:  Change oxygen saturation probe site  4. Every 4-6 hours:  If on nasal continuous positive airway pressure, respiratory therapy assess nares and determine need for appliance change or resting period. Outcome: Progressing     Problem: Safety - Adult  Goal: Free from fall injury  Outcome: Progressing   Care plan reviewed with patient. Patient verbalizes understanding of the plan of care and contributes to goal setting.

## 2023-07-22 NOTE — CARE COORDINATION
Case Management Assessment  Initial Evaluation    Date/Time of Evaluation: 7/22/2023 3:02 PM  Assessment Completed by: Janice Weldon RN    If patient is discharged prior to next notation, then this note serves as note for discharge by case management. Patient Name: Kade Gold                   YOB: 1955  Diagnosis: Primary squamous cell carcinoma of palatine tonsil ([address]) [C09.9]  Tonsil cancer ([address]) [C09.9]                   Date / Time: 7/21/2023  5:59 AM  Location: [address] [place] [address]     Patient Admission Status: Outpatient in a bed   Readmission Risk Low 0-14, Mod 15-19), High > 20: No data recorded  Current PCP: Felipe Hilliard MD  PCP verified by CM? Yes    Chart Reviewed: Yes      History Provided by: Patient  Patient Orientation: Alert and Oriented    Patient Cognition: Alert    Hospitalization in the last 30 days (Readmission):  No    If yes, Readmission Assessment in  Navigator will be completed. Advance Directives:      Code Status: Full Code   Patient's Primary Decision Maker is: Legal Next of Kin    Primary Decision Maker: Melonie Lopez - Spouse - [phone]    Discharge Planning:    Patient lives with: Spouse/Significant Other, Other (Comment) (sposue and grand son) Type of Home: House  Primary Care Giver: Self  Patient Support Systems include: Spouse/Significant Other, Family Members (grandson and wife live there)   Current Financial resources: Medicare  Current community resources: None  Current services prior to admission: Durable Medical Equipment  Current DME: Arnie Form  Type of Home Care services:  Nursing Services, PT, OT, IV Therapy (SLP)    ADLS  Prior functional level: Independent in ADLs/IADLs  Current functional level: Assistance with the following:, Mobility, [address], Housework, Shopping    Family can provide assistance at DC:  Yes  Would you like Case Management to discuss the discharge plan with any other family members/significant others, and if so,

## 2023-07-22 NOTE — PROGRESS NOTES
Pt admitted to  4K20 via cart/stretcher. Complaints: None. IV normal saline infusing into the hand left, condition patent and no redness at a rate of 75 mls/ hour with about 400 mls in the bag still. IV site free of s/s of infection or infiltration. Vital signs obtained. Assessment and data collection initiated. Two nurse skin assessment performed by Janet Morel and Olga Giordano RN. Oriented to room. Policies and procedures for 4 explained. All questions answered with no further questions at this time. Fall prevention and safety brochure discussed with patient. Bed alarm on. Call light in reach. Would you like your Primary Care Physician notified?   no

## 2023-07-22 NOTE — PLAN OF CARE
Problem: Respiratory - Adult  Goal: Achieves optimal ventilation and oxygenation  7/22/2023 1014 by Aram Harp RN  Outcome: Progressing     Problem: Skin/Tissue Integrity - Adult  Goal: Skin integrity remains intact  7/22/2023 1014 by Aram Harp RN  Outcome: Progressing

## 2023-07-23 LAB
BASOPHILS ABSOLUTE: 0 THOU/MM3 (ref 0–0.1)
BASOPHILS NFR BLD AUTO: 0.2 %
DEPRECATED RDW RBC AUTO: 42.5 FL (ref 35–45)
EOSINOPHIL NFR BLD AUTO: 0.5 %
EOSINOPHILS ABSOLUTE: 0.1 THOU/MM3 (ref 0–0.4)
ERYTHROCYTE [DISTWIDTH] IN BLOOD BY AUTOMATED COUNT: 12.9 % (ref 11.5–14.5)
HCT VFR BLD AUTO: 41.4 % (ref 42–52)
HGB BLD-MCNC: 13.4 GM/DL (ref 14–18)
IMM GRANULOCYTES # BLD AUTO: 0.03 THOU/MM3 (ref 0–0.07)
IMM GRANULOCYTES NFR BLD AUTO: 0.3 %
LYMPHOCYTES ABSOLUTE: 1.3 THOU/MM3 (ref 1–4.8)
LYMPHOCYTES NFR BLD AUTO: 12.4 %
MCH RBC QN AUTO: 29.3 PG (ref 26–33)
MCHC RBC AUTO-ENTMCNC: 32.4 GM/DL (ref 32.2–35.5)
MCV RBC AUTO: 90.6 FL (ref 80–94)
MONOCYTES ABSOLUTE: 0.8 THOU/MM3 (ref 0.4–1.3)
MONOCYTES NFR BLD AUTO: 7.8 %
NEUTROPHILS NFR BLD AUTO: 78.8 %
NRBC BLD AUTO-RTO: 0 /100 WBC
PLATELET # BLD AUTO: 162 THOU/MM3 (ref 130–400)
PMV BLD AUTO: 9 FL (ref 9.4–12.4)
RBC # BLD AUTO: 4.57 MILL/MM3 (ref 4.7–6.1)
SEGMENTED NEUTROPHILS ABSOLUTE COUNT: 8.3 THOU/MM3 (ref 1.8–7.7)
WBC # BLD AUTO: 10.5 THOU/MM3 (ref 4.8–10.8)

## 2023-07-23 PROCEDURE — 2500000003 HC RX 250 WO HCPCS: Performed by: NURSE PRACTITIONER

## 2023-07-23 PROCEDURE — 36415 COLL VENOUS BLD VENIPUNCTURE: CPT

## 2023-07-23 PROCEDURE — A4216 STERILE WATER/SALINE, 10 ML: HCPCS | Performed by: NURSE PRACTITIONER

## 2023-07-23 PROCEDURE — 94640 AIRWAY INHALATION TREATMENT: CPT

## 2023-07-23 PROCEDURE — 6360000002 HC RX W HCPCS: Performed by: NURSE PRACTITIONER

## 2023-07-23 PROCEDURE — 94761 N-INVAS EAR/PLS OXIMETRY MLT: CPT

## 2023-07-23 PROCEDURE — 99024 POSTOP FOLLOW-UP VISIT: CPT | Performed by: NURSE PRACTITIONER

## 2023-07-23 PROCEDURE — 6360000002 HC RX W HCPCS: Performed by: OTOLARYNGOLOGY

## 2023-07-23 PROCEDURE — 94669 MECHANICAL CHEST WALL OSCILL: CPT

## 2023-07-23 PROCEDURE — 2580000003 HC RX 258: Performed by: NURSE PRACTITIONER

## 2023-07-23 PROCEDURE — 2700000000 HC OXYGEN THERAPY PER DAY

## 2023-07-23 PROCEDURE — 2060000000 HC ICU INTERMEDIATE R&B

## 2023-07-23 PROCEDURE — 85025 COMPLETE CBC W/AUTO DIFF WBC: CPT

## 2023-07-23 PROCEDURE — 6370000000 HC RX 637 (ALT 250 FOR IP): Performed by: NURSE PRACTITIONER

## 2023-07-23 RX ORDER — ALBUTEROL SULFATE 2.5 MG/3ML
2.5 SOLUTION RESPIRATORY (INHALATION) 2 TIMES DAILY
Status: DISCONTINUED | OUTPATIENT
Start: 2023-07-23 | End: 2023-07-30

## 2023-07-23 RX ORDER — ALBUTEROL SULFATE 2.5 MG/3ML
2.5 SOLUTION RESPIRATORY (INHALATION) EVERY 4 HOURS PRN
Status: DISCONTINUED | OUTPATIENT
Start: 2023-07-23 | End: 2023-08-01

## 2023-07-23 RX ADMIN — SODIUM CHLORIDE, PRESERVATIVE FREE 10 ML: 5 INJECTION INTRAVENOUS at 22:16

## 2023-07-23 RX ADMIN — SODIUM CHLORIDE 3000 MG: 900 INJECTION INTRAVENOUS at 16:53

## 2023-07-23 RX ADMIN — SODIUM CHLORIDE, PRESERVATIVE FREE 20 MG: 5 INJECTION INTRAVENOUS at 21:59

## 2023-07-23 RX ADMIN — ACETAMINOPHEN 650 MG: 160 SOLUTION ORAL at 11:45

## 2023-07-23 RX ADMIN — OXYCODONE 10 MG: 5 TABLET ORAL at 18:09

## 2023-07-23 RX ADMIN — SODIUM CHLORIDE 3000 MG: 900 INJECTION INTRAVENOUS at 11:43

## 2023-07-23 RX ADMIN — ACETAMINOPHEN 650 MG: 160 SOLUTION ORAL at 17:02

## 2023-07-23 RX ADMIN — ALBUTEROL SULFATE 2.5 MG: 2.5 SOLUTION RESPIRATORY (INHALATION) at 08:15

## 2023-07-23 RX ADMIN — ACETAMINOPHEN 650 MG: 160 SOLUTION ORAL at 22:47

## 2023-07-23 RX ADMIN — OXYCODONE 10 MG: 5 TABLET ORAL at 22:00

## 2023-07-23 RX ADMIN — ACETAMINOPHEN 650 MG: 160 SOLUTION ORAL at 05:02

## 2023-07-23 RX ADMIN — OXYCODONE 10 MG: 5 TABLET ORAL at 13:59

## 2023-07-23 RX ADMIN — OXYCODONE 10 MG: 5 TABLET ORAL at 08:16

## 2023-07-23 RX ADMIN — SODIUM CHLORIDE 3000 MG: 900 INJECTION INTRAVENOUS at 22:16

## 2023-07-23 RX ADMIN — SODIUM CHLORIDE 3000 MG: 900 INJECTION INTRAVENOUS at 05:05

## 2023-07-23 RX ADMIN — DONEPEZIL HYDROCHLORIDE 20 MG: 10 TABLET, FILM COATED ORAL at 21:26

## 2023-07-23 RX ADMIN — SODIUM CHLORIDE, PRESERVATIVE FREE 20 MG: 5 INJECTION INTRAVENOUS at 08:11

## 2023-07-23 RX ADMIN — ALBUTEROL SULFATE 2.5 MG: 2.5 SOLUTION RESPIRATORY (INHALATION) at 17:51

## 2023-07-23 ASSESSMENT — PAIN DESCRIPTION - DESCRIPTORS
DESCRIPTORS: ACHING
DESCRIPTORS: SORE
DESCRIPTORS: ACHING
DESCRIPTORS: ACHING;SORE

## 2023-07-23 ASSESSMENT — PAIN DESCRIPTION - FREQUENCY
FREQUENCY: CONTINUOUS

## 2023-07-23 ASSESSMENT — PAIN DESCRIPTION - PAIN TYPE
TYPE: SURGICAL PAIN

## 2023-07-23 ASSESSMENT — PAIN DESCRIPTION - ONSET
ONSET: ON-GOING

## 2023-07-23 ASSESSMENT — PAIN DESCRIPTION - LOCATION
LOCATION: THROAT

## 2023-07-23 ASSESSMENT — PAIN SCALES - GENERAL
PAINLEVEL_OUTOF10: 5
PAINLEVEL_OUTOF10: 7
PAINLEVEL_OUTOF10: 2
PAINLEVEL_OUTOF10: 2
PAINLEVEL_OUTOF10: 6
PAINLEVEL_OUTOF10: 7
PAINLEVEL_OUTOF10: 5
PAINLEVEL_OUTOF10: 7
PAINLEVEL_OUTOF10: 7
PAINLEVEL_OUTOF10: 2

## 2023-07-23 ASSESSMENT — PAIN DESCRIPTION - ORIENTATION
ORIENTATION: MID
ORIENTATION: LOWER
ORIENTATION: LOWER
ORIENTATION: MID

## 2023-07-23 NOTE — PLAN OF CARE
Problem: Discharge Planning  Goal: Discharge to home or other facility with appropriate resources  7/23/2023 0921 by Cierra Wu RN  Outcome: Progressing     Problem: Pain  Goal: Verbalizes/displays adequate comfort level or baseline comfort level  7/23/2023 0921 by Cierra Wu RN  Outcome: Progressing     Problem: ABCDS Injury Assessment  Goal: Absence of physical injury  7/23/2023 0921 by Cierra Wu RN  Outcome: Progressing     Problem: Respiratory - Adult  Goal: Achieves optimal ventilation and oxygenation  7/23/2023 0921 by Cierra Wu RN  Outcome: Progressing

## 2023-07-23 NOTE — PROGRESS NOTES
swollen; soft, mildly tender, mild erythema. No crepitus. No sign of any bleeding. Data  CBC with Differential:    Lab Results   Component Value Date/Time    WBC 10.5 07/23/2023 03:55 AM    RBC 4.57 07/23/2023 03:55 AM    RBC 5.20 07/09/2011 08:24 AM    HGB 13.4 07/23/2023 03:55 AM    HCT 41.4 07/23/2023 03:55 AM     07/23/2023 03:55 AM    MCV 90.6 07/23/2023 03:55 AM    MCH 29.3 07/23/2023 03:55 AM    MCHC 32.4 07/23/2023 03:55 AM    RDW 12.8 01/24/2022 01:00 PM    NRBC 0 07/23/2023 03:55 AM    SEGSPCT 78.8 07/23/2023 03:55 AM    METASPCT 3 07/23/2015 08:25 AM    LYMPHOPCT 23.7 01/24/2022 01:00 PM    MONOPCT 7.8 07/23/2023 03:55 AM    MONOPCT 12.7 01/24/2022 01:00 PM    MYELOPCT 2 07/23/2015 08:25 AM    EOSPCT 0.3 01/24/2022 01:00 PM    BASOPCT 1.0 01/24/2022 01:00 PM    MONOSABS 0.8 07/23/2023 03:55 AM    LYMPHSABS 1.3 07/23/2023 03:55 AM    EOSABS 0.1 07/23/2023 03:55 AM    BASOSABS 0.0 07/23/2023 03:55 AM    DIFFTYPE NOT REPORTED 09/28/2020 09:31 AM     BMP:    Lab Results   Component Value Date/Time     07/22/2023 07:31 AM    K 3.8 07/22/2023 07:31 AM    K 4.4 05/09/2023 05:02 AM     07/22/2023 07:31 AM    CO2 25 07/22/2023 07:31 AM    BUN 14 07/22/2023 07:31 AM    LABALBU 5.0 06/09/2023 09:30 PM    LABALBU 4.3 07/09/2011 08:24 AM    CREATININE 0.9 07/22/2023 07:31 AM    CALCIUM 7.9 07/22/2023 07:31 AM    GFRAA >60 09/28/2020 09:31 AM    LABGLOM >60 07/22/2023 07:31 AM    GLUCOSE 134 07/22/2023 07:31 AM    GLUCOSE 80 01/24/2022 01:53 PM     Radiology Review:    PROCEDURE: XR ABDOMEN FOR NG/OG/NE TUBE PLACEMENT     CLINICAL INFORMATION: Nasogastric tube placement     TECHNIQUE: Mobile semiupright abdominal radiograph     COMPARISON: None     FINDINGS: The tip of a nasogastric tube overlies the right upper abdomen, likely in the distal stomach. The sidehole is distal to the gastroesophageal junction. Bowel gas pattern cannot be assessed. IMPRESSION:  Nasogastric tube as above. pain, fever that would indicate infection of wound and descending  - Up to chair, ambulate  - Continue HFNC for humidification; off for brief breaks and ambulation  - Daily CBC    Electronically signed by MARGAUX Barksdale CNP on 7/23/2023 at 11:22 AM

## 2023-07-24 PROBLEM — C09.9: Status: ACTIVE | Noted: 2023-07-24

## 2023-07-24 LAB
BASOPHILS ABSOLUTE: 0 THOU/MM3 (ref 0–0.1)
BASOPHILS NFR BLD AUTO: 0.2 %
DEPRECATED RDW RBC AUTO: 41.9 FL (ref 35–45)
EOSINOPHIL NFR BLD AUTO: 2.1 %
EOSINOPHILS ABSOLUTE: 0.2 THOU/MM3 (ref 0–0.4)
ERYTHROCYTE [DISTWIDTH] IN BLOOD BY AUTOMATED COUNT: 12.7 % (ref 11.5–14.5)
HCT VFR BLD AUTO: 42.7 % (ref 42–52)
HGB BLD-MCNC: 13.7 GM/DL (ref 14–18)
IMM GRANULOCYTES # BLD AUTO: 0.05 THOU/MM3 (ref 0–0.07)
IMM GRANULOCYTES NFR BLD AUTO: 0.6 %
LYMPHOCYTES ABSOLUTE: 1.4 THOU/MM3 (ref 1–4.8)
LYMPHOCYTES NFR BLD AUTO: 16.2 %
MCH RBC QN AUTO: 29 PG (ref 26–33)
MCHC RBC AUTO-ENTMCNC: 32.1 GM/DL (ref 32.2–35.5)
MCV RBC AUTO: 90.5 FL (ref 80–94)
MONOCYTES ABSOLUTE: 0.8 THOU/MM3 (ref 0.4–1.3)
MONOCYTES NFR BLD AUTO: 8.7 %
NEUTROPHILS NFR BLD AUTO: 72.2 %
NRBC BLD AUTO-RTO: 0 /100 WBC
PLATELET # BLD AUTO: 189 THOU/MM3 (ref 130–400)
PMV BLD AUTO: 9.2 FL (ref 9.4–12.4)
RBC # BLD AUTO: 4.72 MILL/MM3 (ref 4.7–6.1)
SEGMENTED NEUTROPHILS ABSOLUTE COUNT: 6.4 THOU/MM3 (ref 1.8–7.7)
WBC # BLD AUTO: 8.8 THOU/MM3 (ref 4.8–10.8)

## 2023-07-24 PROCEDURE — 6370000000 HC RX 637 (ALT 250 FOR IP): Performed by: NURSE PRACTITIONER

## 2023-07-24 PROCEDURE — 6360000002 HC RX W HCPCS: Performed by: PHYSICIAN ASSISTANT

## 2023-07-24 PROCEDURE — A4216 STERILE WATER/SALINE, 10 ML: HCPCS | Performed by: NURSE PRACTITIONER

## 2023-07-24 PROCEDURE — 2580000003 HC RX 258: Performed by: PHYSICIAN ASSISTANT

## 2023-07-24 PROCEDURE — 2060000000 HC ICU INTERMEDIATE R&B

## 2023-07-24 PROCEDURE — 2500000003 HC RX 250 WO HCPCS: Performed by: NURSE PRACTITIONER

## 2023-07-24 PROCEDURE — 94761 N-INVAS EAR/PLS OXIMETRY MLT: CPT

## 2023-07-24 PROCEDURE — 6360000002 HC RX W HCPCS: Performed by: OTOLARYNGOLOGY

## 2023-07-24 PROCEDURE — 6360000002 HC RX W HCPCS: Performed by: NURSE PRACTITIONER

## 2023-07-24 PROCEDURE — 99024 POSTOP FOLLOW-UP VISIT: CPT | Performed by: PHYSICIAN ASSISTANT

## 2023-07-24 PROCEDURE — 2580000003 HC RX 258: Performed by: NURSE PRACTITIONER

## 2023-07-24 PROCEDURE — 94640 AIRWAY INHALATION TREATMENT: CPT

## 2023-07-24 PROCEDURE — 6370000000 HC RX 637 (ALT 250 FOR IP): Performed by: PHYSICIAN ASSISTANT

## 2023-07-24 PROCEDURE — 36415 COLL VENOUS BLD VENIPUNCTURE: CPT

## 2023-07-24 PROCEDURE — 85025 COMPLETE CBC W/AUTO DIFF WBC: CPT

## 2023-07-24 PROCEDURE — 2700000000 HC OXYGEN THERAPY PER DAY

## 2023-07-24 PROCEDURE — 6370000000 HC RX 637 (ALT 250 FOR IP): Performed by: REGISTERED NURSE

## 2023-07-24 RX ORDER — SENNOSIDES 8.8 MG/5ML
5 LIQUID ORAL NIGHTLY
Status: DISCONTINUED | OUTPATIENT
Start: 2023-07-24 | End: 2023-08-03 | Stop reason: HOSPADM

## 2023-07-24 RX ORDER — LANOLIN ALCOHOL/MO/W.PET/CERES
4.5 CREAM (GRAM) TOPICAL NIGHTLY PRN
Status: DISCONTINUED | OUTPATIENT
Start: 2023-07-24 | End: 2023-08-03 | Stop reason: HOSPADM

## 2023-07-24 RX ORDER — HYDRALAZINE HYDROCHLORIDE 20 MG/ML
5 INJECTION INTRAMUSCULAR; INTRAVENOUS EVERY 4 HOURS PRN
Status: DISCONTINUED | OUTPATIENT
Start: 2023-07-24 | End: 2023-08-03 | Stop reason: HOSPADM

## 2023-07-24 RX ADMIN — ACETAMINOPHEN 650 MG: 160 SOLUTION ORAL at 11:04

## 2023-07-24 RX ADMIN — OXYCODONE 10 MG: 5 TABLET ORAL at 21:03

## 2023-07-24 RX ADMIN — SODIUM CHLORIDE, PRESERVATIVE FREE 20 MG: 5 INJECTION INTRAVENOUS at 09:05

## 2023-07-24 RX ADMIN — SODIUM CHLORIDE 3000 MG: 900 INJECTION INTRAVENOUS at 05:11

## 2023-07-24 RX ADMIN — DONEPEZIL HYDROCHLORIDE 20 MG: 10 TABLET, FILM COATED ORAL at 21:06

## 2023-07-24 RX ADMIN — SODIUM CHLORIDE, PRESERVATIVE FREE 20 MG: 5 INJECTION INTRAVENOUS at 20:00

## 2023-07-24 RX ADMIN — ONDANSETRON 4 MG: 2 INJECTION INTRAMUSCULAR; INTRAVENOUS at 23:58

## 2023-07-24 RX ADMIN — ONDANSETRON 4 MG: 2 INJECTION INTRAMUSCULAR; INTRAVENOUS at 11:12

## 2023-07-24 RX ADMIN — SODIUM CHLORIDE 3000 MG: 900 INJECTION INTRAVENOUS at 23:00

## 2023-07-24 RX ADMIN — POLYETHYLENE GLYCOL 3350 17 G: 17 POWDER, FOR SOLUTION ORAL at 09:05

## 2023-07-24 RX ADMIN — OXYCODONE 10 MG: 5 TABLET ORAL at 03:36

## 2023-07-24 RX ADMIN — Medication 4.5 MG: at 21:18

## 2023-07-24 RX ADMIN — ACETAMINOPHEN 650 MG: 160 SOLUTION ORAL at 18:28

## 2023-07-24 RX ADMIN — HYDROMORPHONE HYDROCHLORIDE 0.25 MG: 1 INJECTION, SOLUTION INTRAMUSCULAR; INTRAVENOUS; SUBCUTANEOUS at 09:05

## 2023-07-24 RX ADMIN — SODIUM CHLORIDE: 9 INJECTION, SOLUTION INTRAVENOUS at 00:40

## 2023-07-24 RX ADMIN — SODIUM CHLORIDE 3000 MG: 900 INJECTION INTRAVENOUS at 16:34

## 2023-07-24 RX ADMIN — SODIUM CHLORIDE, PRESERVATIVE FREE 10 ML: 5 INJECTION INTRAVENOUS at 21:06

## 2023-07-24 RX ADMIN — ALBUTEROL SULFATE 2.5 MG: 2.5 SOLUTION RESPIRATORY (INHALATION) at 09:49

## 2023-07-24 RX ADMIN — HYDROMORPHONE HYDROCHLORIDE 0.5 MG: 1 INJECTION, SOLUTION INTRAMUSCULAR; INTRAVENOUS; SUBCUTANEOUS at 16:30

## 2023-07-24 RX ADMIN — ALBUTEROL SULFATE 2.5 MG: 2.5 SOLUTION RESPIRATORY (INHALATION) at 16:10

## 2023-07-24 RX ADMIN — SODIUM CHLORIDE 3000 MG: 900 INJECTION INTRAVENOUS at 11:00

## 2023-07-24 RX ADMIN — SODIUM CHLORIDE: 9 INJECTION, SOLUTION INTRAVENOUS at 14:50

## 2023-07-24 RX ADMIN — ACETAMINOPHEN 650 MG: 160 SOLUTION ORAL at 05:11

## 2023-07-24 RX ADMIN — OXYCODONE 5 MG: 5 TABLET ORAL at 14:51

## 2023-07-24 RX ADMIN — Medication 8.8 MG: at 21:04

## 2023-07-24 ASSESSMENT — PAIN SCALES - GENERAL
PAINLEVEL_OUTOF10: 3
PAINLEVEL_OUTOF10: 3
PAINLEVEL_OUTOF10: 8
PAINLEVEL_OUTOF10: 4
PAINLEVEL_OUTOF10: 7
PAINLEVEL_OUTOF10: 4
PAINLEVEL_OUTOF10: 7
PAINLEVEL_OUTOF10: 0

## 2023-07-24 ASSESSMENT — PAIN DESCRIPTION - FREQUENCY
FREQUENCY: CONTINUOUS

## 2023-07-24 ASSESSMENT — PAIN DESCRIPTION - ORIENTATION
ORIENTATION: MID
ORIENTATION: INNER
ORIENTATION: RIGHT;LEFT;INNER;MID
ORIENTATION: MID
ORIENTATION: RIGHT;LEFT;INNER;MID
ORIENTATION: LEFT
ORIENTATION: MID

## 2023-07-24 ASSESSMENT — PAIN DESCRIPTION - PAIN TYPE
TYPE: SURGICAL PAIN

## 2023-07-24 ASSESSMENT — PAIN DESCRIPTION - DESCRIPTORS
DESCRIPTORS: ACHING

## 2023-07-24 ASSESSMENT — PAIN DESCRIPTION - LOCATION
LOCATION: THROAT
LOCATION: JAW
LOCATION: THROAT
LOCATION: THROAT
LOCATION: HEAD

## 2023-07-24 ASSESSMENT — PAIN DESCRIPTION - ONSET
ONSET: ON-GOING

## 2023-07-24 NOTE — CARE COORDINATION
Collaborative Discharge Planning    Frances Phillips  :  1955  MRN:  [id number]    ADMIT DATE:  2023      Discharge Planning Discharge Planning  Type of Residence: House  Living Arrangements: Spouse/Significant Other, Other (Comment) (sposue and grand son)  Support Systems: Spouse/Significant Other, Family Members (grandson and wife live there)  Current Services Prior To Admission: Durable Medical Equipment  Current DME Prior to Arrival: Connor Long  Potential Assistance Needed: Home Care  DME Ordered?: No  Potential Assistance Purchasing Medications: No  Type of Home Care Services: Nursing Services, PT, OT, IV Therapy (SLP)  Patient expects to be discharged to[de-identified] House  Follow Up Appointment: Best Day/Time : Tuesday PM  One/Two Story Residence: One story  History of falls?: No  White Board Notes /Social Work Whiteboard Notes  /Social Work Whiteboard: ; SW/CM; plans home w spouse Torrie/grandson, has walker. Family requesting [facility] . NG tube. HIS? Discharge Plan Home with home health  Plans home w spouse Nigel Friendly, new [facility] HH, new SR HIS for OP NGTF x2 weeks after choices offered; has walker; collaborated w family, Neo Gonzalez, Dietician    Post-acute [facility]) provider list was provided to patient. Patient was informed of their freedom to choose [facility] provider. Discussed and offered to show the patient the relevant [facility] Providers quality and resource use measures on Medicare Compare web site via computer based on patient's goals of care and treatment preferences. Questions regarding selection process were answered.       Discharge Milestones and Delays: Clinical status  [place] Tonsil Squamous Cell Carcinoma  Transoral Robotic Radical Tonsillectomy, Right Partial Tongue Base Resection with Closure, Palatoplasty, Pharyngoplasty   ENT Procedure planned  IV AB, IVF, HF Oxygen 30% FIO2/20L  /s  NPO Status  ENT plans ENT procedure , possible 2 weeks NGTF; await HH/HIS orders if planned      SIGNED:  Libia Eugene RN   7/24/2023, 1:02 PM

## 2023-07-24 NOTE — PLAN OF CARE
Problem: Discharge Planning  Goal: Discharge to home or other facility with appropriate resources  7/24/2023 1045 by SIOBHAN Phan  Outcome: Progressing  7/23/2023 2345 by STAR Krishnamurthy consult received and completed. See YASMEEN note.

## 2023-07-24 NOTE — CARE COORDINATION
DISCHARGE PLANNING EVALUATION  7/24/23, 10:42 AM EDT    Reason for Referral: Discharge planning  Mental Status: Alert and Oriented  Decision Making: Self  Family/Social/Home Environment: Patient lives with spouse in their home. Patient has supportive family and friends that are able to assist.   Current Services including food security, transportation and housekeeping: Patient was independent prior to admission. Denied any services in the home prior to admission. Current Equipment: none  Payment Source: Medicare  Concerns or Barriers to Discharge: None  Post-acute [facility]) provider list was provided to patient. Patient was informed of their freedom to choose [facility] provider. Discussed and offered to show the patient the relevant [facility] Providers quality and resource use measures on Medicare Compare web site via computer based on patient's goals of care and treatment preferences. Questions regarding selection process were answered. Teach Back Method used with patient and family regarding care plan and needs. Patient and family verbalized understanding of the plan of care and contribute to goal setting. Patient goals, treatment preferences and discharge plan: Patient plans to return home at discharge with spouse. Open to [facility] and would like referral to [facility]. Patient's spouse reported that patient will go home with NG tube feeding. SW updated CM for possible home infusion. Referral not made to [facility] yet.      Electronically signed by SIOBHAN Hoang on 7/24/2023 at 10:42 AM

## 2023-07-24 NOTE — FLOWSHEET NOTE
07/24/23 1311   Safe Environment   Safety Measures Bed in low position;Call light within reach; Side rails X 2;Standard Safety Measures;Caregiver at bedside; Family at bedside  (Virtual nurse safety round completed.)     Patient resting quietly. No distress noted. Caregiver entered room to check on patient. Family at the bedside.

## 2023-07-24 NOTE — PLAN OF CARE
Problem: Respiratory - Adult  Goal: Achieves optimal ventilation and oxygenation  Outcome: Progressing  Flowsheets (Taken 7/23/2023 2015 by Josie Maldonado RN)  Achieves optimal ventilation and oxygenation: Assess for changes in respiratory status     Patient continues on HFNC for humidification. Weaning per protocol. Patient mutually agreed on goals.

## 2023-07-24 NOTE — PROGRESS NOTES
Patient not tolerating goal tube feeding bolus of 220ml. Bessy SANTIAGO contacted and stated to restart the tube feeding bolus back at the 60ml and increase as tolerated.

## 2023-07-24 NOTE — PLAN OF CARE
Problem: Discharge Planning  Goal: Discharge to home or other facility with appropriate resources  Outcome: Progressing  Flowsheets (Taken 7/23/2023 2015)  Discharge to home or other facility with appropriate resources: Identify barriers to discharge with patient and caregiver     Problem: Pain  Goal: Verbalizes/displays adequate comfort level or baseline comfort level  Outcome: Progressing  Flowsheets (Taken 7/22/2023 2039 by Benita Rae RN)  Verbalizes/displays adequate comfort level or baseline comfort level:   Encourage patient to monitor pain and request assistance   Assess pain using appropriate pain scale   Administer analgesics based on type and severity of pain and evaluate response   Implement non-pharmacological measures as appropriate and evaluate response   Notify Licensed Independent Practitioner if interventions unsuccessful or patient reports new pain     Problem: ABCDS Injury Assessment  Goal: Absence of physical injury  Outcome: Progressing  Flowsheets (Taken 7/23/2023 0254 by Benita Rae RN)  Absence of Physical Injury: Implement safety measures based on patient assessment     Problem: Safety - Adult  Goal: Free from fall injury  Outcome: Progressing  Flowsheets (Taken 7/23/2023 0254 by Benita Rae RN)  Free From Fall Injury: Instruct family/caregiver on patient safety

## 2023-07-25 ENCOUNTER — APPOINTMENT (OUTPATIENT)
Dept: GENERAL RADIOLOGY | Age: 68
End: 2023-07-25
Attending: OTOLARYNGOLOGY
Payer: COMMERCIAL

## 2023-07-25 LAB
ANION GAP SERPL CALC-SCNC: 9 MEQ/L (ref 8–16)
BASOPHILS ABSOLUTE: 0 THOU/MM3 (ref 0–0.1)
BASOPHILS NFR BLD AUTO: 0.7 %
BUN SERPL-MCNC: 12 MG/DL (ref 7–22)
CALCIUM SERPL-MCNC: 9.4 MG/DL (ref 8.5–10.5)
CHLORIDE SERPL-SCNC: 104 MEQ/L (ref 98–111)
CO2 SERPL-SCNC: 27 MEQ/L (ref 23–33)
CREAT SERPL-MCNC: 0.6 MG/DL (ref 0.4–1.2)
DEPRECATED RDW RBC AUTO: 40.4 FL (ref 35–45)
EOSINOPHIL NFR BLD AUTO: 2.1 %
EOSINOPHILS ABSOLUTE: 0.1 THOU/MM3 (ref 0–0.4)
ERYTHROCYTE [DISTWIDTH] IN BLOOD BY AUTOMATED COUNT: 12.3 % (ref 11.5–14.5)
GFR SERPL CREATININE-BSD FRML MDRD: > 60 ML/MIN/1.73M2
GLUCOSE SERPL-MCNC: 106 MG/DL (ref 70–108)
HCT VFR BLD AUTO: 45.7 % (ref 42–52)
HGB BLD-MCNC: 14.8 GM/DL (ref 14–18)
IMM GRANULOCYTES # BLD AUTO: 0.07 THOU/MM3 (ref 0–0.07)
IMM GRANULOCYTES NFR BLD AUTO: 1 %
LYMPHOCYTES ABSOLUTE: 1.1 THOU/MM3 (ref 1–4.8)
LYMPHOCYTES NFR BLD AUTO: 15.6 %
MCH RBC QN AUTO: 28.9 PG (ref 26–33)
MCHC RBC AUTO-ENTMCNC: 32.4 GM/DL (ref 32.2–35.5)
MCV RBC AUTO: 89.3 FL (ref 80–94)
MONOCYTES ABSOLUTE: 0.6 THOU/MM3 (ref 0.4–1.3)
MONOCYTES NFR BLD AUTO: 8.3 %
NEUTROPHILS NFR BLD AUTO: 72.3 %
NRBC BLD AUTO-RTO: 0 /100 WBC
PLATELET # BLD AUTO: 222 THOU/MM3 (ref 130–400)
PMV BLD AUTO: 9.1 FL (ref 9.4–12.4)
POTASSIUM SERPL-SCNC: 4.8 MEQ/L (ref 3.5–5.2)
RBC # BLD AUTO: 5.12 MILL/MM3 (ref 4.7–6.1)
SEGMENTED NEUTROPHILS ABSOLUTE COUNT: 5.1 THOU/MM3 (ref 1.8–7.7)
SODIUM SERPL-SCNC: 140 MEQ/L (ref 135–145)
WBC # BLD AUTO: 7 THOU/MM3 (ref 4.8–10.8)

## 2023-07-25 PROCEDURE — 2580000003 HC RX 258: Performed by: NURSE PRACTITIONER

## 2023-07-25 PROCEDURE — 2580000003 HC RX 258: Performed by: PHYSICIAN ASSISTANT

## 2023-07-25 PROCEDURE — 6360000002 HC RX W HCPCS: Performed by: NURSE PRACTITIONER

## 2023-07-25 PROCEDURE — 85025 COMPLETE CBC W/AUTO DIFF WBC: CPT

## 2023-07-25 PROCEDURE — 80048 BASIC METABOLIC PNL TOTAL CA: CPT

## 2023-07-25 PROCEDURE — 94761 N-INVAS EAR/PLS OXIMETRY MLT: CPT

## 2023-07-25 PROCEDURE — 2060000000 HC ICU INTERMEDIATE R&B

## 2023-07-25 PROCEDURE — 36415 COLL VENOUS BLD VENIPUNCTURE: CPT

## 2023-07-25 PROCEDURE — 94640 AIRWAY INHALATION TREATMENT: CPT

## 2023-07-25 PROCEDURE — 74018 RADEX ABDOMEN 1 VIEW: CPT

## 2023-07-25 PROCEDURE — 6360000002 HC RX W HCPCS: Performed by: OTOLARYNGOLOGY

## 2023-07-25 PROCEDURE — 6370000000 HC RX 637 (ALT 250 FOR IP): Performed by: NURSE PRACTITIONER

## 2023-07-25 PROCEDURE — 2500000003 HC RX 250 WO HCPCS: Performed by: NURSE PRACTITIONER

## 2023-07-25 PROCEDURE — 2700000000 HC OXYGEN THERAPY PER DAY

## 2023-07-25 PROCEDURE — 6360000002 HC RX W HCPCS: Performed by: PHYSICIAN ASSISTANT

## 2023-07-25 PROCEDURE — A4216 STERILE WATER/SALINE, 10 ML: HCPCS | Performed by: NURSE PRACTITIONER

## 2023-07-25 PROCEDURE — 6370000000 HC RX 637 (ALT 250 FOR IP): Performed by: REGISTERED NURSE

## 2023-07-25 RX ADMIN — SODIUM CHLORIDE 3000 MG: 900 INJECTION INTRAVENOUS at 16:41

## 2023-07-25 RX ADMIN — SODIUM CHLORIDE 3000 MG: 900 INJECTION INTRAVENOUS at 06:04

## 2023-07-25 RX ADMIN — ACETAMINOPHEN 650 MG: 160 SOLUTION ORAL at 16:38

## 2023-07-25 RX ADMIN — ALBUTEROL SULFATE 2.5 MG: 2.5 SOLUTION RESPIRATORY (INHALATION) at 15:29

## 2023-07-25 RX ADMIN — DONEPEZIL HYDROCHLORIDE 20 MG: 10 TABLET, FILM COATED ORAL at 21:12

## 2023-07-25 RX ADMIN — SODIUM CHLORIDE, PRESERVATIVE FREE 10 ML: 5 INJECTION INTRAVENOUS at 21:26

## 2023-07-25 RX ADMIN — ACETAMINOPHEN 650 MG: 160 SOLUTION ORAL at 06:02

## 2023-07-25 RX ADMIN — SODIUM CHLORIDE 3000 MG: 900 INJECTION INTRAVENOUS at 11:00

## 2023-07-25 RX ADMIN — Medication 8.8 MG: at 21:12

## 2023-07-25 RX ADMIN — SODIUM CHLORIDE, PRESERVATIVE FREE 20 MG: 5 INJECTION INTRAVENOUS at 08:20

## 2023-07-25 RX ADMIN — ALBUTEROL SULFATE 2.5 MG: 2.5 SOLUTION RESPIRATORY (INHALATION) at 07:40

## 2023-07-25 RX ADMIN — SODIUM CHLORIDE, PRESERVATIVE FREE 20 MG: 5 INJECTION INTRAVENOUS at 21:12

## 2023-07-25 RX ADMIN — ONDANSETRON 4 MG: 2 INJECTION INTRAMUSCULAR; INTRAVENOUS at 06:02

## 2023-07-25 RX ADMIN — SODIUM CHLORIDE: 9 INJECTION, SOLUTION INTRAVENOUS at 03:06

## 2023-07-25 RX ADMIN — ACETAMINOPHEN 650 MG: 160 SOLUTION ORAL at 11:00

## 2023-07-25 RX ADMIN — OXYCODONE 5 MG: 5 TABLET ORAL at 21:12

## 2023-07-25 ASSESSMENT — PAIN DESCRIPTION - DESCRIPTORS
DESCRIPTORS: ACHING
DESCRIPTORS: ACHING

## 2023-07-25 ASSESSMENT — PAIN SCALES - GENERAL
PAINLEVEL_OUTOF10: 5
PAINLEVEL_OUTOF10: 4
PAINLEVEL_OUTOF10: 3

## 2023-07-25 ASSESSMENT — PAIN DESCRIPTION - ONSET
ONSET: ON-GOING
ONSET: ON-GOING

## 2023-07-25 ASSESSMENT — PAIN DESCRIPTION - PAIN TYPE
TYPE: SURGICAL PAIN
TYPE: SURGICAL PAIN

## 2023-07-25 ASSESSMENT — PAIN DESCRIPTION - FREQUENCY
FREQUENCY: CONTINUOUS
FREQUENCY: CONTINUOUS

## 2023-07-25 ASSESSMENT — PAIN DESCRIPTION - LOCATION
LOCATION: THROAT
LOCATION: HEAD;THROAT;ABDOMEN

## 2023-07-25 ASSESSMENT — PAIN DESCRIPTION - ORIENTATION
ORIENTATION: RIGHT;LEFT;MID
ORIENTATION: RIGHT;LEFT;MID

## 2023-07-25 NOTE — PLAN OF CARE
Problem: Respiratory - Adult  Goal: Achieves optimal ventilation and oxygenation  7/25/2023 0744 by Watson Stovall RCP  Outcome: Progressing  7/25/2023 0311 by Gosia Aguilar RN  Outcome: Progressing  Flowsheets (Taken 7/24/2023 2000)  Achieves optimal ventilation and oxygenation:   Assess for changes in respiratory status   Assess for changes in mentation and behavior   Position to facilitate oxygenation and minimize respiratory effort  Goal: Clear lung sounds  Description: Clear lung sounds  7/25/2023 1532 by Watson Stovall RCP  Outcome: Progressing  7/25/2023 0744 by Watson Stovall RCP  Outcome: Progressing  Note: Cont aerosol to improve aeration  Patient mutually agreed on goals.

## 2023-07-25 NOTE — PROGRESS NOTES
Comprehensive Nutrition Assessment    Type and Reason for Visit:  Lisandro Brantley with ENT called me & reports waiting on abdominal xray. He gave orders to  have RN call  ENT re: starting tube feeding when xray results are available. He asked me to put orders in computer for this as well . He requests that it be continuous drip. If tube feeding is restarted,initiate  Pivot 20ml/hr & increase by 10ml every 6 hrs as tolerated to goal  55ml/hr as tolerated. If no IVF, suggest flush with 160ml every 4 hrs. Nutrition Recommendations/Plan:   If xray abdomen okay & able to re-start tube feeds, suggest Pivot 60ml  first bous then increase by 60ml each bolus as tolerated to goal 220ml,  6 times daily ( suggested times are 0600,0900,1200, 1500, 1800,2100). Consider using tube feeding pump & infuse bolus over 1 hr time increments per bolus to aid with tube feeding tolerance as appropriate. Monitor NPO status, labs, & wt  Physician to advise re: no BM atleast 5 days. .      Malnutrition Assessment:  Malnutrition Status: At risk for malnutrition (Comment) (07/25/23 1129)    Context:  Acute Illness     Findings of the 6 clinical characteristics of malnutrition:  Energy Intake:   (pt received 83% estimated needs from TF (7/23-7/24). Tube feeds curently on hold (7/25)waiting on xray of belly per Rola Clark RN)  Weight Loss:   (6.3% wt loss in 2 1/2 months per EMR)     Body Fat Loss:  No significant body fat loss Orbital   Muscle Mass Loss:  No significant muscle mass loss    Fluid Accumulation:  Unable to assess     Strength:  Not Performed    Nutrition Assessment:     Pt. nutritionally not improving AEB tube feeds on hold. At risk for further nutrition compromise r/t increased nutrient needs for wound healing  ( see below), Primary Squamous Cell Carcinoma of [place] Tonsil underlying medical condition (Hx GERD, Hiatal Hernia, PUD, Gastric Fundoplication 6/9/01, Mandible Surgery 1979).        Nutrition (25-30)  Weight Used for Protein Requirements:  (67.3kgm (7/21)  Protein (g/day):  grams (1.2-2 grams protein/kgm)     Fluid (ml/day): 1683-2019ml (25-30ml/kgm wt of 67.3kgm)    Nutrition Diagnosis:   Increased nutrient needs related to increase demand for energy/nutrients as evidenced by wounds    Nutrition Interventions:   Food and/or Nutrient Delivery: Continue NPO (Tube feeds on hold, waiting on xray of belly per RN. Monitor NPO status)  Nutrition Education/Counseling: Education not appropriate  Coordination of Nutrition Care: Continue to monitor while inpatient       Goals:     Goals: Initiate nutrition support, within 2 days       Nutrition Monitoring and Evaluation:      Food/Nutrient Intake Outcomes: Enteral Nutrition Intake/Tolerance  Physical Signs/Symptoms Outcomes: Biochemical Data, Chewing or Swallowing, GI Status, Nausea or Vomiting, Hemodynamic Status, Nutrition Focused Physical Findings, Skin, Weight    Discharge Planning:     Too soon to determine     Kamijessica Victor RD, LD  Contact: [phone]

## 2023-07-25 NOTE — PLAN OF CARE
Problem: Discharge Planning  Goal: Discharge to home or other facility with appropriate resources  Outcome: Progressing  Flowsheets (Taken 7/24/2023 2000)  Discharge to home or other facility with appropriate resources: Identify barriers to discharge with patient and caregiver     Problem: Pain  Goal: Verbalizes/displays adequate comfort level or baseline comfort level  Outcome: Progressing  Flowsheets (Taken 7/24/2023 2045)  Verbalizes/displays adequate comfort level or baseline comfort level:   Encourage patient to monitor pain and request assistance   Assess pain using appropriate pain scale   Administer analgesics based on type and severity of pain and evaluate response     Problem: ABCDS Injury Assessment  Goal: Absence of physical injury  Outcome: Progressing  Flowsheets (Taken 7/23/2023 0254 by Lola Barreto RN)  Absence of Physical Injury: Implement safety measures based on patient assessment     Problem: Respiratory - Adult  Goal: Achieves optimal ventilation and oxygenation  Outcome: Progressing  Flowsheets (Taken 7/24/2023 2000)  Achieves optimal ventilation and oxygenation:   Assess for changes in respiratory status   Assess for changes in mentation and behavior   Position to facilitate oxygenation and minimize respiratory effort     Problem: Cardiovascular - Adult  Goal: Maintains optimal cardiac output and hemodynamic stability  Outcome: Progressing  Flowsheets (Taken 7/24/2023 2000)  Maintains optimal cardiac output and hemodynamic stability:   Monitor blood pressure and heart rate   Monitor urine output and notify Licensed Independent Practitioner for values outside of normal range   Assess for signs of decreased cardiac output     Problem: Skin/Tissue Integrity - Adult  Goal: Skin integrity remains intact  Outcome: Progressing  Flowsheets  Taken 7/25/2023 0311  Skin Integrity Remains Intact:   Monitor for areas of redness and/or skin breakdown   Assess vascular access sites hourly  Taken 7/24/2023

## 2023-07-25 NOTE — PLAN OF CARE
Problem: Respiratory - Adult  Goal: Achieves optimal ventilation and oxygenation  7/25/2023 0744 by Margaret Moore RCP  Outcome: Progressing  7/25/2023 0311 by Sancho Strickland RN  Outcome: Progressing  Flowsheets (Taken 7/24/2023 2000)  Achieves optimal ventilation and oxygenation:   Assess for changes in respiratory status   Assess for changes in mentation and behavior   Position to facilitate oxygenation and minimize respiratory effort  Goal: Clear lung sounds  Description: Clear lung sounds  Outcome: Progressing  Note: Cont aerosol to improve aeration  Patient mutually agreed on goals.

## 2023-07-25 NOTE — PROGRESS NOTES
Patient given 180 ml of tube feed as ordered at a rate of 60/hr at 2200. Complained of severe nausea. Zofran given and tube feeding stopped. Residual was 0 ml prior to this feeding. This morning, a 30 ml residual was noted prior to attempting tube feeding. Zofran was given first as well. However, patient stated that his \"stomach was too upset\" after administration of Tylenol and refused to allow tube feed to be started at this time. Will re-evaluate.

## 2023-07-26 LAB
ANION GAP SERPL CALC-SCNC: 12 MEQ/L (ref 8–16)
BASOPHILS ABSOLUTE: 0 THOU/MM3 (ref 0–0.1)
BASOPHILS NFR BLD AUTO: 0.6 %
BUN SERPL-MCNC: 15 MG/DL (ref 7–22)
CALCIUM SERPL-MCNC: 9 MG/DL (ref 8.5–10.5)
CHLORIDE SERPL-SCNC: 104 MEQ/L (ref 98–111)
CO2 SERPL-SCNC: 22 MEQ/L (ref 23–33)
CREAT SERPL-MCNC: 0.6 MG/DL (ref 0.4–1.2)
DEPRECATED RDW RBC AUTO: 40 FL (ref 35–45)
EOSINOPHIL NFR BLD AUTO: 3.3 %
EOSINOPHILS ABSOLUTE: 0.2 THOU/MM3 (ref 0–0.4)
ERYTHROCYTE [DISTWIDTH] IN BLOOD BY AUTOMATED COUNT: 12.2 % (ref 11.5–14.5)
GFR SERPL CREATININE-BSD FRML MDRD: > 60 ML/MIN/1.73M2
GLUCOSE SERPL-MCNC: 121 MG/DL (ref 70–108)
HCT VFR BLD AUTO: 45.1 % (ref 42–52)
HGB BLD-MCNC: 14.6 GM/DL (ref 14–18)
IMM GRANULOCYTES # BLD AUTO: 0.09 THOU/MM3 (ref 0–0.07)
IMM GRANULOCYTES NFR BLD AUTO: 1.2 %
LYMPHOCYTES ABSOLUTE: 1.3 THOU/MM3 (ref 1–4.8)
LYMPHOCYTES NFR BLD AUTO: 17.5 %
MCH RBC QN AUTO: 29 PG (ref 26–33)
MCHC RBC AUTO-ENTMCNC: 32.4 GM/DL (ref 32.2–35.5)
MCV RBC AUTO: 89.5 FL (ref 80–94)
MONOCYTES ABSOLUTE: 0.7 THOU/MM3 (ref 0.4–1.3)
MONOCYTES NFR BLD AUTO: 10.1 %
NEUTROPHILS NFR BLD AUTO: 67.3 %
NRBC BLD AUTO-RTO: 0 /100 WBC
PLATELET # BLD AUTO: 240 THOU/MM3 (ref 130–400)
PMV BLD AUTO: 8.9 FL (ref 9.4–12.4)
POTASSIUM SERPL-SCNC: 4 MEQ/L (ref 3.5–5.2)
RBC # BLD AUTO: 5.04 MILL/MM3 (ref 4.7–6.1)
SEGMENTED NEUTROPHILS ABSOLUTE COUNT: 4.8 THOU/MM3 (ref 1.8–7.7)
SODIUM SERPL-SCNC: 138 MEQ/L (ref 135–145)
WBC # BLD AUTO: 7.2 THOU/MM3 (ref 4.8–10.8)

## 2023-07-26 PROCEDURE — 6360000002 HC RX W HCPCS: Performed by: OTOLARYNGOLOGY

## 2023-07-26 PROCEDURE — 2580000003 HC RX 258: Performed by: NURSE PRACTITIONER

## 2023-07-26 PROCEDURE — 36415 COLL VENOUS BLD VENIPUNCTURE: CPT

## 2023-07-26 PROCEDURE — 2700000000 HC OXYGEN THERAPY PER DAY

## 2023-07-26 PROCEDURE — 6360000002 HC RX W HCPCS: Performed by: PHYSICIAN ASSISTANT

## 2023-07-26 PROCEDURE — 2060000000 HC ICU INTERMEDIATE R&B

## 2023-07-26 PROCEDURE — 2580000003 HC RX 258: Performed by: PHYSICIAN ASSISTANT

## 2023-07-26 PROCEDURE — A4216 STERILE WATER/SALINE, 10 ML: HCPCS | Performed by: NURSE PRACTITIONER

## 2023-07-26 PROCEDURE — 2500000003 HC RX 250 WO HCPCS: Performed by: NURSE PRACTITIONER

## 2023-07-26 PROCEDURE — 6370000000 HC RX 637 (ALT 250 FOR IP): Performed by: NURSE PRACTITIONER

## 2023-07-26 PROCEDURE — 94761 N-INVAS EAR/PLS OXIMETRY MLT: CPT

## 2023-07-26 PROCEDURE — 80048 BASIC METABOLIC PNL TOTAL CA: CPT

## 2023-07-26 PROCEDURE — 94640 AIRWAY INHALATION TREATMENT: CPT

## 2023-07-26 PROCEDURE — 6360000002 HC RX W HCPCS: Performed by: NURSE PRACTITIONER

## 2023-07-26 PROCEDURE — 85025 COMPLETE CBC W/AUTO DIFF WBC: CPT

## 2023-07-26 RX ADMIN — SODIUM CHLORIDE, PRESERVATIVE FREE 20 MG: 5 INJECTION INTRAVENOUS at 09:27

## 2023-07-26 RX ADMIN — SODIUM CHLORIDE, PRESERVATIVE FREE 20 MG: 5 INJECTION INTRAVENOUS at 21:15

## 2023-07-26 RX ADMIN — HYDROMORPHONE HYDROCHLORIDE 0.25 MG: 1 INJECTION, SOLUTION INTRAMUSCULAR; INTRAVENOUS; SUBCUTANEOUS at 21:15

## 2023-07-26 RX ADMIN — ACETAMINOPHEN 650 MG: 160 SOLUTION ORAL at 00:07

## 2023-07-26 RX ADMIN — SODIUM CHLORIDE 3000 MG: 900 INJECTION INTRAVENOUS at 21:33

## 2023-07-26 RX ADMIN — ACETAMINOPHEN 650 MG: 160 SOLUTION ORAL at 16:52

## 2023-07-26 RX ADMIN — HYDRALAZINE HYDROCHLORIDE 5 MG: 20 INJECTION INTRAMUSCULAR; INTRAVENOUS at 23:25

## 2023-07-26 RX ADMIN — ALBUTEROL SULFATE 2.5 MG: 2.5 SOLUTION RESPIRATORY (INHALATION) at 08:34

## 2023-07-26 RX ADMIN — SODIUM CHLORIDE 3000 MG: 900 INJECTION INTRAVENOUS at 00:25

## 2023-07-26 RX ADMIN — HYDROMORPHONE HYDROCHLORIDE 0.25 MG: 1 INJECTION, SOLUTION INTRAMUSCULAR; INTRAVENOUS; SUBCUTANEOUS at 00:07

## 2023-07-26 RX ADMIN — SODIUM CHLORIDE 3000 MG: 900 INJECTION INTRAVENOUS at 16:58

## 2023-07-26 RX ADMIN — ACETAMINOPHEN 650 MG: 160 SOLUTION ORAL at 21:14

## 2023-07-26 RX ADMIN — ACETAMINOPHEN 650 MG: 160 SOLUTION ORAL at 10:56

## 2023-07-26 RX ADMIN — SODIUM CHLORIDE 3000 MG: 900 INJECTION INTRAVENOUS at 10:57

## 2023-07-26 RX ADMIN — POLYETHYLENE GLYCOL 3350 17 G: 17 POWDER, FOR SOLUTION ORAL at 15:05

## 2023-07-26 RX ADMIN — SODIUM CHLORIDE: 9 INJECTION, SOLUTION INTRAVENOUS at 15:00

## 2023-07-26 RX ADMIN — HYDROMORPHONE HYDROCHLORIDE 0.25 MG: 1 INJECTION, SOLUTION INTRAMUSCULAR; INTRAVENOUS; SUBCUTANEOUS at 16:54

## 2023-07-26 RX ADMIN — ONDANSETRON 4 MG: 2 INJECTION INTRAMUSCULAR; INTRAVENOUS at 17:33

## 2023-07-26 RX ADMIN — DONEPEZIL HYDROCHLORIDE 20 MG: 10 TABLET, FILM COATED ORAL at 21:15

## 2023-07-26 RX ADMIN — ONDANSETRON 4 MG: 2 INJECTION INTRAMUSCULAR; INTRAVENOUS at 11:41

## 2023-07-26 RX ADMIN — SODIUM CHLORIDE: 9 INJECTION, SOLUTION INTRAVENOUS at 01:31

## 2023-07-26 RX ADMIN — SODIUM CHLORIDE 3000 MG: 900 INJECTION INTRAVENOUS at 05:40

## 2023-07-26 RX ADMIN — ALBUTEROL SULFATE 2.5 MG: 2.5 SOLUTION RESPIRATORY (INHALATION) at 17:06

## 2023-07-26 RX ADMIN — ACETAMINOPHEN 650 MG: 160 SOLUTION ORAL at 04:45

## 2023-07-26 ASSESSMENT — PAIN DESCRIPTION - ORIENTATION
ORIENTATION: RIGHT;LEFT
ORIENTATION: MID
ORIENTATION: LEFT;RIGHT
ORIENTATION: MID
ORIENTATION: MID

## 2023-07-26 ASSESSMENT — PAIN DESCRIPTION - FREQUENCY
FREQUENCY: CONTINUOUS

## 2023-07-26 ASSESSMENT — PAIN DESCRIPTION - LOCATION
LOCATION: THROAT
LOCATION: ABDOMEN;OTHER (COMMENT)
LOCATION: THROAT
LOCATION: THROAT;NOSE

## 2023-07-26 ASSESSMENT — PAIN DESCRIPTION - PAIN TYPE
TYPE: SURGICAL PAIN

## 2023-07-26 ASSESSMENT — PAIN DESCRIPTION - DESCRIPTORS
DESCRIPTORS: ACHING
DESCRIPTORS: DISCOMFORT
DESCRIPTORS: ACHING;DISCOMFORT
DESCRIPTORS: ACHING

## 2023-07-26 ASSESSMENT — PAIN - FUNCTIONAL ASSESSMENT
PAIN_FUNCTIONAL_ASSESSMENT: ACTIVITIES ARE NOT PREVENTED

## 2023-07-26 ASSESSMENT — PAIN SCALES - GENERAL
PAINLEVEL_OUTOF10: 4
PAINLEVEL_OUTOF10: 3
PAINLEVEL_OUTOF10: 4
PAINLEVEL_OUTOF10: 3
PAINLEVEL_OUTOF10: 4
PAINLEVEL_OUTOF10: 4
PAINLEVEL_OUTOF10: 1

## 2023-07-26 ASSESSMENT — PAIN DESCRIPTION - ONSET
ONSET: ON-GOING

## 2023-07-26 NOTE — PLAN OF CARE
Problem: Discharge Planning  Goal: Discharge to home or other facility with appropriate resources  Outcome: Progressing  Flowsheets (Taken 7/26/2023 0920)  Discharge to home or other facility with appropriate resources: Identify barriers to discharge with patient and caregiver     Problem: Pain  Goal: Verbalizes/displays adequate comfort level or baseline comfort level  Outcome: Progressing  Flowsheets (Taken 7/26/2023 1635)  Verbalizes/displays adequate comfort level or baseline comfort level:   Encourage patient to monitor pain and request assistance   Assess pain using appropriate pain scale   Administer analgesics based on type and severity of pain and evaluate response     Problem: ABCDS Injury Assessment  Goal: Absence of physical injury  Outcome: Progressing  Flowsheets (Taken 7/26/2023 1635)  Absence of Physical Injury: Implement safety measures based on patient assessment     Problem: Respiratory - Adult  Goal: Achieves optimal ventilation and oxygenation  Outcome: Progressing  Flowsheets (Taken 7/26/2023 0920)  Achieves optimal ventilation and oxygenation: Assess for changes in respiratory status     Problem: Skin/Tissue Integrity - Adult  Goal: Skin integrity remains intact  Outcome: Progressing  Flowsheets (Taken 7/26/2023 0920)  Skin Integrity Remains Intact: Monitor for areas of redness and/or skin breakdown     Problem: Skin/Tissue Integrity - Adult  Goal: Incisions, wounds, or drain sites healing without S/S of infection  Outcome: Progressing  Flowsheets (Taken 7/26/2023 0920)  Incisions, Wounds, or Drain Sites Healing Without Sign and Symptoms of Infection: ADMISSION and DAILY: Assess and document risk factors for pressure ulcer development     Problem: Skin/Tissue Integrity - Adult  Goal: Oral mucous membranes remain intact  Outcome: Progressing  Flowsheets (Taken 7/26/2023 0920)  Oral Mucous Membranes Remain Intact: Assess oral mucosa and hygiene practices     Problem: Musculoskeletal -

## 2023-07-26 NOTE — PLAN OF CARE
Problem: Discharge Planning  Goal: Discharge to home or other facility with appropriate resources  Recent Flowsheet Documentation  Taken 7/25/2023 2015 by Adela Montgomery RN  Discharge to home or other facility with appropriate resources:   Identify barriers to discharge with patient and caregiver   Arrange for needed discharge resources and transportation as appropriate   Identify discharge learning needs (meds, wound care, etc)     Problem: Respiratory - Adult  Goal: Achieves optimal ventilation and oxygenation  Recent Flowsheet Documentation  Taken 7/25/2023 2015 by Adela Montgomery RN  Achieves optimal ventilation and oxygenation: Assess for changes in respiratory status     Problem: Cardiovascular - Adult  Goal: Maintains optimal cardiac output and hemodynamic stability  Recent Flowsheet Documentation  Taken 7/25/2023 2015 by Adela Montgomery RN  Maintains optimal cardiac output and hemodynamic stability:   Monitor blood pressure and heart rate   Monitor urine output and notify Licensed Independent Practitioner for values outside of normal range  Goal: Absence of cardiac dysrhythmias or at baseline  Recent Flowsheet Documentation  Taken 7/25/2023 2015 by Adela Montgomery RN  Absence of cardiac dysrhythmias or at baseline:   Monitor cardiac rate and rhythm   Assess for signs of decreased cardiac output     Problem: Skin/Tissue Integrity - Adult  Goal: Skin integrity remains intact  Recent Flowsheet Documentation  Taken 7/25/2023 2015 by Adela Montgomery RN  Skin Integrity Remains Intact: Monitor for areas of redness and/or skin breakdown  Goal: Oral mucous membranes remain intact  Recent Flowsheet Documentation  Taken 7/25/2023 2015 by Adela Montgomery RN  Oral Mucous Membranes Remain Intact: Assess oral mucosa and hygiene practices     Problem: Musculoskeletal - Adult  Goal: Return mobility to safest level of function  Recent Flowsheet Documentation  Taken 7/25/2023 2015 by Adela Montgomery RN  Return Mobility to [facility]

## 2023-07-26 NOTE — FLOWSHEET NOTE
07/26/23 1053   Safe Environment   Safety Measures Call light within reach; Caregiver at bedside; Bed in low position;Side rails X 2;Standard Safety Measures; Family at bedside  (Virtual nurse safety round completed.)     Patient is awake and  alert and answers questions.  No distress noted

## 2023-07-26 NOTE — CARE COORDINATION
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 Patient was informed of their freedom to choose [facility] provider. Discussed and offered to show the patient the relevant [facility] Providers quality and resource use measures on Medicare Compare web site via computer based on patient's goals of care and treatment preferences. Questions regarding selection process were answered. Discharge Milestones and Delays: Clinical status  [place] Tonsil Squamous Cell Carcinoma  7/21Transoral Robotic Radical Tonsillectomy, Right Partial Tongue Base Resection with Closure, Palatoplasty, Pharyngoplasty  7/28 ENT Procedure planned  IV AB, IVF, HF Oxygen 30% FIO2/20L  /s  NPO Status  ENT plans ENT procedure 7/28, possible 2 weeks NGTF; await HH/HIS orders if planned.     -    7/26 update: Sx scheduled 7/28        PCP: Itzel Chacon MD  Readmission Risk Score: 11.6%

## 2023-07-27 LAB
ANION GAP SERPL CALC-SCNC: 10 MEQ/L (ref 8–16)
BASOPHILS ABSOLUTE: 0.1 THOU/MM3 (ref 0–0.1)
BASOPHILS NFR BLD AUTO: 0.6 %
BUN SERPL-MCNC: 13 MG/DL (ref 7–22)
CALCIUM SERPL-MCNC: 8.9 MG/DL (ref 8.5–10.5)
CHLORIDE SERPL-SCNC: 105 MEQ/L (ref 98–111)
CO2 SERPL-SCNC: 22 MEQ/L (ref 23–33)
CREAT SERPL-MCNC: 0.5 MG/DL (ref 0.4–1.2)
DEPRECATED RDW RBC AUTO: 39.8 FL (ref 35–45)
EOSINOPHIL NFR BLD AUTO: 1.6 %
EOSINOPHILS ABSOLUTE: 0.1 THOU/MM3 (ref 0–0.4)
ERYTHROCYTE [DISTWIDTH] IN BLOOD BY AUTOMATED COUNT: 12.4 % (ref 11.5–14.5)
GFR SERPL CREATININE-BSD FRML MDRD: > 60 ML/MIN/1.73M2
GLUCOSE SERPL-MCNC: 129 MG/DL (ref 70–108)
HCT VFR BLD AUTO: 43.2 % (ref 42–52)
HGB BLD-MCNC: 14.3 GM/DL (ref 14–18)
IMM GRANULOCYTES # BLD AUTO: 0.11 THOU/MM3 (ref 0–0.07)
IMM GRANULOCYTES NFR BLD AUTO: 1.2 %
LYMPHOCYTES ABSOLUTE: 1.3 THOU/MM3 (ref 1–4.8)
LYMPHOCYTES NFR BLD AUTO: 14.1 %
MCH RBC QN AUTO: 29 PG (ref 26–33)
MCHC RBC AUTO-ENTMCNC: 33.1 GM/DL (ref 32.2–35.5)
MCV RBC AUTO: 87.6 FL (ref 80–94)
MONOCYTES ABSOLUTE: 0.7 THOU/MM3 (ref 0.4–1.3)
MONOCYTES NFR BLD AUTO: 7.9 %
NEUTROPHILS NFR BLD AUTO: 74.6 %
NRBC BLD AUTO-RTO: 0 /100 WBC
PLATELET # BLD AUTO: 243 THOU/MM3 (ref 130–400)
PMV BLD AUTO: 8.9 FL (ref 9.4–12.4)
POTASSIUM SERPL-SCNC: 3.5 MEQ/L (ref 3.5–5.2)
RBC # BLD AUTO: 4.93 MILL/MM3 (ref 4.7–6.1)
SEGMENTED NEUTROPHILS ABSOLUTE COUNT: 6.6 THOU/MM3 (ref 1.8–7.7)
SODIUM SERPL-SCNC: 137 MEQ/L (ref 135–145)
WBC # BLD AUTO: 8.9 THOU/MM3 (ref 4.8–10.8)

## 2023-07-27 PROCEDURE — 6370000000 HC RX 637 (ALT 250 FOR IP): Performed by: PHYSICIAN ASSISTANT

## 2023-07-27 PROCEDURE — 2580000003 HC RX 258: Performed by: NURSE PRACTITIONER

## 2023-07-27 PROCEDURE — A4216 STERILE WATER/SALINE, 10 ML: HCPCS | Performed by: NURSE PRACTITIONER

## 2023-07-27 PROCEDURE — 6360000002 HC RX W HCPCS: Performed by: PHYSICIAN ASSISTANT

## 2023-07-27 PROCEDURE — 94640 AIRWAY INHALATION TREATMENT: CPT

## 2023-07-27 PROCEDURE — 2580000003 HC RX 258: Performed by: PHYSICIAN ASSISTANT

## 2023-07-27 PROCEDURE — 6360000002 HC RX W HCPCS: Performed by: OTOLARYNGOLOGY

## 2023-07-27 PROCEDURE — 85025 COMPLETE CBC W/AUTO DIFF WBC: CPT

## 2023-07-27 PROCEDURE — 36415 COLL VENOUS BLD VENIPUNCTURE: CPT

## 2023-07-27 PROCEDURE — 6360000002 HC RX W HCPCS: Performed by: NURSE PRACTITIONER

## 2023-07-27 PROCEDURE — 2700000000 HC OXYGEN THERAPY PER DAY

## 2023-07-27 PROCEDURE — 6370000000 HC RX 637 (ALT 250 FOR IP): Performed by: NURSE PRACTITIONER

## 2023-07-27 PROCEDURE — 2500000003 HC RX 250 WO HCPCS: Performed by: NURSE PRACTITIONER

## 2023-07-27 PROCEDURE — 80048 BASIC METABOLIC PNL TOTAL CA: CPT

## 2023-07-27 PROCEDURE — 2060000000 HC ICU INTERMEDIATE R&B

## 2023-07-27 PROCEDURE — 94761 N-INVAS EAR/PLS OXIMETRY MLT: CPT

## 2023-07-27 RX ORDER — DEXAMETHASONE SODIUM PHOSPHATE 10 MG/ML
10 INJECTION, EMULSION INTRAMUSCULAR; INTRAVENOUS ONCE
Status: COMPLETED | OUTPATIENT
Start: 2023-07-28 | End: 2023-07-28

## 2023-07-27 RX ORDER — HYDRALAZINE HYDROCHLORIDE 20 MG/ML
5 INJECTION INTRAMUSCULAR; INTRAVENOUS ONCE
Status: COMPLETED | OUTPATIENT
Start: 2023-07-27 | End: 2023-07-27

## 2023-07-27 RX ORDER — HYDRALAZINE HYDROCHLORIDE 20 MG/ML
10 INJECTION INTRAMUSCULAR; INTRAVENOUS ONCE
Status: COMPLETED | OUTPATIENT
Start: 2023-07-27 | End: 2023-07-27

## 2023-07-27 RX ADMIN — OXYCODONE 10 MG: 5 TABLET ORAL at 16:19

## 2023-07-27 RX ADMIN — ALBUTEROL SULFATE 2.5 MG: 2.5 SOLUTION RESPIRATORY (INHALATION) at 17:29

## 2023-07-27 RX ADMIN — ACETAMINOPHEN 650 MG: 160 SOLUTION ORAL at 12:05

## 2023-07-27 RX ADMIN — OXYCODONE 10 MG: 5 TABLET ORAL at 20:29

## 2023-07-27 RX ADMIN — HYDRALAZINE HYDROCHLORIDE 10 MG: 20 INJECTION INTRAMUSCULAR; INTRAVENOUS at 01:54

## 2023-07-27 RX ADMIN — SODIUM CHLORIDE, PRESERVATIVE FREE 10 ML: 5 INJECTION INTRAVENOUS at 20:41

## 2023-07-27 RX ADMIN — OXYCODONE 10 MG: 5 TABLET ORAL at 09:07

## 2023-07-27 RX ADMIN — SODIUM CHLORIDE, PRESERVATIVE FREE 20 MG: 5 INJECTION INTRAVENOUS at 20:29

## 2023-07-27 RX ADMIN — ONDANSETRON 4 MG: 2 INJECTION INTRAMUSCULAR; INTRAVENOUS at 00:27

## 2023-07-27 RX ADMIN — SODIUM CHLORIDE 3000 MG: 900 INJECTION INTRAVENOUS at 17:41

## 2023-07-27 RX ADMIN — Medication 4.5 MG: at 21:19

## 2023-07-27 RX ADMIN — HYDRALAZINE HYDROCHLORIDE 5 MG: 20 INJECTION INTRAMUSCULAR; INTRAVENOUS at 00:31

## 2023-07-27 RX ADMIN — SODIUM CHLORIDE 3000 MG: 900 INJECTION INTRAVENOUS at 05:00

## 2023-07-27 RX ADMIN — SODIUM CHLORIDE, PRESERVATIVE FREE 20 MG: 5 INJECTION INTRAVENOUS at 08:54

## 2023-07-27 RX ADMIN — DONEPEZIL HYDROCHLORIDE 20 MG: 10 TABLET, FILM COATED ORAL at 20:29

## 2023-07-27 RX ADMIN — SODIUM CHLORIDE, PRESERVATIVE FREE 10 ML: 5 INJECTION INTRAVENOUS at 08:58

## 2023-07-27 RX ADMIN — ACETAMINOPHEN 650 MG: 160 SOLUTION ORAL at 05:49

## 2023-07-27 RX ADMIN — ONDANSETRON 4 MG: 2 INJECTION INTRAMUSCULAR; INTRAVENOUS at 16:20

## 2023-07-27 RX ADMIN — ONDANSETRON 4 MG: 2 INJECTION INTRAMUSCULAR; INTRAVENOUS at 22:07

## 2023-07-27 RX ADMIN — HYDRALAZINE HYDROCHLORIDE 5 MG: 20 INJECTION INTRAMUSCULAR; INTRAVENOUS at 21:19

## 2023-07-27 RX ADMIN — ALBUTEROL SULFATE 2.5 MG: 2.5 SOLUTION RESPIRATORY (INHALATION) at 08:38

## 2023-07-27 RX ADMIN — SODIUM CHLORIDE 3000 MG: 900 INJECTION INTRAVENOUS at 12:07

## 2023-07-27 ASSESSMENT — PAIN DESCRIPTION - ORIENTATION
ORIENTATION: MID
ORIENTATION: MID

## 2023-07-27 ASSESSMENT — PAIN DESCRIPTION - DESCRIPTORS
DESCRIPTORS: DISCOMFORT
DESCRIPTORS: ACHING;DISCOMFORT

## 2023-07-27 ASSESSMENT — PAIN DESCRIPTION - PAIN TYPE
TYPE: SURGICAL PAIN
TYPE: SURGICAL PAIN

## 2023-07-27 ASSESSMENT — PAIN SCALES - GENERAL
PAINLEVEL_OUTOF10: 4
PAINLEVEL_OUTOF10: 7
PAINLEVEL_OUTOF10: 7
PAINLEVEL_OUTOF10: 5
PAINLEVEL_OUTOF10: 7
PAINLEVEL_OUTOF10: 2
PAINLEVEL_OUTOF10: 5

## 2023-07-27 ASSESSMENT — PAIN DESCRIPTION - LOCATION
LOCATION: ABDOMEN;OTHER (COMMENT)
LOCATION: THROAT

## 2023-07-27 ASSESSMENT — PAIN DESCRIPTION - ONSET
ONSET: ON-GOING
ONSET: ON-GOING

## 2023-07-27 ASSESSMENT — PAIN DESCRIPTION - FREQUENCY
FREQUENCY: CONTINUOUS
FREQUENCY: CONTINUOUS

## 2023-07-27 NOTE — CARE COORDINATION
Collaborative Discharge Planning    Nayeli Monaco  :  1955  MRN:  [id number]    ADMIT DATE:  2023      Discharge Planning Discharge Planning  Type of Residence: House  Living Arrangements: Spouse/Significant Other, Other (Comment) (sposue and grand son)  Support Systems: Spouse/Significant Other, Family Members (grandson and wife live there)  Current Services Prior To Admission: Durable Medical Equipment  Current DME Prior to Arrival: Jw Dina  Potential Assistance Needed: Home Care  DME Ordered?: No  Potential Assistance Purchasing Medications: No  Type of Home Care Services: Nursing Services, PT, OT, IV Therapy (SLP)  Patient expects to be discharged to[de-identified] House  Follow Up Appointment: Best Day/Time : Tuesday PM  One/Two Story Residence: One story  History of falls?: No  White Board Notes /Social Work Whiteboard Notes  /Social Work Whiteboard:  CM; plans home w spouse Torrie/raman, has walker; prefers new [facility],  HIS for NGTF x2 weeks    Discharge Plan Home with home health  Plans home w spouse Michael Valle, [facility], [facility] for OP NGTF x2 weeks after choices offered; has walker  Discharge Milestones and Delays: Clinical status    [place] Tonsil Squamous Cell Carcinoma  Transoral Robotic Radical Tonsillectomy, Right Partial Tongue Base Resection with Closure, Palatoplasty, Pharyngoplasty   Right selective neck dissection and laser resection of positive margin of right tonsil mass planned  IV AB, IVF, HF RA, 15L  NPO status  ENT plans ENT procedure , possible 2 weeks NGTF; await HH/HIS orders if planned    SIGNED:  Corie Love RN   2023, 3:50 PM

## 2023-07-27 NOTE — PLAN OF CARE
Problem: Respiratory - Adult  Goal: Achieves optimal ventilation and oxygenation  7/27/2023 0538 by Sasha Fajardo RCP  Outcome: Progressing  Flowsheets (Taken 7/26/2023 2100 by Rodrigo Tabares RN)  Achieves optimal ventilation and oxygenation:   Assess for changes in respiratory status   Position to facilitate oxygenation and minimize respiratory effort     Patient continues on HFNC for humidification. Patient mutually agreed on goals.

## 2023-07-28 ENCOUNTER — ANESTHESIA (OUTPATIENT)
Dept: OPERATING ROOM | Age: 68
End: 2023-07-28
Payer: COMMERCIAL

## 2023-07-28 ENCOUNTER — ANESTHESIA EVENT (OUTPATIENT)
Dept: OPERATING ROOM | Age: 68
End: 2023-07-28
Payer: COMMERCIAL

## 2023-07-28 LAB
ANION GAP SERPL CALC-SCNC: 14 MEQ/L (ref 8–16)
BASOPHILS ABSOLUTE: 0 THOU/MM3 (ref 0–0.1)
BASOPHILS NFR BLD AUTO: 0.5 %
BUN SERPL-MCNC: 10 MG/DL (ref 7–22)
CALCIUM SERPL-MCNC: 8.9 MG/DL (ref 8.5–10.5)
CHLORIDE SERPL-SCNC: 104 MEQ/L (ref 98–111)
CO2 SERPL-SCNC: 21 MEQ/L (ref 23–33)
CREAT SERPL-MCNC: 0.5 MG/DL (ref 0.4–1.2)
DEPRECATED RDW RBC AUTO: 39.3 FL (ref 35–45)
EOSINOPHIL NFR BLD AUTO: 1.2 %
EOSINOPHILS ABSOLUTE: 0.1 THOU/MM3 (ref 0–0.4)
ERYTHROCYTE [DISTWIDTH] IN BLOOD BY AUTOMATED COUNT: 12.3 % (ref 11.5–14.5)
GFR SERPL CREATININE-BSD FRML MDRD: > 60 ML/MIN/1.73M2
GLUCOSE SERPL-MCNC: 99 MG/DL (ref 70–108)
HCT VFR BLD AUTO: 43.9 % (ref 42–52)
HGB BLD-MCNC: 14.4 GM/DL (ref 14–18)
IMM GRANULOCYTES # BLD AUTO: 0.09 THOU/MM3 (ref 0–0.07)
IMM GRANULOCYTES NFR BLD AUTO: 1.1 %
LYMPHOCYTES ABSOLUTE: 1.3 THOU/MM3 (ref 1–4.8)
LYMPHOCYTES NFR BLD AUTO: 16.5 %
MCH RBC QN AUTO: 28.8 PG (ref 26–33)
MCHC RBC AUTO-ENTMCNC: 32.8 GM/DL (ref 32.2–35.5)
MCV RBC AUTO: 87.8 FL (ref 80–94)
MONOCYTES ABSOLUTE: 0.7 THOU/MM3 (ref 0.4–1.3)
MONOCYTES NFR BLD AUTO: 8.9 %
NEUTROPHILS NFR BLD AUTO: 71.8 %
NRBC BLD AUTO-RTO: 0 /100 WBC
PLATELET # BLD AUTO: 236 THOU/MM3 (ref 130–400)
PMV BLD AUTO: 8.7 FL (ref 9.4–12.4)
POTASSIUM SERPL-SCNC: 3.8 MEQ/L (ref 3.5–5.2)
RBC # BLD AUTO: 5 MILL/MM3 (ref 4.7–6.1)
SEGMENTED NEUTROPHILS ABSOLUTE COUNT: 5.8 THOU/MM3 (ref 1.8–7.7)
SODIUM SERPL-SCNC: 139 MEQ/L (ref 135–145)
WBC # BLD AUTO: 8.1 THOU/MM3 (ref 4.8–10.8)

## 2023-07-28 PROCEDURE — 2060000000 HC ICU INTERMEDIATE R&B

## 2023-07-28 PROCEDURE — 2580000003 HC RX 258: Performed by: NURSE PRACTITIONER

## 2023-07-28 PROCEDURE — 42844 EXTENSIVE SURGERY OF THROAT: CPT | Performed by: OTOLARYNGOLOGY

## 2023-07-28 PROCEDURE — 80048 BASIC METABOLIC PNL TOTAL CA: CPT

## 2023-07-28 PROCEDURE — 3600000013 HC SURGERY LEVEL 3 ADDTL 15MIN: Performed by: OTOLARYNGOLOGY

## 2023-07-28 PROCEDURE — 2500000003 HC RX 250 WO HCPCS: Performed by: NURSE PRACTITIONER

## 2023-07-28 PROCEDURE — 6360000002 HC RX W HCPCS: Performed by: NURSE PRACTITIONER

## 2023-07-28 PROCEDURE — 36415 COLL VENOUS BLD VENIPUNCTURE: CPT

## 2023-07-28 PROCEDURE — 38724 REMOVAL OF LYMPH NODES NECK: CPT | Performed by: OTOLARYNGOLOGY

## 2023-07-28 PROCEDURE — A4216 STERILE WATER/SALINE, 10 ML: HCPCS | Performed by: NURSE PRACTITIONER

## 2023-07-28 PROCEDURE — 7100000001 HC PACU RECOVERY - ADDTL 15 MIN: Performed by: OTOLARYNGOLOGY

## 2023-07-28 PROCEDURE — 6360000002 HC RX W HCPCS: Performed by: OTOLARYNGOLOGY

## 2023-07-28 PROCEDURE — 3600000003 HC SURGERY LEVEL 3 BASE: Performed by: OTOLARYNGOLOGY

## 2023-07-28 PROCEDURE — 6360000002 HC RX W HCPCS: Performed by: PHYSICIAN ASSISTANT

## 2023-07-28 PROCEDURE — 2709999900 HC NON-CHARGEABLE SUPPLY: Performed by: OTOLARYNGOLOGY

## 2023-07-28 PROCEDURE — 88307 TISSUE EXAM BY PATHOLOGIST: CPT

## 2023-07-28 PROCEDURE — 3700000001 HC ADD 15 MINUTES (ANESTHESIA): Performed by: OTOLARYNGOLOGY

## 2023-07-28 PROCEDURE — A4217 STERILE WATER/SALINE, 500 ML: HCPCS | Performed by: OTOLARYNGOLOGY

## 2023-07-28 PROCEDURE — 6360000002 HC RX W HCPCS: Performed by: REGISTERED NURSE

## 2023-07-28 PROCEDURE — 6370000000 HC RX 637 (ALT 250 FOR IP): Performed by: REGISTERED NURSE

## 2023-07-28 PROCEDURE — 7100000000 HC PACU RECOVERY - FIRST 15 MIN: Performed by: OTOLARYNGOLOGY

## 2023-07-28 PROCEDURE — 2720000010 HC SURG SUPPLY STERILE: Performed by: OTOLARYNGOLOGY

## 2023-07-28 PROCEDURE — 6360000002 HC RX W HCPCS

## 2023-07-28 PROCEDURE — C1713 ANCHOR/SCREW BN/BN,TIS/BN: HCPCS | Performed by: OTOLARYNGOLOGY

## 2023-07-28 PROCEDURE — 6370000000 HC RX 637 (ALT 250 FOR IP): Performed by: NURSE PRACTITIONER

## 2023-07-28 PROCEDURE — 42950 RECONSTRUCTION OF THROAT: CPT | Performed by: OTOLARYNGOLOGY

## 2023-07-28 PROCEDURE — 85025 COMPLETE CBC W/AUTO DIFF WBC: CPT

## 2023-07-28 PROCEDURE — 88305 TISSUE EXAM BY PATHOLOGIST: CPT

## 2023-07-28 PROCEDURE — 2580000003 HC RX 258: Performed by: OTOLARYNGOLOGY

## 2023-07-28 PROCEDURE — 2500000003 HC RX 250 WO HCPCS: Performed by: REGISTERED NURSE

## 2023-07-28 PROCEDURE — 2580000003 HC RX 258

## 2023-07-28 PROCEDURE — 94761 N-INVAS EAR/PLS OXIMETRY MLT: CPT

## 2023-07-28 PROCEDURE — 94640 AIRWAY INHALATION TREATMENT: CPT

## 2023-07-28 PROCEDURE — 6370000000 HC RX 637 (ALT 250 FOR IP): Performed by: PHYSICIAN ASSISTANT

## 2023-07-28 PROCEDURE — 07T10ZZ RESECTION OF RIGHT NECK LYMPHATIC, OPEN APPROACH: ICD-10-PCS | Performed by: OTOLARYNGOLOGY

## 2023-07-28 PROCEDURE — 3700000000 HC ANESTHESIA ATTENDED CARE: Performed by: OTOLARYNGOLOGY

## 2023-07-28 RX ORDER — PROMETHAZINE HYDROCHLORIDE 25 MG/ML
6.25 INJECTION, SOLUTION INTRAMUSCULAR; INTRAVENOUS ONCE
Status: COMPLETED | OUTPATIENT
Start: 2023-07-28 | End: 2023-07-28

## 2023-07-28 RX ORDER — ROCURONIUM BROMIDE 10 MG/ML
INJECTION, SOLUTION INTRAVENOUS PRN
Status: DISCONTINUED | OUTPATIENT
Start: 2023-07-28 | End: 2023-07-28 | Stop reason: SDUPTHER

## 2023-07-28 RX ORDER — HYDROMORPHONE HYDROCHLORIDE 2 MG/ML
INJECTION, SOLUTION INTRAMUSCULAR; INTRAVENOUS; SUBCUTANEOUS PRN
Status: DISCONTINUED | OUTPATIENT
Start: 2023-07-28 | End: 2023-07-28 | Stop reason: SDUPTHER

## 2023-07-28 RX ORDER — EPHEDRINE SULFATE/0.9% NACL/PF 50 MG/5 ML
SYRINGE (ML) INTRAVENOUS PRN
Status: DISCONTINUED | OUTPATIENT
Start: 2023-07-28 | End: 2023-07-28 | Stop reason: SDUPTHER

## 2023-07-28 RX ORDER — SUCCINYLCHOLINE/SOD CL,ISO/PF 200MG/10ML
SYRINGE (ML) INTRAVENOUS PRN
Status: DISCONTINUED | OUTPATIENT
Start: 2023-07-28 | End: 2023-07-28 | Stop reason: SDUPTHER

## 2023-07-28 RX ORDER — ONDANSETRON 2 MG/ML
INJECTION INTRAMUSCULAR; INTRAVENOUS PRN
Status: DISCONTINUED | OUTPATIENT
Start: 2023-07-28 | End: 2023-07-28 | Stop reason: SDUPTHER

## 2023-07-28 RX ORDER — AMPICILLIN AND SULBACTAM 2; 1 G/1; G/1
INJECTION, POWDER, FOR SOLUTION INTRAMUSCULAR; INTRAVENOUS PRN
Status: DISCONTINUED | OUTPATIENT
Start: 2023-07-28 | End: 2023-07-28 | Stop reason: SDUPTHER

## 2023-07-28 RX ORDER — LIDOCAINE HCL/PF 100 MG/5ML
SYRINGE (ML) INJECTION PRN
Status: DISCONTINUED | OUTPATIENT
Start: 2023-07-28 | End: 2023-07-28 | Stop reason: SDUPTHER

## 2023-07-28 RX ORDER — FENTANYL CITRATE 50 UG/ML
INJECTION, SOLUTION INTRAMUSCULAR; INTRAVENOUS PRN
Status: DISCONTINUED | OUTPATIENT
Start: 2023-07-28 | End: 2023-07-28 | Stop reason: SDUPTHER

## 2023-07-28 RX ORDER — PHENYLEPHRINE HCL IN 0.9% NACL 1 MG/10 ML
SYRINGE (ML) INTRAVENOUS PRN
Status: DISCONTINUED | OUTPATIENT
Start: 2023-07-28 | End: 2023-07-28 | Stop reason: SDUPTHER

## 2023-07-28 RX ORDER — PROPOFOL 10 MG/ML
INJECTION, EMULSION INTRAVENOUS PRN
Status: DISCONTINUED | OUTPATIENT
Start: 2023-07-28 | End: 2023-07-28 | Stop reason: SDUPTHER

## 2023-07-28 RX ADMIN — HYDROMORPHONE HYDROCHLORIDE 0.5 MG: 1 INJECTION, SOLUTION INTRAMUSCULAR; INTRAVENOUS; SUBCUTANEOUS at 15:22

## 2023-07-28 RX ADMIN — HYDROMORPHONE HYDROCHLORIDE 1 MG: 2 INJECTION INTRAMUSCULAR; INTRAVENOUS; SUBCUTANEOUS at 18:12

## 2023-07-28 RX ADMIN — OXYCODONE 10 MG: 5 TABLET ORAL at 05:20

## 2023-07-28 RX ADMIN — SODIUM CHLORIDE, PRESERVATIVE FREE 20 MG: 5 INJECTION INTRAVENOUS at 22:51

## 2023-07-28 RX ADMIN — DEXAMETHASONE SODIUM PHOSPHATE 10 MG: 10 INJECTION, EMULSION INTRAMUSCULAR; INTRAVENOUS at 15:22

## 2023-07-28 RX ADMIN — SODIUM CHLORIDE, PRESERVATIVE FREE 20 MG: 5 INJECTION INTRAVENOUS at 08:44

## 2023-07-28 RX ADMIN — Medication 100 MCG: at 20:04

## 2023-07-28 RX ADMIN — Medication 15 MG: at 19:14

## 2023-07-28 RX ADMIN — SODIUM CHLORIDE 3000 MG: 900 INJECTION INTRAVENOUS at 11:25

## 2023-07-28 RX ADMIN — HYDROMORPHONE HYDROCHLORIDE 0.5 MG: 2 INJECTION INTRAMUSCULAR; INTRAVENOUS; SUBCUTANEOUS at 19:32

## 2023-07-28 RX ADMIN — Medication 15 MG: at 18:00

## 2023-07-28 RX ADMIN — Medication 10 MG: at 19:21

## 2023-07-28 RX ADMIN — FENTANYL CITRATE 100 MCG: 50 INJECTION, SOLUTION INTRAMUSCULAR; INTRAVENOUS at 16:45

## 2023-07-28 RX ADMIN — DONEPEZIL HYDROCHLORIDE 20 MG: 10 TABLET, FILM COATED ORAL at 22:46

## 2023-07-28 RX ADMIN — HYDROMORPHONE HYDROCHLORIDE 0.5 MG: 2 INJECTION INTRAMUSCULAR; INTRAVENOUS; SUBCUTANEOUS at 18:43

## 2023-07-28 RX ADMIN — Medication 200 MCG: at 19:48

## 2023-07-28 RX ADMIN — PROPOFOL 50 MG: 10 INJECTION, EMULSION INTRAVENOUS at 19:34

## 2023-07-28 RX ADMIN — Medication 200 MCG: at 19:41

## 2023-07-28 RX ADMIN — Medication 4.5 MG: at 22:50

## 2023-07-28 RX ADMIN — HYDRALAZINE HYDROCHLORIDE 5 MG: 20 INJECTION INTRAMUSCULAR; INTRAVENOUS at 08:46

## 2023-07-28 RX ADMIN — PROMETHAZINE HYDROCHLORIDE 6.25 MG: 25 INJECTION INTRAMUSCULAR; INTRAVENOUS at 11:18

## 2023-07-28 RX ADMIN — SODIUM CHLORIDE: 9 INJECTION, SOLUTION INTRAVENOUS at 18:03

## 2023-07-28 RX ADMIN — PROPOFOL 150 MG: 10 INJECTION, EMULSION INTRAVENOUS at 16:34

## 2023-07-28 RX ADMIN — ACETAMINOPHEN 650 MG: 160 SOLUTION ORAL at 22:46

## 2023-07-28 RX ADMIN — Medication 120 MG: at 16:34

## 2023-07-28 RX ADMIN — SODIUM CHLORIDE 3000 MG: 900 INJECTION INTRAVENOUS at 00:59

## 2023-07-28 RX ADMIN — Medication 8.8 MG: at 22:51

## 2023-07-28 RX ADMIN — ONDANSETRON 4 MG: 2 INJECTION INTRAMUSCULAR; INTRAVENOUS at 20:51

## 2023-07-28 RX ADMIN — SODIUM CHLORIDE 3000 MG: 900 INJECTION INTRAVENOUS at 23:07

## 2023-07-28 RX ADMIN — ONDANSETRON 4 MG: 2 INJECTION INTRAMUSCULAR; INTRAVENOUS at 08:44

## 2023-07-28 RX ADMIN — PHENYLEPHRINE HYDROCHLORIDE 50 MCG/MIN: 10 INJECTION INTRAVENOUS at 20:05

## 2023-07-28 RX ADMIN — SODIUM CHLORIDE 3000 MG: 900 INJECTION INTRAVENOUS at 05:12

## 2023-07-28 RX ADMIN — SODIUM CHLORIDE 3000 MG: 900 INJECTION INTRAVENOUS at 00:13

## 2023-07-28 RX ADMIN — ACETAMINOPHEN 650 MG: 160 SOLUTION ORAL at 05:20

## 2023-07-28 RX ADMIN — Medication 60 MG: at 16:34

## 2023-07-28 RX ADMIN — SODIUM CHLORIDE, PRESERVATIVE FREE 10 ML: 5 INJECTION INTRAVENOUS at 22:52

## 2023-07-28 RX ADMIN — ONDANSETRON 4 MG: 2 INJECTION INTRAMUSCULAR; INTRAVENOUS at 16:37

## 2023-07-28 RX ADMIN — Medication 100 MCG: at 19:28

## 2023-07-28 RX ADMIN — AMPICILLIN SODIUM AND SULBACTAM SODIUM 3 G: 2; 1 INJECTION, POWDER, FOR SOLUTION INTRAMUSCULAR; INTRAVENOUS at 16:46

## 2023-07-28 RX ADMIN — ALBUTEROL SULFATE 2.5 MG: 2.5 SOLUTION RESPIRATORY (INHALATION) at 09:45

## 2023-07-28 RX ADMIN — Medication 10 MG: at 17:57

## 2023-07-28 RX ADMIN — ROCURONIUM BROMIDE 50 MG: 10 INJECTION INTRAVENOUS at 16:42

## 2023-07-28 RX ADMIN — ONDANSETRON 4 MG: 2 INJECTION INTRAMUSCULAR; INTRAVENOUS at 15:22

## 2023-07-28 ASSESSMENT — PAIN SCALES - GENERAL
PAINLEVEL_OUTOF10: 8
PAINLEVEL_OUTOF10: 0
PAINLEVEL_OUTOF10: 8

## 2023-07-28 ASSESSMENT — PAIN DESCRIPTION - ORIENTATION: ORIENTATION: RIGHT

## 2023-07-28 ASSESSMENT — PAIN DESCRIPTION - DESCRIPTORS: DESCRIPTORS: ACHING

## 2023-07-28 ASSESSMENT — PAIN DESCRIPTION - LOCATION: LOCATION: NECK

## 2023-07-28 ASSESSMENT — PAIN - FUNCTIONAL ASSESSMENT: PAIN_FUNCTIONAL_ASSESSMENT: ACTIVITIES ARE NOT PREVENTED

## 2023-07-29 LAB
ANION GAP SERPL CALC-SCNC: 10 MEQ/L (ref 8–16)
BUN SERPL-MCNC: 16 MG/DL (ref 7–22)
CALCIUM SERPL-MCNC: 8.4 MG/DL (ref 8.5–10.5)
CHLORIDE SERPL-SCNC: 107 MEQ/L (ref 98–111)
CO2 SERPL-SCNC: 20 MEQ/L (ref 23–33)
CREAT SERPL-MCNC: 0.6 MG/DL (ref 0.4–1.2)
GFR SERPL CREATININE-BSD FRML MDRD: > 60 ML/MIN/1.73M2
GLUCOSE SERPL-MCNC: 116 MG/DL (ref 70–108)
POTASSIUM SERPL-SCNC: 4.1 MEQ/L (ref 3.5–5.2)
SODIUM SERPL-SCNC: 137 MEQ/L (ref 135–145)

## 2023-07-29 PROCEDURE — 2500000003 HC RX 250 WO HCPCS: Performed by: NURSE PRACTITIONER

## 2023-07-29 PROCEDURE — 94640 AIRWAY INHALATION TREATMENT: CPT

## 2023-07-29 PROCEDURE — 6360000002 HC RX W HCPCS: Performed by: OTOLARYNGOLOGY

## 2023-07-29 PROCEDURE — 6360000002 HC RX W HCPCS: Performed by: NURSE PRACTITIONER

## 2023-07-29 PROCEDURE — 2060000000 HC ICU INTERMEDIATE R&B

## 2023-07-29 PROCEDURE — 2580000003 HC RX 258: Performed by: NURSE PRACTITIONER

## 2023-07-29 PROCEDURE — 2700000000 HC OXYGEN THERAPY PER DAY

## 2023-07-29 PROCEDURE — 6370000000 HC RX 637 (ALT 250 FOR IP): Performed by: NURSE PRACTITIONER

## 2023-07-29 PROCEDURE — 36415 COLL VENOUS BLD VENIPUNCTURE: CPT

## 2023-07-29 PROCEDURE — 6370000000 HC RX 637 (ALT 250 FOR IP): Performed by: PHYSICIAN ASSISTANT

## 2023-07-29 PROCEDURE — 94761 N-INVAS EAR/PLS OXIMETRY MLT: CPT

## 2023-07-29 PROCEDURE — 6360000002 HC RX W HCPCS: Performed by: PHYSICIAN ASSISTANT

## 2023-07-29 PROCEDURE — 6370000000 HC RX 637 (ALT 250 FOR IP): Performed by: REGISTERED NURSE

## 2023-07-29 PROCEDURE — 80048 BASIC METABOLIC PNL TOTAL CA: CPT

## 2023-07-29 RX ADMIN — SODIUM CHLORIDE: 9 INJECTION, SOLUTION INTRAVENOUS at 17:58

## 2023-07-29 RX ADMIN — SODIUM CHLORIDE 3000 MG: 900 INJECTION INTRAVENOUS at 03:54

## 2023-07-29 RX ADMIN — Medication 4.5 MG: at 19:46

## 2023-07-29 RX ADMIN — HYDROMORPHONE HYDROCHLORIDE 0.5 MG: 1 INJECTION, SOLUTION INTRAMUSCULAR; INTRAVENOUS; SUBCUTANEOUS at 00:52

## 2023-07-29 RX ADMIN — SODIUM CHLORIDE 3000 MG: 900 INJECTION INTRAVENOUS at 23:03

## 2023-07-29 RX ADMIN — SODIUM CHLORIDE, PRESERVATIVE FREE 10 ML: 5 INJECTION INTRAVENOUS at 19:48

## 2023-07-29 RX ADMIN — Medication 8.8 MG: at 19:46

## 2023-07-29 RX ADMIN — CYCLOBENZAPRINE 10 MG: 10 TABLET, FILM COATED ORAL at 19:46

## 2023-07-29 RX ADMIN — ONDANSETRON 4 MG: 2 INJECTION INTRAMUSCULAR; INTRAVENOUS at 09:22

## 2023-07-29 RX ADMIN — ALBUTEROL SULFATE 2.5 MG: 2.5 SOLUTION RESPIRATORY (INHALATION) at 16:50

## 2023-07-29 RX ADMIN — SODIUM CHLORIDE, PRESERVATIVE FREE 20 MG: 5 INJECTION INTRAVENOUS at 08:52

## 2023-07-29 RX ADMIN — ACETAMINOPHEN 650 MG: 160 SOLUTION ORAL at 03:57

## 2023-07-29 RX ADMIN — SODIUM CHLORIDE 3000 MG: 900 INJECTION INTRAVENOUS at 18:00

## 2023-07-29 RX ADMIN — ACETAMINOPHEN 650 MG: 160 SOLUTION ORAL at 19:46

## 2023-07-29 RX ADMIN — HYDROMORPHONE HYDROCHLORIDE 0.5 MG: 1 INJECTION, SOLUTION INTRAMUSCULAR; INTRAVENOUS; SUBCUTANEOUS at 09:31

## 2023-07-29 RX ADMIN — SODIUM CHLORIDE, PRESERVATIVE FREE 10 ML: 5 INJECTION INTRAVENOUS at 08:53

## 2023-07-29 RX ADMIN — HYDRALAZINE HYDROCHLORIDE 5 MG: 20 INJECTION INTRAMUSCULAR; INTRAVENOUS at 00:02

## 2023-07-29 RX ADMIN — ONDANSETRON 4 MG: 2 INJECTION INTRAMUSCULAR; INTRAVENOUS at 19:48

## 2023-07-29 RX ADMIN — ALBUTEROL SULFATE 2.5 MG: 2.5 SOLUTION RESPIRATORY (INHALATION) at 08:38

## 2023-07-29 RX ADMIN — SODIUM CHLORIDE 3000 MG: 900 INJECTION INTRAVENOUS at 11:40

## 2023-07-29 RX ADMIN — DONEPEZIL HYDROCHLORIDE 20 MG: 10 TABLET, FILM COATED ORAL at 19:48

## 2023-07-29 ASSESSMENT — PAIN - FUNCTIONAL ASSESSMENT
PAIN_FUNCTIONAL_ASSESSMENT: PREVENTS OR INTERFERES SOME ACTIVE ACTIVITIES AND ADLS
PAIN_FUNCTIONAL_ASSESSMENT: PREVENTS OR INTERFERES WITH MANY ACTIVE NOT PASSIVE ACTIVITIES

## 2023-07-29 ASSESSMENT — PAIN SCALES - GENERAL
PAINLEVEL_OUTOF10: 0
PAINLEVEL_OUTOF10: 5
PAINLEVEL_OUTOF10: 0

## 2023-07-29 ASSESSMENT — PAIN DESCRIPTION - DESCRIPTORS
DESCRIPTORS: SHARP
DESCRIPTORS: ACHING

## 2023-07-29 ASSESSMENT — PAIN DESCRIPTION - ORIENTATION
ORIENTATION: MID
ORIENTATION: RIGHT

## 2023-07-29 ASSESSMENT — PAIN DESCRIPTION - LOCATION
LOCATION: THROAT
LOCATION: NECK

## 2023-07-29 NOTE — PLAN OF CARE
Problem: Respiratory - Adult  Goal: Clear lung sounds  Description: Clear lung sounds  7/29/2023 1653 by Tobi Rosa RCP  Outcome: Progressing   Albuterol to improve breath sounds. Patient mutually agreed on goals.

## 2023-07-29 NOTE — PLAN OF CARE
Problem: Discharge Planning  Goal: Discharge to home or other facility with appropriate resources  Outcome: Progressing  Flowsheets (Taken 7/28/2023 2234)  Discharge to home or other facility with appropriate resources:   Identify barriers to discharge with patient and caregiver   Identify discharge learning needs (meds, wound care, etc)   Arrange for needed discharge resources and transportation as appropriate     Problem: Pain  Goal: Verbalizes/displays adequate comfort level or baseline comfort level  Outcome: Progressing     Problem: ABCDS Injury Assessment  Goal: Absence of physical injury  Outcome: Progressing     Problem: Respiratory - Adult  Goal: Achieves optimal ventilation and oxygenation  Outcome: Progressing  Flowsheets (Taken 7/28/2023 2234)  Achieves optimal ventilation and oxygenation:   Encourage broncho-pulmonary hygiene including cough, deep breathe, incentive spirometry   Oxygen supplementation based on oxygen saturation or arterial blood gases   Position to facilitate oxygenation and minimize respiratory effort   Assess for changes in mentation and behavior     Problem: Skin/Tissue Integrity - Adult  Goal: Skin integrity remains intact  Outcome: Progressing  Flowsheets (Taken 7/28/2023 2234)  Skin Integrity Remains Intact:   Monitor for areas of redness and/or skin breakdown   Assess vascular access sites hourly   Every 4-6 hours minimum: Change oxygen saturation probe site   Every 4-6 hours: If on nasal continuous positive airway pressure, respiratory therapy assesses nares and determine need for appliance change or resting period  Goal: Incisions, wounds, or drain sites healing without S/S of infection  Outcome: Progressing  Flowsheets (Taken 7/28/2023 2234)  Incisions, Wounds, or Drain Sites Healing Without Sign and Symptoms of Infection:   TWICE DAILY: Assess and document dressing/incision, wound bed, drain sites and surrounding tissue   TWICE DAILY: Assess and document skin integrity ADMISSION and DAILY: Assess and document risk factors for pressure ulcer development  Goal: Oral mucous membranes remain intact  Outcome: Progressing  Flowsheets (Taken 7/28/2023 2234)  Oral Mucous Membranes Remain Intact:   Assess oral mucosa and hygiene practices   Implement preventative oral hygiene regimen   Implement oral medicated treatments as ordered     Problem: Musculoskeletal - Adult  Goal: Return mobility to safest level of function  Outcome: Progressing  Flowsheets (Taken 7/28/2023 2234)  Return Mobility to Safest Level of Function:   Assist with transfers and ambulation using safe patient handling equipment as needed   Assess patient stability and activity tolerance for standing, transferring and ambulating with or without assistive devices   Ensure adequate protection for wounds/incisions during mobilization   Obtain physical therapy/occupational therapy consults as needed  Goal: Return ADL status to a safe level of function  Outcome: Progressing  Flowsheets (Taken 7/28/2023 2234)  Return ADL Status to a Safe Level of Function:   Assist and instruct patient to increase activity and self care as tolerated   Obtain physical therapy/occupational therapy consults as needed   Assess activities of daily living deficits and provide assistive devices as needed   Administer medication as ordered     Problem: Infection - Adult  Goal: Absence of infection at discharge  Outcome: Progressing  Flowsheets (Taken 7/28/2023 2234)  Absence of infection at discharge:   Assess and monitor for signs and symptoms of infection   Monitor lab/diagnostic results   Monitor all insertion sites i.e., indwelling lines, tubes and drains   [place] appropriate cooling/warming therapies per order  Goal: Absence of infection during hospitalization  Outcome: Progressing  Flowsheets (Taken 7/28/2023 2234)  Absence of infection during hospitalization:   Assess and monitor for signs and symptoms of infection   Monitor lab/diagnostic results   [place] appropriate cooling/warming therapies per order   Instruct and encourage patient and family to use good hand hygiene technique     Problem: Metabolic/Fluid and Electrolytes - Adult  Goal: Electrolytes maintained within normal limits  Outcome: Progressing  Flowsheets (Taken 7/28/2023 2234)  Electrolytes maintained within normal limits:   Administer electrolyte replacement as ordered   Monitor labs and assess patient for signs and symptoms of electrolyte imbalances   Monitor response to electrolyte replacements, including repeat lab results as appropriate  Goal: Hemodynamic stability and optimal renal function maintained  Outcome: Progressing  Flowsheets (Taken 7/28/2023 2234)  Hemodynamic stability and optimal renal function maintained:   Monitor urine specific gravity, serum osmolarity and serum sodium as indicated or ordered   Monitor intake, output and patient weight   Monitor labs and assess for signs and symptoms of volume excess or deficit     Problem: Hematologic - Adult  Goal: Maintains hematologic stability  Outcome: Progressing  Flowsheets (Taken 7/28/2023 2234)  Maintains hematologic stability:   Monitor labs for bleeding or clotting disorders   Assess for signs and symptoms of bleeding or hemorrhage     Problem: Chronic Conditions and Co-morbidities  Goal: Patient's chronic conditions and co-morbidity symptoms are monitored and maintained or improved  Outcome: Progressing  Flowsheets (Taken 7/28/2023 2234)  Care Plan - Patient's Chronic Conditions and Co-Morbidity Symptoms are Monitored and Maintained or Improved:   Update acute care plan with appropriate goals if chronic or comorbid symptoms are exacerbated and prevent overall improvement and discharge   Collaborate with multidisciplinary team to address chronic and comorbid conditions and prevent exacerbation or deterioration   Monitor and assess patient's chronic conditions and comorbid symptoms for stability, deterioration, or

## 2023-07-29 NOTE — PROGRESS NOTES
2134: Pt arrives to pacu, awakens to verbal stimuli. Placed back on heated high flow. Pt unable to follow commands and pulling at all tubes. Attempt to redirect unsuccessful. Suctioned blood from mouth. VSS  2145: Dr. Casey Lau at bedside attempting to talk to patient and helping hold patient  2200: Pt resting off and on with eyes closed, able to follow some directions. Report given to [facility]  2215: Pt transported to  in stable condition.  VSS

## 2023-07-29 NOTE — OP NOTE
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    Post-Op Diagnosis: Same       Procedure(s):  Selective Neck Dissection  Laser Resection of Positive Margin Of Tonsil Mass    Surgeon(s):  Verónica Marcelino MD    Assistant:   * No surgical staff found *    Anesthesia: General    Estimated Blood Loss (mL): less than 50     Complications: None    Specimens:   ID Type Source Tests Collected by Time Destination   A : re-resection from primary resection of distal deep margin from tonsil mass  Tissue Tonsil SURGICAL PATHOLOGY Verónica Marcelino MD 7/28/2023 1732    B : excess skin scar  Tissue Neck SURGICAL PATHOLOGY Verónica Marcelino MD 7/28/2023 1818    C : RIGHT SELECTIVE NECK DISECTION AT LEVELS 1, 2A, 2B, 3 4, AND SUPERIOR LEVEL 5 Tissue Neck SURGICAL PATHOLOGY Verónica Marcelino MD 7/28/2023 2026        Implants:  * No implants in log *      Drains:   Closed/Suction Drain Right; Anterior Neck Bulb (Active)   Site Description Clean, dry & intact 07/29/23 0350   Dressing Status Clean, dry & intact 07/29/23 0350   Drainage Appearance Bloody 07/29/23 0350   Drain Status To bulb suction 07/29/23 0350   Output (ml) 10 ml 07/29/23 0350       NG/OG/NJ/NE Tube Nasogastric Right nostril (Active)   Surrounding Skin Clean, dry & intact 07/28/23 2239   Securement device Tape 07/28/23 2239   Status Clamped 07/28/23 2239   Placement Verified External Catheter Length 07/28/23 2239   NG/OG/NJ/NE External Measurement (cm) 71 cm 07/28/23 2239   Tube Feeding Immune Enhancing 07/28/23 2239   Tube feeding/verify rate (mL/hr) 0 mL/hr 07/28/23 2239   Tube Feeding Supplement (Product) Wound Healing Supplement 07/28/23 0830   Tube Feeding Intake (mL) 261 ml 07/28/23 0000   Free Water/Flush (mL) 60 mL 07/28/23

## 2023-07-29 NOTE — ANESTHESIA POSTPROCEDURE EVALUATION
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])    Surgeons: Phillip Gamble MD Responsible Provider: Ruchi Shields DO    Anesthesia Type: General ASA Status: 3          Anesthesia Type: General    Gael Phase I: Gael Score: 8    Gael Phase II:        Anesthesia Post Evaluation    Patient location during evaluation: PACU  Patient participation: complete - patient participated  Level of consciousness: awake  Airway patency: patent  Nausea & Vomiting: no nausea and no vomiting  Complications: no  Cardiovascular status: hemodynamically stable and tachycardic  Respiratory status: acceptable and nasal cannula  Hydration status: euvolemic  Pain management: adequate

## 2023-07-29 NOTE — FLOWSHEET NOTE
07/29/23 1306   Safe Environment   Safety Measures Bed in low position;Call light within reach; Side rails X 2;Visitors at bedside;MD at bedside  (Virtual Nurse Safety Round Complete)     Call placed to patients room, patient responds to audio, permitted video. Patient alert and oriented. Patient denied any voiced conscerns/complaints at this time. Patient educated on utilizig call light. Call light within reach, no signs or symtpoms of distress noted.

## 2023-07-29 NOTE — PLAN OF CARE
Problem: Respiratory - Adult  Goal: Clear lung sounds  Description: Clear lung sounds  Outcome: Progressing   Albuterol and Vest therapy to improve breath sounds. HFNC to decrease work of breathing. Patient mutually agreed on goals.

## 2023-07-29 NOTE — BRIEF OP NOTE
Brief Postoperative Note      Patient: Tommy Quintanilla  YOB: 1955  MRN: [id number]    Date of Procedure: 7/28/2023    Pre-Op Diagnosis Codes:     * Squamous cell carcinoma of faucial tonsil ([address]) [C09.9]    Post-Op Diagnosis: Same       Procedure(s):  Selective Neck Dissection  Laser Resection of Positive Margin Of Tonsil Mass    Surgeon(s):  Nini Shelley MD    Assistant:  * No surgical staff found *    Anesthesia: General    Estimated Blood Loss (mL): less than 50     Complications: None    Specimens:   ID Type Source Tests Collected by Time Destination   A : re-resection from primary resection of distal deep margin from tonsil mass  Tissue Tonsil SURGICAL PATHOLOGY Nini Shelley MD 7/28/2023 1732    B : excess skin scar  Tissue Neck SURGICAL PATHOLOGY Nini Shelley MD 7/28/2023 1818    C : RIGHT SELECTIVE NECK DISECTION AT LEVELS 1, 2A, 2B, 3 4, AND SUPERIOR LEVEL 5 Tissue Neck SURGICAL PATHOLOGY Nini Shelley MD 7/28/2023 2026        Implants:  * No implants in log *      Drains:   Closed/Suction Drain Right; Anterior Neck Bulb (Active)       NG/OG/NJ/NE Tube Nasogastric Right nostril (Active)   Surrounding Skin Clean, dry & intact 07/28/23 0830   Securement device Bridle 07/28/23 0830   Status Clamped 07/28/23 0830   Placement Verified External Catheter Length 07/26/23 0920   NG/OG/NJ/NE External Measurement (cm) 71 cm 07/26/23 0920   Tube Feeding Immune Enhancing 07/28/23 0830   Tube feeding/verify rate (mL/hr) 0 mL/hr 07/28/23 0000   Tube Feeding Supplement (Product) Wound Healing Supplement 07/28/23 0830   Tube Feeding Intake (mL) 261 ml 07/28/23 0000   Free Water/Flush (mL) 60 mL 07/28/23 0000   Residual Volume (ml) 5 ml 07/27/23 1633       Urinary Catheter 07/28/23 [place]-Temperature (Active)       Findings: 1. Caudal margin and deep space completely exenterated of an additional centimeter of tissue and sealed with fibrin  2.   Selective neck dissection inclusive of levels

## 2023-07-29 NOTE — PROGRESS NOTES
At 16 [phone], this RN sent Dr. Janine Mclain a secure message notifying him that patient was nauseated and refusing anything to go into NG tube. This RN also notified him that the order for tube feedings was not restarted. Waiting for response    AT 1504, this RN sent another secure message to Dr. Janine Mclain that patient still had baca in place from surgery and requested an order for tube feedings to be restarted. Patient's wife stated that she was worried his nutrition because patient feels weak. At 1700, this RN was notified by Riya Rogers, STAR, that she spoke with Dr. Janine Mclain, and he ordered for all pre-op orders to be restarted, including tube feeding. 1755 Tube feeding started. See charting.

## 2023-07-30 PROCEDURE — 94640 AIRWAY INHALATION TREATMENT: CPT

## 2023-07-30 PROCEDURE — A4216 STERILE WATER/SALINE, 10 ML: HCPCS | Performed by: NURSE PRACTITIONER

## 2023-07-30 PROCEDURE — 94761 N-INVAS EAR/PLS OXIMETRY MLT: CPT

## 2023-07-30 PROCEDURE — 2500000003 HC RX 250 WO HCPCS: Performed by: NURSE PRACTITIONER

## 2023-07-30 PROCEDURE — 6370000000 HC RX 637 (ALT 250 FOR IP): Performed by: PHYSICIAN ASSISTANT

## 2023-07-30 PROCEDURE — 2700000000 HC OXYGEN THERAPY PER DAY

## 2023-07-30 PROCEDURE — 2060000000 HC ICU INTERMEDIATE R&B

## 2023-07-30 PROCEDURE — 6360000002 HC RX W HCPCS: Performed by: OTOLARYNGOLOGY

## 2023-07-30 PROCEDURE — 6370000000 HC RX 637 (ALT 250 FOR IP): Performed by: REGISTERED NURSE

## 2023-07-30 PROCEDURE — 2580000003 HC RX 258: Performed by: NURSE PRACTITIONER

## 2023-07-30 PROCEDURE — 6360000002 HC RX W HCPCS: Performed by: NURSE PRACTITIONER

## 2023-07-30 PROCEDURE — 6370000000 HC RX 637 (ALT 250 FOR IP): Performed by: NURSE PRACTITIONER

## 2023-07-30 RX ORDER — ALBUTEROL SULFATE 2.5 MG/3ML
2.5 SOLUTION RESPIRATORY (INHALATION) EVERY 4 HOURS PRN
Status: DISCONTINUED | OUTPATIENT
Start: 2023-07-30 | End: 2023-08-03 | Stop reason: HOSPADM

## 2023-07-30 RX ADMIN — ACETAMINOPHEN 650 MG: 160 SOLUTION ORAL at 04:16

## 2023-07-30 RX ADMIN — ONDANSETRON 4 MG: 2 INJECTION INTRAMUSCULAR; INTRAVENOUS at 08:01

## 2023-07-30 RX ADMIN — SODIUM CHLORIDE 3000 MG: 900 INJECTION INTRAVENOUS at 11:07

## 2023-07-30 RX ADMIN — DONEPEZIL HYDROCHLORIDE 20 MG: 10 TABLET, FILM COATED ORAL at 21:07

## 2023-07-30 RX ADMIN — SODIUM CHLORIDE 3000 MG: 900 INJECTION INTRAVENOUS at 17:13

## 2023-07-30 RX ADMIN — ALBUTEROL SULFATE 2.5 MG: 2.5 SOLUTION RESPIRATORY (INHALATION) at 08:27

## 2023-07-30 RX ADMIN — Medication 4.5 MG: at 21:04

## 2023-07-30 RX ADMIN — SODIUM CHLORIDE, PRESERVATIVE FREE 20 MG: 5 INJECTION INTRAVENOUS at 08:01

## 2023-07-30 RX ADMIN — SODIUM CHLORIDE: 9 INJECTION, SOLUTION INTRAVENOUS at 21:02

## 2023-07-30 RX ADMIN — ONDANSETRON 4 MG: 2 INJECTION INTRAMUSCULAR; INTRAVENOUS at 21:05

## 2023-07-30 RX ADMIN — CYCLOBENZAPRINE 10 MG: 10 TABLET, FILM COATED ORAL at 21:05

## 2023-07-30 RX ADMIN — CYCLOBENZAPRINE 10 MG: 10 TABLET, FILM COATED ORAL at 08:00

## 2023-07-30 RX ADMIN — SODIUM CHLORIDE 3000 MG: 900 INJECTION INTRAVENOUS at 04:17

## 2023-07-30 RX ADMIN — SODIUM CHLORIDE 3000 MG: 900 INJECTION INTRAVENOUS at 23:43

## 2023-07-30 RX ADMIN — SODIUM CHLORIDE, PRESERVATIVE FREE 10 ML: 5 INJECTION INTRAVENOUS at 21:05

## 2023-07-30 RX ADMIN — Medication 8.8 MG: at 21:06

## 2023-07-30 ASSESSMENT — PAIN DESCRIPTION - FREQUENCY: FREQUENCY: CONTINUOUS

## 2023-07-30 ASSESSMENT — PAIN DESCRIPTION - LOCATION
LOCATION: THROAT;NECK
LOCATION: THROAT;NECK

## 2023-07-30 ASSESSMENT — PAIN DESCRIPTION - PAIN TYPE: TYPE: SURGICAL PAIN

## 2023-07-30 ASSESSMENT — PAIN DESCRIPTION - DESCRIPTORS
DESCRIPTORS: ACHING
DESCRIPTORS: ACHING

## 2023-07-30 ASSESSMENT — PAIN SCALES - GENERAL
PAINLEVEL_OUTOF10: 0
PAINLEVEL_OUTOF10: 5
PAINLEVEL_OUTOF10: 6

## 2023-07-30 ASSESSMENT — PAIN DESCRIPTION - ONSET: ONSET: ON-GOING

## 2023-07-30 ASSESSMENT — PAIN DESCRIPTION - ORIENTATION
ORIENTATION: RIGHT
ORIENTATION: RIGHT

## 2023-07-30 NOTE — PLAN OF CARE
Problem: Respiratory - Adult  Goal: Achieves optimal ventilation and oxygenation  Outcome: Progressing  Problem: Respiratory - Adult  Goal: Clear lung sounds  Description: Clear lung sounds  Outcome: Progressing   Continuing albuterol prn per protocol, weaning HFNC as tolerated. Patient mutually agreed on goals.

## 2023-07-31 ENCOUNTER — TELEPHONE (OUTPATIENT)
Dept: ENT CLINIC | Age: 68
End: 2023-07-31

## 2023-07-31 PROCEDURE — 6360000002 HC RX W HCPCS: Performed by: NURSE PRACTITIONER

## 2023-07-31 PROCEDURE — 2700000000 HC OXYGEN THERAPY PER DAY

## 2023-07-31 PROCEDURE — 6370000000 HC RX 637 (ALT 250 FOR IP): Performed by: NURSE PRACTITIONER

## 2023-07-31 PROCEDURE — 2580000003 HC RX 258: Performed by: NURSE PRACTITIONER

## 2023-07-31 PROCEDURE — 2060000000 HC ICU INTERMEDIATE R&B

## 2023-07-31 PROCEDURE — 94761 N-INVAS EAR/PLS OXIMETRY MLT: CPT

## 2023-07-31 RX ADMIN — CYCLOBENZAPRINE 10 MG: 10 TABLET, FILM COATED ORAL at 23:17

## 2023-07-31 RX ADMIN — SODIUM CHLORIDE 3000 MG: 900 INJECTION INTRAVENOUS at 05:58

## 2023-07-31 RX ADMIN — SODIUM CHLORIDE 3000 MG: 900 INJECTION INTRAVENOUS at 23:20

## 2023-07-31 RX ADMIN — ACETAMINOPHEN 650 MG: 160 SOLUTION ORAL at 17:31

## 2023-07-31 RX ADMIN — ACETAMINOPHEN 650 MG: 160 SOLUTION ORAL at 23:16

## 2023-07-31 RX ADMIN — SODIUM CHLORIDE: 9 INJECTION, SOLUTION INTRAVENOUS at 10:11

## 2023-07-31 RX ADMIN — SODIUM CHLORIDE 3000 MG: 900 INJECTION INTRAVENOUS at 17:26

## 2023-07-31 RX ADMIN — OXYCODONE 5 MG: 5 TABLET ORAL at 06:07

## 2023-07-31 RX ADMIN — SODIUM CHLORIDE, PRESERVATIVE FREE 10 ML: 5 INJECTION INTRAVENOUS at 19:44

## 2023-07-31 RX ADMIN — ONDANSETRON 4 MG: 2 INJECTION INTRAMUSCULAR; INTRAVENOUS at 23:17

## 2023-07-31 RX ADMIN — ACETAMINOPHEN 650 MG: 160 SOLUTION ORAL at 05:58

## 2023-07-31 RX ADMIN — SODIUM CHLORIDE 3000 MG: 900 INJECTION INTRAVENOUS at 12:11

## 2023-07-31 RX ADMIN — DONEPEZIL HYDROCHLORIDE 20 MG: 10 TABLET, FILM COATED ORAL at 19:43

## 2023-07-31 RX ADMIN — ONDANSETRON 4 MG: 2 INJECTION INTRAMUSCULAR; INTRAVENOUS at 10:34

## 2023-07-31 RX ADMIN — SODIUM CHLORIDE: 9 INJECTION, SOLUTION INTRAVENOUS at 21:27

## 2023-07-31 ASSESSMENT — PAIN DESCRIPTION - LOCATION
LOCATION: INCISION;NECK;THROAT
LOCATION: INCISION;NECK;THROAT
LOCATION: HEAD
LOCATION: GENERALIZED
LOCATION: INCISION;NECK;THROAT

## 2023-07-31 ASSESSMENT — PAIN SCALES - GENERAL
PAINLEVEL_OUTOF10: 0
PAINLEVEL_OUTOF10: 3
PAINLEVEL_OUTOF10: 3
PAINLEVEL_OUTOF10: 4
PAINLEVEL_OUTOF10: 5
PAINLEVEL_OUTOF10: 3

## 2023-07-31 ASSESSMENT — PAIN DESCRIPTION - DESCRIPTORS
DESCRIPTORS: DULL;ACHING;SORE
DESCRIPTORS: ACHING;DISCOMFORT;SORE
DESCRIPTORS: DISCOMFORT
DESCRIPTORS: ACHING;SORE
DESCRIPTORS: ACHING;DISCOMFORT

## 2023-07-31 ASSESSMENT — PAIN DESCRIPTION - PAIN TYPE: TYPE: ACUTE PAIN;SURGICAL PAIN

## 2023-07-31 ASSESSMENT — PAIN DESCRIPTION - ORIENTATION
ORIENTATION: RIGHT;ANTERIOR
ORIENTATION: MID

## 2023-07-31 NOTE — TELEPHONE ENCOUNTER
Daughter called in requesting an order to be placed to have feeding tube stopped 2 hours prior to [facility] that is to be preformed tomorrow. Pt currently hospitalized. Not sure when the barium swallow is to be performed. She just stated they were going to squeeze him in tomorrow.

## 2023-07-31 NOTE — PROGRESS NOTES
Comprehensive Nutrition Assessment    Type and Reason for Visit:  Reassess    Nutrition Recommendations/Plan:   Continue Pivot at 55ml/hr as tolerated. Do not suggest try going to boluses due to nausea noted earlier today. If able to switch to bolus feeds, suggest Pivot 60ml first bolus then increase by 60ml each bolus until goal 220ml, 6 times daily. Suggested times are 0600, 0900, 1200, 1500, 1800,2100. Monitor for tube feeding education needs, labs & wt. Malnutrition Assessment:  Malnutrition Status: At risk for malnutrition (Comment) (07/25/23 1129)    Context:  Acute Illness     Findings of the 6 clinical characteristics of malnutrition:  Energy Intake:   (pt received 83% estimated needs from TF (7/23-7/24). Tube feeds curently on hold (7/25)waiting on xray of belly per Jessica Interianoar RN)  Weight Loss:   (6.3% wt loss in 2 1/2 months per EMR)     Body Fat Loss:  No significant body fat loss Orbital   Muscle Mass Loss:  No significant muscle mass loss    Fluid Accumulation:  Unable to assess     Strength:  Not Performed    Nutrition Assessment:      Pt. nutritionally not improving AEB tube feeds on hold. At risk for further nutrition compromise r/t increased nutrient needs for wound healing  ( see below), Primary Squamous Cell Carcinoma of [place] Tonsil underlying medical condition (Hx GERD, Hiatal Hernia, PUD, Gastric Fundoplication 3/3/98, Mandible Surgery 1979    Nutrition Related Findings:    Pt. Report/Treatments/Miscellaneous: Pt seen ~1045, mentions just receivied nausea med for nausea, wearing HFNC.  Pivot infusing 55ml/hr (goal)  GI Status: BM x 1 (7/31)  Pertinent Labs: (7/29) Mg 2.5, Ca Serum 8.4  Pertinent Meds: Senokot, Zofran, Glycolax     Wound Type: Surgical Incision ((7/21) transoral robotic assisted rt radical tonsillectomy)       Current Nutrition Intake & Therapies:    Average Meal Intake: NPO  Average Supplements Intake: NPO  Diet NPO  ADULT TUBE FEEDING; Nasogastric; Immune Enhancing; Continuous; 20; Yes; 10; Q 6 hours; 55; 30; Q 4 hours  Current Tube Feeding (TF) Orders:  Feeding Route: Nasogastric  Schedule: Continuous  Feeding Regimen: Pivot 55ml/hr  Additives/Modulars: None  Water Flushes: If no IVF, suggest flush with 160ml free water every 4 hrs (960ml/24 hrs)  Current TF & Flush Orders Provides: Pivot 55ml/hr yields 1320ml, 228 grams CHO, 124 grams protein, 10 grams fiber, 990ml water from TF  Goal TF & Flush Orders Provides: Pivot 220ml 6 times daily yields 1980kcals, 228 grams CHO, 123.8 grams protein,1950ml water (990 TF, 960 flushes), in total volume 2280ml (1320ml TF, 960 flushes)/24 hrs    Anthropometric Measures:  Height: 5' 5\" (165.1 cm)  Ideal Body Weight (IBW): 136 lbs (62 kg)    Admission Body Weight: 148 lb 6.4 oz (67.3 kg) ((7/21) edema N/A)  Current Body Weight: 163 lb 6.4 oz (74.1 kg) (bedscale no edema (7/30)),   IBW. Current BMI (kg/m2): 27.2  Usual Body Weight:  (per EMR: (6/21) 148#10oz actual, (5/24) 147#11oz, (4/28) 158#, (3/16) 154#1oz)                       BMI Categories: Overweight (BMI 25.0-29. 9)    Estimated Daily Nutrient Needs:  Energy Requirements Based On: Kcal/kg  Weight Used for Energy Requirements:  (67.3kgm (7/21))  Energy (kcal/day): 1683-2019kcals (25-30)  Weight Used for Protein Requirements:  (67.3kgm (7/21)  Protein (g/day):  grams (1.2-2 grams protein/kgm)     Fluid (ml/day): 1683-2019ml (25-30ml/kgm wt of 67.3kgm)    Nutrition Diagnosis:   Increased nutrient needs related to increase demand for energy/nutrients as evidenced by wounds    Nutrition Interventions:   Food and/or Nutrient Delivery: Continue Current Tube Feeding  Nutrition Education/Counseling: Education not appropriate  Coordination of Nutrition Care: Continue to monitor while inpatient       Goals:     Goals:  Tolerate nutrition support at goal rate       Nutrition Monitoring and Evaluation:      Food/Nutrient Intake Outcomes: Enteral Nutrition

## 2023-07-31 NOTE — PLAN OF CARE
Problem: Discharge Planning  Goal: Discharge to home or other facility with appropriate resources  Outcome: Progressing  Flowsheets (Taken 7/31/2023 0704)  Discharge to home or other facility with appropriate resources:   Identify barriers to discharge with patient and caregiver   Arrange for needed discharge resources and transportation as appropriate   Identify discharge learning needs (meds, wound care, etc)     Problem: Pain  Goal: Verbalizes/displays adequate comfort level or baseline comfort level  Outcome: Progressing  Flowsheets (Taken 7/31/2023 0704)  Verbalizes/displays adequate comfort level or baseline comfort level:   Encourage patient to monitor pain and request assistance   Assess pain using appropriate pain scale   Administer analgesics based on type and severity of pain and evaluate response   Implement non-pharmacological measures as appropriate and evaluate response     Problem: ABCDS Injury Assessment  Goal: Absence of physical injury  Outcome: Progressing  Flowsheets (Taken 7/31/2023 0704)  Absence of Physical Injury: Implement safety measures based on patient assessment     Problem: Respiratory - Adult  Goal: Achieves optimal ventilation and oxygenation  Outcome: Progressing  Flowsheets (Taken 7/31/2023 0704)  Achieves optimal ventilation and oxygenation:   Assess for changes in respiratory status   Assess for changes in mentation and behavior   Position to facilitate oxygenation and minimize respiratory effort   Initiate smoking cessation protocol as indicated   Encourage broncho-pulmonary hygiene including cough, deep breathe, incentive spirometry   Assess and instruct to report shortness of breath or any respiratory difficulty   Respiratory therapy support as indicated     Problem: Skin/Tissue Integrity - Adult  Goal: Skin integrity remains intact  Outcome: Progressing  Flowsheets  Taken 7/31/2023 0704 by Ivy Clemente RN  Skin Integrity Remains Intact:   Monitor for areas of redness gravity, serum osmolarity and serum sodium as indicated or ordered   Monitor response to interventions for patient's volume status, including labs, urine output, blood pressure (other measures as available)   Encourage oral intake as appropriate   Instruct patient on fluid and nutrition restrictions as appropriate     Problem: Hematologic - Adult  Goal: Maintains hematologic stability  Outcome: Progressing  Flowsheets (Taken 7/31/2023 0704)  Maintains hematologic stability:   Administer blood products/factors as ordered   Assess for signs and symptoms of bleeding or hemorrhage   Monitor labs for bleeding or clotting disorders     Problem: Chronic Conditions and Co-morbidities  Goal: Patient's chronic conditions and co-morbidity symptoms are monitored and maintained or improved  Outcome: Progressing  Flowsheets (Taken 7/31/2023 0704)  Care Plan - Patient's Chronic Conditions and Co-Morbidity Symptoms are Monitored and Maintained or Improved:   Monitor and assess patient's chronic conditions and comorbid symptoms for stability, deterioration, or improvement   Collaborate with multidisciplinary team to address chronic and comorbid conditions and prevent exacerbation or deterioration   Update acute care plan with appropriate goals if chronic or comorbid symptoms are exacerbated and prevent overall improvement and discharge     Problem: Skin/Tissue Integrity  Goal: Absence of new skin breakdown  Description: 1. Monitor for areas of redness and/or skin breakdown  2. Assess vascular access sites hourly  3. Every 4-6 hours minimum:  Change oxygen saturation probe site  4. Every 4-6 hours:  If on nasal continuous positive airway pressure, respiratory therapy assess nares and determine need for appliance change or resting period.   Outcome: Progressing     Problem: Safety - Adult  Goal: Free from fall injury  Outcome: Progressing  Flowsheets (Taken 7/31/2023 0704)  Free From Fall Injury: Instruct family/caregiver on patient safety     Problem: Nutrition Deficit:  Goal: Optimize nutritional status  Outcome: Progressing  Flowsheets (Taken 7/31/2023 5504)  Nutrient intake appropriate for improving, restoring, or maintaining nutritional needs:   Assess nutritional status and recommend course of action   Monitor oral intake, labs, and treatment plans   Recommend appropriate diets, oral nutritional supplements, and vitamin/mineral supplements   Order, calculate, and assess calorie counts as needed   Provide specific nutrition education to patient or family as appropriate   Recommend, monitor, and adjust tube feedings and TPN/PPN based on assessed needs

## 2023-07-31 NOTE — PLAN OF CARE
Problem: Discharge Planning  Goal: Discharge to home or other facility with appropriate resources  7/31/2023 1239 by Cordelia Davies RN  Outcome: Progressing  Flowsheets (Taken 7/31/2023 1239)  Discharge to home or other facility with appropriate resources:   Identify barriers to discharge with patient and caregiver   Refer to discharge planning if patient needs post-hospital services based on physician order or complex needs related to functional status, cognitive ability or social support system   Identify discharge learning needs (meds, wound care, etc)   Arrange for needed discharge resources and transportation as appropriate     Problem: Pain  Goal: Verbalizes/displays adequate comfort level or baseline comfort level  7/31/2023 1239 by Cordelia Davies RN  Outcome: Progressing  Flowsheets (Taken 7/31/2023 1239)  Verbalizes/displays adequate comfort level or baseline comfort level:   Encourage patient to monitor pain and request assistance   Administer analgesics based on type and severity of pain and evaluate response   Consider cultural and social influences on pain and pain management   Assess pain using appropriate pain scale   Implement non-pharmacological measures as appropriate and evaluate response   Notify Licensed Independent Practitioner if interventions unsuccessful or patient reports new pain    Problem: ABCDS Injury Assessment  Goal: Absence of physical injury  7/31/2023 1239 by Cordelia Davies RN  Outcome: Progressing  Flowsheets (Taken 7/31/2023 1239)  Absence of Physical Injury: Implement safety measures based on patient assessment    Problem: Respiratory - Adult  Goal: Achieves optimal ventilation and oxygenation  7/31/2023 1239 by Cordelia Davies RN  Outcome: Progressing  Flowsheets (Taken 7/31/2023 1239)  Achieves optimal ventilation and oxygenation:   Assess for changes in respiratory status   Position to facilitate oxygenation and minimize respiratory effort   Initiate smoking appropriate    Care plan reviewed with patient and spouse. Patient and spouse verbalize understanding of the plan of care and contribute to goal setting.      Electronically signed by Austen Triplett RN on 7/31/2023 at 12:43 PM

## 2023-07-31 NOTE — CARE COORDINATION
Collaborative Discharge Planning    Ras Espinoza  :  1955  MRN:  [id number]    ADMIT DATE:  2023      Discharge Planning Discharge Planning  Type of Residence: House  Living Arrangements: Spouse/Significant Other, Other (Comment) (sposue and grand son)  Support Systems: Spouse/Significant Other, Family Members (grandson and wife live there)  Current Services Prior To Admission: Durable Medical Equipment  Current DME Prior to Arrival: Kannan Ramirez  Potential Assistance Needed: Home Care  DME Ordered?: No  Potential Assistance Purchasing Medications: No  Type of Home Care Services: Nursing Services, PT, OT, IV Therapy (SLP)  Patient expects to be discharged to[de-identified] House  Follow Up Appointment: Best Day/Time : Tuesday PM  One/Two Story Residence: One story  History of falls?: No  White Board Notes /Social Work Whiteboard Notes  /Social Work Whiteboard:  CM; plans home w spouse Mile Mendez, has cane, walker; prefers new [facility], [facility] for NGTF x2 weeks    Discharge Plan Home with home health  Plans home w spouse Mile Mendez, new [facility], new [facility] for OP NGTF x2 weeks after choices offered; has walker  Discharge Milestones and Delays: Clinical status  [place] Tonsil Squamous Cell Carcinoma  Transoral Robotic Radical Tonsillectomy, Right Partial Tongue Base Resection with Closure, Palatoplasty, Pharyngoplasty   Right selective neck dissection and laser resection of positive margin of right tonsil mass planned    Update:   MBS: NPO continued (await ENT recommendations)    IV AB, IVF, HF Oxygen 30% FIO2/25L  NPO status    Await HH/HIS orders if planned (NGTF x2 weeks); monitor possible PEG needs; monitor ENT recommendations      SIGNED:  Zeynep Strange RN   2023, 12:28 PM

## 2023-08-01 ENCOUNTER — APPOINTMENT (OUTPATIENT)
Dept: CT IMAGING | Age: 68
End: 2023-08-01
Attending: OTOLARYNGOLOGY
Payer: COMMERCIAL

## 2023-08-01 ENCOUNTER — APPOINTMENT (OUTPATIENT)
Dept: GENERAL RADIOLOGY | Age: 68
End: 2023-08-01
Attending: OTOLARYNGOLOGY
Payer: COMMERCIAL

## 2023-08-01 PROCEDURE — 6360000002 HC RX W HCPCS: Performed by: NURSE PRACTITIONER

## 2023-08-01 PROCEDURE — 2060000000 HC ICU INTERMEDIATE R&B

## 2023-08-01 PROCEDURE — 2700000000 HC OXYGEN THERAPY PER DAY

## 2023-08-01 PROCEDURE — 6370000000 HC RX 637 (ALT 250 FOR IP): Performed by: PHYSICIAN ASSISTANT

## 2023-08-01 PROCEDURE — 6360000004 HC RX CONTRAST MEDICATION: Performed by: OTOLARYNGOLOGY

## 2023-08-01 PROCEDURE — 6370000000 HC RX 637 (ALT 250 FOR IP): Performed by: NURSE PRACTITIONER

## 2023-08-01 PROCEDURE — 70492 CT SFT TSUE NCK W/O & W/DYE: CPT

## 2023-08-01 PROCEDURE — 92526 ORAL FUNCTION THERAPY: CPT

## 2023-08-01 PROCEDURE — 2500000003 HC RX 250 WO HCPCS: Performed by: REGISTERED NURSE

## 2023-08-01 PROCEDURE — 74230 X-RAY XM SWLNG FUNCJ C+: CPT

## 2023-08-01 PROCEDURE — 92611 MOTION FLUOROSCOPY/SWALLOW: CPT

## 2023-08-01 PROCEDURE — 94761 N-INVAS EAR/PLS OXIMETRY MLT: CPT

## 2023-08-01 PROCEDURE — 2580000003 HC RX 258: Performed by: NURSE PRACTITIONER

## 2023-08-01 PROCEDURE — 6370000000 HC RX 637 (ALT 250 FOR IP): Performed by: REGISTERED NURSE

## 2023-08-01 RX ADMIN — OXYCODONE 5 MG: 5 TABLET ORAL at 15:03

## 2023-08-01 RX ADMIN — DONEPEZIL HYDROCHLORIDE 20 MG: 10 TABLET, FILM COATED ORAL at 20:48

## 2023-08-01 RX ADMIN — ONDANSETRON 4 MG: 2 INJECTION INTRAMUSCULAR; INTRAVENOUS at 20:48

## 2023-08-01 RX ADMIN — SODIUM CHLORIDE 3000 MG: 900 INJECTION INTRAVENOUS at 10:55

## 2023-08-01 RX ADMIN — BARIUM SULFATE 30 ML: 0.81 POWDER, FOR SUSPENSION ORAL at 10:09

## 2023-08-01 RX ADMIN — ACETAMINOPHEN 650 MG: 160 SOLUTION ORAL at 22:45

## 2023-08-01 RX ADMIN — SODIUM CHLORIDE 3000 MG: 900 INJECTION INTRAVENOUS at 16:29

## 2023-08-01 RX ADMIN — ONDANSETRON 4 MG: 2 INJECTION INTRAMUSCULAR; INTRAVENOUS at 08:57

## 2023-08-01 RX ADMIN — SODIUM CHLORIDE 3000 MG: 900 INJECTION INTRAVENOUS at 04:22

## 2023-08-01 RX ADMIN — BARIUM SULFATE 30 ML: 400 SUSPENSION ORAL at 10:09

## 2023-08-01 RX ADMIN — SODIUM CHLORIDE, PRESERVATIVE FREE 10 ML: 5 INJECTION INTRAVENOUS at 08:59

## 2023-08-01 RX ADMIN — OXYCODONE 5 MG: 5 TABLET ORAL at 10:59

## 2023-08-01 RX ADMIN — ACETAMINOPHEN 650 MG: 160 SOLUTION ORAL at 04:18

## 2023-08-01 RX ADMIN — IOPAMIDOL 80 ML: 755 INJECTION, SOLUTION INTRAVENOUS at 12:47

## 2023-08-01 RX ADMIN — SODIUM CHLORIDE, PRESERVATIVE FREE 10 ML: 5 INJECTION INTRAVENOUS at 20:52

## 2023-08-01 RX ADMIN — OXYCODONE 5 MG: 5 TABLET ORAL at 20:48

## 2023-08-01 RX ADMIN — SODIUM CHLORIDE: 9 INJECTION, SOLUTION INTRAVENOUS at 14:02

## 2023-08-01 RX ADMIN — Medication 4.5 MG: at 22:44

## 2023-08-01 RX ADMIN — SODIUM CHLORIDE 3000 MG: 900 INJECTION INTRAVENOUS at 22:42

## 2023-08-01 ASSESSMENT — PAIN DESCRIPTION - ONSET
ONSET: ON-GOING

## 2023-08-01 ASSESSMENT — PAIN DESCRIPTION - LOCATION
LOCATION: JAW
LOCATION: NECK
LOCATION: NECK;JAW
LOCATION: JAW
LOCATION: NECK

## 2023-08-01 ASSESSMENT — PAIN SCALES - GENERAL
PAINLEVEL_OUTOF10: 4
PAINLEVEL_OUTOF10: 2
PAINLEVEL_OUTOF10: 4
PAINLEVEL_OUTOF10: 0
PAINLEVEL_OUTOF10: 4
PAINLEVEL_OUTOF10: 2
PAINLEVEL_OUTOF10: 0
PAINLEVEL_OUTOF10: 2
PAINLEVEL_OUTOF10: 0
PAINLEVEL_OUTOF10: 3
PAINLEVEL_OUTOF10: 2
PAINLEVEL_OUTOF10: 3

## 2023-08-01 ASSESSMENT — PAIN DESCRIPTION - FREQUENCY
FREQUENCY: CONTINUOUS

## 2023-08-01 ASSESSMENT — PAIN DESCRIPTION - PAIN TYPE
TYPE: SURGICAL PAIN

## 2023-08-01 ASSESSMENT — PAIN DESCRIPTION - ORIENTATION
ORIENTATION: RIGHT

## 2023-08-01 ASSESSMENT — PAIN - FUNCTIONAL ASSESSMENT
PAIN_FUNCTIONAL_ASSESSMENT: ACTIVITIES ARE NOT PREVENTED

## 2023-08-01 ASSESSMENT — PAIN SCALES - WONG BAKER
WONGBAKER_NUMERICALRESPONSE: 0
WONGBAKER_NUMERICALRESPONSE: 0

## 2023-08-01 ASSESSMENT — PAIN DESCRIPTION - DESCRIPTORS
DESCRIPTORS: ACHING;DISCOMFORT
DESCRIPTORS: ACHING
DESCRIPTORS: ACHING
DESCRIPTORS: ACHING;DISCOMFORT
DESCRIPTORS: ACHING

## 2023-08-01 NOTE — PROGRESS NOTES
Comprehensive Nutrition Assessment    Type and Reason for Visit:  Reassess    Nutrition Recommendations/Plan:   If  tube feeds need restarted & passes MBS,  suggest  if po intake is less than 50%, initiate Pivot 60ml  first bolus then increase by 60ml each bolus as tolerated to goal 220ml,  6 times daily ( suggested times are 0600,0900,1200, 1500, 1800,2100). Consider using tube feeding pump & infuse bolus over 1 hr time increments per bolus to aid with tube feeding tolerance as appropriate. If doesn't tolerate, consider switching  back to Pivot 1.5 goal of 55ml/hr. Consider PEG as appropriate if fails MBS. If no IVF suggest flush with 160ml free water every 4 hrs (960ml/24 hrs)  Monitor NPO status, MBS results,labs,  wt,  &education needs. Malnutrition Assessment:  Malnutrition Status: At risk for malnutrition (Comment) (07/25/23 1129)    Context:  Acute Illness     Findings of the 6 clinical characteristics of malnutrition:  Energy Intake:   (pt received 83% estimated needs from TF (7/23-7/24). Tube feeds curently on hold (7/25)waiting on xray of belly per Kulwant Oliver RN)  Weight Loss:   (6.3% wt loss in 2 1/2 months per EMR)     Body Fat Loss:  No significant body fat loss Orbital   Muscle Mass Loss:  No significant muscle mass loss    Fluid Accumulation:  Unable to assess     Strength:  Not Performed    Nutrition Assessment:      Pt. nutritionally not improving AEB tube feeds on hold for MBS. At risk for further nutrition compromise r/t increased nutrient needs for wound healing  ( see below), Primary Squamous Cell Carcinoma of [place] Tonsil underlying medical condition (Hx GERD, Hiatal Hernia, PUD, Gastric Fundoplication 3/8/23, Mandible Surgery 1979    Nutrition Related Findings:  Pt. Report/Treatments/Miscellaneous: Pt off unit & RN mentions tube feedings were not running due to NPO for MBS.  Pivot previously infused 55ml/hr (goal) on (7/31)  GI Status: BM x 2 (7/31)  Pertinent Labs: demand for energy/nutrients as evidenced by wounds    Nutrition Interventions:   Food and/or Nutrient Delivery:  (tube feeds on hold for MBS per RN (8/1))  Nutrition Education/Counseling: Education not appropriate  Coordination of Nutrition Care: Continue to monitor while inpatient       Goals:     Goals: Meet at least 75% of estimated needs, by next RD assessment       Nutrition Monitoring and Evaluation:      Food/Nutrient Intake Outcomes: Enteral Nutrition Intake/Tolerance (MBS results)  Physical Signs/Symptoms Outcomes: Biochemical Data, Chewing or Swallowing, GI Status, Nausea or Vomiting, Hemodynamic Status, Nutrition Focused Physical Findings, Skin, Weight    Discharge Planning:     Too soon to determine     America Zurita RD, LD  Contact: [phone]

## 2023-08-01 NOTE — PROGRESS NOTES
08/01/23 1415   Encounter Summary   Encounter Overview/Reason  Spiritual/Emotional Needs   Service Provided For: Patient   Referral/Consult From: [facility]   Support System Spouse   Last Encounter  08/01/23   Complexity of Encounter Low   Begin Time 1410   End Time  1415   Total Time Calculated 5 min   Spiritual/Emotional needs   Type Spiritual Support   Assessment/Intervention/Outcome   Assessment Unable to assess   Intervention Prayer (assurance of)/[place]     During my encounter with the 76 yr old patient, I attempted to visit with the pt on 4K. The patient appears to be resting (not responsive) now and I didn't want to disturb the patient. I or another Dixon Robert will attempt to visit the patient or the family at another time. The pt was admitted due to tonsil cancer.

## 2023-08-01 NOTE — PLAN OF CARE
Problem: Discharge Planning  Goal: Discharge to home or other facility with appropriate resources  8/1/2023 0151 by Ham Luciano RN  Outcome: Progressing  Flowsheets (Taken 7/31/2023 2000)  Discharge to home or other facility with appropriate resources: Identify barriers to discharge with patient and caregiver     Problem: Pain  Goal: Verbalizes/displays adequate comfort level or baseline comfort level  8/1/2023 0151 by Ham Luciano RN  Outcome: Progressing  Flowsheets (Taken 7/31/2023 1239 by Nain Olivo RN)  Verbalizes/displays adequate comfort level or baseline comfort level:   Encourage patient to monitor pain and request assistance   Administer analgesics based on type and severity of pain and evaluate response   Consider cultural and social influences on pain and pain management   Assess pain using appropriate pain scale   Implement non-pharmacological measures as appropriate and evaluate response   Notify Licensed Independent Practitioner if interventions unsuccessful or patient reports new pain     Problem: ABCDS Injury Assessment  Goal: Absence of physical injury  8/1/2023 0151 by Ham Luciano RN  Outcome: Progressing  [address] (Taken 7/31/2023 1239 by Nain Olivo RN)  Absence of Physical Injury: Implement safety measures based on patient assessment     Problem: Respiratory - Adult  Goal: Achieves optimal ventilation and oxygenation  8/1/2023 0151 by Ham Luciano RN  Outcome: Progressing  Flowsheets (Taken 7/31/2023 2000)  Achieves optimal ventilation and oxygenation: Assess for changes in respiratory status     Problem: Cardiovascular - Adult  Goal: Maintains optimal cardiac output and hemodynamic stability  Recent Flowsheet Documentation  Taken 7/31/2023 2000 by Ham Luciano RN  Maintains optimal cardiac output and hemodynamic stability:   Monitor blood pressure and heart rate   Monitor urine output and notify Licensed Independent Practitioner for values outside of normal

## 2023-08-01 NOTE — PROGRESS NOTES
ENT interventions DIRECT correlation in swallow integrity. Patient presents [facility] and mild-moderate ph[facility] dysphag[facility]. Study limited d/t shoulder positioning impacting visualization of pharynx - further impacted during implementation of positional strategies. Patient with adequate oral phase demonstrating appropriate oral initiation, bolus control, and timely AP transit of liquids. Decreased TBR and pharyngeal constriction to propel bolus through the pharynx and into the UES. Patient with decreased hyolaryngeal elevation and anterior excursion to negatively influence epiglottic inversion for airway protection. ?Question possible swelling of epiglottis as well as edema on pharyngeal wall to prohibit inversion and/or ? potential nerve involvement; certainly cannot determine with MBS alone. Patient with appropriate sensation in which he utilized multiple (4-5) swallows in attempts to clear pharyngeal residue and elicited a spontaneous cough following airway invasion events. Residuals remained following the swallow at ~C3-C4. Improved clearance of residuals noted with positional strategies; however, not successful in improved airway protection measures. Patient with consistent transient laryngeal penetration of thin liquids via teaspoon in neutral head positioning + with chin tuck to right side and ALL mildly thick liquids (I.e., via tsp+cup, neutral head positioning + chin tuck to right). Deeper laryngeal penetration present with thin liquids via cup + chin tuck to right. Audible tracheal aspiration present with thin liquids via cup in neutral head positioning despite effort. Dysphagia Therapy: Following completion of MBS: ST completed review of swallow anatomy and physiology via use of computer monitor with patient and wife.  ST defined tracheal aspiration and laryngeal penetration, and explained the associated risk factors (pneumonia, pulmonary decline,

## 2023-08-02 ENCOUNTER — APPOINTMENT (OUTPATIENT)
Dept: GENERAL RADIOLOGY | Age: 68
End: 2023-08-02
Attending: OTOLARYNGOLOGY
Payer: COMMERCIAL

## 2023-08-02 PROCEDURE — 94761 N-INVAS EAR/PLS OXIMETRY MLT: CPT

## 2023-08-02 PROCEDURE — 6360000002 HC RX W HCPCS: Performed by: NURSE PRACTITIONER

## 2023-08-02 PROCEDURE — 6370000000 HC RX 637 (ALT 250 FOR IP): Performed by: NURSE PRACTITIONER

## 2023-08-02 PROCEDURE — 2060000000 HC ICU INTERMEDIATE R&B

## 2023-08-02 PROCEDURE — 92526 ORAL FUNCTION THERAPY: CPT

## 2023-08-02 PROCEDURE — 74018 RADEX ABDOMEN 1 VIEW: CPT

## 2023-08-02 PROCEDURE — 2700000000 HC OXYGEN THERAPY PER DAY

## 2023-08-02 PROCEDURE — 2580000003 HC RX 258: Performed by: NURSE PRACTITIONER

## 2023-08-02 RX ADMIN — CYCLOBENZAPRINE 10 MG: 10 TABLET, FILM COATED ORAL at 21:20

## 2023-08-02 RX ADMIN — ACETAMINOPHEN 650 MG: 160 SOLUTION ORAL at 22:18

## 2023-08-02 RX ADMIN — ACETAMINOPHEN 650 MG: 160 SOLUTION ORAL at 12:05

## 2023-08-02 RX ADMIN — ONDANSETRON 4 MG: 2 INJECTION INTRAMUSCULAR; INTRAVENOUS at 10:00

## 2023-08-02 RX ADMIN — SODIUM CHLORIDE 3000 MG: 900 INJECTION INTRAVENOUS at 05:06

## 2023-08-02 RX ADMIN — SODIUM CHLORIDE 3000 MG: 900 INJECTION INTRAVENOUS at 12:04

## 2023-08-02 RX ADMIN — SODIUM CHLORIDE 3000 MG: 900 INJECTION INTRAVENOUS at 22:18

## 2023-08-02 RX ADMIN — SODIUM CHLORIDE 3000 MG: 900 INJECTION INTRAVENOUS at 16:59

## 2023-08-02 RX ADMIN — SODIUM CHLORIDE: 9 INJECTION, SOLUTION INTRAVENOUS at 16:09

## 2023-08-02 RX ADMIN — ACETAMINOPHEN 650 MG: 160 SOLUTION ORAL at 17:00

## 2023-08-02 RX ADMIN — DONEPEZIL HYDROCHLORIDE 20 MG: 10 TABLET, FILM COATED ORAL at 21:20

## 2023-08-02 RX ADMIN — ACETAMINOPHEN 650 MG: 160 SOLUTION ORAL at 05:08

## 2023-08-02 ASSESSMENT — PAIN DESCRIPTION - PAIN TYPE
TYPE: SURGICAL PAIN
TYPE: SURGICAL PAIN

## 2023-08-02 ASSESSMENT — PAIN DESCRIPTION - DESCRIPTORS
DESCRIPTORS: ACHING
DESCRIPTORS: ACHING;DISCOMFORT
DESCRIPTORS: ACHING
DESCRIPTORS: ACHING;DISCOMFORT

## 2023-08-02 ASSESSMENT — PAIN DESCRIPTION - FREQUENCY
FREQUENCY: CONTINUOUS
FREQUENCY: CONTINUOUS

## 2023-08-02 ASSESSMENT — PAIN SCALES - GENERAL
PAINLEVEL_OUTOF10: 3
PAINLEVEL_OUTOF10: 3
PAINLEVEL_OUTOF10: 0
PAINLEVEL_OUTOF10: 4
PAINLEVEL_OUTOF10: 2
PAINLEVEL_OUTOF10: 2

## 2023-08-02 ASSESSMENT — PAIN - FUNCTIONAL ASSESSMENT
PAIN_FUNCTIONAL_ASSESSMENT: ACTIVITIES ARE NOT PREVENTED

## 2023-08-02 ASSESSMENT — PAIN DESCRIPTION - LOCATION
LOCATION: NECK;JAW
LOCATION: NECK;FACE
LOCATION: NECK;JAW
LOCATION: JAW;NECK

## 2023-08-02 ASSESSMENT — PAIN DESCRIPTION - ONSET
ONSET: ON-GOING
ONSET: ON-GOING

## 2023-08-02 ASSESSMENT — PAIN DESCRIPTION - ORIENTATION
ORIENTATION: RIGHT

## 2023-08-02 NOTE — FLOWSHEET NOTE
08/02/23 0817   Safe Environment   Safety Measures Bed in low position;Call light within reach; Family at bedside;Side rails X 2;Standard Safety Measures  (Virtual nurse safety round completed.)      Patient is awake and  alert and answers questions. No distress noted.

## 2023-08-02 NOTE — CARE COORDINATION
Collaborative Discharge Planning    Nayeli Monaco  :  1955  MRN:  [id number]    ADMIT DATE:  2023      Discharge Planning Discharge Planning  Type of Residence: House  Living Arrangements: Spouse/Significant Other, Other (Comment) (sposue and grand son)  Support Systems: Spouse/Significant Other, Family Members (grandson and wife live there)  Current Services Prior To Admission: Durable Medical Equipment  Current DME Prior to Arrival: Jw Dina  Potential Assistance Needed: [facility] [address]  DME Ordered?: No  Potential Assistance Purchasing Medications: No  Type of Home Care Services: Nursing Services, PT, OT, IV Therapy (SLP)  Patient expects to be discharged to[de-identified] House  Follow Up Appointment: Best Day/Time : Tuesday PM  One/Two Story Residence: One story  History of falls?: No  White Board Notes /Social Work Whiteboard Notes  /Social Work Whiteboard:  CM; plans home w spouse Michael Valle, has cane, walker; prefers new [facility] SR [facility] HIS for NGTF x2 weeks    Discharge Plan Home with home health  Plans home w spouse Michael Valle, [facility] (nsg, PT/OT/ST, aide, wound care, home Pivot TF), new SR HIS for OP NGTF x2 weeks, new SR HME for new bed, BSC after choices offered; has walker; [facility], SR EMELI Aguirre, Addi Miles (has Odalis Patrick Cyver[employer] approval for 10 bottles Pivot 1.5 and will contact SR EMELI Aguirre plans MBS in 1 week; if needs additional product, dietician suggests alternative Vital 1.2), Ben Rosenberg Liaison (see AVS)    Post-acute [facility]) provider list was provided to patient. Patient was informed of their freedom to choose [facility] provider. Discussed and offered to show the patient the relevant [facility] Providers quality and resource use measures on Medicare Compare web site via computer based on patient's goals of care and treatment preferences. Questions regarding selection process were answered.       Discharge Milestones and Delays: Clinical status  [place] Tonsil Squamous Cell Carcinoma  7/21Transoral Robotic Radical Tonsillectomy, Right Partial Tongue Base Resection with Closure, Palatoplasty, Pharyngoplasty  7/28 Right selective neck dissection and laser resection of positive margin of right tonsil mass planned     Update:  8/1 MBS: NPO continued     IV AB, IVF, HF RAFIO2/25L  NPO status; ST following    Kolby Hendrix requires a bedside commode due to being confined to a single room, and is physically incapable of utilizing regular toilet facilities. Current body weight is Weight - Scale: 142 lb 4.8 oz (64.5 kg). Kolby Hendrix was evaluated today and a DME order was entered for a semi-electric hospital bed because he requires assistance for positioning needs not possible in an ordinary bed, complexity of body positioning needs, requirement of elevation of head of bed more than 30 degrees for the diagnosis of tonsillar cancer, neck dissection w continuous TF needs HOB elevated to prevent aspiration . Patient requires frequent and immediate positioning changes for relief of pain and care needs related to medical diagnoses. Patient needs variability of bed height requirements to perform patient transfers and for ambulating. Current body Weight - Scale: 142 lb 4.8 oz (64.5 kg). The need for this equipment was discussed with the patient and he understands and is in agreement. Per Dietician:  Nutrition Recommendations/Plan:   Continue Pivot 55ml/hr at goal.  If no IVF, suggest flush with 160ml free water every 4 hrs. Pt to have MBS next week. If pt fails MBS  next week & home infusion not able to get Pivot 1.5 in stock next week, would suggest use Vital 1.5cal/ml 55ml/hr (1320ml, 1980kcals, 89.1 grams protein, 247 grams CHO, 8 grams fiber, 1008ml water from TF). Would suggest flush with 160ml free water every 4 hrs.      Update:  Called Financial Services re: make Medicare primary, BCBS secondary per family request      SIGNED:  James Day RN   8/2/2023, 11:08 AM

## 2023-08-02 NOTE — PROGRESS NOTES
[address] [facility] ICU STEPDOWN TELEMETRY 4K  DAILY NOTE    TIME   SLP Individual Minutes  Time In: 7374  Time Out: 5622  Minutes: 14  Timed Code Treatment Minutes: 0 Minutes       Date: 2023  Patient Name: Promise Munoz      CSN: [id number]   : 1955  (76 y.o.)  Gender: male   Referring Physician:  Aundrea Gonzales, APRN - CNP  Diagnosis: Primary squamous cell carcinoma of palatine tonsil   Precautions: Fall risk, aspiration precautions   Current Diet: NPO + NG, ice chips + thin liquids via tsp  Respiratory Status: HHFNC: 25LPM  Swallowing Strategies:  See MBS report  Date of Last MBS/FEES:  23    Pain:  No pain reported. Subjective:  Patient seen with RN approval. Patient resting in bed with eyes closed upon ST arrival - wife present at bedside. Patient easily roused to call of name + agreeable to participate in session. Short-Term Goals:  SHORT TERM GOAL #1:  Goal 1: Patient will safely consume ice chips with stable pulmonary status and use of compensatory strategies to assist with nutrition/hydration  INTERVENTIONS:Ice chips + right chin tuck: x8  *fair success - verbal cues required to engage in strict chin tuck to R side. Eructation present x2 following swallow. Throat clear present; however, not necessarily indicative of aspiration event. Continue to recommend NPO + conservative ice chip + thin liquids via tsp. Ongoing dysphagia services to follow. SHORT TERM GOAL #2:  Goal 2: Patient will participate in pharyngeal exercise program to improve/maintain pharyngeal swallow function. INTERVENTIONS:Effortful swallow: x10 - fair success  Nhung - x2 limited success  *high level of skilled educaiton re: recommendations for pharyngeal strengthening program with handout provided. Discussed findings of MBS from previous date and recommendations. Advised patient and wife that soft tissue CT of neck revealed:   \" 1.  Extensive postoperative

## 2023-08-02 NOTE — FLOWSHEET NOTE
08/02/23 7870   Safe Environment   Safety Measures Bed in low position;Call light within reach; Side rails X 2;Standard Safety Measures  (Virtual nurse safety round completed.)     Patient is awake and  alert and answers questions. No distress noted.

## 2023-08-02 NOTE — FLOWSHEET NOTE
08/02/23 1306   Safe Environment   Safety Measures Bed in low position;Call light within reach; Side rails X 2;Standard Safety Measures  (Virtual nurse safety round completed.)      Patient is awake and  alert and answers questions. No distress noted.

## 2023-08-02 NOTE — PROGRESS NOTES
Comprehensive Nutrition Assessment    Type and Reason for Visit:  Reassess    Nutrition Recommendations/Plan:   Continue Pivot 55ml/hr at goal.  If no IVF, suggest flush with 160ml free water every 4 hrs. Pt to have MBS next week. If pt fails MBS  next week & home infusion not able to get Pivot 1.5 in stock next week, would suggest use Vital 1.5cal/ml 55ml/hr (1320ml, 1980kcals, 89.1 grams protein, 247 grams CHO, 8 grams fiber, 1008ml water from TF). Would suggest flush with 160ml free water every 4 hrs. Monitor NPO status, tube feeds, labs & wt. Malnutrition Assessment:  Malnutrition Status: At risk for malnutrition (Comment) (07/25/23 1129)    Context:  Acute Illness     Findings of the 6 clinical characteristics of malnutrition:  Energy Intake:   (pt received 83% estimated needs from TF (7/23-7/24). Tube feeds curently on hold (7/25)waiting on xray of belly per Zaida Chi RN)  Weight Loss:   (6.3% wt loss in 2 1/2 months per EMR)     Body Fat Loss:  No significant body fat loss Orbital   Muscle Mass Loss:  No significant muscle mass loss    Fluid Accumulation:  Unable to assess     Strength:  Not Performed    Nutrition Assessment:      Pt. nutritionally  improving AEB TF at goal rate & nausea improved per pt. At risk for further nutrition compromise r/t increased nutrient needs for wound healing  ( see below), Primary Squamous Cell Carcinoma of [place] Tonsil underlying medical condition (Hx GERD, Hiatal Hernia, PUD, Gastric Fundoplication 4/0/21, Mandible Surgery 1979    Nutrition Related Findings:    Pt. Report/Treatments/Miscellaneous: Pt seen, mentions nausea improved. Pivot 1.5 infusing 55ml/hr (goal) . Pt has a large bore NGT per RN. Spoke with [facility] home infusion Magdiel Baas) & Rishi  re: home TF needs.    GI Status: BM x 2 (8/1)  (Pertinent Labs: reviewed  Pertinent Meds: Senokot , Dilaudid, Milk of Magnesia, Glycolax    Wound Type:  ((7/21) transoral robotic assisted rt radical

## 2023-08-02 NOTE — FLOWSHEET NOTE
08/02/23 0958   Safe Environment   Safety Measures Bed in low position;Call light within reach; Side rails X 2;Standard Safety Measures;Nurse at bedside  (Virtual nurse safety round completed.)      Patient is awake and  alert and answers questions. No distress noted.

## 2023-08-02 NOTE — PLAN OF CARE
Problem: Discharge Planning  Goal: Discharge to home or other facility with appropriate resources  Outcome: Progressing  Flowsheets (Taken 7/31/2023 2000 by Mabel Servin RN)  Discharge to home or other facility with appropriate resources: Identify barriers to discharge with patient and caregiver     Problem: Pain  Goal: Verbalizes/displays adequate comfort level or baseline comfort level  Outcome: Progressing  Flowsheets (Taken 7/31/2023 1239 by Newton Vega, RN)  Verbalizes/displays adequate comfort level or baseline comfort level:   Encourage patient to monitor pain and request assistance   Administer analgesics based on type and severity of pain and evaluate response   Consider cultural and social influences on pain and pain management   Assess pain using appropriate pain scale   Implement non-pharmacological measures as appropriate and evaluate response   Notify Licensed Independent Practitioner if interventions unsuccessful or patient reports new pain     Problem: ABCDS Injury Assessment  Goal: Absence of physical injury  Outcome: Progressing  Flowsheets (Taken 7/31/2023 1239 by Newton Vega RN)  Absence of Physical Injury: Implement safety measures based on patient assessment     Problem: Respiratory - Adult  Goal: Achieves optimal ventilation and oxygenation  Outcome: Progressing  Flowsheets (Taken 7/31/2023 2000 by Mabel Servin RN)  Achieves optimal ventilation and oxygenation: Assess for changes in respiratory status     Problem: Skin/Tissue Integrity - Adult  Goal: Skin integrity remains intact  Outcome: Progressing  Flowsheets (Taken 8/1/2023 0150 by Mabel Servin RN)  Skin Integrity Remains Intact:   Monitor for areas of redness and/or skin breakdown   Assess vascular access sites hourly     Problem: Skin/Tissue Integrity - Adult  Goal: Incisions, wounds, or drain sites healing without S/S of infection  Outcome: Progressing  Flowsheets (Taken 8/1/2023 0150 by Mabel Servin

## 2023-08-02 NOTE — DISCHARGE INSTRUCTIONS
Nutrition Recommendations/Plan:   Continue Pivot 55ml/hr at goal.  If no IVF, suggest flush with 160ml free water every 4 hrs. Pt to have MBS next week. If pt fails MBS  next week & home infusion not able to get Pivot 1.5 in stock next week, would suggest use Vital 1.5cal/ml 55ml/hr (1320ml, 1980kcals, 89.1 grams protein, 247 grams CHO, 8 grams fiber, 1008ml water from TF). Would suggest flush with 160ml free water every 4 hrs. ENT:  +ELADIA drain to remain in place until follow-up with Dr. Nate Campos. To be emptied in the morning and bedtime and to write down output so that we can monitor the amount draining at your follow-up appt and determine if the drain is ready for removal or not. +Swallow study (MBS) is to be scheduled in the next week; our office is calling to schedule. (Hopefully to be scheduled prior to your follow-up appointment with Dr. Nate Campos so that he can review images and remove NG tube and ELADIA drain at this appointment pending your progress). +Utilize the tube feed and follow recommendations from dietician; see above.   +Only to have ice chips and sips of water with chin tuck to the right until approved by Dr. Nate Campos  +To monitor for fever/chills/worsening pain/pus or drainage from incisional area

## 2023-08-03 ENCOUNTER — TELEPHONE (OUTPATIENT)
Dept: ENT CLINIC | Age: 68
End: 2023-08-03

## 2023-08-03 VITALS
TEMPERATURE: 97.4 F | DIASTOLIC BLOOD PRESSURE: 76 MMHG | HEIGHT: 65 IN | SYSTOLIC BLOOD PRESSURE: 168 MMHG | WEIGHT: 142.3 LBS | RESPIRATION RATE: 18 BRPM | OXYGEN SATURATION: 98 % | BODY MASS INDEX: 23.71 KG/M2 | HEART RATE: 64 BPM

## 2023-08-03 DIAGNOSIS — R13.19 OTHER DYSPHAGIA: Primary | ICD-10-CM

## 2023-08-03 PROCEDURE — 6360000002 HC RX W HCPCS: Performed by: NURSE PRACTITIONER

## 2023-08-03 PROCEDURE — 6370000000 HC RX 637 (ALT 250 FOR IP): Performed by: NURSE PRACTITIONER

## 2023-08-03 PROCEDURE — 2580000003 HC RX 258: Performed by: NURSE PRACTITIONER

## 2023-08-03 PROCEDURE — 94761 N-INVAS EAR/PLS OXIMETRY MLT: CPT

## 2023-08-03 RX ORDER — ONDANSETRON 4 MG/1
4 TABLET, FILM COATED ORAL EVERY 8 HOURS PRN
Qty: 15 TABLET | Refills: 0 | Status: SHIPPED | OUTPATIENT
Start: 2023-08-03 | End: 2023-08-10

## 2023-08-03 RX ADMIN — SODIUM CHLORIDE 3000 MG: 900 INJECTION INTRAVENOUS at 10:24

## 2023-08-03 RX ADMIN — ACETAMINOPHEN 650 MG: 160 SOLUTION ORAL at 10:24

## 2023-08-03 RX ADMIN — ONDANSETRON 4 MG: 2 INJECTION INTRAMUSCULAR; INTRAVENOUS at 14:07

## 2023-08-03 RX ADMIN — SODIUM CHLORIDE 3000 MG: 900 INJECTION INTRAVENOUS at 04:29

## 2023-08-03 RX ADMIN — ACETAMINOPHEN 650 MG: 160 SOLUTION ORAL at 04:29

## 2023-08-03 RX ADMIN — OXYCODONE 5 MG: 5 TABLET ORAL at 03:30

## 2023-08-03 RX ADMIN — ONDANSETRON 4 MG: 2 INJECTION INTRAMUSCULAR; INTRAVENOUS at 08:01

## 2023-08-03 RX ADMIN — OXYCODONE 5 MG: 5 TABLET ORAL at 12:49

## 2023-08-03 ASSESSMENT — PAIN DESCRIPTION - LOCATION
LOCATION: THROAT
LOCATION: THROAT
LOCATION: JAW

## 2023-08-03 ASSESSMENT — PAIN SCALES - GENERAL
PAINLEVEL_OUTOF10: 4
PAINLEVEL_OUTOF10: 4
PAINLEVEL_OUTOF10: 5
PAINLEVEL_OUTOF10: 3
PAINLEVEL_OUTOF10: 0

## 2023-08-03 ASSESSMENT — PAIN DESCRIPTION - ORIENTATION
ORIENTATION: RIGHT;LEFT
ORIENTATION: RIGHT;LEFT

## 2023-08-03 ASSESSMENT — PAIN DESCRIPTION - DESCRIPTORS
DESCRIPTORS: ACHING
DESCRIPTORS: ACHING

## 2023-08-03 ASSESSMENT — PAIN DESCRIPTION - PAIN TYPE: TYPE: SURGICAL PAIN

## 2023-08-03 NOTE — PLAN OF CARE
Problem: Discharge Planning  Goal: Discharge to home or other facility with appropriate resources  Outcome: Progressing     Problem: Pain  Goal: Verbalizes/displays adequate comfort level or baseline comfort level  Outcome: Progressing     Problem: ABCDS Injury Assessment  Goal: Absence of physical injury  Outcome: Progressing     Problem: Respiratory - Adult  Goal: Achieves optimal ventilation and oxygenation  Outcome: Progressing     Problem: Skin/Tissue Integrity - Adult  Goal: Skin integrity remains intact  Outcome: Progressing  Goal: Incisions, wounds, or drain sites healing without S/S of infection  Outcome: Progressing  Goal: Oral mucous membranes remain intact  Outcome: Progressing     Problem: Musculoskeletal - Adult  Goal: Return mobility to safest level of function  Outcome: Progressing  Goal: Return ADL status to a safe level of function  Outcome: Progressing     Problem: Infection - Adult  Goal: Absence of infection at discharge  Outcome: Progressing  Goal: Absence of infection during hospitalization  Outcome: Progressing     Problem: Metabolic/Fluid and Electrolytes - Adult  Goal: Electrolytes maintained within normal limits  Outcome: Progressing  Goal: Hemodynamic stability and optimal renal function maintained  Outcome: Progressing     Problem: Hematologic - Adult  Goal: Maintains hematologic stability  Outcome: Progressing     Problem: Chronic Conditions and Co-morbidities  Goal: Patient's chronic conditions and co-morbidity symptoms are monitored and maintained or improved  Outcome: Progressing     Problem: Skin/Tissue Integrity  Goal: Absence of new skin breakdown  Description: 1. Monitor for areas of redness and/or skin breakdown  2. Assess vascular access sites hourly  3. Every 4-6 hours minimum:  Change oxygen saturation probe site  4.   Every 4-6 hours:  If on nasal continuous positive airway pressure, respiratory therapy assess nares and determine need for appliance change or resting period. Outcome: Progressing     Problem: Safety - Adult  Goal: Free from fall injury  Outcome: Progressing     Problem: Nutrition Deficit:  Goal: Optimize nutritional status  Outcome: Progressing   Care plan reviewed with patient and wife. Patient and wife verbalize understanding of the plan of care and contribute to goal setting.

## 2023-08-03 NOTE — FLOWSHEET NOTE
08/03/23 1524   Safe Environment   Safety Measures Other (comment)  (VN completed discharge education)     VN called into patients room and introduced myself and role. Patient answered and permitted video. Video activated. . Patient up in chair. . Patient verbalized understanding of medication and discharge education. Pt denies any further needs or questions at this time.

## 2023-08-03 NOTE — PROGRESS NOTES
Patient being discharged today and orders placed for an MBS that needs completed within 1 week of his discharge today: 8/3/23; ideally Monday or Tuesday next week so that he can follow-up with Dr. Salome Martini later that week to review the result at his post hospital follow-up appointment. His follow-up appointment is already scheduled for next Thursday-so if the MBS can be done prior to this! Please call to schedule. Thanks!

## 2023-08-03 NOTE — CARE COORDINATION
8/3/23, 10:34 AM EDT    Patient goals/plan/ treatment preferences discussed by  and . Patient goals/plan/ treatment preferences reviewed with patient/ family. Patient/ family verbalize understanding of discharge plan and are in agreement with goal/plan/treatment preferences. Understanding was demonstrated using the teach back method. AVS provided by RN at time of discharge, which includes all necessary medical information pertaining to the patients current course of illness, treatment, post-discharge goals of care, and treatment preferences. Services At/After Discharge: DME, Home Health, Aide services, Nursing service, OT, PT, Safety alert, and SLP    Plans home w spouse Raza Caba, [facility] (nsg, PT/OT/ST, aide, wound care, home Pivot TF 55/hr), new SR HIS for OP NGTF x2 weeks, new SR HME for new bed, BS; has walker; collaborated w family, Alma Boucher, [facility], SR HIS Liaison, SR Dietician (has Nando Bain, [employer] approval for 10 bottles Pivot 1.5 and SR HIS aware); plans MBS in 1 week; if needs additional product, dietician suggests alternative Vital 1.2), Prasanna Alvarado, SR HME Liaison (see AVS)        IMM Letter  IMM Letter given to Patient/Family/Significant other/Guardian/POA/by[de-identified] Britany Espinal RN CM  IMM Letter date given[de-identified] 08/02/23  IMM Letter time given[de-identified] 0935  Observation Status Letter date given[de-identified] 07/22/23  Observation Status Letter time given[de-identified] 12  Observation Status Letter given to Patient/Family/Significant other/Guardian/POA/by[de-identified] CM:  Zofia Quintana RN

## 2023-08-03 NOTE — TELEPHONE ENCOUNTER
Patient being discharged today and orders placed for an MBS that needs completed within 1 week of his discharge today: 8/3/23; ideally Monday or Tuesday next week so that he can follow-up with Dr. Marlena River later that week to review the result at his post hospital follow-up appointment. His follow-up appointment is already scheduled for next Thursday-so if the MBS can be done prior to this! Please call to schedule. Thanks!

## 2023-08-03 NOTE — PROGRESS NOTES
Discharge teaching and instructions for diagnosis/procedure of squamous cell carcinoma of tonsil, resection of mass, tonsillectomy completed with patient using teachback method. AVS reviewed. Printed prescriptions given to patient. Patient voiced understanding regarding prescriptions, follow up appointments, and care of self at home.  Discharged in a wheelchair to  home with support per family

## 2023-08-04 ENCOUNTER — CARE COORDINATION (OUTPATIENT)
Dept: CASE MANAGEMENT | Age: 68
End: 2023-08-04

## 2023-08-04 DIAGNOSIS — C09.9 PRIMARY TONSILLAR SQUAMOUS CELL CARCINOMA (HCC): Primary | ICD-10-CM

## 2023-08-04 PROCEDURE — 1111F DSCHRG MED/CURRENT MED MERGE: CPT | Performed by: INTERNAL MEDICINE

## 2023-08-04 NOTE — ADT AUTH CERT
Patient Demographics    Name Patient ID SSN Gender Identity Birth Date   Maicol Dickey \"Chris\" [id number]  Male 55 (76 yrs)     Address Phone Email Employer    [address] [phone] (B)   [phone] (E)   [phone] (M) [email or web address] Race Occupation Emp Status    LORAINE White (non-) -- Retired      Reg Status PCP Date Last Verified Next Review Date    Verified Migdalia Willingham MD  [phone] 07/11/23 08/10/23      Admission Date Discharge Date Admitting Provider     23 Marce De MD       Marital Status Denominational       Non-Tenriism        Emergency Contact [address] [place] [address] [address] [facility] [address]   MNA   [phone] (W)   [phone] (H)     [facility] [de-identified]  CVG Subscriber Name/Sex/Relation Subscriber  Subscriber Address/Phone Subscriber Emp/Emp Phone   1. [facility]   [id number] Male   (Self) 1955 [address]  [address]   [phone](J)   [phone](M) RETIRED    2. MEDICARE   [id number] [address] Johnson - Male   (Self) 1955 [address]   [phone](D)   [phone](T) RETIRED      Utilization Reviews         by Ricardo Hayes RN       Review Status Review Entered   In Primary 8/3/2023 1626       Created By   Ricardo Hayes RN      Criteria Review   DATE:  [facility]         PERTINENT UPDATES:   His drain is holding a vacuum and is producing an appropriate amount of completely clear quinn fluid.   Patient endorses that his biggest complaint today is his continual nausea. uncomfortable feeling of the NG to his right nare. CONTINUES ON IV FLUIDS, IV ANTIBIOTICS, IV NAUSEA CONTROL.             VITALS:  T 96.9, RR 20, HR 70, /78, 30%FIO2 99%HFNC        ABNL/PERTINENT LABS/RADIOLOGY/DIAGNOSTIC STUDIES:  N/A         PHYSICAL EXAM:  NG to right of weak physical state and family interested in home health assistance including PT/OT and aide. Also; pending MBS results may need assistance with tube feed/medication management and patient would benefit from speech therapy assistance in the outpatient setting. Orders placed. MEDICATIONS:  ROXICODONE 5MG NG Q4H PRN X 1, ZOFRAN 4MG IV Q6H PRN X 2, FLEXERIL 10MG NG Q8H PRN X 1, NS 100ML/H CONT IV, UNASYN 3000MG IV Q6H, TYLENOL 650NG Q6H                    7/29  by Andrea Younger RN       Review Status Review Entered   In Primary 8/3/2023 1626       Created By   Andrea Younger RN      Criteria Review   DATE:  7/29 PCU TELE         PERTINENT UPDATES:  7/27 He states that he has started with liquid diarrhea achieving small amounts of stool approximately once per hour. Patient reports that he has some pain to his left nare from the NG tube feeling too tight. 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 He is complaining of mild nausea and has been unwilling to restart his tube feedings. CONTINUES ON IV ANTIBIOTICS, IV FLUIDS, IV BP CONTROL, IV NAUSEA CONTROL, IV PAIN CONTROL. VITALS:  T 98.5, RR 16, , /92, 34%FIO2 HFNC 97%SPO2           ABNL/PERTINENT LABS/RADIOLOGY/DIAGNOSTIC STUDIES:  Sodium 137    Potassium 4.1    Chloride 107    CO2 20 Low     Glucose 116 High     BUN 16    Creatinine 0.6    Calcium 8.4 Low             PHYSICAL EXAM:  On general physical exam the patient is a pleasant alert cooperative remarkably well-appearing adult male in no acute distress.

## 2023-08-04 NOTE — TELEPHONE ENCOUNTER
Faxed MBS order to PT. Called and spoke with [facility] to schedule. She said they are scheduling out 2 weeks but will contact the inpatient team and see if they are able to do it Monday or Tuesday.

## 2023-08-04 NOTE — CARE COORDINATION
Marguerite   8/21/2023  1:40 PM Talisha Fernández November, DO [facility] Transition Nurse provided contact information. Plan for follow-up call in 5-7 days based on severity of symptoms and risk factors.   Plan for next call: symptom management-pain, nausea, still tolerating tube feeding, any new or worsening symptoms    Ree Hampton RN

## 2023-08-08 ENCOUNTER — TELEPHONE (OUTPATIENT)
Dept: ENT CLINIC | Age: 68
End: 2023-08-08

## 2023-08-08 ENCOUNTER — OFFICE VISIT (OUTPATIENT)
Dept: ENT CLINIC | Age: 68
End: 2023-08-08

## 2023-08-08 ENCOUNTER — HOSPITAL ENCOUNTER (OUTPATIENT)
Dept: GENERAL RADIOLOGY | Age: 68
Discharge: HOME | End: 2023-08-08
Attending: REGISTERED NURSE
Payer: MEDICARE

## 2023-08-08 VITALS
HEART RATE: 62 BPM | BODY MASS INDEX: 23.16 KG/M2 | RESPIRATION RATE: 20 BRPM | OXYGEN SATURATION: 98 % | DIASTOLIC BLOOD PRESSURE: 84 MMHG | TEMPERATURE: 98.2 F | HEIGHT: 65 IN | SYSTOLIC BLOOD PRESSURE: 130 MMHG | WEIGHT: 139 LBS

## 2023-08-08 DIAGNOSIS — R13.13 PHARYNGEAL DYSPHAGIA: ICD-10-CM

## 2023-08-08 DIAGNOSIS — R13.19 OTHER DYSPHAGIA: ICD-10-CM

## 2023-08-08 DIAGNOSIS — C09.9 PRIMARY TONSILLAR SQUAMOUS CELL CARCINOMA (HCC): Primary | ICD-10-CM

## 2023-08-08 PROCEDURE — 2500000003 HC RX 250 WO HCPCS: Performed by: REGISTERED NURSE

## 2023-08-08 PROCEDURE — 74230 X-RAY XM SWLNG FUNCJ C+: CPT

## 2023-08-08 PROCEDURE — 92611 MOTION FLUOROSCOPY/SWALLOW: CPT

## 2023-08-08 PROCEDURE — 99024 POSTOP FOLLOW-UP VISIT: CPT | Performed by: OTOLARYNGOLOGY

## 2023-08-08 RX ADMIN — BARIUM SULFATE 10 ML: 400 SUSPENSION ORAL at 09:15

## 2023-08-08 RX ADMIN — BARIUM SULFATE 20 ML: 400 SUSPENSION ORAL at 09:15

## 2023-08-08 RX ADMIN — BARIUM SULFATE 20 ML: 0.81 POWDER, FOR SUSPENSION ORAL at 09:15

## 2023-08-08 NOTE — TELEPHONE ENCOUNTER
Patients wife called and said patient accidentally pulled on the drain and it looks like he may have pulled out a stitch or two. She would like to know if this is ok or does he need to be seen. He has an appt on Thursday.  Please advise

## 2023-08-08 NOTE — DISCHARGE SUMMARY
Reduced hyolaryngeal elevation/anterior excursion noted + limited epiglottic inversion, at time reduced  closure noted as well. Patient with significant odynophagia, but did report \"reduced pain with ongoing PO trials. \" Puree and soft solids trials were not completed at this time due to the increased collection of pharyngeal residue with moderately thick liquids with very limited ability to clear pharyngeal residue. ST with high concerns for patient able to successfully and safely consume PO intake for a sustained oral nutrition at this time. ST recommending NPO. See diet recommendations for further details as ST directly called Dr. Beltran Dela Cruz to follow study. Education and feedback provided to patient and wife Nancy Gardner to follow study; all verbalized appreciation and understanding. Diet Recommendations:  NPO; ST with discussion with Dr. Beltran Dela Cruz directly on phone to follow study. ST recommending consideration to initiate approval of ice chips and thin liquid H2O via teaspoon knowing the risks of aspiration and evidence of laryngeal penetration + consideration of NG tube as appropriate to attempt to improve movement of the pharyngeal structures and improve swallow function. ST also reported to Dr. Beltran Dela Cruz patient/wife and ST concerned with position/pulling of neck drain d/t recent accident removal of stiches per patient report. Dr. Beltran Dela Cruz reported, Tigre Nicholas them come to my office and I can remove that drain today. \"ST recommended, may consider repeating MBS post removal of neck drain and NG tube as appropriate post ongoing recovery to determine readiness to initiate solids. Dr. Beltran Dela Cruz verbalized understanding to all ST recommendations. Wife and patient to go to ENT office directly following MBS. Strategies:  Recommend NPO   Rehabilitation Potential: good  Discharge Recommendations:  Patient's wife stating \"we are current with [facility] but they haven't been out yet.  [facility] Speech therapy called but I am

## 2023-08-10 ENCOUNTER — OFFICE VISIT (OUTPATIENT)
Dept: INTERNAL MEDICINE CLINIC | Age: 68
End: 2023-08-10

## 2023-08-10 VITALS
SYSTOLIC BLOOD PRESSURE: 110 MMHG | WEIGHT: 138.8 LBS | TEMPERATURE: 98 F | HEIGHT: 65 IN | DIASTOLIC BLOOD PRESSURE: 64 MMHG | BODY MASS INDEX: 23.13 KG/M2 | HEART RATE: 84 BPM

## 2023-08-10 DIAGNOSIS — C09.9 PRIMARY SQUAMOUS CELL CARCINOMA OF PALATINE TONSIL (HCC): Primary | ICD-10-CM

## 2023-08-10 DIAGNOSIS — E63.9 INADEQUATE ORAL NUTRITIONAL INTAKE: ICD-10-CM

## 2023-08-10 PROBLEM — R13.13 PHARYNGEAL DYSPHAGIA: Status: ACTIVE | Noted: 2023-08-10

## 2023-08-10 ASSESSMENT — ENCOUNTER SYMPTOMS
VOMITING: 0
COLOR CHANGE: 0
SHORTNESS OF BREATH: 0
SORE THROAT: 1
ABDOMINAL PAIN: 0
CONSTIPATION: 0
BACK PAIN: 0
NAUSEA: 0

## 2023-08-10 NOTE — PROGRESS NOTES
Patient Name: Ember Nelson  YOB: 1955  MRN: [id number]  Office visit date: 8/10/2023    Chief Complaint: Follow-Up from Hospital (D/c 8/3 )    Assessment/Plan:  1. Primary squamous cell carcinoma of palatine tonsil [facility])  Patient is s/p TORS assisted right radical colectomy with tumor resection and pharyngeal flap reconstruction and TORS assisted left tonsillectomy on 7/21/23 with Dr. Dionna Lazo. In the hospitalization, he also had a right modified radical neck dissection and reresection of deep caudal margin. He is currently on a liquid diet right now. He has feeding tube and drain removed on 8/8/23. Right sided neck incision healing well. Plan:  -Keep follow up with ENT on 8/23/23.   -Please make sure to drink at least 4 Ensure drinks a day and as much water as possible  -Discussed with patient if he feels worse or is unable to eat more then to call ENT and/or go to ED    Return in about 13 weeks (around 11/9/2023). Subjective/Objective:    HPI:     Ember Nelson is a 76 y.o. male who presents for a follow-up visit. He is here for evaluation of Follow-Up from Hospital (D/c 8/3 )    Patient has history of tonsillar squamous cell carcinoma. He reports having a sore throat and ear aches for many months before seeing ENT. He is s/p TORS assisted right radical colectomy with tumor resection and pharyngeal flap reconstruction and TORS assisted left tonsillectomy on 7/21/23 with Dr. Dionna Lazo. In the hospitalization, he also had a right modified radical neck dissection and reresection of deep caudal margin. He had a feeding tibe and drain placed after surgery that was removed 2 days ago. Patient looks ill and weak. He states he has been trying to drink protein shakes and water as he is on a liquid diet. He has had a bowel movement and no issues with urination. Speech therapy is scheduled to see him next week. He is taking a pain medication as needed.  He denies having any chest pain,

## 2023-08-11 ENCOUNTER — CARE COORDINATION (OUTPATIENT)
Dept: CASE MANAGEMENT | Age: 68
End: 2023-08-11

## 2023-08-11 NOTE — CARE COORDINATION
[facility] Care Transitions Follow Up Call    Patient Current Location:  Home: [place] [address]    Care Transition Nurse contacted the patient by telephone to follow up after admission on 23. Verified name and  with patient as identifiers. Patient: Jose Eduardo Hernandez  Patient : 1955   MRN: <[id number]>  Reason for Admission: Tonsil CA, acute post op pain  Discharge Date: 8/3/23 RARS: Readmission Risk Score: 9.1      Needs to be reviewed by the provider   Additional needs identified to be addressed with provider: No  none             Method of communication with provider: none. Contacted pt for care transition follow up. Tommy Bowden states he is slowly getting there. Had follow up with ENT and PCP provider. Reports drain was coming out therefore the drain and feeding tube were removed at his ENT appt. He is drinking Ensure with 30 g of protein (should drink 4 per day) and water. States it's slow but he is tolerating the oral intake. His PCP provider was concerned about pt getting enough nutrition with feeding tube removed. Tommy Bowden feels it is getting better but will continue to monitor. He is aware of when to contact provider or return to the ED. May have nausea \"here and there but not all the time\". Also continues to have \"some pain but not as bad as it was\". Has pain medication as needed. He has another follow up with ENT on 23. Reports ST should be making a visit later today. He does not have any questions or concerns at this time. Addressed changes since last contact:   Drain and tube feeding removed  Discussed follow-up appointments. If no appointment was previously scheduled, appointment scheduling offered: Yes. Is follow up appointment scheduled within 7 days of discharge? Yes.     Follow Up  Future Appointments   Date Time Provider 4600  46[facility]   2023 10:45 AM MD RON Riojas   2023  1:00 PM Herminio Spatz, MD SRP Physic [facility] -

## 2023-08-13 PROBLEM — I65.23 CAROTID STENOSIS, BILATERAL: Status: ACTIVE | Noted: 2023-06-16

## 2023-08-13 PROBLEM — I63.10: Status: ACTIVE | Noted: 2023-06-16

## 2023-08-13 PROBLEM — G45.4 TGA (TRANSIENT GLOBAL AMNESIA): Status: ACTIVE | Noted: 2023-06-16

## 2023-08-13 PROBLEM — R11.2 NAUSEA AND VOMITING: Status: RESOLVED | Noted: 2023-03-16 | Resolved: 2023-08-13

## 2023-08-13 PROBLEM — E72.20 HYPERAMMONEMIA (HCC): Status: ACTIVE | Noted: 2023-06-20

## 2023-08-14 ENCOUNTER — TELEPHONE (OUTPATIENT)
Dept: ENT CLINIC | Age: 68
End: 2023-08-14

## 2023-08-14 NOTE — TELEPHONE ENCOUNTER
Dr Shruthi Reich, please advise. I have not seen the patient recently or been a part of discussions regarding his swallowing. Thank you.

## 2023-08-14 NOTE — TELEPHONE ENCOUNTER
Sherryle Halls from home health speech therapy called on behalf of the patient. She stated that the patient and herself had some questions. She stated he had a modified barium swallow and Dr. Escoto Neighbours told him to turn his head to the right to swallow but that is hard on him and he's coughing, it's coming out of his mouth and it's taking up to 5 swallows for it to get down. Sherryle Halls stated she looked at the modified barium swallow results and it was done in a neutral position and he stated it is easier for him to swallow that way at home, it's only taking 2-3 swallows in a neutral position. Patient also wants to know if he can use mouthwash, traci stated that she had him practice swishing and spitting out water and he did fine. Please advise. Sherryle Halls can be reached at [phone].

## 2023-08-15 NOTE — TELEPHONE ENCOUNTER
Now that we have the results of the barium study, what I proposed empirically is clearly not the best approach. Certainly have him do exactly as a speech therapy study indicated. He is still to be cautious and limit his oral intake. He should check his temperatures twice daily and report any signs of objective fever. Yes to the mouthwash of swishing and spitting.   Thank you  PFC

## 2023-08-15 NOTE — TELEPHONE ENCOUNTER
Omkar Arambula RN returned our call. Informed her of all the note. Nickie Niranjan said they have pulled the patient's NG tube so his only nourishment is now orally so he is struggling to eat. Encounter printed out to discuss with Dr Elizabeth Lyn.

## 2023-08-15 NOTE — TELEPHONE ENCOUNTER
Please relay the following message to [facility] speech therapist:  Now that we have the results of the barium study, what I proposed empirically is clearly not the best approach. Certainly have him do exactly as a speech therapy study indicated. He is still to be cautious and limit his oral intake. He should check his temperatures twice daily and report any signs of objective fever. Yes to the mouthwash of swishing and spitting.

## 2023-08-15 NOTE — TELEPHONE ENCOUNTER
Spoke to Dr Barb Mauricio in regards to patient. HE is aware patient no longer has the NG tube. Patient needs to do his best taking in foods and liquids very slowly and keep his head straight as the speech therapist recommended.

## 2023-08-16 NOTE — TELEPHONE ENCOUNTER
Sherryle Halls called and I informed her of what Dr. Nate Campos stated. She verbalized understanding and thanked me.

## 2023-08-18 ENCOUNTER — CARE COORDINATION (OUTPATIENT)
Dept: CASE MANAGEMENT | Age: 68
End: 2023-08-18

## 2023-08-18 NOTE — CARE COORDINATION
care after discharge. Discussed appropriate site of care based on symptoms and resources available to patient including: PCP  Specialist  Home health  When to call 911. The patient agrees to contact the PCP office for questions related to their healthcare. Patients top risk factors for readmission: medical condition-Tonsil CA, Acute post op pain  Interventions to address risk factors: Scheduled appointment with Specialist-ENT appt on 8/23/23    Offered patient enrollment in the Remote Patient Monitoring (RPM) program for in-home monitoring: NA.     Care Transitions Subsequent and Final Call    Subsequent and Final Calls  Do you have any ongoing symptoms?: Yes  Patient-reported symptoms: Weakness, Other  Have your medications changed?: No  Do you have any questions related to your medications?: No  Do you currently have any active services?: Yes  Are you currently active with any services?: Home Health  Do you have any needs or concerns that I can assist you with?: No  Care Transitions Interventions  Other Interventions:             Care Transition Nurse provided contact information for future needs. Plan for follow-up call in 5-7 days based on severity of symptoms and risk factors.   Plan for next call: symptom management-redness around site, any other s/s of infection, pain/discomfort, nausea, intake, any new or worsening symptoms    Lance Mahoney RN

## 2023-08-23 ENCOUNTER — TELEPHONE (OUTPATIENT)
Dept: INTERNAL MEDICINE CLINIC | Age: 68
End: 2023-08-23

## 2023-08-23 ENCOUNTER — OFFICE VISIT (OUTPATIENT)
Dept: ENT CLINIC | Age: 68
End: 2023-08-23

## 2023-08-23 VITALS
BODY MASS INDEX: 22.31 KG/M2 | HEART RATE: 75 BPM | RESPIRATION RATE: 18 BRPM | DIASTOLIC BLOOD PRESSURE: 70 MMHG | SYSTOLIC BLOOD PRESSURE: 122 MMHG | OXYGEN SATURATION: 100 % | HEIGHT: 65 IN | WEIGHT: 133.9 LBS | TEMPERATURE: 98.2 F

## 2023-08-23 DIAGNOSIS — R13.13 PHARYNGEAL DYSPHAGIA: ICD-10-CM

## 2023-08-23 DIAGNOSIS — Z85.89 ENCOUNTER FOR FOLLOW-UP SURVEILLANCE OF HEAD AND NECK CANCER: ICD-10-CM

## 2023-08-23 DIAGNOSIS — E63.9 INADEQUATE ORAL NUTRITIONAL INTAKE: ICD-10-CM

## 2023-08-23 DIAGNOSIS — R13.10 DYSPHAGIA, UNSPECIFIED TYPE: Primary | ICD-10-CM

## 2023-08-23 DIAGNOSIS — C09.9 PRIMARY TONSILLAR SQUAMOUS CELL CARCINOMA (HCC): Primary | ICD-10-CM

## 2023-08-23 DIAGNOSIS — R25.2 TRISMUS: ICD-10-CM

## 2023-08-23 DIAGNOSIS — R13.14 PHARYNGOESOPHAGEAL DYSPHAGIA: ICD-10-CM

## 2023-08-23 DIAGNOSIS — Z08 ENCOUNTER FOR FOLLOW-UP SURVEILLANCE OF HEAD AND NECK CANCER: ICD-10-CM

## 2023-08-23 PROCEDURE — 99024 POSTOP FOLLOW-UP VISIT: CPT | Performed by: OTOLARYNGOLOGY

## 2023-08-23 NOTE — TELEPHONE ENCOUNTER
Nurse from [address] [place] [address] called asking for order for outpatient speech therapy as they patient is now able to transition . Nurse was asking if we could order a modified barium swallow to recheck patient as he is having a difficult time getting enough applesauce down without coughing and chearing his throat . Nurse states that at Dr. Milo Cullen' appointment he wanted him transitioning to foods but nurse said that he tried some small pieses of cheese and turkey and it was a no go and nurse tried some srambled eggs and it was still very difficult . Nurse states that according to Dr. Milo Cullen' scale he was down 5 lbs so she was suggesting a meal replacement supplement to try to bulk up his calories some . Okay to order speech and modified barium swallow ?

## 2023-08-23 NOTE — PROGRESS NOTES
AM    FL MODIFIED BARIUM SWALLOW W VIDEO    Result Date: 8/1/2023  1. Aspiration was observed with thin barium. 2. Laryngeal penetration was observed with thin and mildly thick barium. 3. Additional recommendations from speech therapist will follow. **This report has been created using voice recognition software. It may contain minor errors which are inherent in voice recognition technology. ** Final report electronically signed by Dr Ubaldo Lara on 8/1/2023 1:07 PM     Lab Results   Component Value Date/Time     07/29/2023 04:20 AM     07/28/2023 04:07 AM     07/27/2023 03:37 AM    K 4.1 07/29/2023 04:20 AM    K 3.8 07/28/2023 04:07 AM    K 3.5 07/27/2023 03:37 AM    K 4.4 05/09/2023 05:02 AM    K 3.9 09/10/2021 09:20 AM    K 3.8 04/23/2020 06:40 PM     07/29/2023 04:20 AM     07/28/2023 04:07 AM     07/27/2023 03:37 AM    CO2 20 07/29/2023 04:20 AM    CO2 21 07/28/2023 04:07 AM    CO2 22 07/27/2023 03:37 AM    BUN 16 07/29/2023 04:20 AM    BUN 10 07/28/2023 04:07 AM    BUN 13 07/27/2023 03:37 AM    CREATININE 0.6 07/29/2023 04:20 AM    CREATININE 0.5 07/28/2023 04:07 AM    CREATININE 0.5 07/27/2023 03:37 AM    CALCIUM 8.4 07/29/2023 04:20 AM    CALCIUM 8.9 07/28/2023 04:07 AM    CALCIUM 8.9 07/27/2023 03:37 AM    PROT 8.1 06/09/2023 09:30 PM    PROT 7.2 06/07/2023 10:44 AM    PROT 7.7 01/15/2023 05:53 AM    LABALBU 5.0 06/09/2023 09:30 PM    LABALBU 4.6 06/07/2023 10:44 AM    LABALBU 4.7 01/15/2023 05:53 AM    LABALBU 4.3 07/09/2011 08:24 AM    BILITOT 0.4 06/09/2023 09:30 PM    BILITOT 0.5 06/07/2023 10:44 AM    BILITOT 0.6 01/15/2023 05:53 AM    ALKPHOS 94 06/09/2023 09:30 PM    ALKPHOS 83 06/07/2023 10:44 AM    ALKPHOS 84 01/15/2023 05:53 AM    AST 25 06/09/2023 09:30 PM    AST 26 06/07/2023 10:44 AM    AST 21 01/15/2023 05:53 AM    ALT 27 06/09/2023 09:30 PM    ALT 27 06/07/2023 10:44 AM    ALT 23 01/15/2023 05:53 AM       All of the past medical history, past surgical

## 2023-08-24 ENCOUNTER — CARE COORDINATION (OUTPATIENT)
Dept: CASE MANAGEMENT | Age: 68
End: 2023-08-24

## 2023-08-24 NOTE — CARE COORDINATION
Transitions Subsequent and Final Call    Subsequent and Final Calls  Do you have any ongoing symptoms?: Yes  Patient-reported symptoms: Other  Have your medications changed?: No  Do you have any questions related to your medications?: No  Do you currently have any active services?: Yes  Are you currently active with any services?: Home Health  Do you have any needs or concerns that I can assist you with?: No  Care Transitions Interventions  Other Interventions:             Care Transition Nurse provided contact information for future needs. Plan for follow-up call in 7-10 days based on severity of symptoms and risk factors.   Plan for next call: symptom management-any new or worsening symptoms    Patricia Orozco RN

## 2023-08-25 NOTE — TELEPHONE ENCOUNTER
Please call [facility] and give verbal order for speech therapy and order modified barium swallow. ENT should be running this but I am willing to help to get orders in.

## 2023-08-28 DIAGNOSIS — E63.9 INADEQUATE ORAL NUTRITIONAL INTAKE: Primary | ICD-10-CM

## 2023-08-28 DIAGNOSIS — R13.10 DYSPHAGIA, UNSPECIFIED TYPE: ICD-10-CM

## 2023-08-29 PROBLEM — R25.2 TRISMUS: Status: ACTIVE | Noted: 2023-08-29

## 2023-08-30 NOTE — PROGRESS NOTES
[facility]  [address] [place] [address]  Dept: [phone]  Loc: [phone]   Hematology/Oncology Consult (Clinic)      Bar Bermudez   1955   No ref. provider found   Gaby Harvey MD     Reason: tonsillar [facility]   Chief Complaint   Patient presents with    New Patient     Primary tonsillar squamous cell carcinoma      HPI:   78-year-old male with history of CVA, spondylosis, recently diagnosed head and neck cancer presents to the clinic to establish care. 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 PET scan on 7/3/2023 showed FDG avid right tonsillar enlargement corresponding to known malignancy, no FDG avid metastatic disease. He underwent radical tonsillectomy on 7/21/2023. 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 Re-resection was done again on 7/28/2023 showed margins free of cancer, 0/19 lymph nodes positive for cancer, pN0. Patient has been doing better now. He still has soreness in the neck area. Also complains of soreness in the stomach. He has been eating some eggs, sausage, sauce. Denies other symptoms. Has normal bowel movements. He is still using tube feeds with protein drinks which makes him feel better.    -Allergies and medications, past medical history, past surgical history, family history, and social history reviewed. ROS:  14 point system ROS was done and negative except for the positive pertinent history noted in subjective/ HPI.      Immunizations:  Immunization History   Administered Date(s) Administered    COVID-19, PFIZER Bivalent, DO NOT Dilute, (age 12y+), IM, 30 mcg/0.3 mL 11/08/2022    COVID-19, PFIZER PURPLE top,

## 2023-08-31 ENCOUNTER — TELEPHONE (OUTPATIENT)
Dept: ENT CLINIC | Age: 68
End: 2023-08-31

## 2023-08-31 NOTE — TELEPHONE ENCOUNTER
Patient's wife, Mile Mendez, who is on HIPAA, called in asking if patient would be able to orally drink the liquid nourishment that was being put through his feeding tube previously. Mile Mendez stated patient has been very weak and the nourishment liquid seemed to help keep him shirley for a longer time. Is it OK for him to just drink this? Please advise.

## 2023-08-31 NOTE — TELEPHONE ENCOUNTER
I called the patient back and let him know Donis Rowan stated in his phone encounter. He stated that he is going to trial and error some different protein shakes that taste okay to him. I informed him if he had any questions or concerns to give us a call. Patient verbalized understanding and thanked me.

## 2023-09-01 ENCOUNTER — CARE COORDINATION (OUTPATIENT)
Dept: CASE MANAGEMENT | Age: 68
End: 2023-09-01

## 2023-09-01 ENCOUNTER — CLINICAL DOCUMENTATION (OUTPATIENT)
Dept: NUTRITION | Age: 68
End: 2023-09-01

## 2023-09-01 ENCOUNTER — TELEPHONE (OUTPATIENT)
Dept: CASE MANAGEMENT | Age: 68
End: 2023-09-01

## 2023-09-01 ENCOUNTER — OFFICE VISIT (OUTPATIENT)
Dept: ONCOLOGY | Age: 68
End: 2023-09-01
Payer: MEDICARE

## 2023-09-01 ENCOUNTER — HOSPITAL ENCOUNTER (OUTPATIENT)
Dept: INFUSION THERAPY | Age: 68
Discharge: HOME | End: 2023-09-01
Payer: MEDICARE

## 2023-09-01 ENCOUNTER — CLINICAL DOCUMENTATION (OUTPATIENT)
Dept: CASE MANAGEMENT | Age: 68
End: 2023-09-01

## 2023-09-01 VITALS
BODY MASS INDEX: 23.49 KG/M2 | HEIGHT: 65 IN | SYSTOLIC BLOOD PRESSURE: 120 MMHG | RESPIRATION RATE: 16 BRPM | OXYGEN SATURATION: 98 % | DIASTOLIC BLOOD PRESSURE: 71 MMHG | WEIGHT: 141 LBS

## 2023-09-01 VITALS
DIASTOLIC BLOOD PRESSURE: 71 MMHG | SYSTOLIC BLOOD PRESSURE: 120 MMHG | OXYGEN SATURATION: 98 % | RESPIRATION RATE: 16 BRPM

## 2023-09-01 DIAGNOSIS — C09.9 PRIMARY SQUAMOUS CELL CARCINOMA OF PALATINE TONSIL (HCC): Primary | ICD-10-CM

## 2023-09-01 PROCEDURE — 99205 OFFICE O/P NEW HI 60 MIN: CPT | Performed by: INTERNAL MEDICINE

## 2023-09-01 PROCEDURE — 1123F ACP DISCUSS/DSCN MKR DOCD: CPT | Performed by: INTERNAL MEDICINE

## 2023-09-01 PROCEDURE — 99211 OFF/OP EST MAY X REQ PHY/QHP: CPT

## 2023-09-01 RX ORDER — ONDANSETRON 4 MG/1
4 TABLET, ORALLY DISINTEGRATING ORAL EVERY 8 HOURS PRN
COMMUNITY

## 2023-09-01 NOTE — CARE COORDINATION
Care Transitions Outreach Attempt-Tonsil CA, acute post op pain    Attempted to reach patient for transitions of care follow up. Unable to reach patient. Patient: Arvind Murcia Patient : 1955 MRN: [id number]    Last Discharge [address]       Date Complaint Diagnosis Description Type Department Provider    23  Acute post-operative pain . .. Admission (Discharged) River Riggs MD            Noted following upcoming appointments from discharge chart review:   [facility] follow up appointment(s):   Future Appointments   Date Time Provider 4600  46[facility]   2023  2:30 PM SCHEDULE, FAISAL ERAZO  [facility]   2023  3:00 PM Carla Medina PA-C [facility]   2023  2:30 PM Ivette Garza, [address] [place] [address]   2023  2:30 PM Joselin Sotelo MD N ENT [facility]   2023  1:00 PM Tess Lara MD SRPX Physic [address] [place] [address]   2023 10:00 AM Jorge Vergara MD N [facility]     Attempted to contact pt for care transition follow up. Unable to reach pt. Left message with contact information and request for call back. Episode ending on 9/3/23. No further outreach from [facility]. Pt being followed by oncology navigator.       Misty Messer, RN  Care Transition Nurse

## 2023-09-01 NOTE — PROGRESS NOTES
Nutrition Assessment    Reason for Visit:   9/1/23 - referral from nurse navigator regarding patient with cancer of tonsil and 20# weight loss, post surgery, plan for radiation therapy. Nutrition Recommendations:   Food/beverage small and often throughout the day  OK to mix Pivot formula with premier protein for ONS drink  Soft and moist protein foods as tolerated  Log food and fluid intake to determine actual intake  SLP evaluation and recommendations. Malnutrition Assessment: (9/1/23)  Malnutrition Status: moderate malnutrition  Context:chronic malnutrition  Findings of the 6 clinical characteristics of malnutrition (minimum of 2 out of 6 clinical characteristics is required to make the dx of moderate or severe Protein Calorie Malnutrition based on AND/ASPEN Guidelines):   1. Energy Intake: reduced oral intake   2. Weight Loss: 20# (13% of body weight in 4 months   3. Fat Loss: moderate loss   4. Muscle Loss: moderate loss   5. Fluid Accumulation: none noted   6.  Strength: not measured    Nutrition Diagnosis:   Problem: moderate malnutrition in the context of chronic illness  Etiology: catabolic illness, altered GI function  Signs and Symptoms: difficulty chewing and swallowing, unintentional weight loss, mild fat and muscle loss    Nutrition Assessment:   History: GERD, hiatal hernia post fundoplication, tonsillar cancer post tonsillectomy (7/21/23) and resection (7/28/23), dysphagia, anxiety,   Subjective:   9/1/23 - Patient seen with wife today. Patient reports that he lost 09# with his fundoplication surgery and then lost another 10# with his surgeries in July for tonsil cancer. Patient reports that he cannot chew/use the right side of this mouth and that he can only chew some on his left. Patient reports that he does struggle to swallow and most of his nutrition is from liquids. Patient admits that he was drinking ensure and then switched to premier protein.  Patient says that he feels that he

## 2023-09-01 NOTE — PROGRESS NOTES
See Physician's progress note. Rinvoq Pregnancy And Lactation Text: Based on animal studies, Rinvoq may cause embryo-fetal harm when administered to pregnant women.  The medication should not be used in pregnancy.  Breastfeeding is not recommended during treatment and for 6 days after the last dose.

## 2023-09-01 NOTE — TELEPHONE ENCOUNTER
Joe updated mgr Charli, & he informed pt will be seen in consult on Tuesday 9/5/2023, arrival at 2:15p. Joe phoned pt & his spouse & updated him on his consult appt Tuesday 9/5. Pt verbalized he will be at the appt.

## 2023-09-01 NOTE — PROGRESS NOTES
Name: Roger Host  : 1955  MRN: [id number]    Oncology Navigation- Initial Note:    Intake-  Contact Type: Medical Oncology    Diagnosis: Head/Neck- malignant- tonsillar    Home Disposition: Lives with other who is able to assist  -drives  -independent in care  -concerns about nutrition- wt loss 20# since prior to surgical intervention. Patient needs and barriers to care: Coordination of Care, [address] [place] [address], and Symptom Management     Referral Source: Outpatient    Receptive to Advanced Care Planning/ Palliative Care:  deferred    Interventions-   General Interventions: Joe following to assist & support as needed. Welcome folder reviewed. Education/Screenings:  yes -     ONC POC:  -23 path reviewed with pt  -No indication for chemo at this time  -Proceed with XRT- pt call Joe if no Rad onc phone call within 1 week for appt coordination.  -Rad Onc consult  -return to see Onc in December     Continuum of Care: Diagnosis/Active Treatment    Notes: Joe following to assist & support as needed.     Electronically signed by Rene Nguyễn RN on 2023 at 10:59 AM

## 2023-09-05 ENCOUNTER — HOSPITAL ENCOUNTER (OUTPATIENT)
Dept: RADIATION ONCOLOGY | Age: 68
Discharge: HOME | End: 2023-09-05
Payer: MEDICARE

## 2023-09-05 VITALS
WEIGHT: 142 LBS | DIASTOLIC BLOOD PRESSURE: 78 MMHG | HEIGHT: 65 IN | BODY MASS INDEX: 23.66 KG/M2 | RESPIRATION RATE: 18 BRPM | SYSTOLIC BLOOD PRESSURE: 136 MMHG | TEMPERATURE: 98.1 F | OXYGEN SATURATION: 98 % | HEART RATE: 71 BPM

## 2023-09-05 DIAGNOSIS — C09.9 PRIMARY TONSILLAR SQUAMOUS CELL CARCINOMA (HCC): Primary | ICD-10-CM

## 2023-09-05 PROCEDURE — 99205 OFFICE O/P NEW HI 60 MIN: CPT

## 2023-09-05 PROCEDURE — 99202 OFFICE O/P NEW SF 15 MIN: CPT | Performed by: RADIOLOGY

## 2023-09-05 ASSESSMENT — ENCOUNTER SYMPTOMS
RECTAL PAIN: 0
SINUS PAIN: 0
BACK PAIN: 1
FACIAL SWELLING: 0
SHORTNESS OF BREATH: 0
CONSTIPATION: 1
ABDOMINAL PAIN: 1
BLOOD IN STOOL: 1
COUGH: 1
TROUBLE SWALLOWING: 1
SINUS PRESSURE: 0
VOICE CHANGE: 0
SORE THROAT: 0
NAUSEA: 1

## 2023-09-05 ASSESSMENT — PAIN SCALES - GENERAL: PAINLEVEL_OUTOF10: 4

## 2023-09-05 ASSESSMENT — PAIN DESCRIPTION - LOCATION: LOCATION: ABDOMEN

## 2023-09-05 NOTE — PROGRESS NOTES
forward.     CC:Dr. Alesia Conde ([facility]) Dr. Mouna David (ENT)  ACC:[facility]

## 2023-09-06 ENCOUNTER — SOCIAL WORK (OUTPATIENT)
Dept: RADIATION ONCOLOGY | Age: 68
End: 2023-09-06

## 2023-09-06 NOTE — PROGRESS NOTES
Oncology Social Work    Date: 9/6/2023  Time: 3:30 PM  Name: Ge Almeida  MRN: [id number]     Contact Type: Follow-up    Note:   Situation: This staff called Ge Elle (goes by Stella Badillo) via phone support to introduce myself as his Oncology Social Worker. Background:  Stella Badillo had completed a Distress Thermometer at his initial appointment in the Radiation Dept of the [address] [place] [address]. This staff was calling to review the results of his distress concerns. Assessment: There were physical concerns (pain, sleep, and fatigue) as well as worry and anxiety (emotional concerns) identified on the Distress Thermometer.   - As we discussed, he shared the waiting for his dentist appt was among the biggest issue at this time. He wasn't sure if this has been taken care of and this staff offered to inquire with our team and get back to him. He seemed quite relieved knowing we could provide him with the information.   - An email was sent to his nurse navigator requesting an update at the time of this writing.   - Education regarding the services provided by the SW was provided as well. He declined any community referrals be placed. Referral services may be reconsidered should he determine need at a later date    Recommendation: Follow-up will be initiated by Stella Badillo based on need.  provided him with my contact information and will remain available for support.         DOMINIK De La Rosa, SIOBHAN, KHRIS  Oncology Social Worker      Electronically signed by DOMINIK De La Rosa, SIOBHAN, KHRSI on 9/6/2023 at 3:30 PM

## 2023-09-08 ENCOUNTER — SOCIAL WORK (OUTPATIENT)
Dept: RADIATION ONCOLOGY | Age: 68
End: 2023-09-08

## 2023-09-08 NOTE — PROGRESS NOTES
- After speaking with the RAD nurses, this staff was informed they had left a message for Herve Mohr to contact his dentist and let them know of his appt arrangements. - Herve Mohr stated he was not able to contact them yesterday but would call today. - Herve Mohr agreed to contacting the RAD nurses with the undated information.   - This staff will remain available regarding assistance for either party upon notification.

## 2023-09-12 ENCOUNTER — HOSPITAL ENCOUNTER (OUTPATIENT)
Dept: SPEECH THERAPY | Age: 68
Setting detail: THERAPIES SERIES
Discharge: HOME | End: 2023-09-12
Attending: INTERNAL MEDICINE
Payer: MEDICARE

## 2023-09-12 PROCEDURE — 92610 EVALUATE SWALLOWING FUNCTION: CPT | Performed by: SPEECH-LANGUAGE PATHOLOGIST

## 2023-09-12 NOTE — PROGRESS NOTES
initiate swallowing exercises, diet monitor, review swallowing strategies    Activity/Treatment Tolerance:  [x]  Patient tolerated treatment well  []  Patient limited by fatigue  []  Patient limited by pain   []  Patient limited by other medical complications  []  Other:     Patient Education:   [x]  HEP/Education Completed: Plan of Care, Goals, diet recommendation (handouts provided), swallowing strategies, will initiate swallowing exercises at the next session. []  No new Education completed  []  Reviewed Prior HEP      [x]  Patient verbalized and/or demonstrated understanding of education provided. []  Patient unable to verbalize and/or demonstrate understanding of education provided. Will continue education. []  Barriers to learning:     GOALS:  Patient Goal: Improve swallowing    Short Term Goals:  Time Frame for Short-Term Goals: 4 weeks    SHORT TERM GOAL #1: Patient will tolerate minced and moist textures (mostly supplemental/nutritional drinks) with thin liquids with use of swallowing strategies consistently with min cues to safely maintain adequate nutrition and hydration. INTERVENTIONS: to be completed in subsequent sessions    SHORT TERM GOAL #2: Patient will complete jaw range of motion (especially oral opening - possible use of Therabite device) exercises x 15 for improved oral opening to get his mouth around a utensil of food or a sandwich with increased ease. INTERVENTIONS: to be completed in subsequent sessions    SHORT TERM GOAL #3: Patient will complete lingual range of motion and coordination exercises x 20 with good success for improved AP movement and bolus formation and control. INTERVENTIONS: to be completed in subsequent sessions    SHORT TERM GOAL #4: Patient will complete pharyngeal strengthening exercises (effortful swallows, tongue base retraction, etc) x 20 for improved passage of the bolus through the pharynx and reduce overall residue.   INTERVENTIONS: to be completed in

## 2023-09-14 ENCOUNTER — HOSPITAL ENCOUNTER (OUTPATIENT)
Dept: SPEECH THERAPY | Age: 68
Setting detail: THERAPIES SERIES
Discharge: HOME | End: 2023-09-14
Attending: INTERNAL MEDICINE
Payer: MEDICARE

## 2023-09-14 PROCEDURE — 92526 ORAL FUNCTION THERAPY: CPT

## 2023-09-14 NOTE — PROGRESS NOTES
[facility] THERAPY  [] CLINICAL SWALLOW EVALUATION  [x] DAILY NOTE   [] PROGRESS NOTE [] DISCHARGE NOTE    [x] OUTPATIENT REHABILITATION [facility]   [] [address] [place] [address]    [] [facility]   [] Sweetie Liu    Date: 2023  Patient Name:  Kolby Hendrix  : 1955  MRN: [id number]  CSN: [id number]    Referring Practitioner Zhang Nunez MD   Diagnosis Dysphagia, unspecified type [R13.10]  Inadequate oral nutritional intake [E63.9]  Primary squamous cell carcinoma of palatine tonsil ([address] [facility]) [C09.9]    Treatment Diagnosis Dysphagia    Date of Evaluation 23      Functional Outcome Measure Used [facility] NOMS: swallowing   Functional Outcome Score Level 4 (23)       Insurance: Primary: Payor: Sindhu Dears /  /  / ,   Secondary: Reilly Ortega Information: No precert required   Visit # 2, 2/10 for progress note   Visits Allowed: Unlimited visits based on medical necessity   Recertification Date: 31   Physician Follow-Up: Dr. Columba Hanley - 2023; Dr. Salome Martini - 23   Physician Orders: Servandoa Jose Juan and treat   Pertinent History: Patient reports he started with a sore throat in 2022 and he was treated with thrush and antibiotics multiple times, but the sore throat continued. Patient then started noticing some dysphagia and he had a MBS completed on 22 and a regular diet with thin liquids was recommended. Patient had neck surgery with a plate and screws put in at C3-5 in the end of 2023. Patient then had the fundoplication in the beginning of May of 2023. Patient went to see an ENT at [facility] sometime in May of 2023 and he told him the symptoms were related to acid reflux. Patient went to see Dr. Salome Martini in 23 and he found a tonsillar mass on the right. Patient ten had a MBS completed on 6/15/23 with the recommendation for a regular diet with thin liquids (avoid tough meats, such as steak).

## 2023-09-18 ENCOUNTER — HOSPITAL ENCOUNTER (OUTPATIENT)
Dept: SPEECH THERAPY | Age: 68
Setting detail: THERAPIES SERIES
Discharge: HOME | End: 2023-09-18
Attending: INTERNAL MEDICINE
Payer: MEDICARE

## 2023-09-18 PROCEDURE — 92526 ORAL FUNCTION THERAPY: CPT

## 2023-09-18 NOTE — PROGRESS NOTES
[facility] THERAPY  [] CLINICAL SWALLOW EVALUATION  [x] DAILY NOTE   [] PROGRESS NOTE [] DISCHARGE NOTE    [x] OUTPATIENT REHABILITATION [facility]   [] [address] [place] [address]    [] [facility]   [] [facility]    Date: 2023  Patient Name:  Marina Chaudhry  : 1955  MRN: [id number]  CSN: [id number]    Referring Practitioner Bill Little MD   Diagnosis Dysphagia, unspecified type [R13.10]  Inadequate oral nutritional intake [E63.9]  Primary squamous cell carcinoma of palatine tonsil ([address] [facility]) [C09.9]    Treatment Diagnosis Dysphagia    Date of Evaluation 23      Functional Outcome Measure Used [facility] NOMS: swallowing   Functional Outcome Score Level 4 (23)       Insurance: Primary: Payor: Sandy Cotto /  /  / ,   Secondary: Kelly Muss Information: No precert required   Visit # 3, 3/10 for progress note   Visits Allowed: Unlimited visits based on medical necessity   Recertification Date:    Physician Follow-Up: Dr. Corie Calderon - 2023; Dr. Nate Campos - 23   Physician Orders: Alejo Feliciano and treat   Pertinent History: Patient reports he started with a sore throat in 2022 and he was treated with thrush and antibiotics multiple times, but the sore throat continued. Patient then started noticing some dysphagia and he had a MBS completed on 22 and a regular diet with thin liquids was recommended. Patient had neck surgery with a plate and screws put in at C3-5 in the end of 2023. Patient then had the fundoplication in the beginning of May of 2023. Patient went to see an ENT at [facility] sometime in May of 2023 and he told him the symptoms were related to acid reflux. Patient went to see Dr. Nate Campos in 23 and he found a tonsillar mass on the right. Patient ten had a MBS completed on 6/15/23 with the recommendation for a regular diet with thin liquids (avoid tough meats, such as steak).

## 2023-09-19 ENCOUNTER — HOSPITAL ENCOUNTER (OUTPATIENT)
Dept: RADIATION ONCOLOGY | Age: 68
Discharge: HOME | End: 2023-09-19
Payer: MEDICARE

## 2023-09-19 ENCOUNTER — HOSPITAL ENCOUNTER (OUTPATIENT)
Dept: CT IMAGING | Age: 68
Discharge: HOME | End: 2023-09-19

## 2023-09-19 DIAGNOSIS — C09.9 MALIGNANT NEOPLASM OF TONSIL (HCC): ICD-10-CM

## 2023-09-19 PROCEDURE — 3209999900 CT GUIDE RADIATION THERAPY NO CHARGE

## 2023-09-19 PROCEDURE — 77334 RADIATION TREATMENT AID(S): CPT | Performed by: RADIOLOGY

## 2023-09-19 PROCEDURE — 77333 RADIATION TREATMENT AID(S): CPT | Performed by: RADIOLOGY

## 2023-09-19 PROCEDURE — 77332 RADIATION TREATMENT AID(S): CPT | Performed by: RADIOLOGY

## 2023-09-19 PROCEDURE — 77470 SPECIAL RADIATION TREATMENT: CPT | Performed by: RADIOLOGY

## 2023-09-20 ENCOUNTER — HOSPITAL ENCOUNTER (OUTPATIENT)
Dept: SPEECH THERAPY | Age: 68
Setting detail: THERAPIES SERIES
Discharge: HOME | End: 2023-09-20
Attending: INTERNAL MEDICINE
Payer: MEDICARE

## 2023-09-20 PROCEDURE — 92526 ORAL FUNCTION THERAPY: CPT | Performed by: SPEECH-LANGUAGE PATHOLOGIST

## 2023-09-20 NOTE — PROGRESS NOTES
program up until he starts radiation as well as into the beginning of his radiation treatment as he tolerates to avoid further trismus from setting in. Previous session:  Provided patient with HEP of the following jaw ROM exercises:   -Open stretch: x10 repetitions with 5 second hold  - Jaw side to side: x10 repetitions with fair success; education provided to assist with straight head positioning vs turning head side to side and education encouraging 5 second hold. Patient reporting \"Oh I haven't been holding. \"   - Jaw front to back: x10 repetitions with 5 second hold with limited ROM    SHORT TERM GOAL #3: Patient will complete lingual range of motion and coordination exercises x 20 with good success for improved AP movement and bolus formation and control. INTERVENTIONS: did not test this date d/t focus on the above goals, however, educated the patient to continue to complete all lingual exercises. Previous session:  Provided patient with HEP of the following exercises:   - Lingual lateralization: x10 repetitions with good success  - Alternating lingual protrusion/retraction: x10 with fair success given limited posterior ROM   - Anterior/external lingual circles: x10 each direction with good success  - Internal lingual circles: x10 each direction with good success    SHORT TERM GOAL #4: Patient will complete pharyngeal strengthening exercises (effortful swallows, tongue base retraction, etc) x 20 for improved passage of the bolus through the pharynx and reduce overall residue. INTERVENTIONS: did not test this date d/t focus on the above goals, however, educated the patient to continue to complete all pharyngeal strengthening exercises.     Previous session:  Provided patient with HEP of the following exercises:   - Effortful swallow: x10 with good success  *patient with loud/audible/gulping swallow in % of trials   - Nhung: x10 with good success    SHORT TERM GOAL #5: Patient will complete

## 2023-09-25 ENCOUNTER — HOSPITAL ENCOUNTER (OUTPATIENT)
Dept: SPEECH THERAPY | Age: 68
Setting detail: THERAPIES SERIES
Discharge: HOME | End: 2023-09-25
Attending: INTERNAL MEDICINE
Payer: MEDICARE

## 2023-09-25 PROCEDURE — 92526 ORAL FUNCTION THERAPY: CPT

## 2023-09-25 NOTE — PROGRESS NOTES
[facility] THERAPY  [] CLINICAL SWALLOW EVALUATION  [x] DAILY NOTE   [] PROGRESS NOTE [] DISCHARGE NOTE    [x] OUTPATIENT REHABILITATION [facility]   [] [address] [place] [address]    [] [facility]   [] Benita Hermosillo    Date: 2023  Patient Name:  Suzanne Mccain  : 1955  MRN: [id number]  CSN: [id number]    Referring Practitioner Kathleen Mars MD   Diagnosis Dysphagia, unspecified type [R13.10]  Inadequate oral nutritional intake [E63.9]  Primary squamous cell carcinoma of palatine tonsil ([address] [facility]) [C09.9]    Treatment Diagnosis Dysphagia    Date of Evaluation 23      Functional Outcome Measure Used [facility] NOMS: swallowing   Functional Outcome Score Level 4 (23)       Insurance: Primary: Payor: Bar Austin /  /  / ,   Secondary: Rell Johnson Information: No precert required   Visit # 4, 4/10 for progress note   Visits Allowed: Unlimited visits based on medical necessity   Recertification Date:    Physician Follow-Up: Dr. Shelbie Saha - 2023; Dr. Manny Swan - 23   Physician Orders: Anthony Luo and treat   Pertinent History: Patient reports he started with a sore throat in 2022 and he was treated with thrush and antibiotics multiple times, but the sore throat continued. Patient then started noticing some dysphagia and he had a MBS completed on 22 and a regular diet with thin liquids was recommended. Patient had neck surgery with a plate and screws put in at C3-5 in the end of 2023. Patient then had the fundoplication in the beginning of May of 2023. Patient went to see an ENT at [facility] sometime in May of 2023 and he told him the symptoms were related to acid reflux. Patient went to see Dr. Manny Swan in 23 and he found a tonsillar mass on the right. Patient ten had a MBS completed on 6/15/23 with the recommendation for a regular diet with thin liquids (avoid tough meats, such as steak).

## 2023-09-26 ENCOUNTER — OFFICE VISIT (OUTPATIENT)
Dept: ENT CLINIC | Age: 68
End: 2023-09-26
Payer: MEDICARE

## 2023-09-26 ENCOUNTER — HOSPITAL ENCOUNTER (OUTPATIENT)
Dept: PHYSICAL THERAPY | Age: 68
Setting detail: THERAPIES SERIES
Discharge: HOME | End: 2023-09-26
Attending: INTERNAL MEDICINE
Payer: MEDICARE

## 2023-09-26 VITALS
SYSTOLIC BLOOD PRESSURE: 122 MMHG | OXYGEN SATURATION: 97 % | DIASTOLIC BLOOD PRESSURE: 72 MMHG | RESPIRATION RATE: 20 BRPM | TEMPERATURE: 99.4 F | HEART RATE: 93 BPM | WEIGHT: 140.5 LBS | BODY MASS INDEX: 22.58 KG/M2 | HEIGHT: 66 IN

## 2023-09-26 DIAGNOSIS — R13.13 PHARYNGEAL DYSPHAGIA: ICD-10-CM

## 2023-09-26 DIAGNOSIS — Z85.89 ENCOUNTER FOR FOLLOW-UP SURVEILLANCE OF HEAD AND NECK CANCER: Primary | ICD-10-CM

## 2023-09-26 DIAGNOSIS — C09.9 PRIMARY TONSILLAR SQUAMOUS CELL CARCINOMA (HCC): ICD-10-CM

## 2023-09-26 DIAGNOSIS — Z08 ENCOUNTER FOR FOLLOW-UP SURVEILLANCE OF HEAD AND NECK CANCER: Primary | ICD-10-CM

## 2023-09-26 PROCEDURE — 92511 NASOPHARYNGOSCOPY: CPT | Performed by: OTOLARYNGOLOGY

## 2023-09-26 PROCEDURE — 97110 THERAPEUTIC EXERCISES: CPT

## 2023-09-26 PROCEDURE — 99024 POSTOP FOLLOW-UP VISIT: CPT | Performed by: OTOLARYNGOLOGY

## 2023-09-26 PROCEDURE — 97162 PT EVAL MOD COMPLEX 30 MIN: CPT

## 2023-09-26 NOTE — PROGRESS NOTES
[facility] [place] [facility]  [facility] EAR, NOSE AND THROAT  [address] [facility]  [address] [place] [address],[address]  Dept: [phone]  Dept Fax: [phone]  Loc: [phone]    Kelly De Anda is a 76 y.o. male who was referred by No ref. provider found for:  Chief Complaint   Patient presents with    Post-Op Check     Patient is here for a 1 month f/u for post op TORS tonsil 07/21. Patient c/o not being to open his mouth wide enough. HPI:     Kelly De Anda is a 76 y.o. male status post a transoral robotic assisted radical tonsillectomy tongue base resection and partial pharyngectomy and partial buccal mucosa resection down to the prevertebral fascia on July 21, 2023. This was followed on July 27 with a modified radical neck dissection along with a revision resection of the distal deep tissues which were positive for microscopic disease on the primary specimen. What was resected found no cancer cells which either means the positive margin was elsewhere or the disease was exactly at the interface of the microscopic extension and therefore was not visible because that part of the tissue was vaporized with the 1 mm span of the laser being. Either way is positive margin and the potential that there is still residual disease somewhere in his neck and pharynx served as the basis for referring him for postoperative adjuvant radiotherapy. He is set to start that sometime in the next couple of weeks. The patient reports having had significant improvement in his ability to tolerate p.o. nutrition. He is eating a wide variety of substances but almost all of it he needs to force to his left pharynx as his right tongue and pharynx and right floor of mouth are all still quite numb. He reports breathing comfortably and is sleeping well.   He is combining his oral nutrition with his tube feeds and using his body weight to decide whether to eat more or less or how to supplement his

## 2023-09-27 ENCOUNTER — TELEPHONE (OUTPATIENT)
Dept: CASE MANAGEMENT | Age: 68
End: 2023-09-27

## 2023-09-27 NOTE — TELEPHONE ENCOUNTER
Joe received call from pt's spouse inquiring when XRT will start. Pt simulation completed on 9/19. Joe explained the plan is being developed, & typically start is within 10-14 days after the simulation. Message left for Rad Onc manager with spousal inquiry.

## 2023-09-28 ENCOUNTER — HOSPITAL ENCOUNTER (OUTPATIENT)
Dept: SPEECH THERAPY | Age: 68
Setting detail: THERAPIES SERIES
Discharge: HOME | End: 2023-09-28
Attending: INTERNAL MEDICINE
Payer: MEDICARE

## 2023-09-28 ENCOUNTER — HOSPITAL ENCOUNTER (OUTPATIENT)
Dept: PHYSICAL THERAPY | Age: 68
Setting detail: THERAPIES SERIES
Discharge: HOME | End: 2023-09-28
Attending: INTERNAL MEDICINE
Payer: MEDICARE

## 2023-09-28 PROCEDURE — 97140 MANUAL THERAPY 1/> REGIONS: CPT

## 2023-09-28 PROCEDURE — 97110 THERAPEUTIC EXERCISES: CPT

## 2023-09-28 PROCEDURE — 92526 ORAL FUNCTION THERAPY: CPT

## 2023-09-28 NOTE — PROGRESS NOTES
[address] [facility] THERAPY  [] CLINICAL SWALLOW EVALUATION  [x] DAILY NOTE   [] PROGRESS NOTE [] DISCHARGE NOTE    [x] OUTPATIENT REHABILITATION [facility]   [] [address] [place] [address]    [] [facility]   [] [facility]    Date: 2023  Patient Name:  Indra Del Toro  : 1955  MRN: [id number]  CSN: [id number]    Referring Practitioner Steven Morrison MD   Diagnosis Dysphagia, unspecified type [R13.10]  Inadequate oral nutritional intake [E63.9]  Primary squamous cell carcinoma of palatine tonsil ([address] [facility]) [C09.9]    Treatment Diagnosis Dysphagia    Date of Evaluation 23      Functional Outcome Measure Used [facility] NOMS: swallowing   Functional Outcome Score Level 4 (23)       Insurance: Primary: Payor: Aide Parker /  /  / ,   Secondary: Biju Head Information: No precert required   Visit # 5, /10 for progress note   Visits Allowed: Unlimited visits based on medical necessity   Recertification Date:    Physician Follow-Up: Dr. Jerry June - 2023; Dr. Reta Alas - 23   Physician Orders: Mart Pako and treat   Pertinent History: Patient reports he started with a sore throat in 2022 and he was treated with thrush and antibiotics multiple times, but the sore throat continued. Patient then started noticing some dysphagia and he had a MBS completed on 22 and a regular diet with thin liquids was recommended. Patient had neck surgery with a plate and screws put in at C3-5 in the end of 2023. Patient then had the fundoplication in the beginning of May of 2023. Patient went to see an ENT at [facility] sometime in May of 2023 and he told him the symptoms were related to acid reflux. Patient went to see Dr. Reta Alas in 23 and he found a tonsillar mass on the right. Patient ten had a MBS completed on 6/15/23 with the recommendation for a regular diet with thin liquids (avoid tough meats, such as steak).

## 2023-10-02 ENCOUNTER — HOSPITAL ENCOUNTER (OUTPATIENT)
Dept: PHYSICAL THERAPY | Age: 68
Setting detail: THERAPIES SERIES
Discharge: HOME | End: 2023-10-02
Attending: INTERNAL MEDICINE
Payer: MEDICARE

## 2023-10-02 PROCEDURE — 97140 MANUAL THERAPY 1/> REGIONS: CPT

## 2023-10-03 ENCOUNTER — HOSPITAL ENCOUNTER (OUTPATIENT)
Dept: RADIATION ONCOLOGY | Age: 68
End: 2023-10-03
Payer: MEDICARE

## 2023-10-03 PROCEDURE — 77301 RADIOTHERAPY DOSE PLAN IMRT: CPT | Performed by: RADIOLOGY

## 2023-10-03 PROCEDURE — 77338 DESIGN MLC DEVICE FOR IMRT: CPT | Performed by: RADIOLOGY

## 2023-10-03 PROCEDURE — 77300 RADIATION THERAPY DOSE PLAN: CPT | Performed by: RADIOLOGY

## 2023-10-04 ENCOUNTER — TELEPHONE (OUTPATIENT)
Dept: ENT CLINIC | Age: 68
End: 2023-10-04

## 2023-10-04 NOTE — TELEPHONE ENCOUNTER
Is the stitch in the tongue/mouth that is visible? Or in the back of the throat where it's difficult to see?

## 2023-10-04 NOTE — TELEPHONE ENCOUNTER
Patient said its in the back right side of the mouth and it is on the lower rt side.  He said sometimes food get there and irriates in there

## 2023-10-04 NOTE — TELEPHONE ENCOUNTER
Patient left message on clinical voicemail stating he still has a stitch in his mouth that is bothering him. He starts his radiation next week and doesn't want it to bother him any more than it already does. Patient is asking if he can get the stitch removed before he starts radiation. His f/u is not until 11/13/23 with Dr Eugenia Glover. Please advise.

## 2023-10-05 ENCOUNTER — HOSPITAL ENCOUNTER (OUTPATIENT)
Dept: PHYSICAL THERAPY | Age: 68
Setting detail: THERAPIES SERIES
Discharge: HOME | End: 2023-10-05
Attending: INTERNAL MEDICINE
Payer: MEDICARE

## 2023-10-05 PROCEDURE — 97140 MANUAL THERAPY 1/> REGIONS: CPT

## 2023-10-05 PROCEDURE — 97110 THERAPEUTIC EXERCISES: CPT

## 2023-10-05 NOTE — TELEPHONE ENCOUNTER
Spoke with Dr Nilson Maurice, he said yes - he can be brought in for a quick appointment and he can try to remove it. It will depend on the exact location and his ability to easily get to it while awake in the office, but will certainly try to remove it for him.

## 2023-10-05 NOTE — PROGRESS NOTES
shaded column indicates activity completed today   Modalities Parameters/  Location   Notes             Manual Therapy Time/Technique   Notes   Occipital release  x Supine   Gentle cervical stretching sidebend and rotation X5 each x Minimal motion   Massage completed in supine to patient right posterior/lateral neck 10 minutes x              Exercise/Intervention     Notes   Diaphragmatic breathing 5x   x     Upper Trap Stretch 3x20\"  x Cues for improved technique   Levator Stretch 3x20\"  x    Retro shoulder rolls 10x   x     Lateral neck bends 5 sec x10   x     Neck flexion 5 sec x10   x     Neck extension 5 sec x10   x     Shoulder abduction 10x        Scapular retractions 10x   x     Chin retractions 10x     Cues for movement          Lateral deviation bottom jaw* 10x   x     Ba-la-ga 10x   x     Ga-la-ba 10x  x    Open mouth wide with keeping tongue to roof of mouth 10x  x Difficulty controlling tongue   Open mouth wide 10   x          Specific Interventions Next Treatment: Massage for neck and jaw, possible use of lymphatouch, jaw exercises, shoulder and cervical gentle stretching, Manual lymphatic drainage and myofascial release, compression, strengthening, lymphedema education as needed    Activity Tolerance: Patient tolerated treatment well    ASSESSMENT:  Assessment: Continues to have significant tightness and difficulty opening mouth wide. Discussed completing self massage at home. GOALS:  Patient Goal: \"Get my jaw opened up so I can eat and swallow. \"    Short Term Goals to be met in 10 visits:  Pt will understand lymphedema precautions to decrease risk of infection and exacerbation of lymphedema. Pt will engage in a home exercise program (HEP) with minimal assistance to help improve lymphatic flow and venous return. Pt to demo right shoulder range of motion improved from 155 degrees to 165 degrees to assist with functional mobility and daily activities.     Long Term Goals to be met in 12 weeks:

## 2023-10-09 ENCOUNTER — HOSPITAL ENCOUNTER (OUTPATIENT)
Dept: RADIATION ONCOLOGY | Age: 68
Discharge: HOME | End: 2023-10-09
Payer: MEDICARE

## 2023-10-09 ENCOUNTER — HOSPITAL ENCOUNTER (OUTPATIENT)
Dept: PHYSICAL THERAPY | Age: 68
Setting detail: THERAPIES SERIES
Discharge: HOME | End: 2023-10-09
Attending: INTERNAL MEDICINE
Payer: MEDICARE

## 2023-10-09 PROCEDURE — 77336 RADIATION PHYSICS CONSULT: CPT | Performed by: RADIOLOGY

## 2023-10-09 PROCEDURE — 77386 HC NTSTY MODUL RAD TX DLVR CPLX: CPT | Performed by: RADIOLOGY

## 2023-10-09 PROCEDURE — 77014 CHG CT GUIDANCE RADIATION THERAPY FLDS PLACEMENT: CPT | Performed by: RADIOLOGY

## 2023-10-09 PROCEDURE — 97140 MANUAL THERAPY 1/> REGIONS: CPT

## 2023-10-09 NOTE — PROGRESS NOTES
[address] [facility]  ONCOLOGY REHABILITATION  PHYSICAL THERAPY  [x] DAILY NOTE    [x] [facility] [place]     Date: 10/9/2023  Patient Name:  Ulisses Manzanares  : 1955  MRN: [id number]  CSN: [id number]      Referring Practitioner Karlene Simpson PA-C    Diagnosis Dysphagia, unspecified type [R13.10]  Inadequate oral nutritional intake [E63.9]  Primary squamous cell carcinoma of palatine tonsil ([address] [facility]) [C09.9]    Treatment Diagnosis Tightness in neck and jaw, Z71.9   Date of Evaluation 23    Additional Pertinent History Tonsilectomy 23, Arthritis, Memory Issues, Osteoporosis, Cervical plates E0-9 and R6-6      Functional Outcome Measure Used O: Brief Fatigue Inventory   Functional Outcome Score 4 (23)       Insurance: Primary: Payor: Pina Perkins /  /  / ,   Secondary: [facility]   Authorization Information: PRECERTIFICATION REQUIRED:  No  INSURANCE THERAPY BENEFIT:  Patient has unlimited visits based on medical necessity  Benefit will not cover maintenance or preventative treatment. AQUATIC THERAPY COVERED:   Yes  MODALITIES COVERED:  Yes, with the exception of iontophoresis and hot packs/cold packs  TELEHEALTH COVERED: Yes   Visit # 5, 5/10 for progress note   Visits Allowed: Unlimited visits based on medical necessity   Recertification Date:    Physician Follow-Up: 23-Dr. Marlena River   Physician Orders: Oncology Rehab   History of Present Illness: 23 diagnosed with Tonsillar Squamous Cell Carcinoma with Tonsillectomy and R partial tongue base resection on 23. No chemo, but is supposed to be having radiation, but does not have start date. SUBJECTIVE: Patient arrives reporting he just had his first round of radiation. Denies any current pain, did try some heavier lifting over the weekend but ended up feeling sick after.        OBJECTIVE:     TREATMENT   Precautions: H&N Lymphedema, Cervical plates R9-0 and J3-7   Pain: Denies pain

## 2023-10-10 ENCOUNTER — HOSPITAL ENCOUNTER (OUTPATIENT)
Dept: RADIATION ONCOLOGY | Age: 68
Discharge: HOME | End: 2023-10-10
Payer: MEDICARE

## 2023-10-10 VITALS
OXYGEN SATURATION: 97 % | DIASTOLIC BLOOD PRESSURE: 59 MMHG | RESPIRATION RATE: 18 BRPM | BODY MASS INDEX: 23.99 KG/M2 | TEMPERATURE: 98.4 F | WEIGHT: 146.39 LBS | HEART RATE: 70 BPM | SYSTOLIC BLOOD PRESSURE: 123 MMHG

## 2023-10-10 PROCEDURE — 77014 CHG CT GUIDANCE RADIATION THERAPY FLDS PLACEMENT: CPT | Performed by: RADIOLOGY

## 2023-10-10 PROCEDURE — 77386 HC NTSTY MODUL RAD TX DLVR CPLX: CPT | Performed by: RADIOLOGY

## 2023-10-10 NOTE — PROGRESS NOTES
[address] [facility],[address], [address] [place] [address]  Phone: [phone]   Toll Free: [phone]   Fax: [phone]    RADIATION ONCOLOGY EDUCATION    CHIEF COMPLAINT: Darshana Villarreal presents to radiation oncology today for education regarding treatment to the R Tonsil. PLAN:   Expected and potential side effects were discussed in detail, along with written handouts. Skin care and moisturization instructions were discussed in detail. Patient was not agreeable to physical therapy referral. Explained referral could be placed at any time if patient changes their mind. Patient was informed of dietician that is available weekly and as needed. Educated on weekly on treatment visit to meet with Physician to monitor side effects and treatment course. Informed patient that Physician is available at any time to discuss radiation side effects/concerns through out treatment course. Darshana Villarreal had the opportunity to ask questions, and indicated that all questions were satisfactorily addressed.

## 2023-10-11 ENCOUNTER — HOSPITAL ENCOUNTER (OUTPATIENT)
Dept: RADIATION ONCOLOGY | Age: 68
Discharge: HOME | End: 2023-10-11
Payer: MEDICARE

## 2023-10-11 PROCEDURE — 77386 HC NTSTY MODUL RAD TX DLVR CPLX: CPT | Performed by: RADIOLOGY

## 2023-10-11 PROCEDURE — 77014 CHG CT GUIDANCE RADIATION THERAPY FLDS PLACEMENT: CPT | Performed by: RADIOLOGY

## 2023-10-12 ENCOUNTER — TELEPHONE (OUTPATIENT)
Dept: CASE MANAGEMENT | Age: 68
End: 2023-10-12

## 2023-10-12 ENCOUNTER — HOSPITAL ENCOUNTER (OUTPATIENT)
Dept: PHYSICAL THERAPY | Age: 68
Setting detail: THERAPIES SERIES
Discharge: HOME | End: 2023-10-12
Attending: INTERNAL MEDICINE
Payer: MEDICARE

## 2023-10-12 ENCOUNTER — HOSPITAL ENCOUNTER (OUTPATIENT)
Dept: RADIATION ONCOLOGY | Age: 68
Discharge: HOME | End: 2023-10-12
Payer: MEDICARE

## 2023-10-12 DIAGNOSIS — C09.9 TONSIL CANCER (HCC): Primary | ICD-10-CM

## 2023-10-12 PROCEDURE — 77386 HC NTSTY MODUL RAD TX DLVR CPLX: CPT | Performed by: RADIOLOGY

## 2023-10-12 PROCEDURE — 97110 THERAPEUTIC EXERCISES: CPT

## 2023-10-12 RX ORDER — ONDANSETRON 4 MG/1
4 TABLET, ORALLY DISINTEGRATING ORAL EVERY 8 HOURS PRN
Qty: 90 TABLET | Refills: 1 | Status: SHIPPED | OUTPATIENT
Start: 2023-10-12 | End: 2023-12-11

## 2023-10-12 NOTE — TELEPHONE ENCOUNTER
Spouse call to charisse to report pt with nausea; currently under XRT for tonsillar cancer. No chemotherapy in tx plan at this time. Rad ONC RN, Lennie updated & will FU with pt /spouse accordingly.

## 2023-10-12 NOTE — PROGRESS NOTES
[address] [facility]  ONCOLOGY REHABILITATION  PHYSICAL THERAPY  [x] PROGRESS NOTE    [x] [facility] [place]     Date: 10/12/2023  Patient Name:  Ras Espnioza  : 1955  MRN: [id number]  CSN: [id number]      Referring Practitioner Jose Garcia PA-C    Diagnosis Dysphagia, unspecified type [R13.10]  Inadequate oral nutritional intake [E63.9]  Primary squamous cell carcinoma of palatine tonsil ([address] [facility]) [C09.9]    Treatment Diagnosis Tightness in neck and jaw, Z71.9   Date of Evaluation 23    Additional Pertinent History Tonsilectomy 23, Arthritis, Memory Issues, Osteoporosis, Cervical plates O2-8 and H8-6      Functional Outcome Measure Used O: Brief Fatigue Inventory   Functional Outcome Score 4 (23); 7.44 (10/12/23)      Insurance: Primary: Payor: Valente Dawn /  /  / ,   Secondary: [employer]   Authorization Information: PRECERTIFICATION REQUIRED:  No  INSURANCE THERAPY BENEFIT:  Patient has unlimited visits based on medical necessity  Benefit will not cover maintenance or preventative treatment. AQUATIC THERAPY COVERED:   Yes  MODALITIES COVERED:  Yes, with the exception of iontophoresis and hot packs/cold packs  TELEHEALTH COVERED: Yes   Visit # 6, 6/6 for progress note   Visits Allowed: Unlimited visits based on medical necessity   Recertification Date:    Physician Follow-Up: 23-Dr. Rosa Badillo   Physician Orders: Oncology Rehab   History of Present Illness: 23 diagnosed with Tonsillar Squamous Cell Carcinoma with Tonsillectomy and R partial tongue base resection on 23. No chemo, but is supposed to be having radiation, but does not have start date. SUBJECTIVE: Patient reports he started radiation on Monday and is starting to feel the nausea from it. Patient isn't feeling well this week, but prior to then he felt like his motion was improving.       OBJECTIVE:     Brief Fatigue Inventory   Throughout our lives, most of us have

## 2023-10-13 ENCOUNTER — HOSPITAL ENCOUNTER (OUTPATIENT)
Dept: RADIATION ONCOLOGY | Age: 68
Discharge: HOME | End: 2023-10-13
Payer: MEDICARE

## 2023-10-13 ENCOUNTER — HOSPITAL ENCOUNTER (OUTPATIENT)
Dept: SPEECH THERAPY | Age: 68
Setting detail: THERAPIES SERIES
Discharge: HOME | End: 2023-10-13
Attending: INTERNAL MEDICINE
Payer: MEDICARE

## 2023-10-13 PROCEDURE — 92526 ORAL FUNCTION THERAPY: CPT | Performed by: SPEECH-LANGUAGE PATHOLOGIST

## 2023-10-13 PROCEDURE — 77386 HC NTSTY MODUL RAD TX DLVR CPLX: CPT | Performed by: RADIOLOGY

## 2023-10-13 NOTE — PROGRESS NOTES
Patient will complete lingual range of motion and coordination exercises x 20 with good success for improved AP movement and bolus formation and control. INTERVENTIONS: Patient completed the following lingual exercises this session:  - External lingual lateralization: x15 repetitions with good success, no cues  - Lingual circles: x20 each direction with good success, min cues to slow down slightly to improve coordination    SHORT TERM GOAL #4: Patient will complete pharyngeal strengthening exercises (effortful swallows, tongue base retraction, etc) x 20 for improved passage of the bolus through the pharynx and reduce overall residue. INTERVENTIONS: Patient completed the following pharyngeal strengthening exercises:  - Effortful swallow: x10 with good effort   - Nhung maneuver: x10 with good success, min cues not to stick his tongue out quite as far  - Alternating lingual protrusion/retraction with 3 second retraction hold: x 10 with fair to good success, min cues    SHORT TERM GOAL #5: Patient will complete laryngeal elevation exercises x 20 for improved airway protection. INTERVENTIONS: Patient completed the following:  - Mendelsohn maneuver: x10 with good success    **With reference to his HEP, patient states, \" I do them here and there. ...when I think of them\"  Reports it is probably about every other day. Ongoing education on the importance of completing his HEP 1-2 times per day, EVERY day, especially during radiation treatment. Long Term Goals:  Time Frame for Long-Term Goals: 12 weeks    LONG-TERM GOAL #1: Patient will tolerate a soft and bite sized diet with thin liquids without overt s/s of aspiration and independent use of swallowing strategies at least 80% of the time to safely maintain adequate nutrition and hydration.  ONGOING    Activity/Treatment Tolerance:  [x]  Patient tolerated treatment well  []  Patient limited by fatigue  []  Patient limited by pain   []  Patient limited by other medical

## 2023-10-16 ENCOUNTER — HOSPITAL ENCOUNTER (OUTPATIENT)
Dept: RADIATION ONCOLOGY | Age: 68
Discharge: HOME | End: 2023-10-16
Payer: MEDICARE

## 2023-10-16 ENCOUNTER — HOSPITAL ENCOUNTER (OUTPATIENT)
Dept: PHYSICAL THERAPY | Age: 68
Setting detail: THERAPIES SERIES
Discharge: HOME | End: 2023-10-16
Attending: INTERNAL MEDICINE
Payer: MEDICARE

## 2023-10-16 PROCEDURE — 77336 RADIATION PHYSICS CONSULT: CPT | Performed by: RADIOLOGY

## 2023-10-16 PROCEDURE — 77014 CHG CT GUIDANCE RADIATION THERAPY FLDS PLACEMENT: CPT | Performed by: RADIOLOGY

## 2023-10-16 PROCEDURE — 97140 MANUAL THERAPY 1/> REGIONS: CPT

## 2023-10-16 PROCEDURE — 97110 THERAPEUTIC EXERCISES: CPT

## 2023-10-16 PROCEDURE — 77386 HC NTSTY MODUL RAD TX DLVR CPLX: CPT | Performed by: RADIOLOGY

## 2023-10-16 NOTE — PROGRESS NOTES
[facility]  ONCOLOGY REHABILITATION  PHYSICAL THERAPY  [x] PROGRESS NOTE    [x] [facility] [place]     Date: 10/16/2023  Patient Name:  Rafaela Fernandez  : 1955  MRN: [id number]  CSN: [id number]      Referring Practitioner Shanelle Centeno PA-C    Diagnosis Dysphagia, unspecified type [R13.10]  Inadequate oral nutritional intake [E63.9]  Primary squamous cell carcinoma of palatine tonsil ([address] [facility]) [C09.9]    Treatment Diagnosis Tightness in neck and jaw, Z71.9   Date of Evaluation 23    Additional Pertinent History Tonsilectomy 23, Arthritis, Memory Issues, Osteoporosis, Cervical plates W9-3 and C1-6      Functional Outcome Measure Used O: Brief Fatigue Inventory   Functional Outcome Score 4 (23); 7.44 (10/12/23)      Insurance: Primary: Payor: Formerly named [facility] /  /  / ,   Secondary: BCBS   Authorization Information: PRECERTIFICATION REQUIRED:  No  INSURANCE THERAPY BENEFIT:  Patient has unlimited visits based on medical necessity  Benefit will not cover maintenance or preventative treatment. AQUATIC THERAPY COVERED:   Yes  MODALITIES COVERED:  Yes, with the exception of iontophoresis and hot packs/cold packs  TELEHEALTH COVERED: Yes   Visit # 7,  for progress note   Visits Allowed: Unlimited visits based on medical necessity   Recertification Date:    Physician Follow-Up: 23-Dr. Sunshine Barron   Physician Orders: Oncology Rehab   History of Present Illness: 23 diagnosed with Tonsillar Squamous Cell Carcinoma with Tonsillectomy and R partial tongue base resection on 23. No chemo, but is supposed to be having radiation, but does not have start date. SUBJECTIVE: Pt arrives reporting 2/10 neck and shoulder ain today. Did have to take a muscle relaxer earlier this morning. Reports he feels his muscles are starting to get more tight since beginning radiation.      TREATMENT   Precautions: H&N Lymphedema, Cervical plates A7-2 and

## 2023-10-17 ENCOUNTER — HOSPITAL ENCOUNTER (OUTPATIENT)
Dept: SPEECH THERAPY | Age: 68
Setting detail: THERAPIES SERIES
Discharge: HOME | End: 2023-10-17
Attending: INTERNAL MEDICINE
Payer: MEDICARE

## 2023-10-17 ENCOUNTER — HOSPITAL ENCOUNTER (OUTPATIENT)
Dept: RADIATION ONCOLOGY | Age: 68
Discharge: HOME | End: 2023-10-17
Payer: MEDICARE

## 2023-10-17 VITALS
OXYGEN SATURATION: 97 % | RESPIRATION RATE: 16 BRPM | SYSTOLIC BLOOD PRESSURE: 117 MMHG | BODY MASS INDEX: 24.6 KG/M2 | HEART RATE: 78 BPM | TEMPERATURE: 98.4 F | DIASTOLIC BLOOD PRESSURE: 72 MMHG | WEIGHT: 150.13 LBS

## 2023-10-17 PROCEDURE — 77014 CHG CT GUIDANCE RADIATION THERAPY FLDS PLACEMENT: CPT | Performed by: RADIOLOGY

## 2023-10-17 PROCEDURE — 92526 ORAL FUNCTION THERAPY: CPT | Performed by: SPEECH-LANGUAGE PATHOLOGIST

## 2023-10-17 PROCEDURE — 77386 HC NTSTY MODUL RAD TX DLVR CPLX: CPT | Performed by: RADIOLOGY

## 2023-10-17 NOTE — PROGRESS NOTES
Education completed  []  Reviewed Prior HEP      [x]  Patient verbalized and/or demonstrated understanding of education provided. []  Patient unable to verbalize and/or demonstrate understanding of education provided. Will continue education. []  Barriers to learning:       PLAN:  Treatment Recommendations:     []  Plan of care initiated. Plan to see patient 2 times per week for 12 weeks to address the treatment planned outlined above.   [x]  Continue with current plan of care  []  Modify plan of care as follows:    []  Hold pending physician visit  []  Discharge    Time In 1515   Time Out 1545   Timed Code Minutes: 0 min   Total Treatment Time: 30 min         Darylene Guillaume, M.S. [facility] 3705

## 2023-10-18 ENCOUNTER — HOSPITAL ENCOUNTER (OUTPATIENT)
Dept: PHYSICAL THERAPY | Age: 68
Setting detail: THERAPIES SERIES
Discharge: HOME | End: 2023-10-18
Attending: INTERNAL MEDICINE
Payer: MEDICARE

## 2023-10-18 ENCOUNTER — HOSPITAL ENCOUNTER (OUTPATIENT)
Dept: RADIATION ONCOLOGY | Age: 68
Discharge: HOME | End: 2023-10-18
Payer: MEDICARE

## 2023-10-18 PROCEDURE — 77386 HC NTSTY MODUL RAD TX DLVR CPLX: CPT | Performed by: RADIOLOGY

## 2023-10-18 PROCEDURE — 97140 MANUAL THERAPY 1/> REGIONS: CPT

## 2023-10-18 PROCEDURE — 77014 CHG CT GUIDANCE RADIATION THERAPY FLDS PLACEMENT: CPT | Performed by: RADIOLOGY

## 2023-10-18 PROCEDURE — 97110 THERAPEUTIC EXERCISES: CPT

## 2023-10-19 ENCOUNTER — HOSPITAL ENCOUNTER (OUTPATIENT)
Dept: RADIATION ONCOLOGY | Age: 68
Discharge: HOME | End: 2023-10-19
Payer: MEDICARE

## 2023-10-19 PROCEDURE — 77014 CHG CT GUIDANCE RADIATION THERAPY FLDS PLACEMENT: CPT | Performed by: RADIOLOGY

## 2023-10-19 PROCEDURE — 77386 HC NTSTY MODUL RAD TX DLVR CPLX: CPT | Performed by: RADIOLOGY

## 2023-10-20 ENCOUNTER — HOSPITAL ENCOUNTER (OUTPATIENT)
Dept: SPEECH THERAPY | Age: 68
Setting detail: THERAPIES SERIES
Discharge: HOME | End: 2023-10-20
Attending: INTERNAL MEDICINE
Payer: MEDICARE

## 2023-10-20 ENCOUNTER — HOSPITAL ENCOUNTER (OUTPATIENT)
Dept: RADIATION ONCOLOGY | Age: 68
Discharge: HOME | End: 2023-10-20
Payer: MEDICARE

## 2023-10-20 PROCEDURE — 77386 HC NTSTY MODUL RAD TX DLVR CPLX: CPT | Performed by: RADIOLOGY

## 2023-10-20 PROCEDURE — 92526 ORAL FUNCTION THERAPY: CPT | Performed by: SPEECH-LANGUAGE PATHOLOGIST

## 2023-10-20 NOTE — PROGRESS NOTES
[facility] THERAPY  [] CLINICAL SWALLOW EVALUATION  [x] DAILY NOTE   [] PROGRESS NOTE [] DISCHARGE NOTE    [x] OUTPATIENT REHABILITATION [facility]   [] [address] [place] [address]    [] [facility]   [] [facility]    Date: 10/20/2023  Patient Name:  Kelly De Anda  : 1955  MRN: [id number]  CSN: [id number]    Referring Practitioner River Huff MD   Diagnosis Dysphagia, unspecified type [R13.10]  Inadequate oral nutritional intake [E63.9]  Primary squamous cell carcinoma of palatine tonsil ([facility]) [C09.9]    Treatment Diagnosis Dysphagia    Date of Evaluation 23      Functional Outcome Measure Used [facility] NOMS: swallowing   Functional Outcome Score Level 4 (23)       Insurance: Primary: Payor: Selena Muniz /  /  / ,   Secondary: [employer] Information: No precert required   Visit # 8, 8/10 for progress note   Visits Allowed: Unlimited visits based on medical necessity   Recertification Date:    Physician Follow-Up: Dr. Omega Tyler - 2023; Dr. Sherley Simmons - 23    Physician Orders: Rosa and treat   Pertinent History: Patient reports he started with a sore throat in 2022 and he was treated with thrush and antibiotics multiple times, but the sore throat continued. Patient then started noticing some dysphagia and he had a MBS completed on 22 and a regular diet with thin liquids was recommended. Patient had neck surgery with a plate and screws put in at C3-5 in the end of 2023. Patient then had the fundoplication in the beginning of May of 2023. Patient went to see an ENT at [facility] sometime in May of 2023 and he told him the symptoms were related to acid reflux. Patient went to see Dr. Sherley Simmons in 23 and he found a tonsillar mass on the right. Patient ten had a MBS completed on 6/15/23 with the recommendation for a regular diet with thin liquids (avoid tough meats, such as steak).

## 2023-10-23 ENCOUNTER — APPOINTMENT (OUTPATIENT)
Dept: RADIATION ONCOLOGY | Age: 68
End: 2023-10-23
Payer: MEDICARE

## 2023-10-23 ENCOUNTER — HOSPITAL ENCOUNTER (OUTPATIENT)
Dept: PHYSICAL THERAPY | Age: 68
Setting detail: THERAPIES SERIES
Discharge: HOME | End: 2023-10-23
Attending: INTERNAL MEDICINE
Payer: MEDICARE

## 2023-10-23 PROCEDURE — 77014 CHG CT GUIDANCE RADIATION THERAPY FLDS PLACEMENT: CPT | Performed by: RADIOLOGY

## 2023-10-23 PROCEDURE — 77336 RADIATION PHYSICS CONSULT: CPT | Performed by: RADIOLOGY

## 2023-10-23 PROCEDURE — 77386 HC NTSTY MODUL RAD TX DLVR CPLX: CPT | Performed by: RADIOLOGY

## 2023-10-23 PROCEDURE — 97110 THERAPEUTIC EXERCISES: CPT

## 2023-10-23 NOTE — PROGRESS NOTES
swallow. \"    Short Term Goals to be met in 10 visits:  Pt will understand lymphedema precautions to decrease risk of infection and exacerbation of lymphedema. Pt will engage in a home exercise program (HEP) with minimal assistance to help improve lymphatic flow and venous return. Pt to demo right shoulder range of motion improved to 178 degrees flexion and abduction to assist with functional mobility and daily activities. Long Term Goals to be met in 12 weeks:   Pt to [address] [place] [id number] of Life score improved from 65.4% to 70% for improve quality of life. Pt to demo cervical lymphedema composite girth measures controlled at <120 cm with independence with lymphedema risk reduction strategies for safety after discharge. Pt will be independent with HEP to assist with improving mobility to assist when driving. .   Increase cervical flexion to 25 and extension to 40 degrees to assist with shaving. Patient Education: Continue HEP per tolerance. Encouraged pt to talk with doctor regarding increased throat and tongue pain  Education Outcome: Verbalized understanding  Education Barriers: None    PLAN:  Treatment Recommendations: Manual Lymphatic Drainage, Compression Bandaging/Garment(s), Strengthening, Range of Motion, Lymphedema Management and Education, Manual Techniques, Pain Management, Postural Re-Training, Body Mechanics/Ergonomics, Home Exercise Prescription, and Safety Education    Continue per POC. Plan to see patient up to 4 times per week for up to 12 weeks to address the treatment planned outlined above, tapering frequency as able once pt's symptoms become more controlled.      Time In 1400   Time Out 1444   Timed Code Minutes: 44 min   Total Treatment Time: 44 min       Electronically Signed by: Jr Gonzalez PTA, CLT  10/23/2023

## 2023-10-24 ENCOUNTER — HOSPITAL ENCOUNTER (OUTPATIENT)
Dept: RADIATION ONCOLOGY | Age: 68
Discharge: HOME | End: 2023-10-24
Payer: MEDICARE

## 2023-10-24 ENCOUNTER — APPOINTMENT (OUTPATIENT)
Dept: RADIATION ONCOLOGY | Age: 68
End: 2023-10-24
Payer: MEDICARE

## 2023-10-24 VITALS
HEART RATE: 70 BPM | BODY MASS INDEX: 23.88 KG/M2 | RESPIRATION RATE: 16 BRPM | TEMPERATURE: 98.7 F | DIASTOLIC BLOOD PRESSURE: 66 MMHG | SYSTOLIC BLOOD PRESSURE: 121 MMHG | OXYGEN SATURATION: 97 % | WEIGHT: 145.72 LBS

## 2023-10-24 DIAGNOSIS — C09.9 PRIMARY SQUAMOUS CELL CARCINOMA OF PALATINE TONSIL (HCC): Primary | ICD-10-CM

## 2023-10-24 DIAGNOSIS — R13.14 PHARYNGOESOPHAGEAL DYSPHAGIA: Primary | ICD-10-CM

## 2023-10-24 PROBLEM — R13.10 DYSPHAGIA: Status: ACTIVE | Noted: 2023-10-24

## 2023-10-24 PROCEDURE — 77386 HC NTSTY MODUL RAD TX DLVR CPLX: CPT | Performed by: RADIOLOGY

## 2023-10-24 RX ORDER — SUCRALFATE ORAL 1 G/10ML
1 SUSPENSION ORAL 3 TIMES DAILY
Qty: 1200 ML | Refills: 3 | Status: ON HOLD | OUTPATIENT
Start: 2023-10-24 | End: 2023-10-27 | Stop reason: HOSPADM

## 2023-10-24 RX ORDER — FLUCONAZOLE 100 MG/1
TABLET ORAL
Qty: 8 TABLET | Refills: 1 | Status: SHIPPED | OUTPATIENT
Start: 2023-10-24

## 2023-10-24 RX ORDER — TRAMADOL HYDROCHLORIDE 50 MG/1
50 TABLET ORAL EVERY 6 HOURS PRN
Qty: 28 TABLET | Refills: 0 | Status: SHIPPED | OUTPATIENT
Start: 2023-10-24 | End: 2023-10-31

## 2023-10-24 ASSESSMENT — PAIN SCALES - GENERAL: PAINLEVEL_OUTOF10: 5

## 2023-10-25 ENCOUNTER — HOSPITAL ENCOUNTER (OUTPATIENT)
Dept: PHYSICAL THERAPY | Age: 68
Setting detail: THERAPIES SERIES
Discharge: HOME | End: 2023-10-25
Attending: INTERNAL MEDICINE
Payer: MEDICARE

## 2023-10-25 ENCOUNTER — HOSPITAL ENCOUNTER (OUTPATIENT)
Dept: INFUSION THERAPY | Age: 68
Discharge: HOME | End: 2023-10-25
Payer: COMMERCIAL

## 2023-10-25 ENCOUNTER — APPOINTMENT (OUTPATIENT)
Dept: RADIATION ONCOLOGY | Age: 68
End: 2023-10-25
Payer: MEDICARE

## 2023-10-25 VITALS
HEIGHT: 66 IN | OXYGEN SATURATION: 98 % | DIASTOLIC BLOOD PRESSURE: 77 MMHG | SYSTOLIC BLOOD PRESSURE: 137 MMHG | WEIGHT: 145.72 LBS | RESPIRATION RATE: 16 BRPM | HEART RATE: 66 BPM | TEMPERATURE: 98 F | BODY MASS INDEX: 23.42 KG/M2

## 2023-10-25 DIAGNOSIS — C09.9 PRIMARY TONSILLAR SQUAMOUS CELL CARCINOMA (HCC): Primary | ICD-10-CM

## 2023-10-25 DIAGNOSIS — C09.9 TONSIL CANCER (HCC): Primary | ICD-10-CM

## 2023-10-25 PROCEDURE — 2580000003 HC RX 258: Performed by: RADIOLOGY

## 2023-10-25 PROCEDURE — 96360 HYDRATION IV INFUSION INIT: CPT

## 2023-10-25 PROCEDURE — 97140 MANUAL THERAPY 1/> REGIONS: CPT

## 2023-10-25 PROCEDURE — 77386 HC NTSTY MODUL RAD TX DLVR CPLX: CPT | Performed by: RADIOLOGY

## 2023-10-25 PROCEDURE — 96361 HYDRATE IV INFUSION ADD-ON: CPT

## 2023-10-25 PROCEDURE — 97110 THERAPEUTIC EXERCISES: CPT

## 2023-10-25 RX ORDER — 0.9 % SODIUM CHLORIDE 0.9 %
1000 INTRAVENOUS SOLUTION INTRAVENOUS ONCE
Status: COMPLETED | OUTPATIENT
Start: 2023-10-25 | End: 2023-10-25

## 2023-10-25 RX ORDER — 0.9 % SODIUM CHLORIDE 0.9 %
1000 INTRAVENOUS SOLUTION INTRAVENOUS ONCE
Status: CANCELLED
Start: 2023-10-27 | End: 2023-10-27

## 2023-10-25 RX ORDER — 0.9 % SODIUM CHLORIDE 0.9 %
1000 INTRAVENOUS SOLUTION INTRAVENOUS ONCE
Status: CANCELLED
Start: 2023-10-25 | End: 2023-10-25

## 2023-10-25 RX ADMIN — SODIUM CHLORIDE 1000 ML: 9 INJECTION, SOLUTION INTRAVENOUS at 11:16

## 2023-10-25 NOTE — DISCHARGE INSTRUCTIONS
Please contact your Oncologist if you have any questions regarding the IV fluids that you received today. Patient instructed if experience any of the symptoms following today's IV fluids to notify MD immediately or go to emergency department.     * dizziness/lightheadedness  *acute nausea/vomiting - not relieved with medication  *headache - not relieved from Tylenol/pain medication  *chest pain/pressure  *rash/itching  *shortness of breath        Drink fluids - 48oz fluids daily  Call if develop fever/ chills/ signs or symptoms of infection

## 2023-10-25 NOTE — PLAN OF CARE
Problem: Safety - Adult  Goal: Free from fall injury  Outcome: Adequate for Discharge  Flowsheets (Taken 10/25/2023 1254)  Free From Fall Injury: Instruct family/caregiver on patient safety     Problem: Discharge Planning  Goal: Discharge to home or other facility with appropriate resources  Outcome: Adequate for Discharge  Flowsheets (Taken 10/25/2023 1254)  Discharge to home or other facility with appropriate resources: Identify discharge learning needs (meds, wound care, etc)     Problem: Chronic Conditions and Co-morbidities  Goal: Patient's chronic conditions and co-morbidity symptoms are monitored and maintained or improved  Outcome: Adequate for Discharge  Flowsheets (Taken 10/25/2023 1254)  Care Plan - Patient's Chronic Conditions and Co-Morbidity Symptoms are Monitored and Maintained or Improved: Monitor and assess patient's chronic conditions and comorbid symptoms for stability, deterioration, or improvement  Note: Patient received 1000 ml NS IV infusion over 2 hours. Patient drank sips of water while in OP oncology. Encouraged patient to drink 48 to 64 ounces of fluids everyday. Care plan reviewed with patient. Patient verbalize understanding of the plan of care and contribute to goal setting.

## 2023-10-25 NOTE — PROGRESS NOTES
Patient here for IV hydration of 1000 ml over 2 hr's,  Due to poor oral intake related to sore throat due to radiation. Per Dr. Magnolia Salomon.

## 2023-10-26 ENCOUNTER — APPOINTMENT (OUTPATIENT)
Dept: RADIATION ONCOLOGY | Age: 68
End: 2023-10-26
Payer: MEDICARE

## 2023-10-26 ENCOUNTER — ANESTHESIA (OUTPATIENT)
Dept: ENDOSCOPY | Age: 68
End: 2023-10-26
Payer: MEDICARE

## 2023-10-26 ENCOUNTER — HOSPITAL ENCOUNTER (INPATIENT)
Age: 68
LOS: 1 days | Discharge: HOME | DRG: 148 | End: 2023-10-27
Attending: EMERGENCY MEDICINE
Payer: MEDICARE

## 2023-10-26 ENCOUNTER — TELEPHONE (OUTPATIENT)
Dept: CASE MANAGEMENT | Age: 68
End: 2023-10-26

## 2023-10-26 ENCOUNTER — ANESTHESIA EVENT (OUTPATIENT)
Dept: ENDOSCOPY | Age: 68
End: 2023-10-26
Payer: MEDICARE

## 2023-10-26 ENCOUNTER — APPOINTMENT (OUTPATIENT)
Dept: CT IMAGING | Age: 68
DRG: 148 | End: 2023-10-26
Payer: MEDICARE

## 2023-10-26 DIAGNOSIS — M47.816 SPONDYLOSIS OF LUMBAR REGION WITHOUT MYELOPATHY OR RADICULOPATHY: ICD-10-CM

## 2023-10-26 DIAGNOSIS — R13.10 ODYNOPHAGIA: Primary | ICD-10-CM

## 2023-10-26 DIAGNOSIS — M47.812 SPONDYLOSIS OF CERVICAL REGION WITHOUT MYELOPATHY OR RADICULOPATHY: ICD-10-CM

## 2023-10-26 LAB
ALBUMIN SERPL BCG-MCNC: 4.1 G/DL (ref 3.5–5.1)
ALP SERPL-CCNC: 72 U/L (ref 38–126)
ALT SERPL W/O P-5'-P-CCNC: 16 U/L (ref 11–66)
ANION GAP SERPL CALC-SCNC: 13 MEQ/L (ref 8–16)
AST SERPL-CCNC: 16 U/L (ref 5–40)
BASOPHILS ABSOLUTE: 0 THOU/MM3 (ref 0–0.1)
BASOPHILS NFR BLD AUTO: 0.4 %
BILIRUB CONJ SERPL-MCNC: < 0.2 MG/DL (ref 0–0.3)
BILIRUB SERPL-MCNC: 0.9 MG/DL (ref 0.3–1.2)
BUN SERPL-MCNC: 23 MG/DL (ref 7–22)
CALCIUM SERPL-MCNC: 9.5 MG/DL (ref 8.5–10.5)
CHLORIDE SERPL-SCNC: 102 MEQ/L (ref 98–111)
CO2 SERPL-SCNC: 22 MEQ/L (ref 23–33)
CREAT SERPL-MCNC: 0.7 MG/DL (ref 0.4–1.2)
DEPRECATED RDW RBC AUTO: 36.8 FL (ref 35–45)
EOSINOPHIL NFR BLD AUTO: 1.4 %
EOSINOPHILS ABSOLUTE: 0.1 THOU/MM3 (ref 0–0.4)
ERYTHROCYTE [DISTWIDTH] IN BLOOD BY AUTOMATED COUNT: 11.9 % (ref 11.5–14.5)
FLUAV RNA RESP QL NAA+PROBE: NOT DETECTED
FLUBV RNA RESP QL NAA+PROBE: NOT DETECTED
GFR SERPL CREATININE-BSD FRML MDRD: > 60 ML/MIN/1.73M2
GLUCOSE SERPL-MCNC: 106 MG/DL (ref 70–108)
HCT VFR BLD AUTO: 47.2 % (ref 42–52)
HETEROPH AB SERPL QL IA: NEGATIVE
HGB BLD-MCNC: 16.1 GM/DL (ref 14–18)
IMM GRANULOCYTES # BLD AUTO: 0.05 THOU/MM3 (ref 0–0.07)
IMM GRANULOCYTES NFR BLD AUTO: 1 %
LACTIC ACID, SEPSIS: 0.9 MMOL/L (ref 0.5–1.9)
LACTIC ACID, SEPSIS: 2.7 MMOL/L (ref 0.5–1.9)
LYMPHOCYTES ABSOLUTE: 0.9 THOU/MM3 (ref 1–4.8)
LYMPHOCYTES NFR BLD AUTO: 17.9 %
MAGNESIUM SERPL-MCNC: 2 MG/DL (ref 1.6–2.4)
MCH RBC QN AUTO: 29.2 PG (ref 26–33)
MCHC RBC AUTO-ENTMCNC: 34.1 GM/DL (ref 32.2–35.5)
MCV RBC AUTO: 85.5 FL (ref 80–94)
MONOCYTES ABSOLUTE: 0.5 THOU/MM3 (ref 0.4–1.3)
MONOCYTES NFR BLD AUTO: 9.7 %
NEUTROPHILS NFR BLD AUTO: 69.6 %
NRBC BLD AUTO-RTO: 0 /100 WBC
OSMOLALITY SERPL CALC.SUM OF ELEC: 277.9 MOSMOL/KG (ref 275–300)
PLATELET # BLD AUTO: 197 THOU/MM3 (ref 130–400)
PMV BLD AUTO: 9.2 FL (ref 9.4–12.4)
POTASSIUM SERPL-SCNC: 3.7 MEQ/L (ref 3.5–5.2)
PROCALCITONIN SERPL IA-MCNC: 0.06 NG/ML (ref 0.01–0.09)
PROT SERPL-MCNC: 7.2 G/DL (ref 6.1–8)
RBC # BLD AUTO: 5.52 MILL/MM3 (ref 4.7–6.1)
S PYO AG THROAT QL: NEGATIVE
S PYO THROAT QL CULT: NORMAL
SARS-COV-2 RNA RESP QL NAA+PROBE: NOT DETECTED
SEGMENTED NEUTROPHILS ABSOLUTE COUNT: 3.4 THOU/MM3 (ref 1.8–7.7)
SODIUM SERPL-SCNC: 137 MEQ/L (ref 135–145)
TROPONIN, HIGH SENSITIVITY: 9 NG/L (ref 0–12)
WBC # BLD AUTO: 4.9 THOU/MM3 (ref 4.8–10.8)

## 2023-10-26 PROCEDURE — 3609017100 HC EGD: Performed by: INTERNAL MEDICINE

## 2023-10-26 PROCEDURE — 71275 CT ANGIOGRAPHY CHEST: CPT

## 2023-10-26 PROCEDURE — 82248 BILIRUBIN DIRECT: CPT

## 2023-10-26 PROCEDURE — 6370000000 HC RX 637 (ALT 250 FOR IP)

## 2023-10-26 PROCEDURE — 1200000003 HC TELEMETRY R&B

## 2023-10-26 PROCEDURE — 96374 THER/PROPH/DIAG INJ IV PUSH: CPT

## 2023-10-26 PROCEDURE — 87070 CULTURE OTHR SPECIMN AEROBIC: CPT

## 2023-10-26 PROCEDURE — 7100000010 HC PHASE II RECOVERY - FIRST 15 MIN: Performed by: INTERNAL MEDICINE

## 2023-10-26 PROCEDURE — 2580000003 HC RX 258: Performed by: ANESTHESIOLOGY

## 2023-10-26 PROCEDURE — 83605 ASSAY OF LACTIC ACID: CPT

## 2023-10-26 PROCEDURE — 6360000004 HC RX CONTRAST MEDICATION: Performed by: PHYSICIAN ASSISTANT

## 2023-10-26 PROCEDURE — 86308 HETEROPHILE ANTIBODY SCREEN: CPT

## 2023-10-26 PROCEDURE — 6360000002 HC RX W HCPCS: Performed by: ANESTHESIOLOGY

## 2023-10-26 PROCEDURE — 36415 COLL VENOUS BLD VENIPUNCTURE: CPT

## 2023-10-26 PROCEDURE — 6360000002 HC RX W HCPCS: Performed by: PHYSICIAN ASSISTANT

## 2023-10-26 PROCEDURE — 84145 PROCALCITONIN (PCT): CPT

## 2023-10-26 PROCEDURE — 87880 STREP A ASSAY W/OPTIC: CPT

## 2023-10-26 PROCEDURE — 96375 TX/PRO/DX INJ NEW DRUG ADDON: CPT

## 2023-10-26 PROCEDURE — 99285 EMERGENCY DEPT VISIT HI MDM: CPT

## 2023-10-26 PROCEDURE — 80053 COMPREHEN METABOLIC PANEL: CPT

## 2023-10-26 PROCEDURE — 85025 COMPLETE CBC W/AUTO DIFF WBC: CPT

## 2023-10-26 PROCEDURE — 84484 ASSAY OF TROPONIN QUANT: CPT

## 2023-10-26 PROCEDURE — 0D718ZZ DILATION OF UPPER ESOPHAGUS, VIA NATURAL OR ARTIFICIAL OPENING ENDOSCOPIC: ICD-10-PCS | Performed by: INTERNAL MEDICINE

## 2023-10-26 PROCEDURE — 87040 BLOOD CULTURE FOR BACTERIA: CPT

## 2023-10-26 PROCEDURE — 93005 ELECTROCARDIOGRAM TRACING: CPT | Performed by: PHYSICIAN ASSISTANT

## 2023-10-26 PROCEDURE — 83735 ASSAY OF MAGNESIUM: CPT

## 2023-10-26 PROCEDURE — 87636 SARSCOV2 & INF A&B AMP PRB: CPT

## 2023-10-26 PROCEDURE — 2500000003 HC RX 250 WO HCPCS: Performed by: ANESTHESIOLOGY

## 2023-10-26 PROCEDURE — 3700000000 HC ANESTHESIA ATTENDED CARE: Performed by: INTERNAL MEDICINE

## 2023-10-26 PROCEDURE — 3700000001 HC ADD 15 MINUTES (ANESTHESIA): Performed by: INTERNAL MEDICINE

## 2023-10-26 PROCEDURE — 3609012700 HC EGD DILATION SAVORY: Performed by: INTERNAL MEDICINE

## 2023-10-26 PROCEDURE — 70491 CT SOFT TISSUE NECK W/DYE: CPT

## 2023-10-26 PROCEDURE — 93010 ELECTROCARDIOGRAM REPORT: CPT | Performed by: INTERNAL MEDICINE

## 2023-10-26 PROCEDURE — 99223 1ST HOSP IP/OBS HIGH 75: CPT | Performed by: INTERNAL MEDICINE

## 2023-10-26 PROCEDURE — 2580000003 HC RX 258: Performed by: INTERNAL MEDICINE

## 2023-10-26 RX ORDER — PROPOFOL 10 MG/ML
INJECTION, EMULSION INTRAVENOUS PRN
Status: DISCONTINUED | OUTPATIENT
Start: 2023-10-26 | End: 2023-10-26 | Stop reason: SDUPTHER

## 2023-10-26 RX ORDER — MORPHINE SULFATE 4 MG/ML
4 INJECTION, SOLUTION INTRAMUSCULAR; INTRAVENOUS ONCE
Status: COMPLETED | OUTPATIENT
Start: 2023-10-26 | End: 2023-10-26

## 2023-10-26 RX ORDER — SODIUM CHLORIDE 0.9 % (FLUSH) 0.9 %
5-40 SYRINGE (ML) INJECTION EVERY 12 HOURS SCHEDULED
Status: DISCONTINUED | OUTPATIENT
Start: 2023-10-26 | End: 2023-10-27 | Stop reason: HOSPADM

## 2023-10-26 RX ORDER — POTASSIUM CHLORIDE 20 MEQ/1
40 TABLET, EXTENDED RELEASE ORAL PRN
Status: DISCONTINUED | OUTPATIENT
Start: 2023-10-26 | End: 2023-10-27 | Stop reason: HOSPADM

## 2023-10-26 RX ORDER — CYCLOBENZAPRINE HCL 10 MG
10 TABLET ORAL EVERY 8 HOURS PRN
Status: DISCONTINUED | OUTPATIENT
Start: 2023-10-26 | End: 2023-10-27 | Stop reason: HOSPADM

## 2023-10-26 RX ORDER — ONDANSETRON 2 MG/ML
4 INJECTION INTRAMUSCULAR; INTRAVENOUS ONCE
Status: COMPLETED | OUTPATIENT
Start: 2023-10-26 | End: 2023-10-26

## 2023-10-26 RX ORDER — POLYETHYLENE GLYCOL 3350 17 G/17G
17 POWDER, FOR SOLUTION ORAL DAILY PRN
Status: DISCONTINUED | OUTPATIENT
Start: 2023-10-26 | End: 2023-10-27 | Stop reason: HOSPADM

## 2023-10-26 RX ORDER — CLOPIDOGREL BISULFATE 75 MG/1
75 TABLET ORAL DAILY
Status: DISCONTINUED | OUTPATIENT
Start: 2023-10-27 | End: 2023-10-26

## 2023-10-26 RX ORDER — SODIUM CHLORIDE 0.9 % (FLUSH) 0.9 %
5-40 SYRINGE (ML) INJECTION PRN
Status: DISCONTINUED | OUTPATIENT
Start: 2023-10-26 | End: 2023-10-27 | Stop reason: HOSPADM

## 2023-10-26 RX ORDER — MAGNESIUM SULFATE IN WATER 40 MG/ML
2000 INJECTION, SOLUTION INTRAVENOUS PRN
Status: DISCONTINUED | OUTPATIENT
Start: 2023-10-26 | End: 2023-10-27 | Stop reason: HOSPADM

## 2023-10-26 RX ORDER — POTASSIUM CHLORIDE 7.45 MG/ML
10 INJECTION INTRAVENOUS PRN
Status: DISCONTINUED | OUTPATIENT
Start: 2023-10-26 | End: 2023-10-27 | Stop reason: HOSPADM

## 2023-10-26 RX ORDER — PANTOPRAZOLE SODIUM 40 MG/1
80 TABLET, DELAYED RELEASE ORAL ONCE
Status: COMPLETED | OUTPATIENT
Start: 2023-10-26 | End: 2023-10-26

## 2023-10-26 RX ORDER — LIDOCAINE HYDROCHLORIDE 20 MG/ML
INJECTION, SOLUTION INFILTRATION; PERINEURAL PRN
Status: DISCONTINUED | OUTPATIENT
Start: 2023-10-26 | End: 2023-10-26 | Stop reason: SDUPTHER

## 2023-10-26 RX ORDER — ONDANSETRON 2 MG/ML
4 INJECTION INTRAMUSCULAR; INTRAVENOUS EVERY 6 HOURS PRN
Status: DISCONTINUED | OUTPATIENT
Start: 2023-10-26 | End: 2023-10-27 | Stop reason: HOSPADM

## 2023-10-26 RX ORDER — SODIUM CHLORIDE 9 MG/ML
INJECTION, SOLUTION INTRAVENOUS CONTINUOUS PRN
Status: DISCONTINUED | OUTPATIENT
Start: 2023-10-26 | End: 2023-10-26 | Stop reason: SDUPTHER

## 2023-10-26 RX ORDER — ENOXAPARIN SODIUM 100 MG/ML
40 INJECTION SUBCUTANEOUS EVERY 24 HOURS
Status: DISCONTINUED | OUTPATIENT
Start: 2023-10-27 | End: 2023-10-27 | Stop reason: HOSPADM

## 2023-10-26 RX ORDER — ACETAMINOPHEN 325 MG/1
650 TABLET ORAL EVERY 6 HOURS PRN
Status: DISCONTINUED | OUTPATIENT
Start: 2023-10-26 | End: 2023-10-27 | Stop reason: HOSPADM

## 2023-10-26 RX ORDER — DONEPEZIL HYDROCHLORIDE 10 MG/1
10 TABLET, FILM COATED ORAL NIGHTLY
Status: DISCONTINUED | OUTPATIENT
Start: 2023-10-26 | End: 2023-10-26

## 2023-10-26 RX ORDER — DEXAMETHASONE SODIUM PHOSPHATE 4 MG/ML
10 INJECTION, SOLUTION INTRA-ARTICULAR; INTRALESIONAL; INTRAMUSCULAR; INTRAVENOUS; SOFT TISSUE ONCE
Status: COMPLETED | OUTPATIENT
Start: 2023-10-26 | End: 2023-10-26

## 2023-10-26 RX ORDER — SODIUM CHLORIDE 9 MG/ML
INJECTION, SOLUTION INTRAVENOUS PRN
Status: DISCONTINUED | OUTPATIENT
Start: 2023-10-26 | End: 2023-10-27 | Stop reason: HOSPADM

## 2023-10-26 RX ORDER — ACETAMINOPHEN 650 MG/1
650 SUPPOSITORY RECTAL EVERY 6 HOURS PRN
Status: DISCONTINUED | OUTPATIENT
Start: 2023-10-26 | End: 2023-10-27 | Stop reason: HOSPADM

## 2023-10-26 RX ORDER — SODIUM CHLORIDE 9 MG/ML
INJECTION, SOLUTION INTRAVENOUS ONCE
Status: COMPLETED | OUTPATIENT
Start: 2023-10-26 | End: 2023-10-26

## 2023-10-26 RX ORDER — ONDANSETRON 4 MG/1
4 TABLET, ORALLY DISINTEGRATING ORAL EVERY 8 HOURS PRN
Status: DISCONTINUED | OUTPATIENT
Start: 2023-10-26 | End: 2023-10-27 | Stop reason: HOSPADM

## 2023-10-26 RX ADMIN — LIDOCAINE HYDROCHLORIDE 80 MG: 20 INJECTION, SOLUTION INFILTRATION; PERINEURAL at 14:21

## 2023-10-26 RX ADMIN — Medication 5 ML: at 18:39

## 2023-10-26 RX ADMIN — PROPOFOL 50 MG: 10 INJECTION, EMULSION INTRAVENOUS at 14:18

## 2023-10-26 RX ADMIN — PROPOFOL 50 MG: 10 INJECTION, EMULSION INTRAVENOUS at 14:28

## 2023-10-26 RX ADMIN — SODIUM CHLORIDE: 9 INJECTION, SOLUTION INTRAVENOUS at 15:34

## 2023-10-26 RX ADMIN — ONDANSETRON 4 MG: 2 INJECTION INTRAMUSCULAR; INTRAVENOUS at 06:40

## 2023-10-26 RX ADMIN — PROPOFOL 50 MG: 10 INJECTION, EMULSION INTRAVENOUS at 14:25

## 2023-10-26 RX ADMIN — CYCLOBENZAPRINE 10 MG: 10 TABLET, FILM COATED ORAL at 23:44

## 2023-10-26 RX ADMIN — MORPHINE SULFATE 4 MG: 4 INJECTION, SOLUTION INTRAMUSCULAR; INTRAVENOUS at 06:41

## 2023-10-26 RX ADMIN — DEXAMETHASONE SODIUM PHOSPHATE 10 MG: 4 INJECTION, SOLUTION INTRAMUSCULAR; INTRAVENOUS at 09:00

## 2023-10-26 RX ADMIN — PROPOFOL 50 MG: 10 INJECTION, EMULSION INTRAVENOUS at 14:22

## 2023-10-26 RX ADMIN — CYCLOBENZAPRINE 10 MG: 10 TABLET, FILM COATED ORAL at 15:34

## 2023-10-26 RX ADMIN — SODIUM CHLORIDE: 9 INJECTION, SOLUTION INTRAVENOUS at 14:21

## 2023-10-26 RX ADMIN — IOPAMIDOL 80 ML: 755 INJECTION, SOLUTION INTRAVENOUS at 07:28

## 2023-10-26 RX ADMIN — PROPOFOL 50 MG: 10 INJECTION, EMULSION INTRAVENOUS at 14:30

## 2023-10-26 RX ADMIN — PANTOPRAZOLE SODIUM 80 MG: 40 TABLET, DELAYED RELEASE ORAL at 15:34

## 2023-10-26 ASSESSMENT — PAIN SCALES - GENERAL
PAINLEVEL_OUTOF10: 0
PAINLEVEL_OUTOF10: 4
PAINLEVEL_OUTOF10: 3
PAINLEVEL_OUTOF10: 0

## 2023-10-26 ASSESSMENT — PAIN DESCRIPTION - FREQUENCY
FREQUENCY: CONTINUOUS
FREQUENCY: CONTINUOUS

## 2023-10-26 ASSESSMENT — PAIN DESCRIPTION - LOCATION
LOCATION: THROAT
LOCATION: THROAT

## 2023-10-26 ASSESSMENT — PAIN - FUNCTIONAL ASSESSMENT
PAIN_FUNCTIONAL_ASSESSMENT: 0-10
PAIN_FUNCTIONAL_ASSESSMENT: PREVENTS OR INTERFERES SOME ACTIVE ACTIVITIES AND ADLS
PAIN_FUNCTIONAL_ASSESSMENT: NONE - DENIES PAIN
PAIN_FUNCTIONAL_ASSESSMENT: PREVENTS OR INTERFERES SOME ACTIVE ACTIVITIES AND ADLS

## 2023-10-26 ASSESSMENT — PAIN DESCRIPTION - PAIN TYPE
TYPE: CHRONIC PAIN
TYPE: ACUTE PAIN

## 2023-10-26 ASSESSMENT — PAIN DESCRIPTION - ORIENTATION
ORIENTATION: MID
ORIENTATION: MID

## 2023-10-26 ASSESSMENT — PAIN DESCRIPTION - ONSET
ONSET: ON-GOING
ONSET: ON-GOING

## 2023-10-26 NOTE — ED NOTES
Patient resting in bed. Respirations easy and unlabored. No distress noted. Call light within reach.      Naeem Moser RN  10/26/23 5269

## 2023-10-26 NOTE — CONSULTS
Consult History & Physical      Patient:  Kelly De Anda  YOB: 1955  MRN: [id number]     Acct: [de-identified]    Chief Complaint:  Esophagitis    Date of Service: Pt seen/examined in consultation on 10/26/2023    History Of Present Illness:      76 y.o. male who we are asked to see/evaluate by Roger Diggs DO for medical management of esophagitis. Patient presented to the ED today for dysphagia and odynophagia which started last week. Patient endorses trouble with solids and liquids, but does say thicker liquids \"like Ensure are easier. \" Patient has a history of tonsillar squamous cell carcinoma s/p selective neck dissection and laser resection with positive margins of the tonsil mass 07/21/23. Patient is undergoing radiation therapy and has received 16 sessions with the last being yesterday. Patient has \"chronic nausea that he has had for years,\" denies vomiting. Patient endorses constipation that started approximately 4 days ago and took OTC Senna and Miralax. He states he has passed \"some rabbit pellets. \" Denies diarrhea, melena, and hematochezia. Denies SOB. Past Medical History:    Past Medical History:   Diagnosis Date    Anxiety     Arthritis     neck, back, hands bilat    Cancer ([address]) 2022    Melanoma removed from face    GERD (gastroesophageal reflux disease)     Hiatal hernia     EGD    Neoplasm of tonsillar fossa 07/03/2023    R - Squamous Cell    PONV (postoperative nausea and vomiting)     PUD (peptic ulcer disease)     LONG TIME  AGO    Rectal bleeding     2o11- colonoscopy - dr Christie Hernandez       Home Medications:  Prior to Admission medications    Medication Sig Start Date End Date Taking?  Authorizing Provider   fluconazole (DIFLUCAN) 100 MG tablet Take TWO tablets by mouth on day 1, then ONE tablet by mouth on days 2-7, for a total of 7 days 10/24/23   Cyndi Mireles PA-C   traMADol (ULTRAM) 50 MG tablet Take 1 tablet by mouth every 6 hours as needed for Pain (cancer

## 2023-10-26 NOTE — ED NOTES
RN gave pt some water swabs to help with pt's dry mouth. Pt is resting in cot with respirations even and unlabored. GI NP at bedside to assess pt. Family at bedside.       Trice Candelaria RN  10/26/23 5401

## 2023-10-26 NOTE — TELEPHONE ENCOUNTER
Joe received call from pt's spouse, Chacha Hudson, informing pt with +throat swelling, difficulty breathing; went to ER for evaluation. CT was scheduled, & will be completed while in ER. She shares pt will not be at his 315 p XRT appt. Joe updated RN, Elena Osman, who notified Dr Jewell Harris.

## 2023-10-26 NOTE — ED NOTES
Patient resting in bed with family at bedside, denies any further needs or concerns at this time. Call light in reach. Will continue to monitor.       Joshua Moser RN (Rojean Pais)  10/26/23 4927

## 2023-10-26 NOTE — ED TRIAGE NOTES
Patient presents to ED from home via EMS with complaints of general malaise. Patient states he is a cancer patient undergoing radiation and just hasn't felt right since recent procedures. Patient states he thinks he has an infection and its making him not feel good. Patient states he had \"cancer in his tonsils removed in July\". Patient states episodes of nausea this morning, no vomiting, no diarrhea. Patient states that he doesn't necessarily have chest pain, he just \"doesn't feel right\". Patient states he had a recent scope done through the nostrils and it was painful and has issues ever since.

## 2023-10-26 NOTE — ED PROVIDER NOTES
Final report electronically signed by DR Raghu Mosquera on 10/26/2023 8:10 AM      CT SOFT TISSUE NECK W CONTRAST   Final Result       1. Abnormal soft tissue fullness on the right. The piriform sinus is obscured. This involves the base of the epiglottis. There is also some thickening of the soft tissues in the right oropharynx and nasopharynx. This could represent edema related to    recent radiation. Infection and recurrent tumor are also considerations. 2. Mildly enlarged midline submental lymph nodes. These have increased in size. 3. No other enlarged lymph nodes are seen. **This report has been created using voice recognition software. It may contain minor errors which are inherent in voice recognition technology. **      Final report electronically signed by Dr. Naina Cotton on 10/26/2023 8:16 AM          FINAL IMPRESSION AND DISPOSITION      1. Odynophagia    2. Tonsil squamous cell carcinoma    DISPOSITION Decision To Admit 10/26/2023 10:46:38 AM      PATIENT REFERRED TO:  No follow-up provider specified.     DISCHARGE MEDICATIONS:  New Prescriptions    No medications on file       (Please note that portions of this note were completed with a voice recognition program.  Efforts were made to edit the dictations but occasionally words aremis-transcribed.)    MD Andrea Gautam MD  10/26/23 5019

## 2023-10-26 NOTE — CARE COORDINATION
Spoke with patient who advised they are currently working with  on ACP documents. Denied concerns or needs.

## 2023-10-26 NOTE — PROGRESS NOTES
BRIEF H&P//fENDOSCOPY PREPROCEDURE REPORT     Patient: Liane Chowdhury  : 1955  Acct#: [de-identified]    Date: 10/26/2023  Indications/Brief History: Dysphagia, odynophagia. Prior XRT  Anticipated Procedure: EGD with possible esophageal dil    ASA class:  3  Airway Adequate? Yes__x___   No_______    Preprocedure Exam:   Neuro: Alert, oriented. Heart:  Regular rate and rhythm   Lungs: Clear to ausculation bilaterally, no evidence respiratory distress  Abdomen:  soft.   NT with gentle palpation    PLAN:  EGD__x___   COLONOSCOPY ______     ERCP________    Alverto Mckay MD MD  10/26/2023, 1:58 PM

## 2023-10-26 NOTE — PROGRESS NOTES
Patient is in phase 2      discussed findings, plan of care, discharge instructions with pt and wife    Report called to floor

## 2023-10-26 NOTE — PROGRESS NOTES
Jimena Rowan admitted to [facility] for egd with Dr. Davie Roblero. Consent form signed and verified with pt.

## 2023-10-26 NOTE — ED NOTES
ED to inpatient nurses report    Chief Complaint   Patient presents with    Chest Pain    Generalized Body Aches    Spasms      Present to ED from home  LOC: alert and orientated to name, place, date  Vital signs   Vitals:    10/26/23 0630 10/26/23 0813 10/26/23 0858 10/26/23 1111   BP: (!) 154/87 111/69 99/67 112/74   Pulse: 78 70 66 76   Resp: 23 14 14 15   Temp:       TempSrc:       SpO2: 99% 95% 95% 96%   Weight:       Height:          Oxygen Baseline room air     Current needs required room air    LDAs:   Peripheral IV 10/26/23 Distal;Right Forearm (Active)   Site Assessment Clean, dry & intact 10/26/23 0557   Line Status Blood return noted 10/26/23 [address] changed 10/26/23 0557   Phlebitis Assessment No symptoms 10/26/23 0557   Infiltration Assessment 0 10/26/23 0557   Alcohol Cap Used No 10/26/23 0557   Dressing Status Clean, dry & intact 10/26/23 0557   Dressing Type Transparent 10/26/23 0557   Dressing Intervention New 10/26/23 0557     Mobility: Independent  Pending ED orders: complete  Present condition: stable      C-SSRS Risk of Suicide: No Risk  Swallow Screening    Preferred Language: English     Electronically signed by Ubaldo Alvarez RN on 10/26/2023 at 11:44 AM       Ubaldo Alvarez RN  10/26/23 1147

## 2023-10-26 NOTE — PROGRESS NOTES
EGD complete, photos taken, no specimens taken     Dilated with 45 Rwandan  dilator pt tolerated procedure well    Scope Number [id number] used.

## 2023-10-26 NOTE — PROGRESS NOTES
ENDOSCOPY POSTPROCEDURE REPORT     PT NAME: Luz Liu  MEDICAL RECORD NUMBER: [id number]  YOB: 1955    PROCEDURE PERFORMED BY : Mireya Franks MD    PROCEDURE TYPE:   EGD __x____   COLONOSCOPY _______   ERCP ________  MEDICATIONS USED :  VERSED _____   FENTANYL_____   PROPOFOL __x____  Patient tolerated procedure well. No apparent complications. Estimated blood loss:  Nil  Specimens removed:  Yes_____  No__x____  Disposition of Specimens:  To Lab      BRIEF  FINDINGS:  Erythema, edema and suspected ulcer/exudate in right posterior pharyngeal region. Scope passed easily across UES. Esophageal mucosa itself unremarkable except loss of vascular pattern proximally. 2.  S/p Nissen. Looks good  3. O/w normal.  45F Am dilator passed very gently with mild-mod resistance. Post dilation inspection showed small focal irritation, photo#16. IMP:  Acute radiation mucositis involving posterior pharyngeal area and possibly some inflammatory involvement in upper esophagus, though not as pronounced. Suspect symptoms due to XRT mucosal injury posterior pharynx---odynophagia. No evidence of viral or fungal esophagitis on today's exam.      PRELIMINARY PLAN:  1. Double dose PPI   2. \"Miracle mouthwash\" may help alleviate discomfort. 3.  Push clear and full liquids, including Ensure/Boost drinks. 4. Speak with oncologist for additional symptomatic treatment options     For complete findings and plan, see dictated report.      Mireya Franks MD, MD  10/26/2023  2:36 PM

## 2023-10-26 NOTE — ED NOTES
Patient resting in bed. Respirations easy and unlabored. No distress noted. Call light within reach.        Tata Casteloln RN  10/26/23 5042

## 2023-10-27 ENCOUNTER — APPOINTMENT (OUTPATIENT)
Dept: RADIATION ONCOLOGY | Age: 68
End: 2023-10-27
Payer: MEDICARE

## 2023-10-27 ENCOUNTER — CLINICAL DOCUMENTATION (OUTPATIENT)
Dept: NUTRITION | Age: 68
End: 2023-10-27

## 2023-10-27 ENCOUNTER — HOSPITAL ENCOUNTER (OUTPATIENT)
Dept: INFUSION THERAPY | Age: 68
End: 2023-10-27

## 2023-10-27 VITALS
TEMPERATURE: 97.5 F | SYSTOLIC BLOOD PRESSURE: 96 MMHG | RESPIRATION RATE: 16 BRPM | DIASTOLIC BLOOD PRESSURE: 61 MMHG | HEIGHT: 65 IN | WEIGHT: 140 LBS | BODY MASS INDEX: 23.32 KG/M2 | HEART RATE: 76 BPM | OXYGEN SATURATION: 97 %

## 2023-10-27 LAB
ANION GAP SERPL CALC-SCNC: 13 MEQ/L (ref 8–16)
BUN SERPL-MCNC: 22 MG/DL (ref 7–22)
CALCIUM SERPL-MCNC: 8.7 MG/DL (ref 8.5–10.5)
CHLORIDE SERPL-SCNC: 109 MEQ/L (ref 98–111)
CO2 SERPL-SCNC: 21 MEQ/L (ref 23–33)
CREAT SERPL-MCNC: 0.9 MG/DL (ref 0.4–1.2)
DEPRECATED RDW RBC AUTO: 39.7 FL (ref 35–45)
EKG ATRIAL RATE: 85 BPM
EKG P AXIS: 48 DEGREES
EKG P-R INTERVAL: 134 MS
EKG Q-T INTERVAL: 398 MS
EKG QRS DURATION: 76 MS
EKG QTC CALCULATION (BAZETT): 473 MS
EKG R AXIS: 6 DEGREES
EKG T AXIS: 67 DEGREES
EKG VENTRICULAR RATE: 85 BPM
ERYTHROCYTE [DISTWIDTH] IN BLOOD BY AUTOMATED COUNT: 12.2 % (ref 11.5–14.5)
GFR SERPL CREATININE-BSD FRML MDRD: > 60 ML/MIN/1.73M2
GLUCOSE SERPL-MCNC: 95 MG/DL (ref 70–108)
HCT VFR BLD AUTO: 40.6 % (ref 42–52)
HGB BLD-MCNC: 13.4 GM/DL (ref 14–18)
MAGNESIUM SERPL-MCNC: 2.1 MG/DL (ref 1.6–2.4)
MCH RBC QN AUTO: 29.3 PG (ref 26–33)
MCHC RBC AUTO-ENTMCNC: 33 GM/DL (ref 32.2–35.5)
MCV RBC AUTO: 88.8 FL (ref 80–94)
PLATELET # BLD AUTO: 178 THOU/MM3 (ref 130–400)
PMV BLD AUTO: 9.2 FL (ref 9.4–12.4)
POTASSIUM SERPL-SCNC: 4.3 MEQ/L (ref 3.5–5.2)
RBC # BLD AUTO: 4.57 MILL/MM3 (ref 4.7–6.1)
SODIUM SERPL-SCNC: 143 MEQ/L (ref 135–145)
WBC # BLD AUTO: 15.3 THOU/MM3 (ref 4.8–10.8)

## 2023-10-27 PROCEDURE — 99239 HOSP IP/OBS DSCHRG MGMT >30: CPT | Performed by: FAMILY MEDICINE

## 2023-10-27 PROCEDURE — 83735 ASSAY OF MAGNESIUM: CPT

## 2023-10-27 PROCEDURE — 80048 BASIC METABOLIC PNL TOTAL CA: CPT

## 2023-10-27 PROCEDURE — 92610 EVALUATE SWALLOWING FUNCTION: CPT

## 2023-10-27 PROCEDURE — 6370000000 HC RX 637 (ALT 250 FOR IP)

## 2023-10-27 PROCEDURE — 36415 COLL VENOUS BLD VENIPUNCTURE: CPT

## 2023-10-27 PROCEDURE — 92526 ORAL FUNCTION THERAPY: CPT

## 2023-10-27 PROCEDURE — 85027 COMPLETE CBC AUTOMATED: CPT

## 2023-10-27 PROCEDURE — 2580000003 HC RX 258

## 2023-10-27 RX ORDER — PANTOPRAZOLE SODIUM 40 MG/1
40 TABLET, DELAYED RELEASE ORAL
Status: DISCONTINUED | OUTPATIENT
Start: 2023-10-27 | End: 2023-10-27 | Stop reason: HOSPADM

## 2023-10-27 RX ORDER — PANTOPRAZOLE SODIUM 40 MG/1
40 TABLET, DELAYED RELEASE ORAL
Qty: 30 TABLET | Refills: 2 | Status: SHIPPED | OUTPATIENT
Start: 2023-10-27

## 2023-10-27 RX ADMIN — SODIUM CHLORIDE, PRESERVATIVE FREE 10 ML: 5 INJECTION INTRAVENOUS at 08:01

## 2023-10-27 RX ADMIN — ONDANSETRON 4 MG: 4 TABLET, ORALLY DISINTEGRATING ORAL at 08:02

## 2023-10-27 RX ADMIN — Medication 5 ML: at 06:43

## 2023-10-27 RX ADMIN — CYCLOBENZAPRINE 10 MG: 10 TABLET, FILM COATED ORAL at 08:00

## 2023-10-27 RX ADMIN — Medication 5 ML: at 00:43

## 2023-10-27 ASSESSMENT — PAIN SCALES - GENERAL: PAINLEVEL_OUTOF10: 0

## 2023-10-27 NOTE — PROGRESS NOTES
[address] [facility]. THERAPY  STR MED SURG 8B  Clinical Swallow Evaluation+ Treatment      SLP Individual Minutes  Time In: 3310  Time Out: 0779  Minutes: 16  Timed Code Treatment Minutes: 0 Minutes  Tx: 8       Date: 10/27/2023  Patient Name: Rylee Moy      CSN: [id number]   : 1955  (76 y.o.)  Gender: male   Referring Physician:  Fiona Jeffery DO    Diagnosis: Odynophagia    History of Present Illness/Injury: Patient presents to [facility] with above dx. Per physician H+P:\" Rylee Moy is a 76 y.o. male with PMH significant for CVA, bilateral carotid stenosis and PSH significant for gastric fundoplication (), transoral robotic tonsillectomy (2023) and laser resection of R-partial tongue base and primary palatine tonsillar squamous cell carcinoma (2023 - Dr. Nilson Maurice) who presents to [facility] with non-radiating substernal chest pressure and nausea since this morning. Patient states that chest pressure began this morning around 4 AM with associated nausea. Stated that the chest discomfort lasted approximately 10 to 15 minutes and then subsided on its own, however the nausea and dry heaving continued. Denies diaphoresis or vomiting. Patient states he has had constipation over the past week without relief of OTC Senna and Miralax - still passing small, hard pebble stools. Also reports chronic epigastric abdominal pain. Patient also reports dysphagia and odynophagia since 10/18 - difficulty with solids and liquids, but usually able to tolerate thicker liquids like Ensure. He is currently undergoing radiation therapy - received 16 sessions with the last being yesterday. Denies headache, fever, chills, rash, cough, congestion, neck stiffness, changes in vision, current chest pain, shortness of breath, vomiting, diarrhea, hematochezia, melena, dysuria, numbness/tingling/weakness in extremities. \"  MBS completed 2023 with weeks  Goal 1: Patient will consume conservative ice chip trials following comprehensive oral care with consistent pharyngeal trigger observed to determine potential readiness for MBS completion    LONG TERM GOALS:  No LTGs established d/t short [address], [address] [place] [address], [facility] E Lidya Johnson

## 2023-10-27 NOTE — CARE COORDINATION
Case Management Assessment  Initial Evaluation    Date/Time of Evaluation: 10/27/2023 12:02 PM  Assessment Completed by: Mikki Soliman RN    If patient is discharged prior to next notation, then this note serves as note for discharge by case management. Patient Name: Karlton Rubinstein                   YOB: 1955  Diagnosis: Odynophagia [R13.10]                   Date / Time: 10/26/2023  5:29 AM  Location: [address] [place] [address]     Patient Admission Status: Inpatient   Readmission Risk Low 0-14, Mod 15-19), High > 20: Readmission Risk Score: 14.1    Current PCP: Kelly Méndez MD  PCP verified by CM? Yes    Chart Reviewed: Yes      History Provided by: Patient  Patient Orientation: Alert and Oriented    Patient Cognition: Alert    Hospitalization in the last 30 days (Readmission):  No    If yes, Readmission Assessment in CM Navigator will be completed. Advance Directives:      Code Status: Full Code   Patient's Primary Decision Maker is: Legal Next of Kin    Primary Decision Maker: Allegra Castro - Spouse - [phone]    Discharge Planning:    Patient lives with: Spouse/Significant Other, Family Members Type of Home: House  Primary Care Giver: Self  Patient Support Systems include: Spouse/Significant Other, Family Members   Current Financial resources: Medicare, Other (Comment) (and BCBS)  Current community resources: None  Current services prior to admission: None            Current DME:              Type of Home Care services:  None    ADLS  Prior functional level: Independent in ADLs/IADLs  Current functional level: Independent in ADLs/IADLs    Family can provide assistance at DC: Yes  Would you like Case Management to discuss the discharge plan with any other family members/significant others, and if so, who?  Yes (Spouse)  Plans to Return to Present Housing: Yes  Other Identified Issues/Barriers to RETURNING to current housing: No  Potential Assistance needed at discharge: N/A Potential DME:    Patient expects to discharge to: [id number] [place] [address] for transportation at discharge: Self    Financial    Payor: MEDICARE / Plan: MEDICARE PART A AND B / Product Type: *No Product type* /     Does insurance require precert for SNF: No    Potential assistance Purchasing Medications: No  Meds-to-Beds request: Yes      [id number] - [place], [place] - [address]  [address] [place] [id number]  Phone: [phone] Fax: [address] [place], [address] [place] [address],[address] Floor [phone] - f [phone]  [facility] [address] [address]  Phone: [phone] Fax: [phone]      Notes:    Factors facilitating achievement of predicted outcomes: Family support and Cooperative    Barriers to discharge: Medical complications    Additional Case Management Notes: Admitted with CP and body aches. Painful swallowing. Current with radiation on throat since 10-13. (Surgery 7-20-23). Full liquid diet. EGD performed with esophageal dilation. Miracle Mouthwash. Procedure:   CT SOFT TISSUE NECK W CONTRAST   Final Result       1. Abnormal soft tissue fullness on the right. The piriform sinus is obscured. This involves the base of the epiglottis. There is also some thickening of the soft tissues in the right oropharynx and nasopharynx. This could represent edema related to    recent radiation. Infection and recurrent tumor are also considerations. 2. Mildly enlarged midline submental lymph nodes. These have increased in size. 3. No other enlarged lymph nodes are seen. The Plan for Transition of Care is related to the following treatment goals of Odynophagia [R13.10]    Patient Goals/Plan/Treatment Preferences: Met with pt and spouse today. A grandson also resides with them. Pt has been independent. He has a PCP and is insured. No discharge needs anticipated.    Transportation/Food Security/Housekeeping Addressed: No

## 2023-10-27 NOTE — PROGRESS NOTES
Nutrition Assessment     Reason for Visit:   10/27/23 - late entry for on treatment follow-up visit on 10/25/23  10/18/23 - on treatment follow-up  9/1/23 - referral from nurse navigator regarding patient with cancer of tonsil and 20# weight loss, post surgery, plan for radiation therapy. Nutrition Recommendations:   Minced and moist diet as per SLP recommendations  ONS 4-6/day  High calorie/high protein smoothies  Taste change nutrition therapy  Senokot-S for constipation, or bowel meds per MD  Pain meds as per MD order  Magic Mouthwash as per MD order  Zofran as per MD     Malnutrition Assessment: (9/1/23)  Malnutrition Status: moderate malnutrition  Context:chronic malnutrition  Findings of the 6 clinical characteristics of malnutrition (minimum of 2 out of 6 clinical characteristics is required to make the dx of moderate or severe Protein Calorie Malnutrition based on AND/ASPEN Guidelines):              1. Energy Intake: reduced oral intake              2. Weight Loss: 20# (13% of body weight in 4 months              3. Fat Loss: moderate loss              4. Muscle Loss: moderate loss              5. Fluid Accumulation: none noted              6.  Strength: not measured     Nutrition Diagnosis:   Problem: moderate malnutrition in the context of chronic illness  Etiology: catabolic illness, altered GI function  Signs and Symptoms: difficulty chewing and swallowing, unintentional weight loss, mild fat and muscle loss     Nutrition Assessment:   History: GERD, hiatal hernia post fundoplication, tonsillar cancer post tonsillectomy (7/21/23) and resection (7/28/23), dysphagia, anxiety,   Subjective:   10/27/23 - Patient seen following radiation therapy and PT. Patient in apparent pain with swallow. Patient reports that ONS helps to coat his throat but it is painful to swallow water. Patient says that he was prescribed pain medication but has not started it yet. Patient also mentions that he is constipated.

## 2023-10-27 NOTE — DISCHARGE SUMMARY
DISCHARGE SUMMARY      Patient Identification:   Keegan Augustine   : 1955  MRN: [id number]   Account: [de-identified]      Patient's PCP: Yemi Perry MD    Admit Date: 10/26/2023     Discharge Date: 10/27/2023      Admitting Physician: No admitting provider for patient encounter. 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 Hospital Course:   Keegan Augustine is a 76 y.o. male admitted to [address] [place] [address] on 10/26/2023 for odynophagia and dysphagia. Hospital course is followed below for each problem. 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 The piriform sinuses obscured. This involves the base of the epiglottis. There is also some thickening of the soft tissues in the right oropharynx and nasopharynx. Increased size of submental lymph nodes  - Patient given dexamethasone IV 10 mg and morphine IV 4 mg in ED on 10/26  - GI consulted, appreciate recommendations.   EGD performed and showed radiation mucositis involving the posterior pharyngeal area and possibly some inflammatory involvement in the upper esophagus though not as electronically signed by DR Emely Sen on 10/26/2023 8:10 AM      CT SOFT TISSUE NECK W CONTRAST   Final Result       1. Abnormal soft tissue fullness on the right. The piriform sinus is obscured. This involves the base of the epiglottis. There is also some thickening of the soft tissues in the right oropharynx and nasopharynx. This could represent edema related to    recent radiation. Infection and recurrent tumor are also considerations. 2. Mildly enlarged midline submental lymph nodes. These have increased in size. 3. No other enlarged lymph nodes are seen. **This report has been created using voice recognition software. It may contain minor errors which are inherent in voice recognition technology. **      Final report electronically signed by Dr. Hortencia Altamirano on 10/26/2023 8:16 AM             Consults:     IP CONSULT TO CASE MANAGEMENT  IP CONSULT TO GI    Disposition:    [x] Home       [] TCU       [] Rehab       [] Psych       [] SNF       [] [address]       [] Other-    Condition at Discharge: Stable    Code Status:  Full Code     Patient Instructions:    Discharge lab work: none  Activity: activity as tolerated  Diet: ADULT DIET; Full Liquid  ADULT ORAL NUTRITION SUPPLEMENT; Breakfast, Lunch, Dinner; Other Oral Supplement;  Ensure/Boost drinks      Follow-up visits:   Jose Martin Morris, [address] [place] [address]  [phone]    Schedule an appointment as soon as possible for a visit  As needed    Richar Valencia [address]  [phone]    Follow up  As needed         Discharge Medications:        Medication List        START taking these medications      Magic Mouthwash  Commonly known as: Miracle Mouthwash  Swish and spit 5 mLs 4 times daily as needed for Irritation     pantoprazole 40 MG tablet  Commonly known as: PROTONIX  Take 1 tablet by mouth 2 times daily (before meals)            CONTINUE

## 2023-10-27 NOTE — PROGRESS NOTES
Educated on discharge instructions, medications, and follow up appointments. No further questions or concerns voiced. Discharged to home with sister.

## 2023-10-27 NOTE — PROCEDURES
[place] [address]                                 PROCEDURE NOTE    PATIENT NAME: Frances Phillips                :        1955  MED REC NO:   [id number]                           ROOM:       0023  ACCOUNT NO:   [de-identified]                           ADMIT DATE: 10/26/2023  PROVIDER:     Nubia Flores M.D.    [facility] Roof:  10/26/2023    PROCEDURE:  Upper endoscopy with esophageal dilatation. SURGEON:  Aly Pollard MD    INSTRUMENT:  Olympus video upper endoscope. MEDICATIONS:  Propofol. See Anesthesia documentation report. BRIEF HISTORY:  The patient is a 60-year-old with a history of worsening  dysphagia. No odynophagia. He has a history of tonsillar carcinoma  status post resection. He underwent transoral robotic radical  tonsillectomy, right partial tongue base resection with closure,  palatoplasty, pharyngoplasty on 2023. He currently is undergoing  radiation treatments as well. He notes that he is having worsening  dysphagia and odynophagia. Due to his worsening symptoms, upper  endoscopy with possible esophageal dilatation was planned. The  procedure was discussed with him. Following discussion, he expressed  understanding and did wish to proceed. DESCRIPTION OF PROCEDURE:  The patient arrived from the emergency  department to the endoscopy department. He was placed on the  appropriate monitoring devices. He was placed in the left lateral  decubitus position. Sedation provided by the anesthesiologist using  propofol. Then the Olympus video upper endoscope passed gently into the  posterior pharyngeal region. There was some exudate in the posterior  pharynx and also on inspection, there appeared to be an area of mucosal  ulceration on the right. The scope was then very gently advanced into  the esophagus under direct endoscopic visualization.   There did appear  to be is  looking down at the upper esophageal sphincter from the pharynx; #13 and  #14 show ulceration in the mucosa of the pharynx; photographs #16, #17  and #18 all status post esophageal dilatation; #18 is the upper  esophageal sphincter; #17 the very most proximal esophagus and #16 is  just below this. IMPRESSION:  1.  Mucosal edema, erythema and focal ulceration identified in the right  posterior pharyngeal region. Endoscopic appearance is consistent with  known radiation treatments for tonsillar cancer. 2.  The esophagus was rather unremarkable. There may have been some  inflammation in the very proximal most esophagus with loss of normal  vascular pattern. Endoscopic appearance did not suggest a viral or  fungal esophagitis. 3.  Status post Nissen fundoplication. This looked good. 4.  Otherwise, unremarkable exam.  5.  Esophageal dilatation was performed gently using a 45-Italian  American dilator in standard fashion without difficulty. ASSESSMENT:  As described above, upper endoscopy demonstrates mucositis  involving the posterior pharynx on the right. The patient is status  post surgery and getting radiation in this area. I suspect that this  was the primary cause of his odynphagia and sense of dysphagia. PLAN:  1. Double dose PPI. 2. \"Miracle mouthwash\" may help alleviate discomfort. 3.  Push clear liquids and full liquids including Ensure and Boost  during this time. 4.  The patient should speak with his oncologist regarding any  additional symptomatic treatment options. Paty Downey M.D.    D: 10/26/2023 14:55:31       T: 10/26/2023 14:58:20     [email or web address]  Job#: [id number]     Doc#: [id number]    CC:  Luisa Howard M.D. [address] [place] [address] Dr Melina Villar M.D.

## 2023-10-28 LAB
BACTERIA BLD AEROBE CULT: NORMAL
BACTERIA BLD AEROBE CULT: NORMAL
BACTERIA THROAT AEROBE CULT: NORMAL

## 2023-10-30 ENCOUNTER — APPOINTMENT (OUTPATIENT)
Dept: RADIATION ONCOLOGY | Age: 68
End: 2023-10-30
Payer: MEDICARE

## 2023-10-30 ENCOUNTER — CARE COORDINATION (OUTPATIENT)
Dept: CASE MANAGEMENT | Age: 68
End: 2023-10-30

## 2023-10-30 ENCOUNTER — HOSPITAL ENCOUNTER (OUTPATIENT)
Dept: PHYSICAL THERAPY | Age: 68
Setting detail: THERAPIES SERIES
Discharge: HOME | End: 2023-10-30
Attending: INTERNAL MEDICINE
Payer: MEDICARE

## 2023-10-30 PROCEDURE — 77014 CHG CT GUIDANCE RADIATION THERAPY FLDS PLACEMENT: CPT | Performed by: RADIOLOGY

## 2023-10-30 PROCEDURE — 97140 MANUAL THERAPY 1/> REGIONS: CPT

## 2023-10-30 PROCEDURE — 97110 THERAPEUTIC EXERCISES: CPT

## 2023-10-30 PROCEDURE — 77386 HC NTSTY MODUL RAD TX DLVR CPLX: CPT | Performed by: RADIOLOGY

## 2023-10-30 NOTE — CARE COORDINATION
Care Transitions Outreach Attempt    Call within 2 business days of discharge: Yes   Attempted to reach patient for transitions of care follow up. Unable to reach patient. Patient: Darra Goltz Patient : 1955 MRN: [id number]    Last Discharge Facility       Date Complaint Diagnosis Description Type Department Provider    10/26/23 Chest Pain; Generalized Body Aches; Spasms Odynophagia . .. ED to Hosp-Admission (Discharged) (ADMITTED) Pam Badillo MD; Billy Diego MD; H... Was this an external facility discharge?  No Discharge Facility Name: [facility]    Noted following upcoming appointments from discharge chart review:   [facility] follow up appointment(s):   Future Appointments   Date Time Provider [address]   10/30/2023  2:00 PM Corinne Kirks [address] [facility] HOD   10/30/2023  3:15 PM SCHEDULE, [address] [place] [address] [address]   10/31/2023  3:15 PM SCHEDULE, [place] [facility] [address]   2023  2:30 PM Sun Gao SLP [address] [place] [address].   2023  3:15 PM SCHEDULE, [employer] [facility] [address]   2023  2:00 PM Marizol Bryan [place] [address] [facility] HOD   2023  3:15 PM SCHEDULE, [employer] [facility] [address]   11/3/2023  2:30 PM Stephanie, [employer] [address]   11/3/2023  3:15 PM SCHEDULE, [employer] [facility] [address]   2023  3:15 PM SCHEDULE, [employer] [facility] [address]   2023  2:15 PM Sherrell Walsh PT [facility]   2023  3:15 PM SCHEDULE, [employer] [facility] [address]   2023  2:30 PM [facility]   2023  3:15 PM SCHEDULE, [employer] [facility] [address]   2023  1:00 PM Bess Madsen MD SRPX Physic [facility]   2023  3:15 PM [facility], [employer] LINAC [address]   11/10/2023  2:30 PM Stephanie, [address] [place] [address]   11/10/2023  3:15 PM

## 2023-10-31 ENCOUNTER — HOSPITAL ENCOUNTER (OUTPATIENT)
Dept: RADIATION ONCOLOGY | Age: 68
Discharge: HOME | End: 2023-10-31
Payer: MEDICARE

## 2023-10-31 ENCOUNTER — APPOINTMENT (OUTPATIENT)
Dept: RADIATION ONCOLOGY | Age: 68
End: 2023-10-31
Payer: MEDICARE

## 2023-10-31 VITALS
DIASTOLIC BLOOD PRESSURE: 61 MMHG | SYSTOLIC BLOOD PRESSURE: 105 MMHG | WEIGHT: 144.8 LBS | HEART RATE: 76 BPM | OXYGEN SATURATION: 99 % | TEMPERATURE: 98.3 F | RESPIRATION RATE: 18 BRPM | BODY MASS INDEX: 24.1 KG/M2

## 2023-10-31 DIAGNOSIS — C09.9 PRIMARY TONSILLAR SQUAMOUS CELL CARCINOMA (HCC): Primary | ICD-10-CM

## 2023-10-31 LAB
BACTERIA BLD AEROBE CULT: NORMAL
BACTERIA BLD AEROBE CULT: NORMAL

## 2023-10-31 PROCEDURE — 77386 HC NTSTY MODUL RAD TX DLVR CPLX: CPT | Performed by: RADIOLOGY

## 2023-10-31 PROCEDURE — 77014 CHG CT GUIDANCE RADIATION THERAPY FLDS PLACEMENT: CPT | Performed by: RADIOLOGY

## 2023-10-31 RX ORDER — METHYLPREDNISOLONE 4 MG/1
TABLET ORAL
Qty: 1 KIT | Refills: 0 | Status: SHIPPED | OUTPATIENT
Start: 2023-10-31 | End: 2023-11-06

## 2023-10-31 RX ORDER — 0.9 % SODIUM CHLORIDE 0.9 %
1000 INTRAVENOUS SOLUTION INTRAVENOUS ONCE
Status: CANCELLED
Start: 2023-10-31 | End: 2023-10-31

## 2023-10-31 RX ORDER — LORAZEPAM 0.5 MG/1
0.5 TABLET ORAL EVERY 6 HOURS PRN
Qty: 28 TABLET | Refills: 0 | Status: SHIPPED | OUTPATIENT
Start: 2023-10-31 | End: 2023-11-07

## 2023-10-31 ASSESSMENT — PAIN DESCRIPTION - LOCATION: LOCATION: THROAT

## 2023-10-31 ASSESSMENT — PAIN SCALES - GENERAL: PAINLEVEL_OUTOF10: 3

## 2023-11-01 ENCOUNTER — HOSPITAL ENCOUNTER (OUTPATIENT)
Dept: RADIATION ONCOLOGY | Age: 68
Discharge: HOME | End: 2023-11-01
Payer: MEDICARE

## 2023-11-01 ENCOUNTER — HOSPITAL ENCOUNTER (OUTPATIENT)
Dept: SPEECH THERAPY | Age: 68
Setting detail: THERAPIES SERIES
Discharge: HOME | End: 2023-11-01
Attending: INTERNAL MEDICINE
Payer: MEDICARE

## 2023-11-01 ENCOUNTER — CARE COORDINATION (OUTPATIENT)
Dept: CASE MANAGEMENT | Age: 68
End: 2023-11-01

## 2023-11-01 DIAGNOSIS — R13.10 ODYNOPHAGIA: Primary | ICD-10-CM

## 2023-11-01 PROCEDURE — 92526 ORAL FUNCTION THERAPY: CPT

## 2023-11-01 PROCEDURE — 77014 CHG CT GUIDANCE RADIATION THERAPY FLDS PLACEMENT: CPT | Performed by: RADIOLOGY

## 2023-11-01 PROCEDURE — 77386 HC NTSTY MODUL RAD TX DLVR CPLX: CPT | Performed by: RADIOLOGY

## 2023-11-01 NOTE — PROGRESS NOTES
airway protection. INTERVENTIONS: Patient completed the following:  *Mendelsohn maneuver: x5 with good success; however, patient requiring 10-20 seconds after each repetition to regain breath and/or need for belching. Ongoing education on the importance to complete his HEP 1-2 x per day. Long Term Goals:  Time Frame for Long-Term Goals: 12 weeks    LONG-TERM GOAL #1: Patient will tolerate a soft and bite sized diet with thin liquids without overt s/s of aspiration and independent use of swallowing strategies at least 80% of the time to safely maintain adequate nutrition and hydration. ONGOING    Activity/Treatment Tolerance:  [x]  Patient tolerated treatment well  []  Patient limited by fatigue  []  Patient limited by pain   []  Patient limited by other medical complications  []  Other:     Patient Education:   [x]  HEP/Education Completed: Plan of Care, Goals, swallowing exercises/HEP 1-2 x per day, complete therabite, continue to swallow as much as possible especially with radiation  []  No new Education completed  []  Reviewed Prior HEP      [x]  Patient verbalized and/or demonstrated understanding of education provided. []  Patient unable to verbalize and/or demonstrate understanding of education provided. Will continue education. []  Barriers to learning:       PLAN:  Treatment Recommendations:     []  Plan of care initiated. Plan to see patient 2 times per week for 12 weeks to address the treatment planned outlined above.   [x]  Continue with current plan of care  []  Modify plan of care as follows:    []  Hold pending physician visit  []  Discharge    Time In 1432   Time Out 1500   Timed Code Minutes: 0 min   Total Treatment Time: 28 min         [address], [place] [address]

## 2023-11-01 NOTE — CARE COORDINATION
SCHEDULE, STRZ [place] [facility] [address]   11/9/2023  1:00 PM Bird Bose MD SRPX Physic [address]   11/9/2023  3:15 PM SCHEDULE, Kateryna [place]as LIN[facility] [address]   11/10/2023  2:30 PM Stephanie, [address] [facility] [address] HOD   11/10/2023  3:15 PM SCHEDULE, [place]yna [place] [facility] [address]   11/13/2023  3:15 PM SCHEDULE, [place] [place] [address]   11/14/2023  2:30 PM Stephanie, [address] [facility] [address]   11/14/2023  3:15 PM SCHEDULE, [place]ateryna [place] [address]   11/15/2023  3:15 PM SCHEDULE, [place] [address], [address] HOD   11/16/2023  2:30 PM ROSEMARY Bee STRZ [place] HOD   11/16/2023  3:15 PM SCHEDULE, Kateryna Oas [facility] [address]   11/17/2023  3:15 PM SCHEDULE, [place] [place] [address], [address] HOD   11/20/2023  3:15 PM SCHEDULE, Kateryna Oas [facility] HOD   11/21/2023  3:15 PM SCHEDULE, [place] [address], [address] HOD   12/1/2023 10:00 AM Allison Hoffman MD N Oncology [facility]   12/11/2023  8:45 AM Aparna Mercedes MD N ENT [facility] Transition Nurse provided contact information. Plan for follow-up call in 5-7 days based on severity of symptoms and risk factors. Plan for next call: symptom management-Back spasms, body aches, chest pain/discomfort, SOB, swallowing issues, any new or worsening symptoms, neurology f/u? ACP?     Michel Aponte RN

## 2023-11-02 ENCOUNTER — HOSPITAL ENCOUNTER (OUTPATIENT)
Dept: INFUSION THERAPY | Age: 68
Discharge: HOME | End: 2023-11-02
Payer: MEDICARE

## 2023-11-02 ENCOUNTER — HOSPITAL ENCOUNTER (OUTPATIENT)
Dept: RADIATION ONCOLOGY | Age: 68
Discharge: HOME | End: 2023-11-02
Payer: MEDICARE

## 2023-11-02 VITALS
RESPIRATION RATE: 16 BRPM | TEMPERATURE: 97.6 F | HEART RATE: 74 BPM | DIASTOLIC BLOOD PRESSURE: 78 MMHG | SYSTOLIC BLOOD PRESSURE: 134 MMHG | OXYGEN SATURATION: 100 %

## 2023-11-02 DIAGNOSIS — C09.9 TONSIL CANCER (HCC): Primary | ICD-10-CM

## 2023-11-02 PROCEDURE — 96360 HYDRATION IV INFUSION INIT: CPT

## 2023-11-02 PROCEDURE — 96361 HYDRATE IV INFUSION ADD-ON: CPT

## 2023-11-02 PROCEDURE — 77386 HC NTSTY MODUL RAD TX DLVR CPLX: CPT | Performed by: RADIOLOGY

## 2023-11-02 PROCEDURE — 2580000003 HC RX 258

## 2023-11-02 RX ORDER — 0.9 % SODIUM CHLORIDE 0.9 %
1000 INTRAVENOUS SOLUTION INTRAVENOUS ONCE
Status: CANCELLED
Start: 2023-11-06 | End: 2023-11-06

## 2023-11-02 RX ORDER — 0.9 % SODIUM CHLORIDE 0.9 %
1000 INTRAVENOUS SOLUTION INTRAVENOUS ONCE
Status: COMPLETED | OUTPATIENT
Start: 2023-11-02 | End: 2023-11-02

## 2023-11-02 RX ADMIN — SODIUM CHLORIDE 1000 ML: 9 INJECTION, SOLUTION INTRAVENOUS at 12:40

## 2023-11-02 NOTE — PLAN OF CARE
Problem: Safety - Adult  Goal: Free from fall injury  Outcome: Adequate for Discharge  Flowsheets (Taken 11/2/2023 1257)  Free From Fall Injury: Instruct family/caregiver on patient safety  Note: Patient verbalizes understanding of fall precautions. Patient free from falls this visit. Problem: Discharge Planning  Goal: Discharge to home or other facility with appropriate resources  Outcome: Adequate for Discharge  Flowsheets (Taken 11/2/2023 1257)  Discharge to home or other facility with appropriate resources: Identify barriers to discharge with patient and caregiver  Note: Verbalized understanding of discharge instructions, follow-up appointments, and when to call the physician. Care plan reviewed with patient. Patient verbalizes understanding of the plan of care and contribute to goal setting.

## 2023-11-02 NOTE — DISCHARGE INSTRUCTIONS
Please contact your Oncologist if you have any questions regarding the IV hydration that you received today. Patient instructed if experience any of the symptoms following today's IV hydration to notify MD immediately or go to emergency department.     * dizziness/lightheadedness  *acute nausea/vomiting - not relieved with medication  *headache - not relieved from Tylenol/pain medication  *chest pain/pressure  *rash/itching  *shortness of breath        Drink fluids - 48oz fluids daily  Call if develop fever/ chills/ signs or symptoms of infection

## 2023-11-02 NOTE — PROGRESS NOTES
Patient here for IV hydration of 1000 ml over 2 hr's,  Due to poor oral intake, sore throat. Per Dr. Ran Lu.

## 2023-11-02 NOTE — PROGRESS NOTES
Patient tolerated 1 liter of IV normal saline over 2 hours without any complications. Discharge instructions given to patient-verbalizes understanding. Ambulated off unit per self with belongings.

## 2023-11-03 ENCOUNTER — HOSPITAL ENCOUNTER (OUTPATIENT)
Dept: SPEECH THERAPY | Age: 68
Setting detail: THERAPIES SERIES
Discharge: HOME | End: 2023-11-03
Attending: INTERNAL MEDICINE
Payer: MEDICARE

## 2023-11-03 ENCOUNTER — HOSPITAL ENCOUNTER (OUTPATIENT)
Dept: RADIATION ONCOLOGY | Age: 68
Discharge: HOME | End: 2023-11-03
Payer: MEDICARE

## 2023-11-03 PROCEDURE — 77386 HC NTSTY MODUL RAD TX DLVR CPLX: CPT | Performed by: RADIOLOGY

## 2023-11-03 PROCEDURE — 92526 ORAL FUNCTION THERAPY: CPT | Performed by: SPEECH-LANGUAGE PATHOLOGIST

## 2023-11-03 NOTE — PROGRESS NOTES
** PLEASE SIGN, DATE AND TIME CERTIFICATION BELOW AND RETURN TO [facility] OUTPATIENT REHABILITATION (FAX #: [phone]). ATTEST/CO-SIGN IF ACCESSING VIA [employer]. THANK YOU.**    I certify that I have examined the patient below and determined that Physical Medicine and Rehabilitation service is necessary and that I approve the established plan of care for up to 90 days or as specifically noted. Attestation, signature or co-signature of physician indicates approval of certification requirements.    ________________________ ____________ __________  Physician Signature   Date   Time     [address] [facility] [address]  [] CLINICAL SWALLOW EVALUATION  [] DAILY NOTE   [x] PROGRESS NOTE [] DISCHARGE NOTE    [x] [address] [facility] [address] [facility] [place]   [] [address] [facility]    [] [address] [facility]   [] [facility] [place]    Date: 11/3/2023  Patient Name:  Roxann Castillo  : 1955  MRN: [id number]  CSN: [id number]    Referring Practitioner Lon Rutledge MD   Diagnosis Dysphagia, unspecified type [R13.10]  Inadequate oral nutritional intake [E63.9]  Primary squamous cell carcinoma of palatine tonsil ([address] [facility]) [C09.9]    Treatment Diagnosis Dysphagia    Date of Evaluation 23      Functional Outcome Measure Used [facility] NOMS: swallowing   Functional Outcome Score Level 4 (23)       Insurance: Primary: Payor: Caryle Curia /  /  / ,   Secondary: Pratik How: No precert required   Visit # 10, 10/10 for progress note   Visits Allowed: Unlimited visits based on medical necessity   Recertification Date:    Physician Follow-Up: Dr. Jostin Sun - 2023; Dr. Angelo Donahue - 23    Physician Orders: [employer] and treat   Pertinent History: Patient reports he started with a sore throat in 2022 and he was treated with thrush and antibiotics multiple times, but the sore throat continued.   Patient then started noticing some dysphagia and he had

## 2023-11-05 PROBLEM — E44.0 MODERATE MALNUTRITION (HCC): Status: ACTIVE | Noted: 2023-11-05

## 2023-11-05 PROBLEM — J39.2 PHARYNGEAL ULCER: Status: ACTIVE | Noted: 2023-11-05

## 2023-11-05 PROBLEM — R07.89 CHEST PRESSURE: Status: ACTIVE | Noted: 2023-11-05

## 2023-11-05 PROBLEM — K12.33 MUCOSITIS DUE TO RADIATION THERAPY: Status: ACTIVE | Noted: 2023-11-05

## 2023-11-05 PROBLEM — D72.829 LEUKOCYTOSIS: Status: ACTIVE | Noted: 2023-11-05

## 2023-11-05 PROBLEM — D64.9 NORMOCYTIC ANEMIA: Status: ACTIVE | Noted: 2023-11-05

## 2023-11-05 PROBLEM — Z86.79 HISTORY OF CAROTID STENOSIS: Status: ACTIVE | Noted: 2023-11-05

## 2023-11-05 PROBLEM — Z86.73 HISTORY OF CVA (CEREBROVASCULAR ACCIDENT): Status: ACTIVE | Noted: 2023-11-05

## 2023-11-06 ENCOUNTER — CARE COORDINATION (OUTPATIENT)
Dept: CASE MANAGEMENT | Age: 68
End: 2023-11-06

## 2023-11-06 ENCOUNTER — HOSPITAL ENCOUNTER (OUTPATIENT)
Dept: INFUSION THERAPY | Age: 68
Discharge: HOME | End: 2023-11-06
Payer: MEDICARE

## 2023-11-06 ENCOUNTER — HOSPITAL ENCOUNTER (OUTPATIENT)
Dept: RADIATION ONCOLOGY | Age: 68
Discharge: HOME | End: 2023-11-06
Payer: MEDICARE

## 2023-11-06 VITALS
RESPIRATION RATE: 16 BRPM | OXYGEN SATURATION: 99 % | HEART RATE: 68 BPM | DIASTOLIC BLOOD PRESSURE: 60 MMHG | HEIGHT: 65 IN | TEMPERATURE: 97.6 F | SYSTOLIC BLOOD PRESSURE: 129 MMHG | BODY MASS INDEX: 23.99 KG/M2 | WEIGHT: 144 LBS

## 2023-11-06 DIAGNOSIS — C09.9 TONSIL CANCER (HCC): Primary | ICD-10-CM

## 2023-11-06 PROCEDURE — 96361 HYDRATE IV INFUSION ADD-ON: CPT

## 2023-11-06 PROCEDURE — 77386 HC NTSTY MODUL RAD TX DLVR CPLX: CPT | Performed by: RADIOLOGY

## 2023-11-06 PROCEDURE — 2580000003 HC RX 258

## 2023-11-06 PROCEDURE — 77014 CHG CT GUIDANCE RADIATION THERAPY FLDS PLACEMENT: CPT | Performed by: RADIOLOGY

## 2023-11-06 PROCEDURE — 96360 HYDRATION IV INFUSION INIT: CPT

## 2023-11-06 RX ORDER — 0.9 % SODIUM CHLORIDE 0.9 %
1000 INTRAVENOUS SOLUTION INTRAVENOUS ONCE
Status: CANCELLED
Start: 2023-11-09 | End: 2023-11-09

## 2023-11-06 RX ORDER — 0.9 % SODIUM CHLORIDE 0.9 %
1000 INTRAVENOUS SOLUTION INTRAVENOUS ONCE
Status: COMPLETED | OUTPATIENT
Start: 2023-11-06 | End: 2023-11-06

## 2023-11-06 RX ADMIN — SODIUM CHLORIDE 1000 ML: 9 INJECTION, SOLUTION INTRAVENOUS at 13:13

## 2023-11-06 NOTE — CARE COORDINATION
Care Transitions Follow Up Call    Patient Current Location:  Home: [place] [address]    Care Transition Nurse contacted the patient by telephone to follow up after admission on 10/26/23. Verified name and  with patient as identifiers. Patient: Frances Phillips  Patient : 1955   MRN: [id number]  Reason for Admission: odynophagia; chest pain, body aches/spasms  Discharge Date: 10/27/23 RARS: Readmission Risk Score: 15.3      Needs to be reviewed by the provider   Additional needs identified to be addressed with provider: No  none             Method of communication with provider: none. CTN call to Kathy Sands today and he says he is feeling better this week. Denies chest pain, sob, dizziness, n/v/d, fever, chills, swelling, malaise. Back spasms are better. Taking Ativan for nausea w/relief per Oncology. Wt.=about the same. Drinking mostly liquids w/o problem is drinks slowly. Tolerating Ensure. Upcoming appts.:  Radiation this week  PCP 23  Onc 23  ENT 23  GI- none scheduled  Denies need for transportation. Says is still not taking Plavix and will f/u w/ Neurology at a later time. This writer recommends calling that office to inform them of being off of plavix (for carotid blockage). He expresses understanding and will consider. Sleeping better. Denies problems w/ urination/bowels. PT/ST active. Discussed ACP docs, says  is working this, but agrees to [employer]. Staff message to [address]. Addressed changes since last contact:  medications-Ativan for nausea per Onc  Discussed follow-up appointments. If no appointment was previously scheduled, appointment scheduling offered: Yes. Is follow up appointment scheduled within 7 days of discharge?  No.    Follow Up  Future Appointments   Date Time Provider [address]   2023 12:30 PM STR OUT PT ONC INJ RM 05 STRZ OP ONC [place] HOD   2023  3:15 PM SCHEDULE, [facility] 2 STRZ KACEY

## 2023-11-06 NOTE — PLAN OF CARE
Problem: Safety - Adult  Goal: Free from fall injury  Outcome: Progressing  Flowsheets (Taken 11/6/2023 1631)  Free From Fall Injury: Instruct family/caregiver on patient safety  Note: No falls occurred with visit today. Problem: Discharge Planning  Goal: Discharge to home or other facility with appropriate resources  Outcome: Progressing  Flowsheets (Taken 11/6/2023 1631)  Discharge to home or other facility with appropriate resources: Identify barriers to discharge with patient and caregiver  Note: Verbalize understanding of discharge instructions, follow up appointments, and when to call Physician. Problem: Chronic Conditions and Co-morbidities  Goal: Patient's chronic conditions and co-morbidity symptoms are monitored and maintained or improved  Outcome: Progressing  Flowsheets (Taken 11/6/2023 1631)  Care Plan - Patient's Chronic Conditions and Co-Morbidity Symptoms are Monitored and Maintained or Improved: Monitor and assess patient's chronic conditions and comorbid symptoms for stability, deterioration, or improvement  Note: Discussed indicattions for fluids   Care plan reviewed with patient. Patient verbalizes understanding of the plan of care and contributes to goal setting.

## 2023-11-07 ENCOUNTER — HOSPITAL ENCOUNTER (OUTPATIENT)
Dept: RADIATION ONCOLOGY | Age: 68
Discharge: HOME | End: 2023-11-07
Payer: MEDICARE

## 2023-11-07 ENCOUNTER — HOSPITAL ENCOUNTER (OUTPATIENT)
Dept: PHYSICAL THERAPY | Age: 68
Setting detail: THERAPIES SERIES
Discharge: HOME | End: 2023-11-07
Attending: INTERNAL MEDICINE
Payer: MEDICARE

## 2023-11-07 ENCOUNTER — OFFICE VISIT (OUTPATIENT)
Dept: INTERNAL MEDICINE CLINIC | Age: 68
End: 2023-11-07
Payer: MEDICARE

## 2023-11-07 VITALS
SYSTOLIC BLOOD PRESSURE: 110 MMHG | WEIGHT: 142.86 LBS | TEMPERATURE: 98.2 F | BODY MASS INDEX: 23.77 KG/M2 | OXYGEN SATURATION: 97 % | DIASTOLIC BLOOD PRESSURE: 72 MMHG | RESPIRATION RATE: 16 BRPM | HEART RATE: 77 BPM

## 2023-11-07 VITALS
SYSTOLIC BLOOD PRESSURE: 112 MMHG | HEART RATE: 72 BPM | WEIGHT: 143.8 LBS | BODY MASS INDEX: 23.96 KG/M2 | DIASTOLIC BLOOD PRESSURE: 64 MMHG | TEMPERATURE: 98.5 F | HEIGHT: 65 IN

## 2023-11-07 DIAGNOSIS — K21.9 GASTROESOPHAGEAL REFLUX DISEASE, UNSPECIFIED WHETHER ESOPHAGITIS PRESENT: ICD-10-CM

## 2023-11-07 DIAGNOSIS — R13.10 DYSPHAGIA, UNSPECIFIED TYPE: Primary | ICD-10-CM

## 2023-11-07 DIAGNOSIS — C09.9 PRIMARY SQUAMOUS CELL CARCINOMA OF PALATINE TONSIL (HCC): ICD-10-CM

## 2023-11-07 PROCEDURE — 77386 HC NTSTY MODUL RAD TX DLVR CPLX: CPT | Performed by: RADIOLOGY

## 2023-11-07 PROCEDURE — 77014 CHG CT GUIDANCE RADIATION THERAPY FLDS PLACEMENT: CPT | Performed by: RADIOLOGY

## 2023-11-07 PROCEDURE — 97140 MANUAL THERAPY 1/> REGIONS: CPT

## 2023-11-07 PROCEDURE — 1036F TOBACCO NON-USER: CPT | Performed by: INTERNAL MEDICINE

## 2023-11-07 PROCEDURE — G8420 CALC BMI NORM PARAMETERS: HCPCS | Performed by: INTERNAL MEDICINE

## 2023-11-07 PROCEDURE — 1123F ACP DISCUSS/DSCN MKR DOCD: CPT | Performed by: INTERNAL MEDICINE

## 2023-11-07 PROCEDURE — 97110 THERAPEUTIC EXERCISES: CPT

## 2023-11-07 PROCEDURE — G8484 FLU IMMUNIZE NO ADMIN: HCPCS | Performed by: INTERNAL MEDICINE

## 2023-11-07 PROCEDURE — 99213 OFFICE O/P EST LOW 20 MIN: CPT | Performed by: INTERNAL MEDICINE

## 2023-11-07 PROCEDURE — 3017F COLORECTAL CA SCREEN DOC REV: CPT | Performed by: INTERNAL MEDICINE

## 2023-11-07 PROCEDURE — G8427 DOCREV CUR MEDS BY ELIG CLIN: HCPCS | Performed by: INTERNAL MEDICINE

## 2023-11-07 PROCEDURE — 1111F DSCHRG MED/CURRENT MED MERGE: CPT | Performed by: INTERNAL MEDICINE

## 2023-11-07 ASSESSMENT — ENCOUNTER SYMPTOMS
SHORTNESS OF BREATH: 0
VOMITING: 0
NAUSEA: 0
CONSTIPATION: 0
BACK PAIN: 0
SORE THROAT: 1
ABDOMINAL PAIN: 0

## 2023-11-07 NOTE — PROGRESS NOTES
Known Allergies    Social History     Socioeconomic History    Marital status:      Spouse name: Vangie Yip    Number of children: Not on file    Years of education: Not on file    Highest education level: Not on file   Occupational History    Not on file   Tobacco Use    Smoking status: Never    Smokeless tobacco: Never   Vaping Use    Vaping Use: Never used   Substance and Sexual Activity    Alcohol use: No     Comment: rare    Drug use: No    Sexual activity: Not on file   Other Topics Concern    Not on file   Social History Narrative    Not on file     Social Determinants of Health     Financial Resource Strain: Low Risk  (2/9/2023)    Overall Financial Resource Strain (CARDIA)     Difficulty of Paying Living Expenses: Not hard at all   Food Insecurity: No Food Insecurity (2/9/2023)    Hunger Vital Sign     Worried About Running Out of Food in the Last Year: Never true     [address] in the Last Year: Never true   Transportation Needs: Unknown (2/9/2023)    PRAPARE - Transportation     Lack of Transportation (Medical): Not on file     Lack of Transportation (Non-Medical): No   Physical Activity: Not on file   Stress: Not on file   Social Connections: Not on file   Intimate Partner Violence: Not on file   Housing Stability: Unknown (2/9/2023)    Housing Stability Vital Sign     Unable to Pay for Housing in the Last Year: Not on file     Number of Places Lived in the Last Year: Not on file     Unstable Housing in the Last Year: No       Family History   Problem Relation Age of Onset    Asthma Mother     Heart Disease Mother     Heart Disease Father     Other Father         CHF    Stroke Other     Heart Disease Other        Review of Systems   Constitutional:  Negative for chills and fatigue. HENT:  Positive for sore throat. Respiratory:  Negative for cough, shortness of breath and wheezing. Cardiovascular:  Negative for chest pain, palpitations and leg swelling.    Gastrointestinal:  Negative for

## 2023-11-07 NOTE — PATIENT INSTRUCTIONS
Make appt with Haseeb at [address] [address]  - in 1-2 months- to see what to do with your pantoprazole.

## 2023-11-07 NOTE — PROGRESS NOTES
[facility]  ONCOLOGY REHABILITATION  PHYSICAL THERAPY  [x] PROGRESS NOTE    [x] [facility] [place]     Date: 2023  Patient Name:  Roosevelt Cabrera  : 1955  MRN: [id number]  CSN: [id number]      Referring Practitioner Brent Celaya PA-C    Diagnosis Dysphagia, unspecified type [R13.10]  Inadequate oral nutritional intake [E63.9]  Primary squamous cell carcinoma of palatine tonsil ([address] [facility]) [C09.9]    Treatment Diagnosis Tightness in neck and jaw, Z71.9   Date of Evaluation 23    Additional Pertinent History Tonsilectomy 23, Arthritis, Memory Issues, Osteoporosis, Cervical plates P0-5 and S6-9      Functional Outcome Measure Used O: Brief Fatigue Inventory   Functional Outcome Score 4 (23); 7.44 (10/12/23); 3 (23)      Insurance: Primary: Payor: Vida Byrnes /  /  / ,   Secondary: [facility]   Authorization Information: PRECERTIFICATION REQUIRED:  No  INSURANCE THERAPY BENEFIT:  Patient has unlimited visits based on medical necessity  Benefit will not cover maintenance or preventative treatment. AQUATIC THERAPY COVERED:   Yes  MODALITIES COVERED:  Yes, with the exception of iontophoresis and hot packs/cold packs  TELEHEALTH COVERED: Yes   Visit # 12, 6/6 for progress note   Visits Allowed: Unlimited visits based on medical necessity   Recertification Date:    Physician Follow-Up: 23-Dr. Cathye Severe   Physician Orders: Oncology Rehab   History of Present Illness: 23 diagnosed with Tonsillar Squamous Cell Carcinoma with Tonsillectomy and R partial tongue base resection on 23. No chemo, but is supposed to be having radiation, but does not have start date. SUBJECTIVE: Pt arrives reporting he is feeling a little better. He is eating a little more. He is getting fluids 2 times per week and reports it is helping. Brief Fatigue Inventory   Throughout our lives, most of us have times when we feel very tired or fatigued.

## 2023-11-08 ENCOUNTER — HOSPITAL ENCOUNTER (OUTPATIENT)
Dept: SPEECH THERAPY | Age: 68
Setting detail: THERAPIES SERIES
Discharge: HOME | End: 2023-11-08
Attending: INTERNAL MEDICINE
Payer: MEDICARE

## 2023-11-08 ENCOUNTER — HOSPITAL ENCOUNTER (OUTPATIENT)
Dept: RADIATION ONCOLOGY | Age: 68
Discharge: HOME | End: 2023-11-08
Payer: MEDICARE

## 2023-11-08 PROCEDURE — 77386 HC NTSTY MODUL RAD TX DLVR CPLX: CPT | Performed by: RADIOLOGY

## 2023-11-08 PROCEDURE — 92526 ORAL FUNCTION THERAPY: CPT

## 2023-11-08 NOTE — PROGRESS NOTES
[facility] THERAPY  [] CLINICAL SWALLOW EVALUATION  [x] DAILY NOTE   [] PROGRESS NOTE [] DISCHARGE NOTE    [x] OUTPATIENT REHABILITATION [facility]   [] [facility]    [] [facility]   [] Mick Fix    Date: 2023  Patient Name:  Keegan Augustine  : 1955  MRN: [id number]  CSN: [id number]    Referring Practitioner Yemi Perry MD   Diagnosis Dysphagia, unspecified type [R13.10]  Inadequate oral nutritional intake [E63.9]  Primary squamous cell carcinoma of palatine tonsil ([address] [facility]) [C09.9]    Treatment Diagnosis Dysphagia    Date of Evaluation 23      Functional Outcome Measure Used [facility] NOMS: swallowing   Functional Outcome Score Level 4 (23)       Insurance: Primary: Payor: [employer] /  /  / ,   Secondary: Elizabeth Graff Information: No precert required   Visit # 11, 1/10 for progress note   Visits Allowed: Unlimited visits based on medical necessity   Recertification Date: 12/3/84   Physician Follow-Up: Dr. Shaneka Calvillo - 2023; Dr. Jeannette Ayala - 23    Physician Orders: Jill Farrell and treat   Pertinent History: Patient reports he started with a sore throat in 2022 and he was treated with thrush and antibiotics multiple times, but the sore throat continued. Patient then started noticing some dysphagia and he had a MBS completed on 22 and a regular diet with thin liquids was recommended. Patient had neck surgery with a plate and screws put in at C3-5 in the end of 2023. Patient then had the fundoplication in the beginning of May of 2023. Patient went to see an ENT at [facility] sometime in May of 2023 and he told him the symptoms were related to acid reflux. Patient went to see Dr. Jeannette Ayala in 23 and he found a tonsillar mass on the right. Patient ten had a MBS completed on 6/15/23 with the recommendation for a regular diet with thin liquids (avoid tough meats, such as steak).

## 2023-11-09 ENCOUNTER — HOSPITAL ENCOUNTER (OUTPATIENT)
Dept: INFUSION THERAPY | Age: 68
Discharge: HOME | End: 2023-11-09
Payer: MEDICARE

## 2023-11-09 ENCOUNTER — HOSPITAL ENCOUNTER (OUTPATIENT)
Dept: PHYSICAL THERAPY | Age: 68
Setting detail: THERAPIES SERIES
Discharge: HOME | End: 2023-11-09
Attending: INTERNAL MEDICINE
Payer: MEDICARE

## 2023-11-09 ENCOUNTER — HOSPITAL ENCOUNTER (OUTPATIENT)
Dept: RADIATION ONCOLOGY | Age: 68
Discharge: HOME | End: 2023-11-09
Payer: MEDICARE

## 2023-11-09 VITALS
OXYGEN SATURATION: 99 % | TEMPERATURE: 98.3 F | HEART RATE: 69 BPM | DIASTOLIC BLOOD PRESSURE: 71 MMHG | SYSTOLIC BLOOD PRESSURE: 111 MMHG | RESPIRATION RATE: 16 BRPM

## 2023-11-09 DIAGNOSIS — C09.9 TONSIL CANCER (HCC): Primary | ICD-10-CM

## 2023-11-09 PROCEDURE — 97110 THERAPEUTIC EXERCISES: CPT

## 2023-11-09 PROCEDURE — 96360 HYDRATION IV INFUSION INIT: CPT

## 2023-11-09 PROCEDURE — 2580000003 HC RX 258

## 2023-11-09 PROCEDURE — 96361 HYDRATE IV INFUSION ADD-ON: CPT

## 2023-11-09 PROCEDURE — 77386 HC NTSTY MODUL RAD TX DLVR CPLX: CPT | Performed by: RADIOLOGY

## 2023-11-09 PROCEDURE — 97140 MANUAL THERAPY 1/> REGIONS: CPT

## 2023-11-09 RX ORDER — 0.9 % SODIUM CHLORIDE 0.9 %
1000 INTRAVENOUS SOLUTION INTRAVENOUS ONCE
Start: 2023-11-13 | End: 2023-11-13

## 2023-11-09 RX ORDER — 0.9 % SODIUM CHLORIDE 0.9 %
1000 INTRAVENOUS SOLUTION INTRAVENOUS ONCE
Status: COMPLETED | OUTPATIENT
Start: 2023-11-09 | End: 2023-11-09

## 2023-11-09 RX ADMIN — SODIUM CHLORIDE 1000 ML: 9 INJECTION, SOLUTION INTRAVENOUS at 11:33

## 2023-11-09 ASSESSMENT — PAIN SCALES - GENERAL
PAINLEVEL_OUTOF10: 3
PAINLEVEL_OUTOF10: 3

## 2023-11-09 ASSESSMENT — PAIN DESCRIPTION - LOCATION: LOCATION: THROAT

## 2023-11-09 NOTE — PROGRESS NOTES
[facility]  ONCOLOGY REHABILITATION  PHYSICAL THERAPY  [x] PROGRESS NOTE    [x] [facility] [place]     Date: 2023  Patient Name:  Indra Del Toro  : 1955  MRN: [id number]  [facility]: [id number]      Referring Practitioner Dawson Casiano PA-C    Diagnosis Dysphagia, unspecified type [R13.10]  Inadequate oral nutritional intake [E63.9]  Primary squamous cell carcinoma of palatine tonsil ([address] [facility]) [C09.9]    Treatment Diagnosis Tightness in neck and jaw, Z71.9   Date of Evaluation 23    Additional Pertinent History Tonsilectomy 23, Arthritis, Memory Issues, Osteoporosis, Cervical plates K0-2 and E6-5      Functional Outcome Measure Used O: Brief Fatigue Inventory   Functional Outcome Score 4 (23); 7.44 (10/12/23); 3 (23)      Insurance: Primary: Payor: [facility] /  /  / ,   Secondary: [facility]   Authorization Information: PRECERTIFICATION REQUIRED:  No  INSURANCE THERAPY BENEFIT:  Patient has unlimited visits based on medical necessity  Benefit will not cover maintenance or preventative treatment. AQUATIC THERAPY COVERED:   Yes  MODALITIES COVERED:  Yes, with the exception of iontophoresis and hot packs/cold packs  TELEHEALTH COVERED: Yes   Visit # 13,  for progress note   Visits Allowed: Unlimited visits based on medical necessity   Recertification Date:    Physician Follow-Up: 23-Dr. Reta Alas   Physician Orders: Oncology Rehab   History of Present Illness: 23 diagnosed with Tonsillar Squamous Cell Carcinoma with Tonsillectomy and R partial tongue base resection on 23. No chemo, but is supposed to be having radiation, but does not have start date. SUBJECTIVE: Pt arrives reporting he is now off his steroid so has noticed some increased swelling. Just got fluids prior to therapy and states that always makes him feel a little better.      TREATMENT   Precautions: H&N Lymphedema, Cervical plates R3-0 and P2-6   Pain:

## 2023-11-09 NOTE — PLAN OF CARE
Problem: Safety - Adult  Goal: Free from fall injury  Outcome: Adequate for Discharge  Flowsheets (Taken 11/9/2023 1427)  Free From Fall Injury: Instruct family/caregiver on patient safety  Note: Patient free of falls this visit. Fall risks assessed. Precautions discussed. Problem: Discharge Planning  Goal: Discharge to home or other facility with appropriate resources  Outcome: Adequate for Discharge  Flowsheets (Taken 11/9/2023 1427)  Discharge to home or other facility with appropriate resources:   Identify barriers to discharge with patient and caregiver   Arrange for needed discharge resources and transportation as appropriate  Note: Patient verbalizes understanding of discharge instructions, follow up appointment, and when to call physician if needed      Problem: Chronic Conditions and Co-morbidities  Goal: Patient's chronic conditions and co-morbidity symptoms are monitored and maintained or improved  Outcome: Adequate for Discharge  Flowsheets (Taken 11/9/2023 1427)  Care Plan - Patient's Chronic Conditions and Co-Morbidity Symptoms are Monitored and Maintained or Improved:   Monitor and assess patient's chronic conditions and comorbid symptoms for stability, deterioration, or improvement   Collaborate with multidisciplinary team to address chronic and comorbid conditions and prevent exacerbation or deterioration  Note: Patient verbalizes understanding to verbal information given on IV hydration, including action and possible side effects. Aware to call MD if develop complications. Care plan reviewed with patient. Patient verbalizes understanding of the plan of care and contributes to goal setting.

## 2023-11-10 ENCOUNTER — HOSPITAL ENCOUNTER (OUTPATIENT)
Dept: SPEECH THERAPY | Age: 68
Setting detail: THERAPIES SERIES
Discharge: HOME | End: 2023-11-10
Attending: INTERNAL MEDICINE
Payer: MEDICARE

## 2023-11-10 ENCOUNTER — HOSPITAL ENCOUNTER (EMERGENCY)
Age: 68
Discharge: HOME | End: 2023-11-10
Attending: EMERGENCY MEDICINE
Payer: MEDICARE

## 2023-11-10 ENCOUNTER — APPOINTMENT (OUTPATIENT)
Dept: CT IMAGING | Age: 68
End: 2023-11-10
Payer: MEDICARE

## 2023-11-10 ENCOUNTER — CARE COORDINATION (OUTPATIENT)
Dept: CARE COORDINATION | Age: 68
End: 2023-11-10

## 2023-11-10 VITALS
WEIGHT: 142 LBS | DIASTOLIC BLOOD PRESSURE: 76 MMHG | HEIGHT: 65 IN | TEMPERATURE: 97.8 F | BODY MASS INDEX: 23.66 KG/M2 | RESPIRATION RATE: 13 BRPM | SYSTOLIC BLOOD PRESSURE: 116 MMHG | HEART RATE: 68 BPM | OXYGEN SATURATION: 98 %

## 2023-11-10 DIAGNOSIS — R13.10 DYSPHAGIA, UNSPECIFIED TYPE: Primary | ICD-10-CM

## 2023-11-10 LAB
ANION GAP SERPL CALC-SCNC: 9 MEQ/L (ref 8–16)
BASOPHILS ABSOLUTE: 0 THOU/MM3 (ref 0–0.1)
BASOPHILS NFR BLD AUTO: 0.6 %
BUN SERPL-MCNC: 15 MG/DL (ref 7–22)
CALCIUM SERPL-MCNC: 9.7 MG/DL (ref 8.5–10.5)
CHLORIDE SERPL-SCNC: 102 MEQ/L (ref 98–111)
CO2 SERPL-SCNC: 27 MEQ/L (ref 23–33)
CREAT SERPL-MCNC: 0.9 MG/DL (ref 0.4–1.2)
DEPRECATED RDW RBC AUTO: 39.4 FL (ref 35–45)
EKG ATRIAL RATE: 64 BPM
EKG P AXIS: 53 DEGREES
EKG P-R INTERVAL: 134 MS
EKG Q-T INTERVAL: 428 MS
EKG QRS DURATION: 96 MS
EKG QTC CALCULATION (BAZETT): 441 MS
EKG R AXIS: 34 DEGREES
EKG T AXIS: 74 DEGREES
EKG VENTRICULAR RATE: 64 BPM
EOSINOPHIL NFR BLD AUTO: 1.9 %
EOSINOPHILS ABSOLUTE: 0.1 THOU/MM3 (ref 0–0.4)
ERYTHROCYTE [DISTWIDTH] IN BLOOD BY AUTOMATED COUNT: 12.4 % (ref 11.5–14.5)
GFR SERPL CREATININE-BSD FRML MDRD: > 60 ML/MIN/1.73M2
GLUCOSE SERPL-MCNC: 99 MG/DL (ref 70–108)
HCT VFR BLD AUTO: 49.1 % (ref 42–52)
HGB BLD-MCNC: 16.1 GM/DL (ref 14–18)
IMM GRANULOCYTES # BLD AUTO: 0.05 THOU/MM3 (ref 0–0.07)
IMM GRANULOCYTES NFR BLD AUTO: 0.7 %
LYMPHOCYTES ABSOLUTE: 0.8 THOU/MM3 (ref 1–4.8)
LYMPHOCYTES NFR BLD AUTO: 11.5 %
MCH RBC QN AUTO: 28.8 PG (ref 26–33)
MCHC RBC AUTO-ENTMCNC: 32.8 GM/DL (ref 32.2–35.5)
MCV RBC AUTO: 87.8 FL (ref 80–94)
MONOCYTES ABSOLUTE: 0.6 THOU/MM3 (ref 0.4–1.3)
MONOCYTES NFR BLD AUTO: 8.4 %
NEUTROPHILS NFR BLD AUTO: 76.9 %
NRBC BLD AUTO-RTO: 0 /100 WBC
OSMOLALITY SERPL CALC.SUM OF ELEC: 276.5 MOSMOL/KG (ref 275–300)
PLATELET # BLD AUTO: 190 THOU/MM3 (ref 130–400)
PMV BLD AUTO: 8.8 FL (ref 9.4–12.4)
POTASSIUM SERPL-SCNC: 3.9 MEQ/L (ref 3.5–5.2)
RBC # BLD AUTO: 5.59 MILL/MM3 (ref 4.7–6.1)
SEGMENTED NEUTROPHILS ABSOLUTE COUNT: 5.2 THOU/MM3 (ref 1.8–7.7)
SODIUM SERPL-SCNC: 138 MEQ/L (ref 135–145)
TROPONIN, HIGH SENSITIVITY: 9 NG/L (ref 0–12)
WBC # BLD AUTO: 6.8 THOU/MM3 (ref 4.8–10.8)

## 2023-11-10 PROCEDURE — 93010 ELECTROCARDIOGRAM REPORT: CPT | Performed by: INTERNAL MEDICINE

## 2023-11-10 PROCEDURE — 70491 CT SOFT TISSUE NECK W/DYE: CPT

## 2023-11-10 PROCEDURE — 36415 COLL VENOUS BLD VENIPUNCTURE: CPT

## 2023-11-10 PROCEDURE — 96375 TX/PRO/DX INJ NEW DRUG ADDON: CPT

## 2023-11-10 PROCEDURE — 96374 THER/PROPH/DIAG INJ IV PUSH: CPT

## 2023-11-10 PROCEDURE — 85025 COMPLETE CBC W/AUTO DIFF WBC: CPT

## 2023-11-10 PROCEDURE — 99285 EMERGENCY DEPT VISIT HI MDM: CPT

## 2023-11-10 PROCEDURE — 6360000004 HC RX CONTRAST MEDICATION: Performed by: EMERGENCY MEDICINE

## 2023-11-10 PROCEDURE — 80048 BASIC METABOLIC PNL TOTAL CA: CPT

## 2023-11-10 PROCEDURE — 6360000002 HC RX W HCPCS

## 2023-11-10 PROCEDURE — 93005 ELECTROCARDIOGRAM TRACING: CPT | Performed by: EMERGENCY MEDICINE

## 2023-11-10 PROCEDURE — 84484 ASSAY OF TROPONIN QUANT: CPT

## 2023-11-10 RX ORDER — ONDANSETRON 2 MG/ML
4 INJECTION INTRAMUSCULAR; INTRAVENOUS ONCE
Status: COMPLETED | OUTPATIENT
Start: 2023-11-10 | End: 2023-11-10

## 2023-11-10 RX ORDER — MORPHINE SULFATE 4 MG/ML
4 INJECTION, SOLUTION INTRAMUSCULAR; INTRAVENOUS ONCE
Status: COMPLETED | OUTPATIENT
Start: 2023-11-10 | End: 2023-11-10

## 2023-11-10 RX ADMIN — ONDANSETRON 4 MG: 2 INJECTION INTRAMUSCULAR; INTRAVENOUS at 16:14

## 2023-11-10 RX ADMIN — MORPHINE SULFATE 4 MG: 4 INJECTION, SOLUTION INTRAMUSCULAR; INTRAVENOUS at 16:14

## 2023-11-10 RX ADMIN — IOPAMIDOL 80 ML: 755 INJECTION, SOLUTION INTRAVENOUS at 18:35

## 2023-11-10 ASSESSMENT — PAIN SCALES - GENERAL: PAINLEVEL_OUTOF10: 9

## 2023-11-10 ASSESSMENT — PAIN - FUNCTIONAL ASSESSMENT
PAIN_FUNCTIONAL_ASSESSMENT: NONE - DENIES PAIN
PAIN_FUNCTIONAL_ASSESSMENT: 0-10

## 2023-11-10 ASSESSMENT — PAIN DESCRIPTION - LOCATION: LOCATION: NECK

## 2023-11-10 ASSESSMENT — PAIN DESCRIPTION - DESCRIPTORS: DESCRIPTORS: SHARP

## 2023-11-10 ASSESSMENT — PAIN DESCRIPTION - ORIENTATION: ORIENTATION: LEFT

## 2023-11-10 NOTE — CARE COORDINATION
SW mailed out ACP documents to pt and will follow up to make sure he receives and assist in completing.

## 2023-11-10 NOTE — ED NOTES
Patient resting in bed with family at bedside, denies any further needs or concerns at this time. Call light in reach. Will continue to monitor.       Alessia Moser RN  11/10/23 3571

## 2023-11-10 NOTE — ED NOTES
[facility]      Billing, [address] [facility] [address] Janie GRESHAM, RN  11/10/23 1404

## 2023-11-10 NOTE — ED TRIAGE NOTES
Presents to ED with complaints of dizziness that began while sitting in speech therapy. Pt states he currently has cancer and last received radiation for his neck yesterday. Pt reports he was sitting and he suddenly became lightheaded and had to lie down. States he feels better upon ED arrival pt complains of worsening neck pain from radiation. States he takes Tramadol and last took it this morning. EKG completed.

## 2023-11-10 NOTE — ED NOTES
Patient resting in bed with eyes closed, respirations easy and unlabored. Lights dimmed and door closed for patient comfort. Call light in reach. Will continue to monitor.       Umm Moser, RN  11/10/23 1969

## 2023-11-10 NOTE — ED PROVIDER NOTES
[facility] DEPT  EMERGENCY DEPARTMENT ENCOUNTER          Pt Name: Darra Goltz  MRN: [id number]  [address] 1955  Date of evaluation: 11/10/2023  Physician: Cameron Cochran MD  Supervising Attending Physician: Pablo Hercules DO       CHIEF COMPLAINT       Chief Complaint   Patient presents with    Dizziness         HISTORY OF PRESENT ILLNESS    HPI  Darra Goltz is a 76 y.o. male who presents to the emergency department  from speech therapy , brought in by EMS for evaluation of lightheadedness and throat pain. Patient reports that while at speech therapy he got lightheaded and fell like he was in a pass out. Patient states that he laid down and began to feel much better. Patient reports that when they open the door and the cold air came in he felt better. Patient reports that for the past couple days he has noticed has been more difficult to swallow. Patient states that he undergoes radiation daily for esophageal cancer. Patient states that this morning he was unable to eat his food. Patient reports that he is still able to drink fluids however it is difficult. Patient also reports that over the past couple days it has been more difficult to breathe. He also reports that the pain in his neck is gotten progressively worse since starting radiation. Patient denies any chest pain or shortness of breath. Patient denies any fever or chills.         PAST MEDICAL AND SURGICAL HISTORY     Past Medical History:   Diagnosis Date    Anxiety     Arthritis     neck, back, hands bilat    Cancer ([address]) 2022    Melanoma removed from face    GERD (gastroesophageal reflux disease)     Hiatal hernia     EGD    Neoplasm of tonsillar fossa 07/03/2023    R - Squamous Cell    PONV (postoperative nausea and vomiting)     PUD (peptic ulcer disease)     LONG TIME  AGO    Rectal bleeding     2o11- colonoscopy - dr Jessica Martinez     Past Surgical History:   Procedure Laterality Date 11/10/2023 08:50:51 PM      This transcription was electronically signed. It was dictated by use of voice recognition software and electronically transcribed. The transcription may contain errors not detected in proofreading.        Zbigniew Ruby MD  Resident  11/10/23 2113

## 2023-11-10 NOTE — PROGRESS NOTES
[facility]. THERAPY MISSED TREATMENT NOTE  [facility]- [facility]      Date: 11/10/2023  Patient Name: Zena Johnson        MRN: [id number]    : 1955  (76 y.o.)    REASON FOR MISSED TREATMENT:  Patient arrived for his  appointment this date, however, reported he did not feel well. Patient looked pale in the face, but his neck was very red from his radiation treatments. Patient walked back to the  treatment room independently. Patient reports he wasn't able to eat anything this morning, but he was able to get 2 bottles of Ensure down earlier this date. Reports \"it didn't go down as easy as it usually does. \"  Patient reports he is exhausted, despite getting fluids yesterday. After this brief discussion, the patient stated he was starting to feel lightheaded. Patient slumped down in the chair to recline himself back because it felt better in that position. Patient then started to feel very clammy and became very lethargic. Patient was lowered to the floor and placed in supine position with the help of 2 PT's. Patient's legs were then elevated on a chair and his vitals were checked. While in supine position, patient's SpO2 levels ranged from 94-96%, BP was 118/69 and HR was 64. In the meantime, this  called [phone] to have them call the EMS. Patient was taken to the ED via ambulance. Attempted to call the patient's wife 5 x, but no answer each time. Clerical also attempted to call the patient's wife, but was unable to get through to her.         Swetha Gaston M.S. Lynne Yan 7899

## 2023-11-11 NOTE — DISCHARGE INSTRUCTIONS
Return to the ED immediately for any change or worsening of symptoms including but not limited to chest pain, shortness of breath, dizziness, nausea, vomiting.

## 2023-11-11 NOTE — ED NOTES
Pt resting in bed with family at bedside. Pt c/o upset stomach and asks if he can have his ensure. Pt given ensure. No other requests from pt at this time.       Mayo Birght RN  11/10/23 2009

## 2023-11-13 ENCOUNTER — HOSPITAL ENCOUNTER (OUTPATIENT)
Dept: RADIATION ONCOLOGY | Age: 68
Discharge: HOME | End: 2023-11-13
Payer: MEDICARE

## 2023-11-13 ENCOUNTER — CARE COORDINATION (OUTPATIENT)
Dept: CASE MANAGEMENT | Age: 68
End: 2023-11-13

## 2023-11-13 ENCOUNTER — HOSPITAL ENCOUNTER (OUTPATIENT)
Dept: PHYSICAL THERAPY | Age: 68
Setting detail: THERAPIES SERIES
Discharge: HOME | End: 2023-11-13
Attending: INTERNAL MEDICINE
Payer: MEDICARE

## 2023-11-13 ENCOUNTER — HOSPITAL ENCOUNTER (OUTPATIENT)
Dept: INFUSION THERAPY | Age: 68
Discharge: HOME | End: 2023-11-13
Payer: MEDICARE

## 2023-11-13 VITALS
HEART RATE: 70 BPM | OXYGEN SATURATION: 100 % | RESPIRATION RATE: 16 BRPM | TEMPERATURE: 97.9 F | DIASTOLIC BLOOD PRESSURE: 61 MMHG | WEIGHT: 143.6 LBS | BODY MASS INDEX: 23.9 KG/M2 | SYSTOLIC BLOOD PRESSURE: 121 MMHG

## 2023-11-13 DIAGNOSIS — C09.9 TONSIL CANCER (HCC): Primary | ICD-10-CM

## 2023-11-13 PROBLEM — G95.20 SPINAL CORD COMPRESSION (HCC): Status: RESOLVED | Noted: 2023-02-20 | Resolved: 2023-11-13

## 2023-11-13 PROBLEM — E72.20 HYPERAMMONEMIA (HCC): Status: RESOLVED | Noted: 2023-06-20 | Resolved: 2023-11-13

## 2023-11-13 PROCEDURE — 2580000003 HC RX 258

## 2023-11-13 PROCEDURE — 97140 MANUAL THERAPY 1/> REGIONS: CPT

## 2023-11-13 PROCEDURE — 96361 HYDRATE IV INFUSION ADD-ON: CPT

## 2023-11-13 PROCEDURE — 77386 HC NTSTY MODUL RAD TX DLVR CPLX: CPT | Performed by: RADIOLOGY

## 2023-11-13 PROCEDURE — 96360 HYDRATION IV INFUSION INIT: CPT

## 2023-11-13 PROCEDURE — 97110 THERAPEUTIC EXERCISES: CPT

## 2023-11-13 PROCEDURE — 77336 RADIATION PHYSICS CONSULT: CPT | Performed by: RADIOLOGY

## 2023-11-13 PROCEDURE — 77014 CHG CT GUIDANCE RADIATION THERAPY FLDS PLACEMENT: CPT | Performed by: RADIOLOGY

## 2023-11-13 RX ORDER — 0.9 % SODIUM CHLORIDE 0.9 %
1000 INTRAVENOUS SOLUTION INTRAVENOUS ONCE
Status: CANCELLED
Start: 2023-11-16 | End: 2023-11-16

## 2023-11-13 RX ORDER — 0.9 % SODIUM CHLORIDE 0.9 %
1000 INTRAVENOUS SOLUTION INTRAVENOUS ONCE
Status: COMPLETED | OUTPATIENT
Start: 2023-11-13 | End: 2023-11-13

## 2023-11-13 RX ADMIN — SODIUM CHLORIDE 1000 ML: 9 INJECTION, SOLUTION INTRAVENOUS at 11:47

## 2023-11-13 ASSESSMENT — ENCOUNTER SYMPTOMS
BLOOD IN STOOL: 0
WHEEZING: 0
COUGH: 0

## 2023-11-13 NOTE — CARE COORDINATION
Care Transitions Follow Up Call    Patient Current Location:  Home: [place] [address]    Care Transition Nurse contacted the patient by telephone to follow up after admission on 10/26/23. Verified name and  with patient as identifiers. 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 CTN call to Jarad Hayley today and he is at the hospital receiving IV fluids. Says he is feeling ok. C/o dysphagia & some nausea-taking antiemetic.  ED 11/10/23 for dizziness at PT felt better after meds, rest.  Onc f/u 23  ENT f/u 23  Denies back pain, malaise, fever, chills, v/d, chest pain,sob, dizziness, lightheadedness, swelling. PT and radiation continue as scheduled daily. Denies need for transportation. PCP 23->no changes  Rad Onc 23->IV fluids 2x/week, Medrol dosepak, Ativan 0.5 mh q6 h prn  Tolerating Ensure and water ok. Denies problems w/ urination/bowels. No other concerns voiced at this time. Will continue to follow. Addressed changes since last contact:  Medrol dosepak, Ativan 0.5 mh q6 h prn per Rad Onc  Discussed follow-up appointments. If no appointment was previously scheduled, appointment scheduling offered: Yes. Is follow up appointment scheduled within 7 days of discharge?  No.    Follow Up  Future Appointments   Date Time Provider [address]   2023  2:00 PM Briseyda Mei [facility]   2023  3:15 PM SCHEDULE, [address] LINBERTHA [facility] [address], [address] [facility] HOD   2023

## 2023-11-13 NOTE — DISCHARGE INSTRUCTIONS
You received 1 liter of IV fluids while in outpatient oncology clinic. Call if any uncontrolled nausea/vomiting  Call if any fevers/chills/ problems or concerns  Call if any bleeding  Call if any chest pain/pressure  Call if any severe shortness of breath  Drink at least (6) 8oz glasses of fluids daily     If develop any of above condition, please call your MD or go to Emergency Department.

## 2023-11-13 NOTE — PROGRESS NOTES
[facility]  ONCOLOGY REHABILITATION  PHYSICAL THERAPY  [x] PROGRESS NOTE    [x] [facility] [place]     Date: 2023  Patient Name:  Rafaela Fernandez  : 1955  MRN: [id number]  CSN: [id number]      Referring Practitioner Shanelle Centeno PA-C    Diagnosis Dysphagia, unspecified type [R13.10]  Inadequate oral nutritional intake [E63.9]  Primary squamous cell carcinoma of palatine tonsil ([address] [facility]) [C09.9]    Treatment Diagnosis Tightness in neck and jaw, Z71.9   Date of Evaluation 23    Additional Pertinent History Tonsilectomy 23, Arthritis, Memory Issues, Osteoporosis, Cervical plates N3-4 and I6-4      Functional Outcome Measure Used O: Brief Fatigue Inventory   Functional Outcome Score 4 (23); 7.44 (10/12/23); 3 (23)      Insurance: Primary: Payor: [facility] /  /  / ,   Secondary: [facility]   Authorization Information: PRECERTIFICATION REQUIRED:  No  INSURANCE THERAPY BENEFIT:  Patient has unlimited visits based on medical necessity  Benefit will not cover maintenance or preventative treatment. AQUATIC THERAPY COVERED:   Yes  MODALITIES COVERED:  Yes, with the exception of iontophoresis and hot packs/cold packs  TELEHEALTH COVERED: Yes   Visit # 14, 2/7 for progress note   Visits Allowed: Unlimited visits based on medical necessity   Recertification Date:    Physician Follow-Up: 23-Dr. Sunshine Barron   Physician Orders: Oncology Rehab   History of Present Illness: 23 diagnosed with Tonsillar Squamous Cell Carcinoma with Tonsillectomy and R partial tongue base resection on 23. No chemo, but is supposed to be having radiation, but does not have start date. SUBJECTIVE: Pt and wife attend session. Report pt had another ER visit but was discharged to home the same evening. Reports swelling and pain in neck/throat continue to increase. Only able to tolerate ensure and water at this point.  Did go for fluids prior to therapy

## 2023-11-13 NOTE — PROGRESS NOTES
Patient here for IV hydration of 1000 ml over 2 hr's,  Due to dehydration, nausea/vomiting, poor oral intake. Per Dr. Reji Priest.

## 2023-11-13 NOTE — PLAN OF CARE
Problem: Safety - Adult  Goal: Free from fall injury  Outcome: Progressing  Flowsheets (Taken 11/13/2023 1734)  Free From Fall Injury: Instruct family/caregiver on patient safety  Note: No falls occurred with visit today. Problem: Discharge Planning  Goal: Discharge to home or other facility with appropriate resources  Outcome: Progressing  Flowsheets (Taken 11/13/2023 1734)  Discharge to home or other facility with appropriate resources: Identify barriers to discharge with patient and caregiver  Note: Verbalized understanding of discharge instructions, follow-up appointments, and when to call the physician. Problem: Chronic Conditions and Co-morbidities  Goal: Patient's chronic conditions and co-morbidity symptoms are monitored and maintained or improved  Outcome: Progressing  Flowsheets (Taken 11/13/2023 1734)  Care Plan - Patient's Chronic Conditions and Co-Morbidity Symptoms are Monitored and Maintained or Improved: Monitor and assess patient's chronic conditions and comorbid symptoms for stability, deterioration, or improvement  Note: Patient verbalizes understanding to verbal information given on IV fluids action and possible side effects. Aware to call MD if develop complications. Care plan reviewed with patient and family member. Patient and family member verbalize understanding of the plan of care and contribute to goal setting.

## 2023-11-14 ENCOUNTER — HOSPITAL ENCOUNTER (OUTPATIENT)
Dept: RADIATION ONCOLOGY | Age: 68
Discharge: HOME | End: 2023-11-14
Payer: MEDICARE

## 2023-11-14 ENCOUNTER — HOSPITAL ENCOUNTER (OUTPATIENT)
Dept: SPEECH THERAPY | Age: 68
Setting detail: THERAPIES SERIES
Discharge: HOME | End: 2023-11-14
Attending: INTERNAL MEDICINE
Payer: MEDICARE

## 2023-11-14 VITALS
SYSTOLIC BLOOD PRESSURE: 114 MMHG | DIASTOLIC BLOOD PRESSURE: 74 MMHG | OXYGEN SATURATION: 99 % | HEART RATE: 77 BPM | RESPIRATION RATE: 16 BRPM | WEIGHT: 140 LBS | BODY MASS INDEX: 23.3 KG/M2 | TEMPERATURE: 98.4 F

## 2023-11-14 DIAGNOSIS — C09.9 PRIMARY TONSILLAR SQUAMOUS CELL CARCINOMA (HCC): Primary | ICD-10-CM

## 2023-11-14 PROCEDURE — 77386 HC NTSTY MODUL RAD TX DLVR CPLX: CPT | Performed by: RADIOLOGY

## 2023-11-14 PROCEDURE — 92526 ORAL FUNCTION THERAPY: CPT | Performed by: SPEECH-LANGUAGE PATHOLOGIST

## 2023-11-14 RX ORDER — PREDNISONE 20 MG/1
20 TABLET ORAL DAILY
Qty: 7 TABLET | Refills: 0 | Status: SHIPPED | OUTPATIENT
Start: 2023-11-14 | End: 2023-11-21

## 2023-11-14 ASSESSMENT — PAIN SCALES - GENERAL: PAINLEVEL_OUTOF10: 4

## 2023-11-14 NOTE — PROGRESS NOTES
[facility] THERAPY  [] CLINICAL SWALLOW EVALUATION  [x] DAILY NOTE   [] PROGRESS NOTE [] DISCHARGE NOTE    [x] OUTPATIENT REHABILITATION [facility]   [] [address] [place] [address]    [] [facility]   [] Lilibeth April    Date: 2023  Patient Name:  Zena Johnson  : 1955  MRN: [id number]  CSN: [id number]    Referring Practitioner Elmer Velasquez MD   Diagnosis Dysphagia, unspecified type [R13.10]  Inadequate oral nutritional intake [E63.9]  Primary squamous cell carcinoma of palatine tonsil ([facility]) [C09.9]    Treatment Diagnosis Dysphagia    Date of Evaluation 23      Functional Outcome Measure Used [facility] NOMS: swallowing   Functional Outcome Score Level 4 (23)       Insurance: Primary: Payor: Jake Grimaldo /  /  / ,   Secondary: [employer] Information: No precert required   Visit # 12, 2/10 for progress note   Visits Allowed: Unlimited visits based on medical necessity   Recertification Date:    Physician Follow-Up: Dr. Dre Obrien - 2023; Dr. Renetta Coleman - 23    Physician Orders: Sandoval Sax and treat   Pertinent History: Patient reports he started with a sore throat in 2022 and he was treated with thrush and antibiotics multiple times, but the sore throat continued. Patient then started noticing some dysphagia and he had a MBS completed on 22 and a regular diet with thin liquids was recommended. Patient had neck surgery with a plate and screws put in at [facility] in the end of 2023. Patient then had the fundoplication in the beginning of May of 2023. Patient went to see an ENT at [facility] sometime in May of 2023 and he told him the symptoms were related to acid reflux. Patient went to see Dr. Renetta Coleman in 23 and he found a tonsillar mass on the right. Patient ten had a MBS completed on 6/15/23 with the recommendation for a regular diet with thin liquids (avoid tough meats, such as steak).

## 2023-11-15 ENCOUNTER — CLINICAL DOCUMENTATION (OUTPATIENT)
Dept: NUTRITION | Age: 68
End: 2023-11-15

## 2023-11-15 ENCOUNTER — HOSPITAL ENCOUNTER (OUTPATIENT)
Dept: RADIATION ONCOLOGY | Age: 68
Discharge: HOME | End: 2023-11-15
Payer: MEDICARE

## 2023-11-15 ENCOUNTER — HOSPITAL ENCOUNTER (OUTPATIENT)
Dept: PHYSICAL THERAPY | Age: 68
Setting detail: THERAPIES SERIES
Discharge: HOME | End: 2023-11-15
Attending: INTERNAL MEDICINE
Payer: MEDICARE

## 2023-11-15 PROCEDURE — 97110 THERAPEUTIC EXERCISES: CPT

## 2023-11-15 PROCEDURE — 97140 MANUAL THERAPY 1/> REGIONS: CPT

## 2023-11-15 PROCEDURE — 77386 HC NTSTY MODUL RAD TX DLVR CPLX: CPT | Performed by: RADIOLOGY

## 2023-11-15 NOTE — PROGRESS NOTES
[facility]  ONCOLOGY REHABILITATION  PHYSICAL THERAPY  [x] DAILY NOTE    [x] [facility] [place]     Date: 11/15/2023  Patient Name:  Noman Wright  : 1955  MRN: [id number]  CSN: [id number]      Referring Practitioner Manuela Penny PA-C    Diagnosis Dysphagia, unspecified type [R13.10]  Inadequate oral nutritional intake [E63.9]  Primary squamous cell carcinoma of palatine tonsil ([facility]) [C09.9]    Treatment Diagnosis Tightness in neck and jaw, Z71.9   Date of Evaluation 23    Additional Pertinent History Tonsilectomy 23, Arthritis, Memory Issues, Osteoporosis, Cervical plates P5-4 and Y7-9      Functional Outcome Measure Used O: Brief Fatigue Inventory   Functional Outcome Score 4 (23); 7.44 (10/12/23); 3 (23)      Insurance: Primary: Payor: Juvencio Degroot /  /  / ,   Secondary: [facility]   Authorization Information: PRECERTIFICATION REQUIRED:  No  INSURANCE THERAPY BENEFIT:  Patient has unlimited visits based on medical necessity  Benefit will not cover maintenance or preventative treatment. AQUATIC THERAPY COVERED:   Yes  MODALITIES COVERED:  Yes, with the exception of iontophoresis and hot packs/cold packs  TELEHEALTH COVERED: Yes   Visit # 15, 3/7 for progress note   Visits Allowed: Unlimited visits based on medical necessity   Recertification Date:    Physician Follow-Up: 23-Dr. Francisca Humphries   Physician Orders: Oncology Rehab   History of Present Illness: 23 diagnosed with Tonsillar Squamous Cell Carcinoma with Tonsillectomy and R partial tongue base resection on 23. No chemo, but is supposed to be having radiation, but does not have start date. SUBJECTIVE: Pt arrives to therapy stating he is not feeling well today. Reports he was put back on steroids and given a stronger pain med.  States he feels like he needs to cancel his appointments until he can get through radiation as right now he is struggling to keep up

## 2023-11-15 NOTE — PROGRESS NOTES
Nutrition Assessment     Reason for Visit:   11/15/23 - on treatment visit  10/27/23 - late entry for on treatment follow-up visit on 10/25/23  10/18/23 - on treatment follow-up  9/1/23 - referral from nurse navigator regarding patient with cancer of tonsil and 20# weight loss, post surgery, plan for radiation therapy.      Nutrition Recommendations:   Minced and moist diet as per SLP recommendations  ONS 4-6/day  High calorie/high protein smoothies  Taste change nutrition therapy  Senokot-S for constipation, or bowel meds per MD  Pain meds as per MD order  Magic Mouthwash as per MD order  Zofran as per MD     Malnutrition Assessment: (9/1/23)  Malnutrition Status: moderate malnutrition  Context:chronic malnutrition  Findings of the 6 clinical characteristics of malnutrition (minimum of 2 out of 6 clinical characteristics is required to make the dx of moderate or severe Protein Calorie Malnutrition based on AND/ASPEN Guidelines):              1. Energy Intake: reduced oral intake              2. Weight Loss: 20# (13% of body weight in 4 months              3. Fat Loss: moderate loss              4. Muscle Loss: moderate loss              5. Fluid Accumulation: none noted              6.  Strength: not measured     Nutrition Diagnosis:   Problem: moderate malnutrition in the context of chronic illness  Etiology: catabolic illness, altered GI function  Signs and Symptoms: difficulty chewing and swallowing, unintentional weight loss, mild fat and muscle loss     Nutrition Assessment:   History: GERD, hiatal hernia post fundoplication, tonsillar cancer post tonsillectomy (7/21/23) and resection (7/28/23), dysphagia, anxiety,   Subjective:   11/15/23 - Patient seen and says that he is drinking about 3-4 ensure per day. Patient started prednisone and pain meds were increased so feels a little better. Patient reports that he feels that the inflammation is down some so he can swallow a little better. Patient says that

## 2023-11-16 ENCOUNTER — APPOINTMENT (OUTPATIENT)
Dept: SPEECH THERAPY | Age: 68
End: 2023-11-16
Attending: INTERNAL MEDICINE
Payer: MEDICARE

## 2023-11-16 ENCOUNTER — HOSPITAL ENCOUNTER (OUTPATIENT)
Dept: RADIATION ONCOLOGY | Age: 68
Discharge: HOME | End: 2023-11-16
Payer: MEDICARE

## 2023-11-16 ENCOUNTER — HOSPITAL ENCOUNTER (OUTPATIENT)
Dept: INFUSION THERAPY | Age: 68
Discharge: HOME | End: 2023-11-16
Payer: MEDICARE

## 2023-11-16 VITALS
BODY MASS INDEX: 23.32 KG/M2 | HEIGHT: 65 IN | TEMPERATURE: 98.4 F | SYSTOLIC BLOOD PRESSURE: 123 MMHG | OXYGEN SATURATION: 100 % | WEIGHT: 140 LBS | HEART RATE: 67 BPM | DIASTOLIC BLOOD PRESSURE: 63 MMHG | RESPIRATION RATE: 18 BRPM

## 2023-11-16 DIAGNOSIS — C09.9 PRIMARY TONSILLAR SQUAMOUS CELL CARCINOMA (HCC): ICD-10-CM

## 2023-11-16 DIAGNOSIS — C09.9 TONSIL CANCER (HCC): Primary | ICD-10-CM

## 2023-11-16 PROCEDURE — 77386 HC NTSTY MODUL RAD TX DLVR CPLX: CPT | Performed by: RADIOLOGY

## 2023-11-16 PROCEDURE — 96361 HYDRATE IV INFUSION ADD-ON: CPT

## 2023-11-16 PROCEDURE — 96360 HYDRATION IV INFUSION INIT: CPT

## 2023-11-16 PROCEDURE — 2580000003 HC RX 258

## 2023-11-16 RX ORDER — 0.9 % SODIUM CHLORIDE 0.9 %
1000 INTRAVENOUS SOLUTION INTRAVENOUS ONCE
Status: CANCELLED
Start: 2023-11-20 | End: 2023-11-20

## 2023-11-16 RX ORDER — 0.9 % SODIUM CHLORIDE 0.9 %
1000 INTRAVENOUS SOLUTION INTRAVENOUS ONCE
Status: COMPLETED | OUTPATIENT
Start: 2023-11-16 | End: 2023-11-16

## 2023-11-16 RX ORDER — LORAZEPAM 0.5 MG/1
0.5 TABLET ORAL EVERY 6 HOURS PRN
Qty: 28 TABLET | Refills: 0 | Status: SHIPPED | OUTPATIENT
Start: 2023-11-16 | End: 2023-11-23

## 2023-11-16 RX ADMIN — SODIUM CHLORIDE 1000 ML: 9 INJECTION, SOLUTION INTRAVENOUS at 12:43

## 2023-11-16 NOTE — DISCHARGE SUMMARY
Patient tolerated 1 liter of normal saline for hydration without any complications. Patient verbalized understanding of discharge instructions. Ambulated off unit per self with belongings.

## 2023-11-16 NOTE — PROGRESS NOTES
Patient received 1000ml of normal saline over two hours for dehydration. Patient did not drink anything while in the outpatient infusion area. Patient stated that he did feel better after normal saline hydration.

## 2023-11-16 NOTE — PLAN OF CARE
Problem: Safety - Adult  Goal: Free from fall injury  Outcome: Adequate for Discharge  Flowsheets (Taken 11/16/2023 1642)  Free From Fall Injury:   Instruct family/caregiver on patient safety   Based on caregiver fall risk screen, instruct family/caregiver to ask for assistance with transferring infant if caregiver noted to have fall risk factors  Note: Free from falls while in O.P. Oncology. Problem: Discharge Planning  Goal: Discharge to home or other facility with appropriate resources  Flowsheets (Taken 11/16/2023 1642)  Discharge to home or other facility with appropriate resources:   Identify barriers to discharge with patient and caregiver   Arrange for needed discharge resources and transportation as appropriate   Identify discharge learning needs (meds, wound care, etc)  Note: Verbalize understanding of discharge instructions, follow up appointments, and when to call Physician. Problem: Chronic Conditions and Co-morbidities  Goal: Patient's chronic conditions and co-morbidity symptoms are monitored and maintained or improved  Flowsheets (Taken 11/16/2023 1642)  Care Plan - Patient's Chronic Conditions and Co-Morbidity Symptoms are Monitored and Maintained or Improved:   Monitor and assess patient's chronic conditions and comorbid symptoms for stability, deterioration, or improvement   Collaborate with multidisciplinary team to address chronic and comorbid conditions and prevent exacerbation or deterioration   Update acute care plan with appropriate goals if chronic or comorbid symptoms are exacerbated and prevent overall improvement and discharge  Note: Verbalizes understanding to signs and symptoms of dehydration and when to call the Physician. Care plan reviewed with patient and spouse. Patient and spouse verbalize understanding of the plan of care and contribute to goal setting.

## 2023-11-16 NOTE — DISCHARGE INSTRUCTIONS
Please contact your Oncologist if you have any questions regarding the one liter fluid infusion that you received today. Patient instructed if experience any of the symptoms following today's infusion / to notify MD immediately or go to emergency department.     * dizziness/lightheadedness  *acute nausea/vomiting - not relieved with medication  *headache - not relieved from Tylenol/pain medication  *chest pain/pressure  *rash/itching  *shortness of breath        Drink fluids - 48oz fluids daily  Call if develop fever/ chills/ signs or symptoms of infection

## 2023-11-17 ENCOUNTER — HOSPITAL ENCOUNTER (OUTPATIENT)
Dept: RADIATION ONCOLOGY | Age: 68
Discharge: HOME | End: 2023-11-17
Payer: MEDICARE

## 2023-11-17 PROCEDURE — 77386 HC NTSTY MODUL RAD TX DLVR CPLX: CPT | Performed by: RADIOLOGY

## 2023-11-17 NOTE — DISCHARGE INSTRUCTIONS
PATIENT DISCHARGE INSTRUCTIONS    Remember that side effects present at the end of your treatments will improve within a few weeks after the last treatment. Eat well balanced meals even though your treatments are finished. This will help speed the healing process. Continue any special diets prescribed to control side effects until these side effects have been resolved. Get plenty of rest.  If you have experienced fatigue and/or weakness, this may continue for several weeks after your last treatment. Continue with your daily activities according to the way you feel. Continue to be gentle with your skin. Follow your present skin care instructions until your follow-up visit. IF YOU DEVELOP ANY CHANGES IN YOUR SKIN IN THE AREA TREATED WITH RADIATION, PLEASE CALL THE RADIATION ONCOLOGY NURSE -[phone]. Protect your skin from any injury and avoid direct sun exposure in the treatment area. The skin in the treated area may always be more sensitive than the rest of your skin. Always use SPF 27 or higher sun block if you will be in the sun and cannot avoid exposure. Please contact your referring physician for a follow-up appointment in addition to your Radiation Oncology appointment. You may receive a survey via text message or e-mail at some point after treatment. We would appreciate your time in filling out this survey and giving us your honest feedback about your experience with us. We strive for excellence and hope that you were \"Very Satisfied\" with your care and our service. Presence of pain:   Medication Taper: No    See Instructions Dated: N/A  Follow up orders:  Will be discussed at Follow-Up

## 2023-11-19 LAB
EKG ATRIAL RATE: 64 BPM
EKG P AXIS: 53 DEGREES
EKG P-R INTERVAL: 134 MS
EKG Q-T INTERVAL: 428 MS
EKG QRS DURATION: 96 MS
EKG QTC CALCULATION (BAZETT): 441 MS
EKG R AXIS: 34 DEGREES
EKG T AXIS: 74 DEGREES
EKG VENTRICULAR RATE: 64 BPM

## 2023-11-20 ENCOUNTER — APPOINTMENT (OUTPATIENT)
Dept: RADIATION ONCOLOGY | Age: 68
End: 2023-11-20
Payer: MEDICARE

## 2023-11-20 ENCOUNTER — HOSPITAL ENCOUNTER (OUTPATIENT)
Dept: INFUSION THERAPY | Age: 68
Discharge: HOME | End: 2023-11-20
Payer: MEDICARE

## 2023-11-20 ENCOUNTER — APPOINTMENT (OUTPATIENT)
Dept: SPEECH THERAPY | Age: 68
End: 2023-11-20
Attending: INTERNAL MEDICINE
Payer: MEDICARE

## 2023-11-20 VITALS
RESPIRATION RATE: 18 BRPM | WEIGHT: 143.2 LBS | HEART RATE: 79 BPM | BODY MASS INDEX: 23.83 KG/M2 | DIASTOLIC BLOOD PRESSURE: 78 MMHG | TEMPERATURE: 97.6 F | OXYGEN SATURATION: 99 % | SYSTOLIC BLOOD PRESSURE: 119 MMHG

## 2023-11-20 DIAGNOSIS — C09.9 TONSIL CANCER (HCC): Primary | ICD-10-CM

## 2023-11-20 PROCEDURE — 2580000003 HC RX 258

## 2023-11-20 PROCEDURE — 96361 HYDRATE IV INFUSION ADD-ON: CPT

## 2023-11-20 PROCEDURE — 77014 CHG CT GUIDANCE RADIATION THERAPY FLDS PLACEMENT: CPT | Performed by: RADIOLOGY

## 2023-11-20 PROCEDURE — 77386 HC NTSTY MODUL RAD TX DLVR CPLX: CPT | Performed by: RADIOLOGY

## 2023-11-20 PROCEDURE — 96360 HYDRATION IV INFUSION INIT: CPT

## 2023-11-20 RX ORDER — 0.9 % SODIUM CHLORIDE 0.9 %
1000 INTRAVENOUS SOLUTION INTRAVENOUS ONCE
Status: COMPLETED | OUTPATIENT
Start: 2023-11-20 | End: 2023-11-20

## 2023-11-20 RX ORDER — 0.9 % SODIUM CHLORIDE 0.9 %
1000 INTRAVENOUS SOLUTION INTRAVENOUS ONCE
Status: CANCELLED
Start: 2023-11-23 | End: 2023-11-23

## 2023-11-20 RX ADMIN — SODIUM CHLORIDE 1000 ML: 9 INJECTION, SOLUTION INTRAVENOUS at 13:08

## 2023-11-20 NOTE — PLAN OF CARE
Problem: Safety - Adult  Goal: Free from fall injury  Outcome: Adequate for Discharge  Flowsheets (Taken 11/20/2023 1431)  Free From Fall Injury:   Instruct family/caregiver on patient safety   Based on caregiver fall risk screen, instruct family/caregiver to ask for assistance with transferring infant if caregiver noted to have fall risk factors  Note: Free from falls while in O.P. Oncology. Discussed the need to use the call light for assistance when getting up to ambulate. Problem: Discharge Planning  Goal: Discharge to home or other facility with appropriate resources  Outcome: Adequate for Discharge  Flowsheets (Taken 11/20/2023 1431)  Discharge to home or other facility with appropriate resources:   Identify barriers to discharge with patient and caregiver   Identify discharge learning needs (meds, wound care, etc)  Note: Verbalize understanding of discharge instructions, follow up appointments, and when to call Physician. Discuss understanding of discharge instructions, follow up appointments and when to call Physician. Problem: Chronic Conditions and Co-morbidities  Goal: Patient's chronic conditions and co-morbidity symptoms are monitored and maintained or improved  Outcome: Adequate for Discharge  Flowsheets (Taken 11/20/2023 1431)  Care Plan - Patient's Chronic Conditions and Co-Morbidity Symptoms are Monitored and Maintained or Improved:   Monitor and assess patient's chronic conditions and comorbid symptoms for stability, deterioration, or improvement   Collaborate with multidisciplinary team to address chronic and comorbid conditions and prevent exacerbation or deterioration  Note: Patient verbalizes understanding to verbal information given on IV fluids,action and possible side effects. Aware to call MD if develop complications. Verbalizes understanding to signs and symptoms of dehydration and when to call the Physician. Care plan reviewed with patient.   Patient verbalizes understanding of the plan of care and contribute to goal setting.

## 2023-11-21 ENCOUNTER — CLINICAL DOCUMENTATION (OUTPATIENT)
Dept: SPIRITUAL SERVICES | Facility: CLINIC | Age: 68
End: 2023-11-21

## 2023-11-21 ENCOUNTER — APPOINTMENT (OUTPATIENT)
Dept: PHYSICAL THERAPY | Age: 68
End: 2023-11-21
Attending: INTERNAL MEDICINE
Payer: MEDICARE

## 2023-11-21 ENCOUNTER — APPOINTMENT (OUTPATIENT)
Dept: RADIATION ONCOLOGY | Age: 68
End: 2023-11-21
Payer: MEDICARE

## 2023-11-21 ENCOUNTER — HOSPITAL ENCOUNTER (OUTPATIENT)
Dept: RADIATION ONCOLOGY | Age: 68
Discharge: HOME | End: 2023-11-21
Payer: MEDICARE

## 2023-11-21 ENCOUNTER — CARE COORDINATION (OUTPATIENT)
Dept: CASE MANAGEMENT | Age: 68
End: 2023-11-21

## 2023-11-21 VITALS
SYSTOLIC BLOOD PRESSURE: 113 MMHG | DIASTOLIC BLOOD PRESSURE: 71 MMHG | RESPIRATION RATE: 18 BRPM | TEMPERATURE: 98.4 F | HEART RATE: 73 BPM | OXYGEN SATURATION: 98 % | WEIGHT: 139.77 LBS | BODY MASS INDEX: 23.26 KG/M2

## 2023-11-21 DIAGNOSIS — C09.9 PRIMARY TONSILLAR SQUAMOUS CELL CARCINOMA (HCC): Primary | ICD-10-CM

## 2023-11-21 PROCEDURE — 77014 CHG CT GUIDANCE RADIATION THERAPY FLDS PLACEMENT: CPT | Performed by: RADIOLOGY

## 2023-11-21 PROCEDURE — 77386 HC NTSTY MODUL RAD TX DLVR CPLX: CPT | Performed by: RADIOLOGY

## 2023-11-21 RX ORDER — PREDNISONE 20 MG/1
20 TABLET ORAL DAILY
Qty: 14 TABLET | Refills: 0 | Status: SHIPPED | OUTPATIENT
Start: 2023-11-21 | End: 2023-12-05

## 2023-11-21 ASSESSMENT — PAIN DESCRIPTION - LOCATION: LOCATION: THROAT

## 2023-11-21 ASSESSMENT — PAIN SCALES - GENERAL: PAINLEVEL_OUTOF10: 5

## 2023-11-21 NOTE — CARE COORDINATION
Care Transitions Follow Up Call    Patient Current Location:  Home: [place] [id number]    Care Transition Nurse contacted the patient by telephone to follow up after admission on 10/26/23. Verified name and  with patient as identifiers. 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 Contacted pt for care transition follow up. Yuko Pineda states he is doing okay. States it is under control now. Nausea still present but is better. No vomiting. Taking Zofran which has been effective. Coughing noted during call. Cough is non productive and denies having any abnormal bleeding. Energy level is a little better and not like it was. When asking about dizziness/lightheadedness, pt states not really having any. He continues with dysphagia. Taking in mainly liquids but able to tolerate gravy with eggs chopped up. Pt has oncology tx today. No questions or concerns at this time.       Addressed changes since last contact:  none    Follow Up  Future Appointments   Date Time Provider [facility] [address]   2023  3:15 PM [address]   2023  3:30 PM Ken Rodriguez MD [facility]   2023  3:15 PM SCHEDULE, Jaja Counter LINAC 2 [facility]   2023 10:45 AM STR OUT PT ONC INJ RM 10 STRZ OP ONC [facility]   2023 10:00 AM Jennifer Orta MD N Oncology [facility]   2023  2:00 PM Caden Dill PT STRZ [facility]   2023  8:45 AM

## 2023-11-21 NOTE — PROGRESS NOTES
made an attempt x1 to contact [place] Cole via telephone call to offer support and to check on the progress of her treatments. There was no answer so  left message and my contact information. I will follow-up.

## 2023-11-22 ENCOUNTER — HOSPITAL ENCOUNTER (OUTPATIENT)
Dept: RADIATION ONCOLOGY | Age: 68
End: 2023-11-22
Payer: MEDICARE

## 2023-11-22 ENCOUNTER — APPOINTMENT (OUTPATIENT)
Dept: SPEECH THERAPY | Age: 68
End: 2023-11-22
Attending: INTERNAL MEDICINE
Payer: MEDICARE

## 2023-11-22 ENCOUNTER — CARE COORDINATION (OUTPATIENT)
Dept: CARE COORDINATION | Age: 68
End: 2023-11-22

## 2023-11-22 PROCEDURE — 77386 HC NTSTY MODUL RAD TX DLVR CPLX: CPT | Performed by: RADIOLOGY

## 2023-11-22 PROCEDURE — 77014 CHG CT GUIDANCE RADIATION THERAPY FLDS PLACEMENT: CPT | Performed by: RADIOLOGY

## 2023-11-22 NOTE — CARE COORDINATION
SW called pt to follow up with ACP documents. Pt stated he did receive but has \"not looked at them yet. \"  Advised pt to do so and will call him in a couple weeks. Provided pt with docusign information and he stated his wife has an email address. Answered questions and will call him in a couple weeks.

## 2023-11-24 ENCOUNTER — HOSPITAL ENCOUNTER (OUTPATIENT)
Dept: INFUSION THERAPY | Age: 68
Discharge: HOME | End: 2023-11-24
Payer: MEDICARE

## 2023-11-24 VITALS
OXYGEN SATURATION: 97 % | SYSTOLIC BLOOD PRESSURE: 127 MMHG | DIASTOLIC BLOOD PRESSURE: 76 MMHG | WEIGHT: 142 LBS | TEMPERATURE: 98.2 F | BODY MASS INDEX: 23.63 KG/M2 | HEART RATE: 71 BPM | RESPIRATION RATE: 18 BRPM

## 2023-11-24 DIAGNOSIS — C09.9 TONSIL CANCER (HCC): Primary | ICD-10-CM

## 2023-11-24 PROCEDURE — 96360 HYDRATION IV INFUSION INIT: CPT

## 2023-11-24 PROCEDURE — 2580000003 HC RX 258

## 2023-11-24 PROCEDURE — 96361 HYDRATE IV INFUSION ADD-ON: CPT

## 2023-11-24 RX ORDER — 0.9 % SODIUM CHLORIDE 0.9 %
1000 INTRAVENOUS SOLUTION INTRAVENOUS ONCE
Status: CANCELLED
Start: 2023-11-27 | End: 2023-11-27

## 2023-11-24 RX ORDER — 0.9 % SODIUM CHLORIDE 0.9 %
1000 INTRAVENOUS SOLUTION INTRAVENOUS ONCE
Status: COMPLETED | OUTPATIENT
Start: 2023-11-24 | End: 2023-11-24

## 2023-11-24 RX ADMIN — SODIUM CHLORIDE 1000 ML: 9 INJECTION, SOLUTION INTRAVENOUS at 10:54

## 2023-11-24 NOTE — DISCHARGE INSTRUCTIONS
Please contact your Oncologist if you have any questions regarding the hydration that you received today.

## 2023-11-24 NOTE — PLAN OF CARE
Problem: Safety - Adult  Goal: Free from fall injury  Outcome: Adequate for Discharge  Flowsheets (Taken 11/24/2023 1309)  Free From Fall Injury: Instruct family/caregiver on patient safety  Note: No falls this admission Patient aware of fall precautions for here and at home -call light in reach while here       Problem: Discharge Planning  Goal: Discharge to home or other facility with appropriate resources  Outcome: Adequate for Discharge  Flowsheets (Taken 11/24/2023 1309)  Discharge to home or other facility with appropriate resources:   Identify barriers to discharge with patient and caregiver   Identify discharge learning needs (meds, wound care, etc)   Arrange for needed discharge resources and transportation as appropriate  Note: Discharge instructions given and reviewed with patient. All questions answered. Patient verbalized understanding Patient  able to teach back follow up appointments and when to call the doctor. Patient offers no questions at this time      Problem: Chronic Conditions and Co-morbidities  Goal: Patient's chronic conditions and co-morbidity symptoms are monitored and maintained or improved  Outcome: Adequate for Discharge  Flowsheets (Taken 11/24/2023 1309)  Care Plan - Patient's Chronic Conditions and Co-Morbidity Symptoms are Monitored and Maintained or Improved:   Monitor and assess patient's chronic conditions and comorbid symptoms for stability, deterioration, or improvement   Collaborate with multidisciplinary team to address chronic and comorbid conditions and prevent exacerbation or deterioration  Note: Reviewed hydration orders  with patient, patient offered no questions or concerns. Patient verbalized understanding of drug being administered. Care plan reviewed with patient and he verbalized understanding of the plan of care and contributed to goal setting.

## 2023-11-27 ENCOUNTER — APPOINTMENT (OUTPATIENT)
Dept: PHYSICAL THERAPY | Age: 68
End: 2023-11-27
Attending: INTERNAL MEDICINE
Payer: MEDICARE

## 2023-11-27 ENCOUNTER — HOSPITAL ENCOUNTER (OUTPATIENT)
Dept: INFUSION THERAPY | Age: 68
Discharge: HOME | End: 2023-11-27
Payer: MEDICARE

## 2023-11-27 VITALS
TEMPERATURE: 98.5 F | RESPIRATION RATE: 18 BRPM | HEART RATE: 69 BPM | BODY MASS INDEX: 23.96 KG/M2 | SYSTOLIC BLOOD PRESSURE: 121 MMHG | HEIGHT: 65 IN | OXYGEN SATURATION: 98 % | DIASTOLIC BLOOD PRESSURE: 73 MMHG | WEIGHT: 143.8 LBS

## 2023-11-27 DIAGNOSIS — C09.9 TONSIL CANCER (HCC): Primary | ICD-10-CM

## 2023-11-27 PROCEDURE — 96361 HYDRATE IV INFUSION ADD-ON: CPT

## 2023-11-27 PROCEDURE — 96360 HYDRATION IV INFUSION INIT: CPT

## 2023-11-27 PROCEDURE — 2580000003 HC RX 258

## 2023-11-27 RX ORDER — 0.9 % SODIUM CHLORIDE 0.9 %
1000 INTRAVENOUS SOLUTION INTRAVENOUS ONCE
Status: CANCELLED
Start: 2023-11-30 | End: 2023-11-30

## 2023-11-27 RX ORDER — 0.9 % SODIUM CHLORIDE 0.9 %
1000 INTRAVENOUS SOLUTION INTRAVENOUS ONCE
Status: COMPLETED | OUTPATIENT
Start: 2023-11-27 | End: 2023-11-27

## 2023-11-27 RX ADMIN — SODIUM CHLORIDE 1000 ML: 9 INJECTION, SOLUTION INTRAVENOUS at 11:21

## 2023-11-27 ASSESSMENT — PAIN SCALES - GENERAL: PAINLEVEL_OUTOF10: 2

## 2023-11-27 ASSESSMENT — PAIN DESCRIPTION - LOCATION: LOCATION: THROAT

## 2023-11-27 ASSESSMENT — PAIN DESCRIPTION - DESCRIPTORS: DESCRIPTORS: DISCOMFORT

## 2023-11-27 NOTE — DISCHARGE INSTRUCTIONS
You received 1 liter of IV Normal Saline while in outpatient oncology clinic. Call if any uncontrolled nausea/vomiting  Call if any fevers/chills/ problems or concerns  Call if any bleeding  Call if any chest pain/pressure  Call if any severe shortness of breath  Drink at least (6) 8oz glasses of fluids daily     If develop any of above condition, please call your MD or go to Emergency Department.

## 2023-11-27 NOTE — PROGRESS NOTES
Patient here for IV hydration of 1000 ml over 2 hr's,  Due to dehydration/poor oral intake.   Per Dr. Margie Campbell MD.

## 2023-11-27 NOTE — PLAN OF CARE
Problem: Safety - Adult  Goal: Free from fall injury  Outcome: Progressing  Flowsheets (Taken 11/27/2023 1124)  Free From Fall Injury: Instruct family/caregiver on patient safety  Note: No falls occurred with visit today. Problem: Discharge Planning  Goal: Discharge to home or other facility with appropriate resources  Outcome: Progressing  Flowsheets (Taken 11/27/2023 1124)  Discharge to home or other facility with appropriate resources: Identify barriers to discharge with patient and caregiver  Note: Verbalized understanding of discharge instructions, follow-up appointments, and when to call the physician. Problem: Chronic Conditions and Co-morbidities  Goal: Patient's chronic conditions and co-morbidity symptoms are monitored and maintained or improved  Outcome: Progressing  Flowsheets (Taken 11/27/2023 1124)  Care Plan - Patient's Chronic Conditions and Co-Morbidity Symptoms are Monitored and Maintained or Improved: Monitor and assess patient's chronic conditions and comorbid symptoms for stability, deterioration, or improvement  Note: Patient verbalizes understanding to verbal information given on IV hydration action and possible side effects. Aware to call MD if develop complications. Care plan reviewed with patient. Patient verbalizes understanding of the plan of care and contribute to goal setting.

## 2023-11-28 ENCOUNTER — CARE COORDINATION (OUTPATIENT)
Dept: CASE MANAGEMENT | Age: 68
End: 2023-11-28

## 2023-11-28 NOTE — CARE COORDINATION
Care Transitions Follow Up Call    Patient Current Location:  Home: [place] [address]    Care Transition Nurse contacted the patient by telephone to follow up after admission on 10/26/23. Verified name and  with patient as identifiers. Patient: Kelly De Anda  Patient : 1955   MRN: [id number]  Reason for Admission: Odynophagia  Discharge Date: 11/10/23 RARS: Readmission Risk Score: 15.3      Needs to be reviewed by the provider   Additional needs identified to be addressed with provider: No  none             Method of communication with provider: none. Patient states he is doing okay. States he is in no pain. He feels tired and nauseated at times. States his nausea medication helps. States he is on a soft diet, although it is mostly liquid. Elimination regular. He has completed radiation. He is tired, but feels like it is improving. He is going to restart going to speech, now that the radiations is complete. States both was too hard on him. Discussed ACP paperwork. States he received paperwork, but has not read through it. We discussed counseling and palliative care. Patient states he will be alright. He will see his oncologist 23. He will see his surgeon on 23. Will resolve program.    Follow Up  Future Appointments   Date Time Provider [facility] [address]   2023 12:30 PM STR OUT PT ONC INJ RM 11 STRZ OP ONC [place] [facility]""   2023 10:00 AM Esha Khalil MD N Oncology [facility]   2023  2:00 PM Marivel Garcia, PT STRZ [facility]   2023  8:45 AM Linnette Parra MD N ENT [facility]   2023  2:30 PM SCHEDULE, Cole Sotelo RAD NURSE STR [facility]\""   2024  9:30 AM [facility] Transition Nurse reviewed medical action plan with patient and discussed any barriers to care and/or understanding of plan of care after discharge.  Discussed appropriate site of care based on symptoms and resources

## 2023-11-29 NOTE — PROGRESS NOTES
tonsil with invasion into surrounding skeletal muscle, caudal margin positive for carcinoma, [id number], 1.6 cm, no LVI, PNI present.   - Re-resection on 7/28/2023 showed margins free of cancer, 0/19 lymph nodes positive for cancer, pN0.   - PET scan on 7/3/23 negative for metastatic disease   - completed radiation to the neck on 11/22/23     PLAN:  Patient has completed adjuvant radiation. Will repeat PET scan in 3 months. Continue with IV fluids as needed. Will monitor TSH with repeat labs. RTC in 3 months with repeat imaging. Total of 25 minutes were spent on the encounter with at least 50% of the time spent face to face with the patient.      Malik Smith MD  Hematology Oncology

## 2023-11-30 ENCOUNTER — HOSPITAL ENCOUNTER (OUTPATIENT)
Dept: INFUSION THERAPY | Age: 68
Discharge: HOME | End: 2023-11-30
Payer: MEDICARE

## 2023-11-30 ENCOUNTER — APPOINTMENT (OUTPATIENT)
Dept: PHYSICAL THERAPY | Age: 68
End: 2023-11-30
Attending: INTERNAL MEDICINE
Payer: MEDICARE

## 2023-11-30 VITALS
DIASTOLIC BLOOD PRESSURE: 71 MMHG | HEART RATE: 75 BPM | SYSTOLIC BLOOD PRESSURE: 143 MMHG | RESPIRATION RATE: 18 BRPM | TEMPERATURE: 98.4 F | OXYGEN SATURATION: 100 %

## 2023-11-30 DIAGNOSIS — C09.9 TONSIL CANCER (HCC): Primary | ICD-10-CM

## 2023-11-30 PROCEDURE — 2580000003 HC RX 258

## 2023-11-30 PROCEDURE — 96361 HYDRATE IV INFUSION ADD-ON: CPT

## 2023-11-30 PROCEDURE — 96360 HYDRATION IV INFUSION INIT: CPT

## 2023-11-30 RX ORDER — SODIUM CHLORIDE 9 MG/ML
1000 INJECTION, SOLUTION INTRAVENOUS ONCE
Status: COMPLETED | OUTPATIENT
Start: 2023-11-30 | End: 2023-11-30

## 2023-11-30 RX ORDER — 0.9 % SODIUM CHLORIDE 0.9 %
1000 INTRAVENOUS SOLUTION INTRAVENOUS ONCE
Status: CANCELLED
Start: 2023-11-30 | End: 2023-11-30

## 2023-11-30 RX ADMIN — SODIUM CHLORIDE 1000 ML: 9 INJECTION, SOLUTION INTRAVENOUS at 12:38

## 2023-11-30 NOTE — PROGRESS NOTES
Patient tolerated IV fluids without any complications. Discharge instructions given to patient-verbalizes understanding. Ambulated off unit per self with belongings. Patient here for IV hydration of 1000 ml over 2 hr's.

## 2023-11-30 NOTE — PLAN OF CARE
Problem: Safety - Adult  Goal: Free from fall injury  Outcome: Adequate for Discharge  Flowsheets (Taken 11/30/2023 1345)  Free From Fall Injury:   Instruct family/caregiver on patient safety   Based on caregiver fall risk screen, instruct family/caregiver to ask for assistance with transferring infant if caregiver noted to have fall risk factors  Note: Free from falls while in O.P. Oncology. Discussed the need to use the call light for assistance when getting up to ambulate. Problem: Discharge Planning  Goal: Discharge to home or other facility with appropriate resources  Outcome: Adequate for Discharge  Flowsheets (Taken 11/30/2023 1345)  Discharge to home or other facility with appropriate resources:   Identify barriers to discharge with patient and caregiver   Identify discharge learning needs (meds, wound care, etc)  Note: Verbalize understanding of discharge instructions, follow up appointments, and when to call Physician. Discuss understanding of discharge instructions, follow up appointments and when to call Physician. Problem: Chronic Conditions and Co-morbidities  Goal: Patient's chronic conditions and co-morbidity symptoms are monitored and maintained or improved  Outcome: Adequate for Discharge  Flowsheets (Taken 11/30/2023 1345)  Care Plan - Patient's Chronic Conditions and Co-Morbidity Symptoms are Monitored and Maintained or Improved:   Monitor and assess patient's chronic conditions and comorbid symptoms for stability, deterioration, or improvement   Collaborate with multidisciplinary team to address chronic and comorbid conditions and prevent exacerbation or deterioration  Note: Patient verbalizes understanding to verbal information given on IV fluids,action and possible side effects. Aware to call MD if develop complications. Patient here for IV hydration of 1000 ml over 2 hr's,  Due to poor oral intake. Care plan reviewed with patient.   Patient verbalizes understanding of the plan of care and contribute to goal setting.

## 2023-12-01 ENCOUNTER — HOSPITAL ENCOUNTER (OUTPATIENT)
Dept: INFUSION THERAPY | Age: 68
Discharge: HOME | End: 2023-12-01
Payer: MEDICARE

## 2023-12-01 ENCOUNTER — SOCIAL WORK (OUTPATIENT)
Dept: INFUSION THERAPY | Age: 68
End: 2023-12-01

## 2023-12-01 ENCOUNTER — OFFICE VISIT (OUTPATIENT)
Dept: ONCOLOGY | Age: 68
End: 2023-12-01
Payer: MEDICARE

## 2023-12-01 VITALS
RESPIRATION RATE: 16 BRPM | DIASTOLIC BLOOD PRESSURE: 73 MMHG | HEIGHT: 65 IN | TEMPERATURE: 97.8 F | SYSTOLIC BLOOD PRESSURE: 119 MMHG | BODY MASS INDEX: 23.93 KG/M2 | WEIGHT: 143.6 LBS | HEART RATE: 74 BPM

## 2023-12-01 VITALS
OXYGEN SATURATION: 98 % | HEIGHT: 65 IN | SYSTOLIC BLOOD PRESSURE: 119 MMHG | WEIGHT: 143.6 LBS | DIASTOLIC BLOOD PRESSURE: 73 MMHG | HEART RATE: 74 BPM | BODY MASS INDEX: 23.93 KG/M2 | RESPIRATION RATE: 16 BRPM | TEMPERATURE: 97.8 F

## 2023-12-01 DIAGNOSIS — C09.9 TONSIL CANCER (HCC): ICD-10-CM

## 2023-12-01 DIAGNOSIS — T66.XXXA ADVERSE EFFECT OF RADIATION, INITIAL ENCOUNTER: ICD-10-CM

## 2023-12-01 DIAGNOSIS — C09.9 PRIMARY SQUAMOUS CELL CARCINOMA OF PALATINE TONSIL (HCC): Primary | ICD-10-CM

## 2023-12-01 DIAGNOSIS — C09.9 PRIMARY SQUAMOUS CELL CARCINOMA OF PALATINE TONSIL (HCC): ICD-10-CM

## 2023-12-01 LAB
ABSOLUTE IMMATURE GRANULOCYTE: 0.06 THOU/MM3 (ref 0–0.07)
ALBUMIN SERPL BCG-MCNC: 3.8 G/DL (ref 3.5–5.1)
ALP SERPL-CCNC: 65 U/L (ref 38–126)
ALT SERPL W/O P-5'-P-CCNC: 21 U/L (ref 11–66)
AST SERPL-CCNC: 16 U/L (ref 5–40)
BASOPHILS ABSOLUTE: 0 THOU/MM3 (ref 0–0.1)
BASOPHILS NFR BLD AUTO: 0 % (ref 0–3)
BILIRUB CONJ SERPL-MCNC: < 0.2 MG/DL (ref 0–0.3)
BILIRUB SERPL-MCNC: 0.3 MG/DL (ref 0.3–1.2)
BUN BLDP-MCNC: 17 MG/DL (ref 8–26)
CHLORIDE BLD-SCNC: 105 MEQ/L (ref 98–109)
CREAT BLD-MCNC: 0.8 MG/DL (ref 0.5–1.2)
EOSINOPHIL NFR BLD AUTO: 1 % (ref 0–4)
EOSINOPHILS ABSOLUTE: 0.1 THOU/MM3 (ref 0–0.4)
ERYTHROCYTE [DISTWIDTH] IN BLOOD BY AUTOMATED COUNT: 13 % (ref 11.5–14.5)
GFR SERPL CREATININE-BSD FRML MDRD: > 60 ML/MIN/1.73M2
GLUCOSE BLD-MCNC: 104 MG/DL (ref 70–108)
HCT VFR BLD AUTO: 44.1 % (ref 42–52)
HGB BLD-MCNC: 14.8 GM/DL (ref 14–18)
IMMATURE GRANULOCYTES: 1 %
IONIZED CALCIUM, WHOLE BLOOD: 1.25 MMOL/L (ref 1.12–1.32)
LYMPHOCYTES ABSOLUTE: 0.5 THOU/MM3 (ref 1–4.8)
LYMPHOCYTES NFR BLD AUTO: 7 % (ref 15–47)
MCH RBC QN AUTO: 29.2 PG (ref 26–33)
MCHC RBC AUTO-ENTMCNC: 33.6 GM/DL (ref 32.2–35.5)
MCV RBC AUTO: 87 FL (ref 80–94)
MONOCYTES ABSOLUTE: 0.7 THOU/MM3 (ref 0.4–1.3)
MONOCYTES NFR BLD AUTO: 10 % (ref 0–12)
NEUTROPHILS NFR BLD AUTO: 81 % (ref 43–75)
PLATELET # BLD AUTO: 195 THOU/MM3 (ref 130–400)
PMV BLD AUTO: 8.7 FL (ref 9.4–12.4)
POTASSIUM BLD-SCNC: 3.5 MEQ/L (ref 3.5–4.9)
PROT SERPL-MCNC: 6.6 G/DL (ref 6.1–8)
RBC # BLD AUTO: 5.07 MILL/MM3 (ref 4.7–6.1)
SEGMENTED NEUTROPHILS ABSOLUTE COUNT: 5.7 THOU/MM3 (ref 1.8–7.7)
SODIUM BLD-SCNC: 143 MEQ/L (ref 138–146)
TOTAL CO2, WHOLE BLOOD: 29 MEQ/L (ref 23–33)
WBC # BLD AUTO: 7 THOU/MM3 (ref 4.8–10.8)

## 2023-12-01 PROCEDURE — 1123F ACP DISCUSS/DSCN MKR DOCD: CPT | Performed by: INTERNAL MEDICINE

## 2023-12-01 PROCEDURE — 80076 HEPATIC FUNCTION PANEL: CPT

## 2023-12-01 PROCEDURE — 99213 OFFICE O/P EST LOW 20 MIN: CPT | Performed by: INTERNAL MEDICINE

## 2023-12-01 PROCEDURE — G8484 FLU IMMUNIZE NO ADMIN: HCPCS | Performed by: INTERNAL MEDICINE

## 2023-12-01 PROCEDURE — 1036F TOBACCO NON-USER: CPT | Performed by: INTERNAL MEDICINE

## 2023-12-01 PROCEDURE — G8420 CALC BMI NORM PARAMETERS: HCPCS | Performed by: INTERNAL MEDICINE

## 2023-12-01 PROCEDURE — 99211 OFF/OP EST MAY X REQ PHY/QHP: CPT

## 2023-12-01 PROCEDURE — 3017F COLORECTAL CA SCREEN DOC REV: CPT | Performed by: INTERNAL MEDICINE

## 2023-12-01 PROCEDURE — 85025 COMPLETE CBC W/AUTO DIFF WBC: CPT

## 2023-12-01 PROCEDURE — G8427 DOCREV CUR MEDS BY ELIG CLIN: HCPCS | Performed by: INTERNAL MEDICINE

## 2023-12-01 PROCEDURE — 36415 COLL VENOUS BLD VENIPUNCTURE: CPT

## 2023-12-01 PROCEDURE — 80047 BASIC METABLC PNL IONIZED CA: CPT

## 2023-12-01 RX ORDER — ONDANSETRON 4 MG/1
4 TABLET, ORALLY DISINTEGRATING ORAL EVERY 8 HOURS PRN
Qty: 90 TABLET | Refills: 1 | Status: SHIPPED | OUTPATIENT
Start: 2023-12-01 | End: 2024-01-30

## 2023-12-04 ENCOUNTER — HOSPITAL ENCOUNTER (OUTPATIENT)
Dept: INFUSION THERAPY | Age: 68
Discharge: HOME | End: 2023-12-04
Payer: MEDICARE

## 2023-12-04 ENCOUNTER — CARE COORDINATION (OUTPATIENT)
Dept: CARE COORDINATION | Age: 68
End: 2023-12-04

## 2023-12-04 VITALS
DIASTOLIC BLOOD PRESSURE: 89 MMHG | TEMPERATURE: 97.7 F | SYSTOLIC BLOOD PRESSURE: 133 MMHG | OXYGEN SATURATION: 100 % | HEIGHT: 65 IN | WEIGHT: 143.6 LBS | HEART RATE: 70 BPM | RESPIRATION RATE: 16 BRPM | BODY MASS INDEX: 23.93 KG/M2

## 2023-12-04 DIAGNOSIS — C09.9 TONSIL CANCER (HCC): Primary | ICD-10-CM

## 2023-12-04 PROCEDURE — 96361 HYDRATE IV INFUSION ADD-ON: CPT

## 2023-12-04 PROCEDURE — 2580000003 HC RX 258: Performed by: RADIOLOGY

## 2023-12-04 PROCEDURE — 96360 HYDRATION IV INFUSION INIT: CPT

## 2023-12-04 RX ORDER — 0.9 % SODIUM CHLORIDE 0.9 %
1000 INTRAVENOUS SOLUTION INTRAVENOUS ONCE
Status: CANCELLED
Start: 2023-12-04 | End: 2023-12-04

## 2023-12-04 RX ORDER — 0.9 % SODIUM CHLORIDE 0.9 %
1000 INTRAVENOUS SOLUTION INTRAVENOUS ONCE
Status: COMPLETED | OUTPATIENT
Start: 2023-12-04 | End: 2023-12-04

## 2023-12-04 RX ADMIN — SODIUM CHLORIDE 1000 ML: 9 INJECTION, SOLUTION INTRAVENOUS at 11:46

## 2023-12-04 NOTE — PROGRESS NOTES
Patient here for IV hydration of 1000 ml over 2 hr's,  Due to poor oral intake.   Per Dr. Emelina Acosta

## 2023-12-04 NOTE — CARE COORDINATION
Suzanne desai spoke with pt wife Opal Friday regarding ACP documents. Opal Friday informed me that Rahulmarc Gray have been working on them. \" Inquired if they need any assistance and reminded her we can assist through email on [address] Opal Friday did not believe assistance is needed but has SW contact information and will call with questions. Advised her to complete and take to PCP office to have scanned in pt chart. Understanding voiced. Will resolve at this time.

## 2023-12-04 NOTE — DISCHARGE INSTRUCTIONS
Please contact your Oncologist if you have any questions regarding the IV hydration that you received today. Patient instructed if experience any of the symptoms following today's hydration / to notify MD immediately or go to emergency department.     * dizziness/lightheadedness  *acute nausea/vomiting - not relieved with medication  *headache - not relieved from Tylenol/pain medication  *chest pain/pressure  *rash/itching  *shortness of breath        Drink fluids - 48oz fluids daily  Call if develop fever/ chills/ signs or symptoms of infection

## 2023-12-04 NOTE — PLAN OF CARE
Problem: Safety - Adult  Goal: Free from fall injury  Outcome: Adequate for Discharge  Flowsheets (Taken 12/4/2023 1414)  Free From Fall Injury: Instruct family/caregiver on patient safety     Problem: Discharge Planning  Goal: Discharge to home or other facility with appropriate resources  Outcome: Adequate for Discharge  Flowsheets (Taken 12/4/2023 1414)  Discharge to home or other facility with appropriate resources: Identify barriers to discharge with patient and caregiver     Problem: Chronic Conditions and Co-morbidities  Goal: Patient's chronic conditions and co-morbidity symptoms are monitored and maintained or improved  Outcome: Adequate for Discharge  Flowsheets (Taken 12/4/2023 1414)  Care Plan - Patient's Chronic Conditions and Co-Morbidity Symptoms are Monitored and Maintained or Improved: Monitor and assess patient's chronic conditions and comorbid symptoms for stability, deterioration, or improvement     Care plan reviewed with patient and spouse. Patient and spouse verbalize understanding of the plan of care and contribute to goal setting.

## 2023-12-05 ENCOUNTER — HOSPITAL ENCOUNTER (OUTPATIENT)
Dept: PHYSICAL THERAPY | Age: 68
Setting detail: THERAPIES SERIES
Discharge: HOME | End: 2023-12-05
Attending: INTERNAL MEDICINE
Payer: MEDICARE

## 2023-12-05 PROCEDURE — 97110 THERAPEUTIC EXERCISES: CPT

## 2023-12-05 NOTE — DISCHARGE SUMMARY
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]    Treatment Diagnosis Tightness in neck and jaw, Z71.9   Date of Evaluation 23    Additional Pertinent History Tonsilectomy 23, Arthritis, Memory Issues, Osteoporosis, Cervical plates F4-3 and S5-1      Functional Outcome Measure Used O: Brief Fatigue Inventory   Functional Outcome Score 4 (23); 7.44 (10/12/23); 3 (23); 6.67 (23)       Insurance: Primary: Payor: Veryl Senters /  /  / ,   Secondary: BCBS   Authorization Information: PRECERTIFICATION REQUIRED:  No  INSURANCE THERAPY BENEFIT:  Patient has unlimited visits based on medical necessity  Benefit will not cover maintenance or preventative treatment. AQUATIC THERAPY COVERED:   Yes  MODALITIES COVERED:  Yes, with the exception of iontophoresis and hot packs/cold packs  TELEHEALTH COVERED: Yes   Visit # 16, 4/4 for progress note   Visits Allowed: Unlimited visits based on medical necessity   Recertification Date:    Physician Follow-Up: 23-Dr. Jeannette Ayala   Physician Orders: Oncology Rehab   History of Present Illness: 23 diagnosed with Tonsillar Squamous Cell Carcinoma with Tonsillectomy and R partial tongue base resection on 23. No chemo, but is supposed to be having radiation, but does not have start date. SUBJECTIVE: Today is patient's first session back to therapy since 11/15/23. He had put therapy on hold due to not doing well with radiation. Reports eating is not going well but it is better than it was.   Nicho Christina reports eating and his progression is

## 2023-12-06 ENCOUNTER — CLINICAL DOCUMENTATION (OUTPATIENT)
Dept: SPIRITUAL SERVICES | Facility: CLINIC | Age: 68
End: 2023-12-06

## 2023-12-06 NOTE — PROGRESS NOTES
made contact with Kathy Sands via telephone call to follow up from the cancer center appointment. He has completed 30 radiation treatments and has infusion on Dec 7th. I offer emotional and spiritual support. I also offered words of encouragement along with a prayer of comfort. Kathy Sands stated that he was doing fine and has family support.

## 2023-12-07 ENCOUNTER — HOSPITAL ENCOUNTER (OUTPATIENT)
Dept: INFUSION THERAPY | Age: 68
Discharge: HOME | End: 2023-12-07
Payer: MEDICARE

## 2023-12-07 VITALS
DIASTOLIC BLOOD PRESSURE: 70 MMHG | RESPIRATION RATE: 16 BRPM | TEMPERATURE: 98.1 F | OXYGEN SATURATION: 99 % | HEART RATE: 74 BPM | SYSTOLIC BLOOD PRESSURE: 130 MMHG

## 2023-12-07 DIAGNOSIS — C09.9 TONSIL CANCER (HCC): Primary | ICD-10-CM

## 2023-12-07 PROCEDURE — 2580000003 HC RX 258: Performed by: INTERNAL MEDICINE

## 2023-12-07 PROCEDURE — 96361 HYDRATE IV INFUSION ADD-ON: CPT

## 2023-12-07 PROCEDURE — 96360 HYDRATION IV INFUSION INIT: CPT

## 2023-12-07 RX ORDER — 0.9 % SODIUM CHLORIDE 0.9 %
1000 INTRAVENOUS SOLUTION INTRAVENOUS ONCE
Status: COMPLETED | OUTPATIENT
Start: 2023-12-07 | End: 2023-12-07

## 2023-12-07 RX ORDER — 0.9 % SODIUM CHLORIDE 0.9 %
1000 INTRAVENOUS SOLUTION INTRAVENOUS ONCE
Status: CANCELLED
Start: 2023-12-07 | End: 2023-12-07

## 2023-12-07 RX ADMIN — SODIUM CHLORIDE 1000 ML: 9 INJECTION, SOLUTION INTRAVENOUS at 11:30

## 2023-12-07 NOTE — PLAN OF CARE
Problem: Safety - Adult  Goal: Free from fall injury  Description: Free from falls while in O.P. Oncology. Discussed the need to use the call light for assistance when getting up to ambulate. Outcome: Adequate for Discharge  Flowsheets (Taken 12/7/2023 1139)  Free From Fall Injury:   Rommie Marker family/caregiver on patient safety   Based on caregiver fall risk screen, instruct family/caregiver to ask for assistance with transferring infant if caregiver noted to have fall risk factors  Note: Free from falls while in O.P. Oncology. Discussed the need to use the call light for assistance when getting up to ambulate. Problem: Discharge Planning  Goal: Discharge to home or other facility with appropriate resources  Description: Verbalize understanding of discharge instructions, follow up appointments, and when to call Physician. Discuss understanding of discharge instructions, follow up appointments and when to call Physician. Outcome: Adequate for Discharge  Flowsheets (Taken 12/7/2023 1139)  Discharge to home or other facility with appropriate resources:   Identify barriers to discharge with patient and caregiver   Identify discharge learning needs (meds, wound care, etc)  Note: Verbalize understanding of discharge instructions, follow up appointments, and when to call Physician. Discuss understanding of discharge instructions, follow up appointments and when to call Physician. Problem: Chronic Conditions and Co-morbidities  Goal: Patient's chronic conditions and co-morbidity symptoms are monitored and maintained or improved  Description: Verbalizes understanding to signs and symptoms of dehydration and when to call the Physician. Patient received 1000 IV infusion over 2. Patient drank 1 bottle of water while in OP oncology. Encouraged patient to drink 48 to 64 ounces of fluids everyday.     Outcome: Adequate for Discharge  Flowsheets (Taken 12/7/2023 1139)  Care Plan - Patient's Chronic Conditions and

## 2023-12-11 ENCOUNTER — OFFICE VISIT (OUTPATIENT)
Dept: ENT CLINIC | Age: 68
End: 2023-12-11
Payer: MEDICARE

## 2023-12-11 VITALS
SYSTOLIC BLOOD PRESSURE: 116 MMHG | DIASTOLIC BLOOD PRESSURE: 68 MMHG | TEMPERATURE: 97.5 F | HEART RATE: 68 BPM | HEIGHT: 65 IN | WEIGHT: 140.8 LBS | BODY MASS INDEX: 23.46 KG/M2 | OXYGEN SATURATION: 98 %

## 2023-12-11 DIAGNOSIS — Z08 ENCOUNTER FOR FOLLOW-UP SURVEILLANCE OF HEAD AND NECK CANCER: Primary | ICD-10-CM

## 2023-12-11 DIAGNOSIS — C09.9 PRIMARY TONSILLAR SQUAMOUS CELL CARCINOMA (HCC): ICD-10-CM

## 2023-12-11 DIAGNOSIS — R13.14 PHARYNGOESOPHAGEAL DYSPHAGIA: ICD-10-CM

## 2023-12-11 DIAGNOSIS — Z85.89 ENCOUNTER FOR FOLLOW-UP SURVEILLANCE OF HEAD AND NECK CANCER: Primary | ICD-10-CM

## 2023-12-11 PROCEDURE — 1123F ACP DISCUSS/DSCN MKR DOCD: CPT | Performed by: OTOLARYNGOLOGY

## 2023-12-11 PROCEDURE — 92511 NASOPHARYNGOSCOPY: CPT | Performed by: OTOLARYNGOLOGY

## 2023-12-11 PROCEDURE — G8484 FLU IMMUNIZE NO ADMIN: HCPCS | Performed by: OTOLARYNGOLOGY

## 2023-12-11 PROCEDURE — 1036F TOBACCO NON-USER: CPT | Performed by: OTOLARYNGOLOGY

## 2023-12-11 PROCEDURE — 3017F COLORECTAL CA SCREEN DOC REV: CPT | Performed by: OTOLARYNGOLOGY

## 2023-12-11 PROCEDURE — G8427 DOCREV CUR MEDS BY ELIG CLIN: HCPCS | Performed by: OTOLARYNGOLOGY

## 2023-12-11 PROCEDURE — G8420 CALC BMI NORM PARAMETERS: HCPCS | Performed by: OTOLARYNGOLOGY

## 2023-12-11 PROCEDURE — 99215 OFFICE O/P EST HI 40 MIN: CPT | Performed by: OTOLARYNGOLOGY

## 2023-12-11 NOTE — PROGRESS NOTES
[facility]YSICIANS [place] [facility]  [facility] EAR, NOSE AND THROAT  [address] [facility] [facility]  [address] [facility] [address] [facility] [address]  Dept: [phone]  Dept Fax: [phone]  Loc: [phone]    Indra Del Toro is a 76 y.o. male who was referred by No ref. provider found for:  Chief Complaint   Patient presents with    Follow-up     Pt is here for a f/u surveillance of head and neck cancer. Pt state that he is feeling better than he was. Pt states that he has a sore on the R back side of his mouth that is bothering him. He isnt sure if it is from surgery. HPI:     Indra Del Toro is a 76 y.o. male returns at a 2-month interval for follow-up having had his visit postponed because of scheduling problems on my side. He is status post a transoral robotic assisted radical tonsillectomy tongue base resection and partial pharyngectomy and partial buccal mucosa resection down to the prevertebral fascia on July 21, 2023. This was followed on July 27 with a modified radical neck dissection along with a revision resection of the distal deep tissues which were positive for microscopic disease on the primary specimen. He comes today with his wife reporting having had an area of pain and ulceration in his right oral cavity that he describes as tissue having grown over a tooth. He says it has subsided now and the pain is almost gone. Of note is the fact that he finished his radiation therapy last month. His next PET CT scan is scheduled for March 2024    The patient reports having had significant improvement in his ability to tolerate p.o. nutrition. He is no longer using tube feeding to supplement his oral nutrition. Most foods and fluids are going down as long as he pushes up to the left side. He is breathing comfortably at night.                History:     No Known Allergies  Current Outpatient Medications   Medication Sig Dispense Refill    ondansetron (ZOFRAN-ODT) 4 MG disintegrating tablet Take 1

## 2023-12-28 ENCOUNTER — HOSPITAL ENCOUNTER (OUTPATIENT)
Dept: SPEECH THERAPY | Age: 68
Setting detail: THERAPIES SERIES
Discharge: HOME | End: 2023-12-28
Attending: INTERNAL MEDICINE
Payer: MEDICARE

## 2023-12-28 PROCEDURE — 92526 ORAL FUNCTION THERAPY: CPT

## 2023-12-28 NOTE — PROGRESS NOTES
[facility] THERAPY  [] CLINICAL SWALLOW EVALUATION  [x] DAILY NOTE   [] PROGRESS NOTE [] DISCHARGE NOTE    [x] OUTPATIENT REHABILITATION [facility]   [] [address] [place] [address]    [] 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 Sent this to precert analyst for review of benefits. Authorization Information: No precert required   Visit # 15, 5/10 for progress note   Visits Allowed: Unlimited visits based on medical necessity   Recertification Date: 88   Physician Follow-Up: Dr. Yasmin Humphrey - 2024; Dr. Casey Lau - 1/15/24    Physician Orders: Barbara Garber and treat   Pertinent History: Patient reports he started with a sore throat in 2022 and he was treated with thrush and antibiotics multiple times, but the sore throat continued. Patient then started noticing some dysphagia and he had a MBS completed on 22 and a regular diet with thin liquids was recommended. Patient had neck surgery with a plate and screws put in at C3-5 in the end of 2023. Patient then had the fundoplication in the beginning of May of 2023. Patient went to see an ENT at [facility] sometime in May of 2023 and he told him the symptoms were related to acid reflux.   Patient went to see Dr. Casey Lau in 23 and he found a tonsillar mass on

## 2023-12-29 ENCOUNTER — HOSPITAL ENCOUNTER (OUTPATIENT)
Dept: RADIATION ONCOLOGY | Age: 68
Discharge: HOME | End: 2023-12-29
Payer: MEDICARE

## 2023-12-29 VITALS
TEMPERATURE: 98.3 F | DIASTOLIC BLOOD PRESSURE: 77 MMHG | WEIGHT: 148.6 LBS | RESPIRATION RATE: 18 BRPM | HEART RATE: 73 BPM | SYSTOLIC BLOOD PRESSURE: 119 MMHG | BODY MASS INDEX: 24.73 KG/M2 | OXYGEN SATURATION: 98 %

## 2023-12-29 DIAGNOSIS — C09.9 PRIMARY SQUAMOUS CELL CARCINOMA OF PALATINE TONSIL (HCC): Primary | ICD-10-CM

## 2023-12-29 PROCEDURE — 99212 OFFICE O/P EST SF 10 MIN: CPT | Performed by: RADIOLOGY

## 2024-01-02 ENCOUNTER — TELEPHONE (OUTPATIENT)
Dept: ONCOLOGY | Age: 69
End: 2024-01-02

## 2024-01-02 NOTE — TELEPHONE ENCOUNTER
Received call from Radiation oncology stating the patient is scheduled for MRI ans follow up in march. Stated Dr. Rodriguez doesn't believe patient needs to follow up with us in march or get PET scan and asked his nurse to cancel the appointment  and scans, they called us as an FYI

## 2024-01-03 ENCOUNTER — HOSPITAL ENCOUNTER (OUTPATIENT)
Dept: SPEECH THERAPY | Age: 69
Setting detail: THERAPIES SERIES
Discharge: HOME | End: 2024-01-03
Attending: INTERNAL MEDICINE
Payer: MEDICARE

## 2024-01-03 ENCOUNTER — CLINICAL DOCUMENTATION (OUTPATIENT)
Dept: NUTRITION | Age: 69
End: 2024-01-03

## 2024-01-03 PROCEDURE — 92526 ORAL FUNCTION THERAPY: CPT | Performed by: SPEECH-LANGUAGE PATHOLOGIST

## 2024-01-03 NOTE — PROGRESS NOTES
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 Patient is aware of this as of 1/3/24 and wished to proceed with today's visit. Will check benefits prior to the next scheduled visit.   Authorization Information: No precert required   Visit # 16, 6/10 for progress note   Visits Allowed: Unlimited visits based on medical necessity   Recertification Date: 24   Physician Follow-Up: Dr. Wilkes - 2024; Dr. Gutierrez - 1/15/24    Physician Orders: Eval and treat   Pertinent History: Patient reports he started with a sore throat in 2022 and he was treated with thrush and antibiotics multiple times, but the sore throat continued.  Patient then started noticing some dysphagia and he had a MBS completed on 22 and a regular diet with thin liquids was recommended. Patient had neck surgery with a plate and screws put in at C3-5 in the end of 2023. Patient then had the fundoplication in the beginning of May of 2023.  Patient went to see an ENT at [facility] sometime in May of 2023 and he told him the symptoms were related to acid reflux.

## 2024-01-03 NOTE — PROGRESS NOTES
Nutrition Assessment     Reason for Call:   1/3/24 - post treatment follow-up call    Nutrition Recommendations:   Diet as per SLP  PO at best efforts  ONS prn     Malnutrition Assessment: unable to assess     Nutrition Diagnosis: (9/1/23)  Problem: moderate malnutrition in the context of chronic illness  Etiology: catabolic illness, altered GI function  Signs and Symptoms: difficulty chewing and swallowing, unintentional weight loss, mild fat and muscle loss     Nutrition Assessment:   History: GERD, hiatal hernia post fundoplication, tonsillar cancer post tonsillectomy (7/21/23) and resection (7/28/23), dysphagia, anxiety,   Subjective:   1/3/24 - Received call from [facility] for an update on patient status. Patient is currently on hold for TF supplies with them. Attempted to call patient for follow-up, no answer to call, voice message left with reason for call and encouraged a call back. Patient did have a follow-up with Dr. Rodriguez on 12/29/23, note reviewed: healing well, mild/moderate pain in treatment area, mild/moderate dysphagia (seeing SLP), tolerating PO with some limitations with bread and meat, good taste, intermittent dry mouth and thick saliva. Patient completed radiation therapy on 11/22/23. Patient's current weight is 148# (12/29/23) which is up from 143# (12/4/23). I did return call to [facility] and to update them on recent MD follow-up. Plan to continue to keep patient on hold for TF supplies.   Current Nutrition:              Oral Diet: per MD note: tolerating most foods with the exception of bread and meat              Oral Diet Intake:    n/a              Oral Nutrition Supplement (ONS): n/a  Anthropometric Measures:              Ht: 5'5\"                Current Weight:  148# (EHR, 12/29/23)            Ideal Weight:  136#  BMI: 24.73          Nutrition Interventions:    1/3/24:  -Voice message left with patient to return RD call for follow-up on nutritional

## 2024-01-05 ENCOUNTER — HOSPITAL ENCOUNTER (OUTPATIENT)
Dept: SPEECH THERAPY | Age: 69
Setting detail: THERAPIES SERIES
Discharge: HOME | End: 2024-01-05
Attending: INTERNAL MEDICINE
Payer: MEDICARE

## 2024-01-05 PROCEDURE — 92526 ORAL FUNCTION THERAPY: CPT | Performed by: SPEECH-LANGUAGE PATHOLOGIST

## 2024-01-05 NOTE — PROGRESS NOTES
2023 and he told him the symptoms were related to acid reflux.  Patient went to see Dr. Gutierrez in 6/5/23 and he found a tonsillar mass on the right.  Patient ten had a MBS completed on 6/15/23 with the recommendation for a regular diet with thin liquids (avoid tough meats, such as steak).  Patient had a biopsy of the tonsillar mass on 6/21/23 and on 7/21/23 he had a tonsillectomy, right partial tongue base resection, palatoplasty and pharyngoplasty.  Patient then had \"selective neck dissection\" and \"laser resection of positive margin of tonsil mass\" on 7/28/23.  Patient had another MBS completed on 8/1/23 with the recommendation for NPO, but okay for ice chips and thin liquids via teaspoon.  Patient had home health ST for about 3 weeks with significant difficulty noted with all PO.  Patient had a repeat MBS completed on 8/8/23 with the ultimate diet recommendation of NPO, however, after a discussion with ENT, it was decided to consider initiation of ice chips and thin liquids via teaspoon knowing the risks of aspiration.  Patient had a NG tube following his most recent surgery and currently does not have any feeding tube.  Patient reports he is currently is drinking 6 supplemental nutritional drinks (half pivot, half premier) orally.  Patient reports he will occasionally eat some puree/soft foods (ie: spaghetti, fish, mashed potatoes and gravy), but he needs to have liquids to wash it down.  Patient reports he has been tolerating thin liquids much better recently, but he does have occasional coughing.  Patient reports he will be starting 6.5 weeks (5 days a week) of radiation after he sees the dentist on 9/13/23. Patient reports he does not need to complete any chemotherapy. Patient reports he lost about 20 pounds since the beginning of the year, but he recently gained 5 pounds back.       SUBJECTIVE: Patient came into this session reporting he feels \"off\" and his stomach is upset.  Patient also reports he

## 2024-01-09 ASSESSMENT — PATIENT HEALTH QUESTIONNAIRE - PHQ9
SUM OF ALL RESPONSES TO PHQ9 QUESTIONS 1 & 2: 0
SUM OF ALL RESPONSES TO PHQ QUESTIONS 1-9: 0
2. FEELING DOWN, DEPRESSED OR HOPELESS: 0
1. LITTLE INTEREST OR PLEASURE IN DOING THINGS: 0
SUM OF ALL RESPONSES TO PHQ QUESTIONS 1-9: 0

## 2024-01-10 ENCOUNTER — HOSPITAL ENCOUNTER (OUTPATIENT)
Age: 69
Discharge: HOME | End: 2024-01-10
Payer: MEDICARE

## 2024-01-10 ENCOUNTER — APPOINTMENT (OUTPATIENT)
Dept: SPEECH THERAPY | Age: 69
End: 2024-01-10
Attending: INTERNAL MEDICINE
Payer: MEDICARE

## 2024-01-10 ENCOUNTER — OFFICE VISIT (OUTPATIENT)
Dept: INTERNAL MEDICINE CLINIC | Age: 69
End: 2024-01-10
Payer: MEDICARE

## 2024-01-10 VITALS
HEIGHT: 65 IN | OXYGEN SATURATION: 98 % | DIASTOLIC BLOOD PRESSURE: 78 MMHG | TEMPERATURE: 98.9 F | BODY MASS INDEX: 24.29 KG/M2 | SYSTOLIC BLOOD PRESSURE: 116 MMHG | WEIGHT: 145.8 LBS | HEART RATE: 88 BPM

## 2024-01-10 DIAGNOSIS — H69.91 DYSFUNCTION OF RIGHT EUSTACHIAN TUBE: ICD-10-CM

## 2024-01-10 DIAGNOSIS — R42 LIGHTHEADEDNESS: Primary | ICD-10-CM

## 2024-01-10 DIAGNOSIS — R13.10 DYSPHAGIA, UNSPECIFIED TYPE: ICD-10-CM

## 2024-01-10 DIAGNOSIS — R42 LIGHTHEADEDNESS: ICD-10-CM

## 2024-01-10 LAB
ALBUMIN SERPL BCG-MCNC: 4.3 G/DL (ref 3.5–5.1)
ANION GAP SERPL CALC-SCNC: 8 MEQ/L (ref 8–16)
BUN SERPL-MCNC: 20 MG/DL (ref 7–22)
CALCIUM SERPL-MCNC: 9.5 MG/DL (ref 8.5–10.5)
CHLORIDE SERPL-SCNC: 105 MEQ/L (ref 98–111)
CO2 SERPL-SCNC: 27 MEQ/L (ref 23–33)
CREAT SERPL-MCNC: 0.7 MG/DL (ref 0.4–1.2)
GFR SERPL CREATININE-BSD FRML MDRD: > 60 ML/MIN/1.73M2
GLUCOSE SERPL-MCNC: 84 MG/DL (ref 70–108)
POTASSIUM SERPL-SCNC: 4.4 MEQ/L (ref 3.5–5.2)
PTH-INTACT SERPL-MCNC: 47 PG/ML (ref 15–65)
SODIUM SERPL-SCNC: 140 MEQ/L (ref 135–145)
TSH SERPL DL<=0.005 MIU/L-ACNC: 2.82 UIU/ML (ref 0.4–4.2)

## 2024-01-10 PROCEDURE — 93010 ELECTROCARDIOGRAM REPORT: CPT | Performed by: INTERNAL MEDICINE

## 2024-01-10 PROCEDURE — 99214 OFFICE O/P EST MOD 30 MIN: CPT

## 2024-01-10 PROCEDURE — 93005 ELECTROCARDIOGRAM TRACING: CPT

## 2024-01-10 PROCEDURE — 84443 ASSAY THYROID STIM HORMONE: CPT

## 2024-01-10 PROCEDURE — 80048 BASIC METABOLIC PNL TOTAL CA: CPT

## 2024-01-10 PROCEDURE — 82040 ASSAY OF SERUM ALBUMIN: CPT

## 2024-01-10 PROCEDURE — 1123F ACP DISCUSS/DSCN MKR DOCD: CPT

## 2024-01-10 PROCEDURE — 36415 COLL VENOUS BLD VENIPUNCTURE: CPT

## 2024-01-10 PROCEDURE — 83970 ASSAY OF PARATHORMONE: CPT

## 2024-01-10 RX ORDER — LORATADINE PSEUDOEPHEDRINE SULFATE 10; 240 MG/1; MG/1
1 TABLET, EXTENDED RELEASE ORAL DAILY
Qty: 30 TABLET | Refills: 0 | Status: SHIPPED | OUTPATIENT
Start: 2024-01-10 | End: 2024-02-09

## 2024-01-10 ASSESSMENT — PATIENT HEALTH QUESTIONNAIRE - PHQ9
1. LITTLE INTEREST OR PLEASURE IN DOING THINGS: NOT AT ALL
2. FEELING DOWN, DEPRESSED OR HOPELESS: NOT AT ALL
SUM OF ALL RESPONSES TO PHQ9 QUESTIONS 1 & 2: 0

## 2024-01-10 ASSESSMENT — ENCOUNTER SYMPTOMS
NAUSEA: 1
ABDOMINAL PAIN: 0
EYES NEGATIVE: 1
COUGH: 0
SINUS PRESSURE: 0
VOMITING: 0
ALLERGIC/IMMUNOLOGIC NEGATIVE: 1
RESPIRATORY NEGATIVE: 1

## 2024-01-10 NOTE — PATIENT INSTRUCTIONS
We will start Claritin-D for treatment of eustachian tube dysfunction and headache  Get lab work done prior to next visit  We will order Magic mouthwash for your sore throat  Follow-up with Dr. Wilkes next month per your originally scheduled appointment

## 2024-01-10 NOTE — PROGRESS NOTES
Javon Kenney (:  1955) is a 68 y.o. male,Established patient, here for evaluation of the following chief complaint(s):  Dizziness, Nausea, and Pharyngitis       ASSESSMENT/PLAN:  1. Lightheadedness  -     Basic Metabolic Panel; Future  -     Albumin; Future  -     TSH With Reflex Ft4; Future  -     Cortisol Total; Future  -     PTH, Intact; Future  -     EKG 12 Lead - Clinic Performed; Future  2. Dysphagia, unspecified type  -     Magic Mouthwash (MIRACLE MOUTHWASH); Swish and spit 5 mLs 4 times daily as needed for Irritation, Disp-2 each, R-0Normal  3. Dysfunction of right eustachian tube  -     loratadine-pseudoephedrine (CLARITIN-D 24 HOUR)  MG per extended release tablet; Take 1 tablet by mouth daily, Disp-30 tablet, R-0Normal    Lightheadedness: Patient reports new lightheadedness that has started over the past week, and he has never had this before. Unspecified etiology at this time. He was also noted to have eustachian tube dysfunction of R ear which may be contributory. Possibly orthostatic in nature given occasional correlation with standing up as well as radiation therapy for throat cancer likely affecting his ability to eat in some way. Other possible etiologies include cardiac, hormonal imbalance, abnormal electrolytes.  -  regular hydration.  - Multiple labs ordered including BMP, CBC, albumin, 8 AM cortisol, TSH with Free T4, PTH  - EKG ordered  - Continue to follow with surgery, radiation oncology, and ENT for throat cancer and associated therapies.  - Treatment of eustachian tube dysfunction as described below.    Dysphagia: Patient has chronic dysphagia and difficulty swallowing that is almost certainly due to his throat cancer and his subsequent surgery and radiation therapies (last done 6-7 weeks ago). Patient reports chronic painful swallowing and sore throat that has recently worsened, but is able to consume food while maintaining his appetite.  - Magic mouthwash

## 2024-01-11 ENCOUNTER — HOSPITAL ENCOUNTER (OUTPATIENT)
Age: 69
Discharge: HOME | End: 2024-01-11
Payer: MEDICARE

## 2024-01-11 DIAGNOSIS — R42 LIGHTHEADEDNESS: ICD-10-CM

## 2024-01-11 LAB
CORTIS SERPL-MCNC: 7.94 UG/DL
CORTISOL COLLECTION INFO: NORMAL

## 2024-01-11 PROCEDURE — 36415 COLL VENOUS BLD VENIPUNCTURE: CPT

## 2024-01-11 PROCEDURE — 82533 TOTAL CORTISOL: CPT

## 2024-01-12 ENCOUNTER — HOSPITAL ENCOUNTER (OUTPATIENT)
Dept: SPEECH THERAPY | Age: 69
Setting detail: THERAPIES SERIES
Discharge: HOME | End: 2024-01-12
Attending: INTERNAL MEDICINE
Payer: MEDICARE

## 2024-01-12 LAB
EKG ATRIAL RATE: 68 BPM
EKG P AXIS: 58 DEGREES
EKG P-R INTERVAL: 138 MS
EKG Q-T INTERVAL: 382 MS
EKG QRS DURATION: 94 MS
EKG QTC CALCULATION (BAZETT): 406 MS
EKG R AXIS: 12 DEGREES
EKG T AXIS: 71 DEGREES
EKG VENTRICULAR RATE: 68 BPM

## 2024-01-12 PROCEDURE — 92526 ORAL FUNCTION THERAPY: CPT | Performed by: SPEECH-LANGUAGE PATHOLOGIST

## 2024-01-12 NOTE — PROGRESS NOTES
swallowing exercises, but reports it was improved this date d/t using magic mouthwash. Recommend to continue dysphagia treatment to work towards toleration of a full soft and bite sized diet with thin liquids.    Activity/Treatment Tolerance:  [x]  Patient tolerated treatment fair   []  Patient limited by fatigue  []  Patient limited by pain   []  Patient limited by other medical complications  []  Other:     Patient Education:   [x]  HEP/Education Completed: Plan of Care, Goals, continue HEP after using magic mouthwash  []  No new Education completed  []  Reviewed Prior HEP      [x]  Patient verbalized and/or demonstrated understanding of education provided.  []  Patient unable to verbalize and/or demonstrate understanding of education provided.  Will continue education.  []  Barriers to learning:       PLAN:  Treatment Recommendations:     []  Plan of care initiated.  Plan to see patient 2 times per week for 12 weeks to address the treatment planned outlined above.  []  Continue with current plan of care  [x]  PROGRESS NOTE: 2 x per week x 6 weeks  []  Hold pending physician visit  []  Discharge    Time In 1530   Time Out 1600   Timed Code Minutes: 0 min   Total Treatment Time: 30 min         Nancy Brown M.S. CCC-SLP [id number]

## 2024-01-15 ENCOUNTER — OFFICE VISIT (OUTPATIENT)
Dept: ENT CLINIC | Age: 69
End: 2024-01-15
Payer: MEDICARE

## 2024-01-15 VITALS
DIASTOLIC BLOOD PRESSURE: 78 MMHG | TEMPERATURE: 98.2 F | SYSTOLIC BLOOD PRESSURE: 130 MMHG | HEIGHT: 65 IN | WEIGHT: 145.6 LBS | BODY MASS INDEX: 24.26 KG/M2 | OXYGEN SATURATION: 98 % | RESPIRATION RATE: 20 BRPM | HEART RATE: 85 BPM

## 2024-01-15 DIAGNOSIS — J39.2 PHARYNGEAL MASS: ICD-10-CM

## 2024-01-15 DIAGNOSIS — C09.9 PRIMARY TONSILLAR SQUAMOUS CELL CARCINOMA (HCC): Primary | ICD-10-CM

## 2024-01-15 DIAGNOSIS — H92.01 REFERRED OTALGIA OF RIGHT EAR: ICD-10-CM

## 2024-01-15 PROCEDURE — 1123F ACP DISCUSS/DSCN MKR DOCD: CPT | Performed by: OTOLARYNGOLOGY

## 2024-01-15 PROCEDURE — 92511 NASOPHARYNGOSCOPY: CPT | Performed by: OTOLARYNGOLOGY

## 2024-01-15 PROCEDURE — 99215 OFFICE O/P EST HI 40 MIN: CPT | Performed by: OTOLARYNGOLOGY

## 2024-01-15 NOTE — PROGRESS NOTES
(postoperative nausea and vomiting)     PUD (peptic ulcer disease)     LONG TIME  AGO    Rectal bleeding     2o11- colonoscopy - dr david      Past Surgical History:   Procedure Laterality Date    BRONCHOSCOPY N/A 6/21/2023    BRONCHOSCOPY performed by Federico Gutierrez MD at [facility] OR    EYE SURGERY  plate in right eye    GASTRIC FUNDOPLICATION N/A 5/8/2023    ROBOTIC HIATAL HERNIA REPAIR AND NISSEN FUNDOPLICATION performed by Med Collins MD at [facility] OR    LARYNGOSCOPY N/A 6/21/2023    Laryngoscopy with Biopsy, Flexible Esophagoscopy - Diagnostic Bronchoscopy with Broncheal Alveolar Lavage, Biopsy of Oropharynx performed by Federico Gutierrez MD at [facility] OR    LARYNGOSCOPY N/A 7/28/2023    Laser Resection of Positive Margin Of Tonsil Mass performed by Federico Gutierrez MD at [facility] OR    MANDIBLE SURGERY  1979    MOUTH SURGERY N/A 7/21/2023    Transoral Robotic Radical Tonsillectomy, Right Partial Tongue Base Resection with Closure, Palatoplasty, Pharyngoplasty performed by Federico Gutierrez MD at [facility] OR    NECK SURGERY  2001    plate in neck     NECK SURGERY N/A 7/28/2023    Selective Neck Dissection performed by Federico Gutierrez MD at [facility] OR    UPPER GASTROINTESTINAL ENDOSCOPY N/A 2/10/2023    EGD, POSS BIOPSIES,  MEJÍA performed by Marisol Guerrero MD at [facility] Endoscopy    UPPER GASTROINTESTINAL ENDOSCOPY N/A 4/13/2023    EGD ESOPHAGEAL MANOMETRY AND PH MONITORING 24 HR performed by Rosalino Lu MD at [facility] Endoscopy    UPPER GASTROINTESTINAL ENDOSCOPY N/A 10/26/2023    EGD performed by Aly Pollard MD at [facility] ENDOSCOPY    UPPER GASTROINTESTINAL ENDOSCOPY  10/26/2023    EGD DILATION SAVORY performed by Aly Pollard MD at [facility] ENDOSCOPY     Family History   Problem Relation Age of Onset    Asthma Mother     Heart Disease Mother     Heart Disease Father     Other Father         CHF    Stroke Other     Heart Disease Other      Social History     Tobacco Use    Smoking status: Never

## 2024-01-17 ENCOUNTER — TELEPHONE (OUTPATIENT)
Dept: ENT CLINIC | Age: 69
End: 2024-01-17

## 2024-01-17 ENCOUNTER — HOSPITAL ENCOUNTER (OUTPATIENT)
Dept: SPEECH THERAPY | Age: 69
Setting detail: THERAPIES SERIES
Discharge: HOME | End: 2024-01-17
Attending: INTERNAL MEDICINE
Payer: MEDICARE

## 2024-01-17 PROCEDURE — 92526 ORAL FUNCTION THERAPY: CPT | Performed by: SPEECH-LANGUAGE PATHOLOGIST

## 2024-01-17 NOTE — TELEPHONE ENCOUNTER
Patient's daughter, Saundra Luo, called in this morning stating they would like to have patient referred to Dr Zbigniew Madrid at [facility]. She gave me a fax number of [phone] to send all the patient's records and the referral. Told Saundra I would pass the information onto Dr Gutierrez.  Patient's daughter verbalized understanding and thanked me.

## 2024-01-17 NOTE — DISCHARGE SUMMARY
Education:   [x]  HEP/Education Completed: Plan of Care, Goals, continue to eat and drink as much as possible, continue HEP as tolerated, will need new order to come back to therapy in future as needed  []  No new Education completed  []  Reviewed Prior HEP      [x]  Patient verbalized and/or demonstrated understanding of education provided.  []  Patient unable to verbalize and/or demonstrate understanding of education provided.  Will continue education.  []  Barriers to learning:       PLAN:  Treatment Recommendations:     []  Plan of care initiated.  Plan to see patient 2 times per week for 12 weeks to address the treatment planned outlined above.  []  Continue with current plan of care  []  PROGRESS NOTE: 2 x per week x 6 weeks  []  Hold pending physician visit  [x]  Discharge    Time In 1432   Time Out 1500   Timed Code Minutes: 0 min   Total Treatment Time: 28 min         Nancy Brown M.S. CCC-SLP 0321

## 2024-01-19 ENCOUNTER — APPOINTMENT (OUTPATIENT)
Dept: SPEECH THERAPY | Age: 69
End: 2024-01-19
Attending: INTERNAL MEDICINE
Payer: MEDICARE

## 2024-01-23 ENCOUNTER — HOSPITAL ENCOUNTER (OUTPATIENT)
Dept: PET IMAGING | Age: 69
Discharge: HOME | End: 2024-01-23
Attending: OTOLARYNGOLOGY
Payer: MEDICARE

## 2024-01-23 ENCOUNTER — TELEPHONE (OUTPATIENT)
Dept: ENT CLINIC | Age: 69
End: 2024-01-23

## 2024-01-23 DIAGNOSIS — H92.01 REFERRED OTALGIA OF RIGHT EAR: ICD-10-CM

## 2024-01-23 DIAGNOSIS — J39.2 PHARYNGEAL MASS: ICD-10-CM

## 2024-01-23 DIAGNOSIS — C09.9 PRIMARY TONSILLAR SQUAMOUS CELL CARCINOMA (HCC): Primary | ICD-10-CM

## 2024-01-23 DIAGNOSIS — C09.9 PRIMARY TONSILLAR SQUAMOUS CELL CARCINOMA (HCC): ICD-10-CM

## 2024-01-23 PROCEDURE — 78815 PET IMAGE W/CT SKULL-THIGH: CPT

## 2024-01-23 PROCEDURE — 3430000000 HC RX DIAGNOSTIC RADIOPHARMACEUTICAL: Performed by: OTOLARYNGOLOGY

## 2024-01-23 PROCEDURE — A9609 HC RX DIAGNOSTIC RADIOPHARMACEUTICAL: HCPCS | Performed by: OTOLARYNGOLOGY

## 2024-01-23 RX ORDER — FLUDEOXYGLUCOSE F 18 200 MCI/ML
12.4 INJECTION, SOLUTION INTRAVENOUS
Status: COMPLETED | OUTPATIENT
Start: 2024-01-23 | End: 2024-01-23

## 2024-01-23 RX ADMIN — FLUDEOXYGLUCOSE F 18 12.4 MILLICURIE: 200 INJECTION, SOLUTION INTRAVENOUS at 08:18

## 2024-01-23 NOTE — TELEPHONE ENCOUNTER
Patient had his PET Scan done 1/23/2024  per wife it confirmed cancer.  They are wanting to know if you want to see him or what plan of care are you going to suggest.    He is also having a lot of pain and they are asking for liquid pain medication Hydrocodone and Acetaminophen.   They use [facility] [address]     Please contact patient.    Thanks

## 2024-01-24 ENCOUNTER — APPOINTMENT (OUTPATIENT)
Dept: SPEECH THERAPY | Age: 69
End: 2024-01-24
Attending: INTERNAL MEDICINE
Payer: MEDICARE

## 2024-01-24 NOTE — TELEPHONE ENCOUNTER
Reviewed with Dr Gutierrez. Hold off on referral for now. We need to biopsy the area first and confirm if there is persistent disease present. There was uptake in the area of concern on PET, but did not appear to be a focal mass/lesion from what I could see, but still concerning nonetheless. The biopsy on 2/9/24 should hopefully clarify this and we can determine if the referral is needed once we have those results. Dr Gutierrez will discuss these options at the follow up visit for biopsy results on 2/13/24.     Rx for pain medicine sent to pharmacy. Take every 6 hours as needed for severe pain if it cannot be managed by tylenol and/or ibuprofen. He should not take tylenol and the liquid pain medicine I just sent (hydrocodone-acetaminophen) at the same time

## 2024-01-24 NOTE — TELEPHONE ENCOUNTER
Chris called in again asking about something for pain. His wife had called yesterday regarding that the PET scan revealed cancer and he is in extreme pain as noted in encounter from 1/23/24. He also inquired last week on a referral to Dr. Zbigniew Madrid at [facility] but one has not been placed yet.

## 2024-01-25 NOTE — TELEPHONE ENCOUNTER
I spoke with Chris and let him know what Bladimir had recommeded and reminded him of his appointment on 2/13/23.    He did say that he got his prescription pain medication picked up and it is providing some relief.    Patient verbalized understanding and thanked me.

## 2024-01-25 NOTE — TELEPHONE ENCOUNTER
Torrie (wife) called in to see if there was any way Chris can have his biopsy any sooner than 2/9? (I did check to see if there was time on Friday 2/2 but the surgery department said Dr Gutierrez would need to check with Mary to add anything).     Torrie said Chris is in so much pain and is not eating due to this and she is just worried after seeing the results of the PET. They called and got an appointment at [facility] for Tuesday 1/30/24 with a physician that does do reconstruction, however, they understand he will still need the biopsy. She mentions Dr Gutierrez told them he does not do the reconstruction should he need that. They are asking for advice?    I did inform her that I would talk to Dr Gutierrez on Monday (unless Dr Gutierrez responds to this phone encounter prior) and will call them back on Monday so they know what his recommendation is knowing this most recent information.    Please advise.

## 2024-01-26 ENCOUNTER — APPOINTMENT (OUTPATIENT)
Dept: SPEECH THERAPY | Age: 69
End: 2024-01-26
Attending: INTERNAL MEDICINE
Payer: MEDICARE

## 2024-01-30 ENCOUNTER — PREP FOR PROCEDURE (OUTPATIENT)
Dept: ENT CLINIC | Age: 69
End: 2024-01-30

## 2024-01-30 NOTE — PROGRESS NOTES
Follow all instructions given by your physician  Do not eat or drink anything after midnight prior to surgery(includes water, chewing gum, mints and ice chips)  Sips of water am of surgery with allowed medications  Do not use chewing tobacco after midnight prior to surgery  May brush teeth do not swallow water  Do not smoke, drink alcoholic beverages or use any illicit drugs for 24 hours prior to surgery  Bring insurance info and photo ID  Bring pertinent paperwork with you from Doctor or surgeons's office  Wear clean comfortable, loose-fitting clothing  No make-up, nail polish, jewelry, piercings, or contact lenses to be worn day of surgery  No glue on dentures morning of surgery; you will be asked to remove them for surgery. Case for glasses.  Shower the night before and the morning of surgery with cleansing soap provided or a liquid antibacterial soap, dry with new fresh clean towel after each shower, no lotions, creams or powder.  Clean sheets and pillowcase on bed night before surgery  Bring medications in original bottles, Bring rescue inhalers with you  Bring CPAP/BIPAP machine if you have one ( you may be charged if one is needed in recovery room )    Our pharmacy has a Meds to Beds program where they will deliver any new prescriptions you may have to your room before you leave. Our Pharmacy will clear it through your insurance; for example (same co pay). This enables you to take your new RX as soon as you need when you get home and avoids stop/wait delays on the way home.  Please have a form of payment with you and have someone designated as your Pharmacy contact with their phone # as you may not feel well or still be under the influence of anesthesia.    Please refer to the SSI-Surgical Site Infection Flyer you hopefully received in the mail-together we can prevent infections; signs and symptoms reviewed.  When discharged be sure you understand how to care for your wound and that you have the Dr./office  phone # to call if you have any concerns or questions about your wound.     needed at discharge and someone over 18 to stay with you for 24 hours overnight (surgery may be cancelled if you don't have this)  Report to [facility] on 2nd floor  If you would become ill prior to surgery, please call the surgeon  May have a visitor with you, we request that you limit to 2 visitors in pre-op area  Masks are recommended but not required, new masks at entrance desk  Call [phone] for any questions

## 2024-01-31 ENCOUNTER — TELEPHONE (OUTPATIENT)
Dept: OTOLARYNGOLOGY | Age: 69
End: 2024-01-31

## 2024-01-31 NOTE — H&P
Adapted from prior ENT note:    7/21/2023 S/p transoral robotic radical tonsillectomy (right), palatoplasty, pharyngoplasty and right partial tongue base resection with closure  7/28/2023 S/p transoral robotic assisted radical tonsillectomy and pharyngectomy with the positive margin and reresection at the time of a modified radical neck dissection for staging  S/p chemoradiation therapies  No new symptoms    Past Medical History:   Diagnosis Date    Anxiety     Arthritis     neck, back, hands bilat    Cancer (HCC) 2022    Melanoma removed from face    GERD (gastroesophageal reflux disease)     Hiatal hernia     EGD    History of carotid stenosis 11/05/2023    Neoplasm of tonsillar fossa 07/03/2023    R - Squamous Cell    PONV (postoperative nausea and vomiting)     PUD (peptic ulcer disease)     LONG TIME  AGO    Rectal bleeding     2o11- colonoscopy - dr david       Past Surgical History:   Procedure Laterality Date    BRONCHOSCOPY N/A 6/21/2023    BRONCHOSCOPY performed by Federico Gutierrez MD at [facility] OR    EYE SURGERY  plate in right eye    GASTRIC FUNDOPLICATION N/A 5/8/2023    ROBOTIC HIATAL HERNIA REPAIR AND NISSEN FUNDOPLICATION performed by Med Collins MD at [facility] OR    LARYNGOSCOPY N/A 6/21/2023    Laryngoscopy with Biopsy, Flexible Esophagoscopy - Diagnostic Bronchoscopy with Broncheal Alveolar Lavage, Biopsy of Oropharynx performed by Federico Gutierrez MD at [facility] OR    LARYNGOSCOPY N/A 7/28/2023    Laser Resection of Positive Margin Of Tonsil Mass performed by Federico Gutierrez MD at [facility] OR    MANDIBLE SURGERY  1979    MOUTH SURGERY N/A 7/21/2023    Transoral Robotic Radical Tonsillectomy, Right Partial Tongue Base Resection with Closure, Palatoplasty, Pharyngoplasty performed by Federico Gutierrez MD at [facility] OR    NECK SURGERY  2001    plate in neck     NECK SURGERY N/A 7/28/2023    Selective Neck Dissection performed by Federico Gutierrez MD at [facility] OR    UPPER GASTROINTESTINAL ENDOSCOPY N/A

## 2024-02-01 ENCOUNTER — ANESTHESIA (OUTPATIENT)
Dept: OPERATING ROOM | Age: 69
End: 2024-02-01
Payer: MEDICARE

## 2024-02-01 ENCOUNTER — HOSPITAL ENCOUNTER (OUTPATIENT)
Age: 69
Setting detail: OUTPATIENT SURGERY
Discharge: HOME | End: 2024-02-01
Attending: OTOLARYNGOLOGY | Admitting: OTOLARYNGOLOGY
Payer: MEDICARE

## 2024-02-01 ENCOUNTER — ANESTHESIA EVENT (OUTPATIENT)
Dept: OPERATING ROOM | Age: 69
End: 2024-02-01
Payer: MEDICARE

## 2024-02-01 VITALS
HEART RATE: 93 BPM | WEIGHT: 143 LBS | OXYGEN SATURATION: 95 % | SYSTOLIC BLOOD PRESSURE: 140 MMHG | RESPIRATION RATE: 18 BRPM | HEIGHT: 65 IN | DIASTOLIC BLOOD PRESSURE: 90 MMHG | BODY MASS INDEX: 23.82 KG/M2 | TEMPERATURE: 98.4 F

## 2024-02-01 DIAGNOSIS — H92.01 REFERRED OTALGIA OF RIGHT EAR: ICD-10-CM

## 2024-02-01 DIAGNOSIS — G89.18 ACUTE POST-OPERATIVE PAIN: Primary | ICD-10-CM

## 2024-02-01 DIAGNOSIS — C09.9 PRIMARY TONSILLAR SQUAMOUS CELL CARCINOMA (HCC): ICD-10-CM

## 2024-02-01 DIAGNOSIS — J39.2 PHARYNGEAL MASS: ICD-10-CM

## 2024-02-01 PROCEDURE — 88331 PATH CONSLTJ SURG 1 BLK 1SPC: CPT

## 2024-02-01 PROCEDURE — 88305 TISSUE EXAM BY PATHOLOGIST: CPT

## 2024-02-01 PROCEDURE — 6360000002 HC RX W HCPCS

## 2024-02-01 PROCEDURE — 31536 LARYNGOSCOPY W/BX & OP SCOPE: CPT | Performed by: OTOLARYNGOLOGY

## 2024-02-01 PROCEDURE — APPNB45 APP NON BILLABLE 31-45 MINUTES: Performed by: NURSE PRACTITIONER

## 2024-02-01 PROCEDURE — 7100000011 HC PHASE II RECOVERY - ADDTL 15 MIN: Performed by: OTOLARYNGOLOGY

## 2024-02-01 PROCEDURE — 3600000004 HC SURGERY LEVEL 4 BASE: Performed by: OTOLARYNGOLOGY

## 2024-02-01 PROCEDURE — 7100000001 HC PACU RECOVERY - ADDTL 15 MIN: Performed by: OTOLARYNGOLOGY

## 2024-02-01 PROCEDURE — 3700000001 HC ADD 15 MINUTES (ANESTHESIA): Performed by: OTOLARYNGOLOGY

## 2024-02-01 PROCEDURE — 7100000000 HC PACU RECOVERY - FIRST 15 MIN: Performed by: OTOLARYNGOLOGY

## 2024-02-01 PROCEDURE — 6360000002 HC RX W HCPCS: Performed by: OTOLARYNGOLOGY

## 2024-02-01 PROCEDURE — 2580000003 HC RX 258: Performed by: OTOLARYNGOLOGY

## 2024-02-01 PROCEDURE — 3700000000 HC ANESTHESIA ATTENDED CARE: Performed by: OTOLARYNGOLOGY

## 2024-02-01 PROCEDURE — 88342 IMHCHEM/IMCYTCHM 1ST ANTB: CPT

## 2024-02-01 PROCEDURE — 6370000000 HC RX 637 (ALT 250 FOR IP): Performed by: OTOLARYNGOLOGY

## 2024-02-01 PROCEDURE — 2500000003 HC RX 250 WO HCPCS: Performed by: NURSE ANESTHETIST, CERTIFIED REGISTERED

## 2024-02-01 PROCEDURE — 2709999900 HC NON-CHARGEABLE SUPPLY: Performed by: OTOLARYNGOLOGY

## 2024-02-01 PROCEDURE — 6360000002 HC RX W HCPCS: Performed by: ANESTHESIOLOGY

## 2024-02-01 PROCEDURE — 6360000002 HC RX W HCPCS: Performed by: NURSE ANESTHETIST, CERTIFIED REGISTERED

## 2024-02-01 PROCEDURE — 88341 IMHCHEM/IMCYTCHM EA ADD ANTB: CPT

## 2024-02-01 PROCEDURE — 3600000014 HC SURGERY LEVEL 4 ADDTL 15MIN: Performed by: OTOLARYNGOLOGY

## 2024-02-01 PROCEDURE — 6360000002 HC RX W HCPCS: Performed by: NURSE PRACTITIONER

## 2024-02-01 PROCEDURE — 7100000010 HC PHASE II RECOVERY - FIRST 15 MIN: Performed by: OTOLARYNGOLOGY

## 2024-02-01 RX ORDER — SODIUM CHLORIDE 9 MG/ML
INJECTION, SOLUTION INTRAVENOUS PRN
Status: DISCONTINUED | OUTPATIENT
Start: 2024-02-01 | End: 2024-02-01 | Stop reason: HOSPADM

## 2024-02-01 RX ORDER — ROCURONIUM BROMIDE 10 MG/ML
INJECTION, SOLUTION INTRAVENOUS PRN
Status: DISCONTINUED | OUTPATIENT
Start: 2024-02-01 | End: 2024-02-01 | Stop reason: SDUPTHER

## 2024-02-01 RX ORDER — SODIUM CHLORIDE 0.9 % (FLUSH) 0.9 %
5-40 SYRINGE (ML) INJECTION EVERY 12 HOURS SCHEDULED
Status: DISCONTINUED | OUTPATIENT
Start: 2024-02-01 | End: 2024-02-01 | Stop reason: HOSPADM

## 2024-02-01 RX ORDER — LIDOCAINE HCL/PF 100 MG/5ML
SYRINGE (ML) INJECTION PRN
Status: DISCONTINUED | OUTPATIENT
Start: 2024-02-01 | End: 2024-02-01 | Stop reason: SDUPTHER

## 2024-02-01 RX ORDER — MEPERIDINE HYDROCHLORIDE 25 MG/ML
INJECTION INTRAMUSCULAR; INTRAVENOUS; SUBCUTANEOUS
Status: COMPLETED
Start: 2024-02-01 | End: 2024-02-01

## 2024-02-01 RX ORDER — DEXAMETHASONE SODIUM PHOSPHATE 10 MG/ML
INJECTION, EMULSION INTRAMUSCULAR; INTRAVENOUS PRN
Status: DISCONTINUED | OUTPATIENT
Start: 2024-02-01 | End: 2024-02-01 | Stop reason: SDUPTHER

## 2024-02-01 RX ORDER — HYDROCODONE BITARTRATE AND ACETAMINOPHEN 5; 325 MG/1; MG/1
1 TABLET ORAL ONCE
Status: COMPLETED | OUTPATIENT
Start: 2024-02-01 | End: 2024-02-01

## 2024-02-01 RX ORDER — HYDROCODONE BITARTRATE AND ACETAMINOPHEN 7.5; 325 MG/1; MG/1
1 TABLET ORAL EVERY 6 HOURS PRN
Status: ON HOLD | COMMUNITY
End: 2024-02-01

## 2024-02-01 RX ORDER — SODIUM CHLORIDE 0.9 % (FLUSH) 0.9 %
5-40 SYRINGE (ML) INJECTION PRN
Status: DISCONTINUED | OUTPATIENT
Start: 2024-02-01 | End: 2024-02-01 | Stop reason: HOSPADM

## 2024-02-01 RX ORDER — ONDANSETRON 2 MG/ML
INJECTION INTRAMUSCULAR; INTRAVENOUS PRN
Status: DISCONTINUED | OUTPATIENT
Start: 2024-02-01 | End: 2024-02-01 | Stop reason: SDUPTHER

## 2024-02-01 RX ORDER — MIDAZOLAM HYDROCHLORIDE 1 MG/ML
INJECTION INTRAMUSCULAR; INTRAVENOUS PRN
Status: DISCONTINUED | OUTPATIENT
Start: 2024-02-01 | End: 2024-02-01 | Stop reason: SDUPTHER

## 2024-02-01 RX ORDER — FENTANYL CITRATE 50 UG/ML
INJECTION, SOLUTION INTRAMUSCULAR; INTRAVENOUS PRN
Status: DISCONTINUED | OUTPATIENT
Start: 2024-02-01 | End: 2024-02-01 | Stop reason: SDUPTHER

## 2024-02-01 RX ORDER — KETAMINE HCL IN NACL, ISO-OSM 100MG/10ML
SYRINGE (ML) INJECTION PRN
Status: DISCONTINUED | OUTPATIENT
Start: 2024-02-01 | End: 2024-02-01 | Stop reason: SDUPTHER

## 2024-02-01 RX ORDER — FENTANYL CITRATE 50 UG/ML
50 INJECTION, SOLUTION INTRAMUSCULAR; INTRAVENOUS EVERY 5 MIN PRN
Status: DISCONTINUED | OUTPATIENT
Start: 2024-02-01 | End: 2024-02-01 | Stop reason: HOSPADM

## 2024-02-01 RX ORDER — MEPERIDINE HYDROCHLORIDE 25 MG/ML
12.5 INJECTION INTRAMUSCULAR; INTRAVENOUS; SUBCUTANEOUS EVERY 5 MIN PRN
Status: COMPLETED | OUTPATIENT
Start: 2024-02-01 | End: 2024-02-01

## 2024-02-01 RX ORDER — PROPOFOL 10 MG/ML
INJECTION, EMULSION INTRAVENOUS PRN
Status: DISCONTINUED | OUTPATIENT
Start: 2024-02-01 | End: 2024-02-01 | Stop reason: SDUPTHER

## 2024-02-01 RX ORDER — HYDROCODONE BITARTRATE AND ACETAMINOPHEN 7.5; 325 MG/1; MG/1
1 TABLET ORAL EVERY 6 HOURS PRN
Qty: 42 TABLET | Refills: 0 | Status: SHIPPED | OUTPATIENT
Start: 2024-02-01 | End: 2024-02-08

## 2024-02-01 RX ORDER — SUCCINYLCHOLINE/SOD CL,ISO/PF 200MG/10ML
SYRINGE (ML) INTRAVENOUS PRN
Status: DISCONTINUED | OUTPATIENT
Start: 2024-02-01 | End: 2024-02-01 | Stop reason: SDUPTHER

## 2024-02-01 RX ORDER — PHENYLEPHRINE HCL IN 0.9% NACL 1 MG/10 ML
SYRINGE (ML) INTRAVENOUS PRN
Status: DISCONTINUED | OUTPATIENT
Start: 2024-02-01 | End: 2024-02-01 | Stop reason: SDUPTHER

## 2024-02-01 RX ORDER — SODIUM CHLORIDE 9 MG/ML
INJECTION, SOLUTION INTRAVENOUS PRN
Status: CANCELLED | OUTPATIENT
Start: 2024-02-01

## 2024-02-01 RX ORDER — SODIUM CHLORIDE 9 MG/ML
INJECTION, SOLUTION INTRAVENOUS CONTINUOUS
Status: DISCONTINUED | OUTPATIENT
Start: 2024-02-01 | End: 2024-02-01 | Stop reason: HOSPADM

## 2024-02-01 RX ORDER — EPINEPHRINE 1 MG/ML(1)
AMPUL (ML) INJECTION PRN
Status: DISCONTINUED | OUTPATIENT
Start: 2024-02-01 | End: 2024-02-01 | Stop reason: ALTCHOICE

## 2024-02-01 RX ORDER — LABETALOL HYDROCHLORIDE 5 MG/ML
INJECTION INTRAVENOUS
Status: COMPLETED
Start: 2024-02-01 | End: 2024-02-01

## 2024-02-01 RX ORDER — LABETALOL HYDROCHLORIDE 5 MG/ML
10 INJECTION INTRAVENOUS
Status: DISCONTINUED | OUTPATIENT
Start: 2024-02-01 | End: 2024-02-01 | Stop reason: HOSPADM

## 2024-02-01 RX ORDER — SODIUM CHLORIDE 0.9 % (FLUSH) 0.9 %
5-40 SYRINGE (ML) INJECTION PRN
Status: CANCELLED | OUTPATIENT
Start: 2024-02-01

## 2024-02-01 RX ORDER — FENTANYL CITRATE 50 UG/ML
25 INJECTION, SOLUTION INTRAMUSCULAR; INTRAVENOUS EVERY 5 MIN PRN
Status: DISCONTINUED | OUTPATIENT
Start: 2024-02-01 | End: 2024-02-01 | Stop reason: HOSPADM

## 2024-02-01 RX ORDER — SODIUM CHLORIDE 0.9 % (FLUSH) 0.9 %
5-40 SYRINGE (ML) INJECTION EVERY 12 HOURS SCHEDULED
Status: CANCELLED | OUTPATIENT
Start: 2024-02-01

## 2024-02-01 RX ADMIN — LABETALOL HYDROCHLORIDE 10 MG: 5 INJECTION INTRAVENOUS at 09:10

## 2024-02-01 RX ADMIN — FENTANYL CITRATE 50 MCG: 50 INJECTION, SOLUTION INTRAMUSCULAR; INTRAVENOUS at 08:56

## 2024-02-01 RX ADMIN — FENTANYL CITRATE 25 MCG: 50 INJECTION, SOLUTION INTRAMUSCULAR; INTRAVENOUS at 08:10

## 2024-02-01 RX ADMIN — DEXAMETHASONE SODIUM PHOSPHATE 8 MG: 10 INJECTION, EMULSION INTRAMUSCULAR; INTRAVENOUS at 07:48

## 2024-02-01 RX ADMIN — HYDROMORPHONE HYDROCHLORIDE 0.5 MG: 1 INJECTION, SOLUTION INTRAMUSCULAR; INTRAVENOUS; SUBCUTANEOUS at 09:20

## 2024-02-01 RX ADMIN — Medication 120 MG: at 07:45

## 2024-02-01 RX ADMIN — ROCURONIUM BROMIDE 25 MG: 10 INJECTION INTRAVENOUS at 08:01

## 2024-02-01 RX ADMIN — MEPERIDINE HYDROCHLORIDE 12.5 MG: 25 INJECTION, SOLUTION INTRAMUSCULAR; INTRAVENOUS; SUBCUTANEOUS at 09:00

## 2024-02-01 RX ADMIN — MIDAZOLAM 2 MG: 1 INJECTION INTRAMUSCULAR; INTRAVENOUS at 07:41

## 2024-02-01 RX ADMIN — ONDANSETRON 4 MG: 2 INJECTION INTRAMUSCULAR; INTRAVENOUS at 08:33

## 2024-02-01 RX ADMIN — PROPOFOL 150 MG: 10 INJECTION, EMULSION INTRAVENOUS at 07:45

## 2024-02-01 RX ADMIN — SODIUM CHLORIDE: 9 INJECTION, SOLUTION INTRAVENOUS at 06:46

## 2024-02-01 RX ADMIN — FENTANYL CITRATE 50 MCG: 50 INJECTION, SOLUTION INTRAMUSCULAR; INTRAVENOUS at 07:45

## 2024-02-01 RX ADMIN — SUGAMMADEX 300 MG: 100 INJECTION, SOLUTION INTRAVENOUS at 08:41

## 2024-02-01 RX ADMIN — Medication 20 MG: at 08:08

## 2024-02-01 RX ADMIN — HYDROCODONE BITARTRATE AND ACETAMINOPHEN 1 TABLET: 5; 325 TABLET ORAL at 10:20

## 2024-02-01 RX ADMIN — ROCURONIUM BROMIDE 25 MG: 10 INJECTION INTRAVENOUS at 07:52

## 2024-02-01 RX ADMIN — Medication 100 MG: at 07:45

## 2024-02-01 RX ADMIN — Medication 200 MCG: at 08:33

## 2024-02-01 RX ADMIN — MEPERIDINE HYDROCHLORIDE 12.5 MG: 25 INJECTION, SOLUTION INTRAMUSCULAR; INTRAVENOUS; SUBCUTANEOUS at 09:05

## 2024-02-01 RX ADMIN — FENTANYL CITRATE 25 MCG: 50 INJECTION, SOLUTION INTRAMUSCULAR; INTRAVENOUS at 08:26

## 2024-02-01 RX ADMIN — Medication 2000 MG: at 07:56

## 2024-02-01 RX ADMIN — FENTANYL CITRATE 50 MCG: 50 INJECTION, SOLUTION INTRAMUSCULAR; INTRAVENOUS at 07:51

## 2024-02-01 RX ADMIN — HYDROMORPHONE HYDROCHLORIDE 0.5 MG: 1 INJECTION, SOLUTION INTRAMUSCULAR; INTRAVENOUS; SUBCUTANEOUS at 09:25

## 2024-02-01 RX ADMIN — Medication 100 MCG: at 08:31

## 2024-02-01 RX ADMIN — SODIUM CHLORIDE: 9 INJECTION, SOLUTION INTRAVENOUS at 08:30

## 2024-02-01 ASSESSMENT — PAIN DESCRIPTION - DESCRIPTORS
DESCRIPTORS: ACHING
DESCRIPTORS: ACHING;DISCOMFORT;BURNING
DESCRIPTORS: SORE

## 2024-02-01 ASSESSMENT — PAIN DESCRIPTION - LOCATION
LOCATION: THROAT
LOCATION: THROAT

## 2024-02-01 ASSESSMENT — PAIN DESCRIPTION - ORIENTATION: ORIENTATION: MID

## 2024-02-01 ASSESSMENT — PAIN SCALES - GENERAL
PAINLEVEL_OUTOF10: 6
PAINLEVEL_OUTOF10: 7
PAINLEVEL_OUTOF10: 2
PAINLEVEL_OUTOF10: 7

## 2024-02-01 ASSESSMENT — PAIN - FUNCTIONAL ASSESSMENT
PAIN_FUNCTIONAL_ASSESSMENT: 0-10
PAIN_FUNCTIONAL_ASSESSMENT: 0-10

## 2024-02-01 ASSESSMENT — PAIN DESCRIPTION - ONSET: ONSET: ON-GOING

## 2024-02-01 ASSESSMENT — PAIN DESCRIPTION - PAIN TYPE: TYPE: SURGICAL PAIN

## 2024-02-01 NOTE — TELEPHONE ENCOUNTER
I contacted the patient and spoke to him and to his wife about what was relayed to me regarding his initial evaluation by the Head Neck surgeon at [facility] there was conveyed to me as a dismissal of the possibility that the patient had residual cancer.  This was clarified by the patient and corroborated by his wife that the doctor simply said he could not tell if he had cancer or not and that he supported getting a biopsy to see if it was true or not.  He was excepted for follow-up care by this doctor who plan to see him in a month if the biopsies were negative.  That was an acceptable option and we planned to be to schedule in the morning for his general anesthesia based exam and biopsies.    Federico Gutierrez MD

## 2024-02-01 NOTE — PROGRESS NOTES
0855  - pt arrives to pacu, respirations unlabored on room air, pt states pain 3 out of 10, VSS    0900 -pt given 12.5 mg of demerol    0905 - pt given 12.5 mg of demerol    0910 - pt given 10 mg of labetalol    0915 - pt given ice chips    0920 - pt given 0.5 mg of dilaudid    0925 - pt given 0.5 mg of dilaudid    0935 - pt resting comfortably, pt states pain tolerable    0945 - pt meets criteria for discharge from pacu at this time, pt transported to [facility] in stable condition

## 2024-02-01 NOTE — PROGRESS NOTES
Pt has met discharge criteria and states he is ready for discharge to home. IV removed, gauze and tape applied. Dressed in own clothes and personal belongings gathered. Discharge instructions (with opioid medication education information) given to pt and family; pt and family verbalized understanding of discharge instructions, prescriptions and follow up appointments. Pt transported to discharge lobby by [facility] staff.

## 2024-02-01 NOTE — ANESTHESIA POSTPROCEDURE EVALUATION
Department of Anesthesiology  Postprocedure Note    Patient: Javon Kenney  MRN: [id number]  YOB: 1955  Date of evaluation: 2/1/2024    Procedure Summary       Date: 02/01/24 Room / Location: [facility] OR  / [facility]    Anesthesia Start: 0741 Anesthesia Stop: 0858    Procedure: Biopsy Nasopharynx/Oropharynx Visible Lesion Simple (Throat) Diagnosis:       Primary tonsillar squamous cell carcinoma (HCC)      Pharyngeal mass      Referred otalgia of right ear      (Primary tonsillar squamous cell carcinoma (HCC) [C09.9])      (Pharyngeal mass [J39.2])      (Referred otalgia of right ear [H92.01])    Surgeons: Federico Gutierrez MD Responsible Provider: Marlo Menendez DO    Anesthesia Type: General ASA Status: 3            Anesthesia Type: General    Gael Phase I: Gael Score: 9    Gael Phase II: Gael Score: 10    Anesthesia Post Evaluation    Comments: [facility]  POST-ANESTHESIA NOTE       Name:  Javon Kenney                                         Age:  68 y.o.   MRN:  [id number]      Last Vitals:  BP (!) 146/82   Pulse 95   Temp 98.4 °F (36.9 °C) (Temporal)   Resp 18   Ht 1.651 m (5' 5\")   Wt 64.9 kg (143 lb)   SpO2 94%   BMI 23.80 kg/m²   Patient Vitals in the past 4 hrs:  02/01/24 1018, BP:(!) 146/82, Pulse:95, Resp:18, SpO2:94 %  02/01/24 0945, BP:(!) 170/91, Temp:98.4 °F (36.9 °C), Temp src:Temporal, Pulse:87, Resp:16, SpO2:95 %  02/01/24 0940, BP:(!) 165/94, Pulse:90, Resp:14, SpO2:99 %  02/01/24 0935, BP:(!) 149/86, Pulse:85, Resp:13, SpO2:98 %  02/01/24 0930, BP:(!) 169/91, Pulse:86, Resp:13, SpO2:100 %  02/01/24 0925, BP:(!) 158/87, Pulse:91, Resp:18, SpO2:98 %  02/01/24 0920, BP:(!) 152/88, Pulse:90, Resp:13, SpO2:98 %  02/01/24 0915, BP:(!) 168/93, Pulse:(!) 108, Resp:14, SpO2:95 %  02/01/24 0910, BP:(!) 184/106, Pulse:(!) 107, Resp:15, SpO2:93 %  02/01/24 0905, BP:(!) 188/111, Pulse:(!) 113, Resp:14, SpO2:95 %  02/01/24 0855, BP:(!) 194/109, Temp:97 °F

## 2024-02-01 NOTE — ANESTHESIA PRE PROCEDURE
Department of Anesthesiology  Preprocedure Note       Name:  Javon Kenney   Age:  68 y.o.  :  1955                                          MRN:  [id number]         Date:  2024      Surgeon: Surgeon(s):  Federico Gutierrez MD    Procedure: Procedure(s):  Biopsy Nasopharynx/Oropharynx Visible Lesion Simple    Medications prior to admission:   Prior to Admission medications    Medication Sig Start Date End Date Taking? Authorizing Provider   HYDROcodone-acetaminophen (NORCO) 7.5-325 MG per tablet Take 1 tablet by mouth every 6 hours as needed for Pain. Max Daily Amount: 4 tablets   Yes Provider, MD Kirsten   loratadine-pseudoephedrine (CLARITIN-D 24 HOUR)  MG per extended release tablet Take 1 tablet by mouth daily  Patient not taking: Reported on 2024 1/10/24 2/9/24  Med Obrein DO   Magic Mouthwash (MIRACLE MOUTHWASH) Swish and spit 5 mLs 4 times daily as needed for Irritation 1/10/24   Med Obrien DO   clopidogrel (PLAVIX) 75 MG tablet daily  Patient not taking: Reported on 2024   Provider, MD Kirsten       Current medications:    Current Facility-Administered Medications   Medication Dose Route Frequency Provider Last Rate Last Admin   • ceFAZolin (ANCEF) 2000 mg in 0.9% sodium chloride 50 mL IVPB  2,000 mg IntraVENous Once Kathleen Salazar, APRN - CNP       • 0.9 % sodium chloride infusion   IntraVENous Continuous Federico Gutierrez  mL/hr at 24 0646 New Bag at 24 0646   • sodium chloride flush 0.9 % injection 5-40 mL  5-40 mL IntraVENous 2 times per day Federico Gutierrez MD       • sodium chloride flush 0.9 % injection 5-40 mL  5-40 mL IntraVENous PRN Federico Gutierrez MD       • 0.9 % sodium chloride infusion   IntraVENous PRN Federico Gutierrez MD         Facility-Administered Medications Ordered in Other Encounters   Medication Dose Route Frequency Provider Last Rate Last Admin   • midazolam (VERSED) injection   IntraVENous PRN Sofiya

## 2024-02-01 NOTE — PROGRESS NOTES
Pt admitted to [facility] room 8 and oriented to unit. SCD sleeves applied. Nares swabbed. Pt verbalized permission for first name, last initial and physicians name on white board. SDS board and discharge criteria explained, pt and family verbalized understanding. Pt denies thoughts of harming self or others. Call light in reach. Family at the bedside.  Torrie [phone]

## 2024-02-01 NOTE — DISCHARGE INSTRUCTIONS
- Resume diet as tolerated  - Resume regular activities as tolerated  - You may have some bloody saliva for the next day or 2.  If you have increased bleeding or clots, call ENT office or on call provider for recommendations.  - Pain medication as needed as prescribed  - Keep follow up in ENT office as scheduled

## 2024-02-02 NOTE — OP NOTE
Operative Note      Patient: Javon Kenney  YOB: 1955  MRN: [id number]    Date of Procedure: 2/1/2024    Pre-Op Diagnosis Codes:     * Primary tonsillar squamous cell carcinoma (HCC) [C09.9]     * Pharyngeal mass [J39.2]     * Referred otalgia of right ear [H92.01]    Post-Op Diagnosis: Same       Procedure(s):  Biopsy Nasopharynx/Oropharynx Visible Lesion Simple    Surgeon(s):  Federico Gutierrez MD    Assistant:   First Assistant: Kathleen Salazar APRN - CNP    Anesthesia: General    Estimated Blood Loss (mL): Minimal    Complications: None    Specimens:   ID Type Source Tests Collected by Time Destination   A : RIGHT OROPHARYNX ULCER FROZEN Tissue Nasopharynx/Oropharynx SURGICAL PATHOLOGY Federico Gutierrez MD 2/1/2024 0809    B : RIGHT OROPHARYNX ULCER-PERMANENT Tissue Nasopharynx/Oropharynx SURGICAL PATHOLOGY Federico Gutierrez MD 2/1/2024 0821    C : RIGHT PHARYNGEAL ULCER AT TONGUE BASE FOLLICULA INTERFACE Tissue Nasopharynx/Oropharynx SURGICAL PATHOLOGY Federico Gutierrez MD 2/1/2024 0826        Implants:  * No implants in log *      Drains: * No LDAs found *    Findings: A 2 x 3 area of ovoid ulceration with luis felipe of granulation tissue versus tumor was carefully examined ranging from the anterior aspect of the patient's reconstructed pharynx over the mucosa of the mandibular body near to the juncture of the floor of mouth with the tongue base.  Multiple biopsies were taken to which were sent for frozen section.  They returned as acute and chronic inflammation with granulation tissue containing atypical cells.        Detailed Description of Procedure:   The patient was taken to the operating room awake and placed in supine position.  General anesthesia was induced and the patient was intubated with a 7.0 endotracheal tube without difficulty or vital sign instability.  The table was turned 90 degrees for the procedure and the patient was draped in usual fashion for transoral surgery.  A

## 2024-02-04 ENCOUNTER — TELEPHONE (OUTPATIENT)
Dept: OTOLARYNGOLOGY | Age: 69
End: 2024-02-04

## 2024-02-13 ENCOUNTER — OFFICE VISIT (OUTPATIENT)
Dept: ENT CLINIC | Age: 69
End: 2024-02-13
Payer: MEDICARE

## 2024-02-13 VITALS
BODY MASS INDEX: 23.06 KG/M2 | HEIGHT: 65 IN | DIASTOLIC BLOOD PRESSURE: 70 MMHG | TEMPERATURE: 98.6 F | HEART RATE: 86 BPM | OXYGEN SATURATION: 97 % | RESPIRATION RATE: 16 BRPM | WEIGHT: 138.4 LBS | SYSTOLIC BLOOD PRESSURE: 130 MMHG

## 2024-02-13 DIAGNOSIS — R13.10 ODYNOPHAGIA: ICD-10-CM

## 2024-02-13 DIAGNOSIS — R13.14 PHARYNGOESOPHAGEAL DYSPHAGIA: ICD-10-CM

## 2024-02-13 DIAGNOSIS — Z85.89 ENCOUNTER FOR FOLLOW-UP SURVEILLANCE OF HEAD AND NECK CANCER: ICD-10-CM

## 2024-02-13 DIAGNOSIS — Z08 ENCOUNTER FOR FOLLOW-UP SURVEILLANCE OF HEAD AND NECK CANCER: ICD-10-CM

## 2024-02-13 DIAGNOSIS — C09.9 PRIMARY TONSILLAR SQUAMOUS CELL CARCINOMA (HCC): Primary | ICD-10-CM

## 2024-02-13 PROCEDURE — 92511 NASOPHARYNGOSCOPY: CPT | Performed by: OTOLARYNGOLOGY

## 2024-02-13 PROCEDURE — 1123F ACP DISCUSS/DSCN MKR DOCD: CPT | Performed by: OTOLARYNGOLOGY

## 2024-02-13 PROCEDURE — 99215 OFFICE O/P EST HI 40 MIN: CPT | Performed by: OTOLARYNGOLOGY

## 2024-02-13 RX ORDER — SUCRALFATE 1 G/1
TABLET ORAL
COMMUNITY
Start: 2024-01-30

## 2024-02-13 RX ORDER — LIDOCAINE HYDROCHLORIDE 20 MG/ML
SOLUTION OROPHARYNGEAL
COMMUNITY
Start: 2024-01-11 | End: 2024-02-13

## 2024-02-13 RX ORDER — HYDROCODONE BITARTRATE AND ACETAMINOPHEN 7.5; 325 MG/1; MG/1
1 TABLET ORAL EVERY 6 HOURS PRN
COMMUNITY

## 2024-02-13 NOTE — PROGRESS NOTES
Value Date/Time     01/10/2024 03:56 PM     12/01/2023 10:01 AM     11/10/2023 03:20 PM     10/27/2023 05:54 AM    K 4.4 01/10/2024 03:56 PM    K 3.5 12/01/2023 10:01 AM    K 3.9 11/10/2023 03:20 PM    K 4.3 10/27/2023 05:54 AM    K 3.7 10/26/2023 05:43 AM    K 4.4 05/09/2023 05:02 AM    K 3.9 09/10/2021 09:20 AM     01/10/2024 03:56 PM     11/10/2023 03:20 PM     10/27/2023 05:54 AM    CO2 27 01/10/2024 03:56 PM    CO2 27 11/10/2023 03:20 PM    CO2 21 10/27/2023 05:54 AM    BUN 20 01/10/2024 03:56 PM    BUN 15 11/10/2023 03:20 PM    BUN 22 10/27/2023 05:54 AM    CREATININE 0.7 01/10/2024 03:56 PM    CREATININE 0.8 12/01/2023 10:01 AM    CREATININE 0.9 11/10/2023 03:20 PM    CREATININE 0.9 10/27/2023 05:54 AM    CALCIUM 9.5 01/10/2024 03:56 PM    CALCIUM 9.7 11/10/2023 03:20 PM    CALCIUM 8.7 10/27/2023 05:54 AM    PROT 6.6 12/01/2023 10:01 AM    PROT 7.2 10/26/2023 05:43 AM    PROT 8.1 06/09/2023 09:30 PM    LABALBU 4.3 01/10/2024 03:56 PM    LABALBU 3.8 12/01/2023 10:01 AM    LABALBU 4.1 10/26/2023 05:43 AM    LABALBU 4.3 07/09/2011 08:24 AM    BILITOT 0.3 12/01/2023 10:01 AM    BILITOT 0.9 10/26/2023 05:43 AM    BILITOT 0.4 06/09/2023 09:30 PM    ALKPHOS 65 12/01/2023 10:01 AM    ALKPHOS 72 10/26/2023 05:43 AM    ALKPHOS 94 06/09/2023 09:30 PM    AST 16 12/01/2023 10:01 AM    AST 16 10/26/2023 05:43 AM    AST 25 06/09/2023 09:30 PM    ALT 21 12/01/2023 10:01 AM    ALT 16 10/26/2023 05:43 AM    ALT 27 06/09/2023 09:30 PM       All of the past medical history, past surgical history, family history,social history, allergies and current medications were reviewed with the patient.     Assessment & Plan   Diagnoses and all orders for this visit:     Diagnosis Orders   1. Primary tonsillar squamous cell carcinoma (HCC)        2. Encounter for follow-up surveillance of head and neck cancer        3. Pharyngoesophageal dysphagia        4. Odynophagia            The findings were

## 2024-02-19 ENCOUNTER — OFFICE VISIT (OUTPATIENT)
Dept: INTERNAL MEDICINE CLINIC | Age: 69
End: 2024-02-19

## 2024-02-19 ENCOUNTER — TELEPHONE (OUTPATIENT)
Dept: ENT CLINIC | Age: 69
End: 2024-02-19

## 2024-02-19 VITALS
OXYGEN SATURATION: 99 % | DIASTOLIC BLOOD PRESSURE: 84 MMHG | SYSTOLIC BLOOD PRESSURE: 130 MMHG | HEART RATE: 102 BPM | RESPIRATION RATE: 18 BRPM | BODY MASS INDEX: 23 KG/M2 | WEIGHT: 138.2 LBS | TEMPERATURE: 97.3 F

## 2024-02-19 DIAGNOSIS — G62.9 NEUROPATHY: ICD-10-CM

## 2024-02-19 DIAGNOSIS — F32.A DEPRESSION, UNSPECIFIED DEPRESSION TYPE: ICD-10-CM

## 2024-02-19 DIAGNOSIS — R13.10 DYSPHAGIA, UNSPECIFIED TYPE: Primary | ICD-10-CM

## 2024-02-19 DIAGNOSIS — E44.0 MODERATE PROTEIN-CALORIE MALNUTRITION (HCC): ICD-10-CM

## 2024-02-19 DIAGNOSIS — R42 LIGHTHEADEDNESS: ICD-10-CM

## 2024-02-19 DIAGNOSIS — K21.9 GASTROESOPHAGEAL REFLUX DISEASE, UNSPECIFIED WHETHER ESOPHAGITIS PRESENT: ICD-10-CM

## 2024-02-19 RX ORDER — PREGABALIN 75 MG/1
75 CAPSULE ORAL 2 TIMES DAILY
Qty: 60 CAPSULE | Refills: 0 | Status: SHIPPED | OUTPATIENT
Start: 2024-02-19 | End: 2024-03-20

## 2024-02-19 RX ORDER — DULOXETIN HYDROCHLORIDE 30 MG/1
30 CAPSULE, DELAYED RELEASE ORAL DAILY
Qty: 30 CAPSULE | Refills: 5 | Status: SHIPPED | OUTPATIENT
Start: 2024-02-19

## 2024-02-19 SDOH — ECONOMIC STABILITY: FOOD INSECURITY: WITHIN THE PAST 12 MONTHS, YOU WORRIED THAT YOUR FOOD WOULD RUN OUT BEFORE YOU GOT MONEY TO BUY MORE.: NEVER TRUE

## 2024-02-19 SDOH — ECONOMIC STABILITY: INCOME INSECURITY: HOW HARD IS IT FOR YOU TO PAY FOR THE VERY BASICS LIKE FOOD, HOUSING, MEDICAL CARE, AND HEATING?: NOT HARD AT ALL

## 2024-02-19 SDOH — ECONOMIC STABILITY: FOOD INSECURITY: WITHIN THE PAST 12 MONTHS, THE FOOD YOU BOUGHT JUST DIDN'T LAST AND YOU DIDN'T HAVE MONEY TO GET MORE.: NEVER TRUE

## 2024-02-19 NOTE — TELEPHONE ENCOUNTER
Wife called asking about the pain medication. It was supposed to be sent in after last visit but pharmacy never received it.

## 2024-02-19 NOTE — PROGRESS NOTES
tobacco: Never   Vaping Use    Vaping Use: Never used   Substance and Sexual Activity    Alcohol use: No     Comment: rare    Drug use: No    Sexual activity: Not on file   Other Topics Concern    Not on file   Social History Narrative    Not on file     Social Determinants of Health     Financial Resource Strain: Low Risk  (2/19/2024)    Overall Financial Resource Strain (CARDIA)     Difficulty of Paying Living Expenses: Not hard at all   Food Insecurity: No Food Insecurity (2/19/2024)    Hunger Vital Sign     Worried About Running Out of Food in the Last Year: Never true     Ran Out of Food in the Last Year: Never true   Transportation Needs: Unknown (2/19/2024)    PRAPARE - Transportation     Lack of Transportation (Medical): Not on file     Lack of Transportation (Non-Medical): No   Physical Activity: Not on file   Stress: Not on file   Social Connections: Not on file   Intimate Partner Violence: Not on file   Housing Stability: Unknown (2/19/2024)    Housing Stability Vital Sign     Unable to Pay for Housing in the Last Year: Not on file     Number of Places Lived in the Last Year: Not on file     Unstable Housing in the Last Year: No       Family History   Problem Relation Age of Onset    Asthma Mother     Heart Disease Mother     Heart Disease Father     Other Father         CHF    Stroke Other     Heart Disease Other        Review of Systems   Constitutional:  Negative for chills and fatigue.   HENT:  Positive for sore throat.    Respiratory:  Negative for cough, shortness of breath and wheezing.    Cardiovascular:  Negative for chest pain, palpitations and leg swelling.   Gastrointestinal:  Negative for abdominal pain, blood in stool, constipation, nausea and vomiting.   Genitourinary:  Negative for dysuria, frequency and hematuria.   Musculoskeletal:  Positive for neck pain. Negative for back pain and myalgias.   Skin:  Negative for rash and wound.   Neurological:  Negative for dizziness, syncope and

## 2024-03-03 PROBLEM — G62.9 NEUROPATHY: Status: ACTIVE | Noted: 2024-03-03

## 2024-03-03 PROBLEM — F32.A DEPRESSION: Status: ACTIVE | Noted: 2024-03-03

## 2024-03-03 PROBLEM — D72.829 LEUKOCYTOSIS: Status: RESOLVED | Noted: 2023-11-05 | Resolved: 2024-03-03

## 2024-03-03 PROBLEM — E44.0 MODERATE MALNUTRITION (HCC): Status: RESOLVED | Noted: 2023-11-05 | Resolved: 2024-03-03

## 2024-03-03 PROBLEM — R07.89 CHEST PRESSURE: Status: RESOLVED | Noted: 2023-11-05 | Resolved: 2024-03-03

## 2024-03-03 PROBLEM — G89.18 ACUTE POST-OPERATIVE PAIN: Status: RESOLVED | Noted: 2023-06-25 | Resolved: 2024-03-03

## 2024-03-03 PROBLEM — U07.1 COVID-19: Status: RESOLVED | Noted: 2022-01-24 | Resolved: 2024-03-03

## 2024-03-03 PROBLEM — D64.9 NORMOCYTIC ANEMIA: Status: RESOLVED | Noted: 2023-11-05 | Resolved: 2024-03-03

## 2024-03-03 PROBLEM — G45.4 TGA (TRANSIENT GLOBAL AMNESIA): Status: RESOLVED | Noted: 2023-06-16 | Resolved: 2024-03-03

## 2024-03-03 PROBLEM — K27.9 PEPTIC ULCER: Status: RESOLVED | Noted: 2017-04-04 | Resolved: 2024-03-03

## 2024-03-06 ENCOUNTER — TELEPHONE (OUTPATIENT)
Dept: INTERNAL MEDICINE CLINIC | Age: 69
End: 2024-03-06

## 2024-03-06 ENCOUNTER — HOSPITAL ENCOUNTER (OUTPATIENT)
Age: 69
Discharge: HOME | End: 2024-03-06
Payer: MEDICARE

## 2024-03-06 DIAGNOSIS — E44.0 MODERATE PROTEIN-CALORIE MALNUTRITION (HCC): ICD-10-CM

## 2024-03-06 DIAGNOSIS — R13.10 DYSPHAGIA, UNSPECIFIED TYPE: ICD-10-CM

## 2024-03-06 LAB
ALBUMIN SERPL BCG-MCNC: 4.2 G/DL (ref 3.5–5.1)
ANION GAP SERPL CALC-SCNC: 12 MEQ/L (ref 8–16)
BUN SERPL-MCNC: 21 MG/DL (ref 7–22)
CALCIUM SERPL-MCNC: 9.2 MG/DL (ref 8.5–10.5)
CHLORIDE SERPL-SCNC: 103 MEQ/L (ref 98–111)
CO2 SERPL-SCNC: 25 MEQ/L (ref 23–33)
CREAT SERPL-MCNC: 0.7 MG/DL (ref 0.4–1.2)
GFR SERPL CREATININE-BSD FRML MDRD: > 60 ML/MIN/1.73M2
GLUCOSE SERPL-MCNC: 99 MG/DL (ref 70–108)
POTASSIUM SERPL-SCNC: 4.2 MEQ/L (ref 3.5–5.2)
SODIUM SERPL-SCNC: 140 MEQ/L (ref 135–145)

## 2024-03-06 PROCEDURE — 82040 ASSAY OF SERUM ALBUMIN: CPT

## 2024-03-06 PROCEDURE — 80048 BASIC METABOLIC PNL TOTAL CA: CPT

## 2024-03-06 PROCEDURE — 36415 COLL VENOUS BLD VENIPUNCTURE: CPT

## 2024-03-06 NOTE — TELEPHONE ENCOUNTER
----- Message from Kasie Wilkes MD sent at 3/3/2024 10:53 AM EST -----  Remind him BMP and albumin due now.

## 2024-03-18 ENCOUNTER — OFFICE VISIT (OUTPATIENT)
Dept: INTERNAL MEDICINE CLINIC | Age: 69
End: 2024-03-18

## 2024-03-18 VITALS
WEIGHT: 141.6 LBS | HEART RATE: 75 BPM | SYSTOLIC BLOOD PRESSURE: 110 MMHG | HEIGHT: 65 IN | OXYGEN SATURATION: 99 % | BODY MASS INDEX: 23.59 KG/M2 | DIASTOLIC BLOOD PRESSURE: 62 MMHG

## 2024-03-18 DIAGNOSIS — G62.9 NEUROPATHY: ICD-10-CM

## 2024-03-18 DIAGNOSIS — R13.14 PHARYNGOESOPHAGEAL DYSPHAGIA: Primary | ICD-10-CM

## 2024-03-18 DIAGNOSIS — F32.A DEPRESSION, UNSPECIFIED DEPRESSION TYPE: ICD-10-CM

## 2024-03-18 PROBLEM — E44.1 MILD PROTEIN-CALORIE MALNUTRITION (HCC): Status: ACTIVE | Noted: 2024-03-18

## 2024-03-18 PROBLEM — R13.19 OTHER DYSPHAGIA: Status: ACTIVE | Noted: 2023-10-24

## 2024-03-18 RX ORDER — DULOXETIN HYDROCHLORIDE 30 MG/1
30 CAPSULE, DELAYED RELEASE ORAL DAILY
Qty: 30 CAPSULE | Refills: 5 | Status: SHIPPED | OUTPATIENT
Start: 2024-03-18 | End: 2024-04-11

## 2024-03-18 NOTE — PROGRESS NOTES
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  Radical tonsillectomy July 21 2023.  Finished radiation therapy with . Patient 11/24/2023.    Dysphagia  -Seeing ENT in [place] to discuss possible removal of bone    Neuropathy: Lyrica 75 mg twice daily  -Was experiencing shooting nerve pains Lyrica trial  -pain was a 5, now a 3     Depression: Started on Cymbalta last visit 30 mg daily    Moderate protein calorie malnutrition: BMP, albumin  -Encouraged to increase Ensure 4-5 to 6 a day  last visit weight of 138 lbs, 141 lbs ( 3 Lbs)  -Albumin 4.2 3/6/2024    Vasovagal syncope 2/27/24    Past Medical History:   Diagnosis Date    Anxiety     Arthritis     neck, back, hands bilat    Cancer (HCC) 2022    Melanoma removed from face    GERD (gastroesophageal reflux disease)     Hiatal hernia     EGD    History of carotid stenosis 11/05/2023    Neoplasm of tonsillar fossa 07/03/2023    R - Squamous Cell    PONV (postoperative nausea and vomiting)     PUD (peptic ulcer disease)     LONG TIME  AGO    Rectal bleeding     2o11- colonoscopy - dr david    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  (No tx indicated.)       Past Surgical History:   Procedure Laterality Date    BRONCHOSCOPY N/A 6/21/2023    BRONCHOSCOPY performed by Federico Gutierrez MD at [facility] OR    EYE SURGERY  plate in right eye    GASTRIC FUNDOPLICATION N/A 5/8/2023    ROBOTIC HIATAL HERNIA REPAIR AND NISSEN FUNDOPLICATION performed by Med Collins MD at [facility] OR    LARYNGOSCOPY N/A 6/21/2023    Laryngoscopy with Biopsy, Flexible Esophagoscopy - Diagnostic Bronchoscopy with Broncheal Alveolar Lavage, Biopsy of

## 2024-03-22 ENCOUNTER — HOSPITAL ENCOUNTER (OUTPATIENT)
Dept: RADIATION ONCOLOGY | Age: 69
Discharge: HOME | End: 2024-03-22
Payer: MEDICARE

## 2024-03-22 VITALS
BODY MASS INDEX: 24.2 KG/M2 | HEART RATE: 88 BPM | OXYGEN SATURATION: 98 % | TEMPERATURE: 98 F | RESPIRATION RATE: 16 BRPM | SYSTOLIC BLOOD PRESSURE: 125 MMHG | WEIGHT: 145.4 LBS | DIASTOLIC BLOOD PRESSURE: 78 MMHG

## 2024-03-22 DIAGNOSIS — R59.0 MEDIASTINAL ADENOPATHY: ICD-10-CM

## 2024-03-22 DIAGNOSIS — L90.5 SCAR TISSUE: Primary | ICD-10-CM

## 2024-03-22 DIAGNOSIS — C09.9 PRIMARY TONSILLAR SQUAMOUS CELL CARCINOMA (HCC): ICD-10-CM

## 2024-03-22 PROCEDURE — 99212 OFFICE O/P EST SF 10 MIN: CPT

## 2024-03-22 ASSESSMENT — ENCOUNTER SYMPTOMS
TROUBLE SWALLOWING: 1
BLOOD IN STOOL: 0
VOMITING: 0
NAUSEA: 1
VOICE CHANGE: 0
SHORTNESS OF BREATH: 0
CONSTIPATION: 0
SINUS PAIN: 0
SORE THROAT: 0
ABDOMINAL PAIN: 1
SINUS PRESSURE: 0
COUGH: 1
RECTAL PAIN: 0

## 2024-03-22 NOTE — PROGRESS NOTES
[facility]           [address], [address]        [place] [id number]        O: [phone]        F: [phone]       [employer]            FOLLOW UP NOTE    Date of Service: 3/22/2024  Patient ID: Javon Kenney   : 1955  MRN: [id number]   Acct Number: [id number]       DATE OF SERVICE: 3/22/2024   LOCATION: [facility]  PROVIDER: Cyndi Mireles PA-C    FOLLOW UP PHYSICIANS: Dr. Federico Gutierrez (ENT)    ASSESSMENT AND PLAN:   Cancer Staging   Primary tonsillar squamous cell carcinoma (HCC)  Staging form: Pharynx - HPV-Mediated Oropharynx, AJCC 8th Edition  - Clinical stage from 2023: Stage I (cT2, cN0, cM0, p16+) - Signed by Federico Gutierrez MD on 2023  - Pathologic stage from 2023: Stage II (pT4, pN0, cM0, p16+) - Unsigned    - Javon Kenney is a 68 y.o. male who presents today with his wife for regularly-scheduled follow-up for their head and neck cancer. He completed adjuvant RT to post-op tonsil and LNs on 23  - The patient appears to be doing fair, slowly recovering from his radiation treatments. He notes some syncopal episodes recently, with a feeling of warmth and weakness preceding but no other cardiac, neurologic, or other symptoms; ED workup was negative. He is not taking in much food or hydration secondary to decreased stomach size since surgery. He can swallow solids but some get stuck; he was released from SLP. He reports stable numbness of the tongue and resolving mouth and throat discomfort. He notes right neck firmness, and states he feels like he has had intermittent lumps on the left neck  - Recent PET/CT scan on 24 resulted as: right oropharyngeal soft tissue fullness with decreased avidity,   - ENT scopes 3/11/24 by Dr. Gutierrez and 3/12/24 showed ROSEMARIE, with healing superficial ulcer  - Continue to follow with ENT, dentist, and all other medical providers. Continue undergoing

## 2024-03-26 ENCOUNTER — OFFICE VISIT (OUTPATIENT)
Dept: ENT CLINIC | Age: 69
End: 2024-03-26
Payer: MEDICARE

## 2024-03-26 VITALS
SYSTOLIC BLOOD PRESSURE: 144 MMHG | TEMPERATURE: 98.3 F | OXYGEN SATURATION: 97 % | HEART RATE: 74 BPM | RESPIRATION RATE: 20 BRPM | BODY MASS INDEX: 23.14 KG/M2 | HEIGHT: 66 IN | WEIGHT: 144 LBS | DIASTOLIC BLOOD PRESSURE: 78 MMHG

## 2024-03-26 DIAGNOSIS — J34.89 NASAL SEPTAL SPUR: ICD-10-CM

## 2024-03-26 DIAGNOSIS — J34.2 NASAL SEPTAL DEVIATION: ICD-10-CM

## 2024-03-26 DIAGNOSIS — J39.2 PHARYNGEAL LESION: ICD-10-CM

## 2024-03-26 DIAGNOSIS — Z01.818 PRE-OP TESTING: Primary | ICD-10-CM

## 2024-03-26 DIAGNOSIS — C09.9 PRIMARY TONSILLAR SQUAMOUS CELL CARCINOMA (HCC): Primary | ICD-10-CM

## 2024-03-26 PROCEDURE — 1123F ACP DISCUSS/DSCN MKR DOCD: CPT | Performed by: OTOLARYNGOLOGY

## 2024-03-26 PROCEDURE — 99215 OFFICE O/P EST HI 40 MIN: CPT | Performed by: OTOLARYNGOLOGY

## 2024-03-26 PROCEDURE — 92511 NASOPHARYNGOSCOPY: CPT | Performed by: OTOLARYNGOLOGY

## 2024-03-26 NOTE — PROGRESS NOTES
SIMPLE      2. Nasal septal deviation  WY SEPTOPLASTY/SUBMUCOUS RESECJ W/WO CARTILAGE GRF      3. Nasal septal spur  WY SEPTOPLASTY/SUBMUCOUS RESECJ W/WO CARTILAGE GRF      4. Pharyngeal lesion            The findings were explained and his questions were answered.  Based on patient's history and these physical findings today particularly those of his nasopharyngoscopy, the patient has an distinct expansion of the ulcerated area that I biopsied a couple of months ago and was negative for malignancy.  It is now extending towards the posterior edge of the mandible and the border of the pharyngeal mucosa and covering a height of 2-3 times the original ulcer area.  It looks very concerning for the possibility of malignancy.  I recommended exam under anesthesia and biopsies to reduce the chances that he has a malignancy that is not being treated.  I reviewed with him the other risks which she is well aware of.  He may have some exposed mandibular bone as well and he is aware of the risk of osteoradionecrosis.  In addition, the patient has a very narrow left nasal airway making it difficult to scope and without causing a nosebleed and a lot of pain.  I recommended that I remove a nasal septal spur as part of a limited septoplasty that will help his follow-up care be easier to provide.  I reviewed the risks of that process as well to his satisfaction and those of his wife who was with this the entire time.    I spent over 40 minutes of face-to-face time with the patient not counting the 10 minutes of his endoscopy the majority of which was dedicated to reviewing his complex history and structuring his follow-up care plan.      Return in about 6 weeks (around 5/7/2024) for Postop follow-up and to review pathology report.           **This report has been created using voice recognition software. It may contain minor errors which are inherent in voice recognition technology.**

## 2024-03-27 DIAGNOSIS — R55 SYNCOPE, UNSPECIFIED SYNCOPE TYPE: Primary | ICD-10-CM

## 2024-04-01 DIAGNOSIS — C09.9 PRIMARY TONSILLAR SQUAMOUS CELL CARCINOMA (HCC): Primary | ICD-10-CM

## 2024-04-05 ENCOUNTER — HOSPITAL ENCOUNTER (OUTPATIENT)
Dept: CT IMAGING | Age: 69
Discharge: HOME | End: 2024-04-05
Payer: MEDICARE

## 2024-04-05 DIAGNOSIS — C09.9 PRIMARY TONSILLAR SQUAMOUS CELL CARCINOMA (HCC): ICD-10-CM

## 2024-04-05 DIAGNOSIS — R55 SYNCOPE, UNSPECIFIED SYNCOPE TYPE: ICD-10-CM

## 2024-04-05 PROCEDURE — 6360000004 HC RX CONTRAST MEDICATION

## 2024-04-05 PROCEDURE — 70498 CT ANGIOGRAPHY NECK: CPT

## 2024-04-05 PROCEDURE — 70487 CT MAXILLOFACIAL W/DYE: CPT

## 2024-04-05 RX ADMIN — IOPAMIDOL 75 ML: 755 INJECTION, SOLUTION INTRAVENOUS at 17:29

## 2024-04-07 DIAGNOSIS — F32.A DEPRESSION, UNSPECIFIED DEPRESSION TYPE: ICD-10-CM

## 2024-04-07 DIAGNOSIS — G62.9 NEUROPATHY: ICD-10-CM

## 2024-04-07 DIAGNOSIS — R13.14 PHARYNGOESOPHAGEAL DYSPHAGIA: ICD-10-CM

## 2024-04-10 RX ORDER — MEMANTINE HYDROCHLORIDE 10 MG/1
10 TABLET ORAL 2 TIMES DAILY
COMMUNITY
Start: 2024-04-02

## 2024-04-10 NOTE — PROGRESS NOTES
Notified fran with Dr Gutierrez office that pt states using Labs from 2/27/24 for this surgery and if they are his potassium was slightly low at 3.3.  Pt had BMP redone on 3/6 Potassium level normal

## 2024-04-10 NOTE — FLOWSHEET NOTE
Pt is having 0 pain nausea  .  It is 0/10 today.  \"    Pain scale and pain management review with patient.     Follow all instructions given by your physician  Do not eat or drink anything after midnight prior to surgery(includes water, chewing gum, mints and ice chips)  Sips of water am of surgery with allowed medications  Do not use chewing tobacco after midnight prior to surgery  May brush teeth do not swallow water  Do not smoke, drink alcoholic beverages or use any illicit drugs for 24 hours prior to surgery  Bring insurance info and photo ID  Bring pertinent paperwork with you from Doctor or surgeons's office  Wear clean comfortable, loose-fitting clothing  No make-up, nail polish, jewelry, piercings, or contact lenses to be worn day of surgery  No glue on dentures morning of surgery; you will be asked to remove them for surgery. Case for glasses.  Shower the night before and the morning of surgery with cleansing soap provided or a liquid antibacterial soap, dry with new fresh clean towel after each shower, no lotions, creams or powder.  Clean sheets and pillowcase on bed night before surgery  Bring medications in original bottles, Bring rescue inhalers with you  Bring CPAP/BIPAP machine if you have one ( you may be charged if one is needed in recovery room )  CPAP machine  no   Pacemaker no   Our pharmacy has a Meds to Beds program where they will deliver any new prescriptions you may have to your room before you leave. Our Pharmacy will clear it through your insurance; for example (same co pay). This enables you to take your new RX as soon as you need when you get home and avoids stop/wait delays on the way home.  Please have a form of payment with you and have someone designated as your Pharmacy contact with their phone # as you may not feel well or still be under the influence of anesthesia.    Please refer to the SSI-Surgical Site Infection Flyer you hopefully received in the mail-together we can prevent  infections; signs and symptoms reviewed.  When discharged be sure you understand how to care for your wound and that you have the Dr./office phone # to call if you have any concerns or questions about your wound.     needed at discharge and someone over 18 to stay with you for 24 hours overnight (surgery may be cancelled if you don't have this)  Report to [facility] on 2nd floor  If you would become ill prior to surgery, please call the surgeon  May have a visitor with you, we request that you limit to 2 visitors in pre-op area  Masks are recommended but not required, new masks at entrance desk  Call [phone] for any questions

## 2024-04-11 ENCOUNTER — PREP FOR PROCEDURE (OUTPATIENT)
Dept: ENT CLINIC | Age: 69
End: 2024-04-11

## 2024-04-11 RX ORDER — DULOXETIN HYDROCHLORIDE 30 MG/1
30 CAPSULE, DELAYED RELEASE ORAL DAILY
Qty: 90 CAPSULE | Refills: 1 | Status: SHIPPED | OUTPATIENT
Start: 2024-04-11

## 2024-04-16 ENCOUNTER — HOSPITAL ENCOUNTER (OUTPATIENT)
Dept: PHYSICAL THERAPY | Age: 69
Setting detail: THERAPIES SERIES
Discharge: HOME | End: 2024-04-16
Attending: INTERNAL MEDICINE
Payer: MEDICARE

## 2024-04-16 PROCEDURE — 97162 PT EVAL MOD COMPLEX 30 MIN: CPT

## 2024-04-16 NOTE — PROGRESS NOTES
PT/Oncology Rehab to allow increase cervical ROM, improved fatigue, lymphedema reduction and control of symptoms to reduce risk of secondary complications such as cellulitis and a decrease in function and quality of life to allow full return to her previous level of activities for quality of life with survivorship.   Body Structures/Functions/Activity Limitations:Decreased affected limb strength, Decreased tolerance of activities, Decreased affected limb range of motion, Difficulty reaching/carrying/pushing/pulling, Difficulty sleeping, Pain, Lymphedema, Lymphedema Risk, Impaired posture, and Disrupted scapulo-humeral rhythm  Prognosis: good    GOALS:  Patient Goal: \"Increase motion in neck.\"    Short Term Goals to be met in 10 visits:  Pt will understand lymphedema precautions to decrease risk of infection and exacerbation of lymphedema.  Pt to demo improved cervical ROM rotation to 65 degrees to assist with turning head when driving.  Pt will engage in a home exercise program (HEP) with minimal assistance to help improve lymphatic flow and venous return.  Pt to demo shoulder flexion range of motion improved from 155 degrees to 165 degrees to assist with functional mobility and daily activities.  Improve BFI to <5 to assist with keeping up with household chores.    Long Term Goals to be met in 12 weeks:   Pt to [facility] Quality of Life score improved by at least 10% (from 56.25% to 66.25%) for improved quality of life.  Pt to demo neck lymphedema composite girth measures controlled at <120 cm with independence with lymphedema risk reduction strategies for safety after discharge.  Pt to demo independence with donning/doffing compression garments/pump for daily and nighttime wear to maintain improved lymphedema volume for fit of clothing.  Pt will be independent with HEP and lymphedema management to help prevent edema relapse and reduce risk of infection.      Patient Education: Plan of care,

## 2024-04-18 ENCOUNTER — ANESTHESIA (OUTPATIENT)
Dept: OPERATING ROOM | Age: 69
End: 2024-04-18
Payer: MEDICARE

## 2024-04-18 ENCOUNTER — ANESTHESIA EVENT (OUTPATIENT)
Dept: OPERATING ROOM | Age: 69
End: 2024-04-18
Payer: MEDICARE

## 2024-04-18 ENCOUNTER — HOSPITAL ENCOUNTER (OUTPATIENT)
Age: 69
Setting detail: OBSERVATION
Discharge: HOME | End: 2024-04-19
Attending: OTOLARYNGOLOGY
Payer: MEDICARE

## 2024-04-18 DIAGNOSIS — J34.89 NASAL SEPTAL SPUR: ICD-10-CM

## 2024-04-18 DIAGNOSIS — J34.2 NASAL SEPTAL DEVIATION: ICD-10-CM

## 2024-04-18 DIAGNOSIS — C09.9 PRIMARY TONSILLAR SQUAMOUS CELL CARCINOMA (HCC): ICD-10-CM

## 2024-04-18 DIAGNOSIS — G89.18 ACUTE POST-OPERATIVE PAIN: Primary | ICD-10-CM

## 2024-04-18 PROBLEM — I95.81 POSTOPERATIVE HYPOTENSION: Status: ACTIVE | Noted: 2024-04-18

## 2024-04-18 PROCEDURE — G0378 HOSPITAL OBSERVATION PER HR: HCPCS

## 2024-04-18 PROCEDURE — 2720000010 HC SURG SUPPLY STERILE: Performed by: OTOLARYNGOLOGY

## 2024-04-18 PROCEDURE — 6360000002 HC RX W HCPCS: Performed by: REGISTERED NURSE

## 2024-04-18 PROCEDURE — 3700000000 HC ANESTHESIA ATTENDED CARE: Performed by: OTOLARYNGOLOGY

## 2024-04-18 PROCEDURE — 2580000003 HC RX 258: Performed by: NURSE PRACTITIONER

## 2024-04-18 PROCEDURE — 2580000003 HC RX 258: Performed by: OTOLARYNGOLOGY

## 2024-04-18 PROCEDURE — 88311 DECALCIFY TISSUE: CPT

## 2024-04-18 PROCEDURE — 7100000010 HC PHASE II RECOVERY - FIRST 15 MIN: Performed by: OTOLARYNGOLOGY

## 2024-04-18 PROCEDURE — 6370000000 HC RX 637 (ALT 250 FOR IP): Performed by: REGISTERED NURSE

## 2024-04-18 PROCEDURE — 7100000001 HC PACU RECOVERY - ADDTL 15 MIN: Performed by: OTOLARYNGOLOGY

## 2024-04-18 PROCEDURE — 6360000002 HC RX W HCPCS: Performed by: NURSE PRACTITIONER

## 2024-04-18 PROCEDURE — 7100000000 HC PACU RECOVERY - FIRST 15 MIN: Performed by: OTOLARYNGOLOGY

## 2024-04-18 PROCEDURE — 2580000003 HC RX 258: Performed by: PHYSICIAN ASSISTANT

## 2024-04-18 PROCEDURE — 6360000002 HC RX W HCPCS: Performed by: ANESTHESIOLOGY

## 2024-04-18 PROCEDURE — 2500000003 HC RX 250 WO HCPCS: Performed by: REGISTERED NURSE

## 2024-04-18 PROCEDURE — 6370000000 HC RX 637 (ALT 250 FOR IP): Performed by: OTOLARYNGOLOGY

## 2024-04-18 PROCEDURE — 3600000014 HC SURGERY LEVEL 4 ADDTL 15MIN: Performed by: OTOLARYNGOLOGY

## 2024-04-18 PROCEDURE — 7100000011 HC PHASE II RECOVERY - ADDTL 15 MIN: Performed by: OTOLARYNGOLOGY

## 2024-04-18 PROCEDURE — 6360000002 HC RX W HCPCS: Performed by: OTOLARYNGOLOGY

## 2024-04-18 PROCEDURE — 2709999900 HC NON-CHARGEABLE SUPPLY: Performed by: OTOLARYNGOLOGY

## 2024-04-18 PROCEDURE — 3600000004 HC SURGERY LEVEL 4 BASE: Performed by: OTOLARYNGOLOGY

## 2024-04-18 PROCEDURE — 3700000001 HC ADD 15 MINUTES (ANESTHESIA): Performed by: OTOLARYNGOLOGY

## 2024-04-18 PROCEDURE — 93005 ELECTROCARDIOGRAM TRACING: CPT | Performed by: ANESTHESIOLOGY

## 2024-04-18 PROCEDURE — 88305 TISSUE EXAM BY PATHOLOGIST: CPT

## 2024-04-18 PROCEDURE — APPNB45 APP NON BILLABLE 31-45 MINUTES: Performed by: NURSE PRACTITIONER

## 2024-04-18 RX ORDER — NALOXONE HYDROCHLORIDE 0.4 MG/ML
INJECTION, SOLUTION INTRAMUSCULAR; INTRAVENOUS; SUBCUTANEOUS PRN
Status: DISCONTINUED | OUTPATIENT
Start: 2024-04-18 | End: 2024-04-18 | Stop reason: HOSPADM

## 2024-04-18 RX ORDER — MEMANTINE HYDROCHLORIDE 10 MG/1
10 TABLET ORAL 2 TIMES DAILY
Status: DISCONTINUED | OUTPATIENT
Start: 2024-04-18 | End: 2024-04-19 | Stop reason: HOSPADM

## 2024-04-18 RX ORDER — SODIUM CHLORIDE 0.9 % (FLUSH) 0.9 %
5-40 SYRINGE (ML) INJECTION PRN
Status: DISCONTINUED | OUTPATIENT
Start: 2024-04-18 | End: 2024-04-18 | Stop reason: HOSPADM

## 2024-04-18 RX ORDER — SODIUM CHLORIDE 0.9 % (FLUSH) 0.9 %
5-40 SYRINGE (ML) INJECTION EVERY 12 HOURS SCHEDULED
Status: DISCONTINUED | OUTPATIENT
Start: 2024-04-18 | End: 2024-04-18 | Stop reason: HOSPADM

## 2024-04-18 RX ORDER — HYDROCODONE BITARTRATE AND ACETAMINOPHEN 5; 325 MG/1; MG/1
1 TABLET ORAL EVERY 6 HOURS PRN
Qty: 20 TABLET | Refills: 0 | Status: SHIPPED | OUTPATIENT
Start: 2024-04-18 | End: 2024-04-23

## 2024-04-18 RX ORDER — OXYMETAZOLINE HYDROCHLORIDE 0.05 G/100ML
SPRAY NASAL PRN
Status: DISCONTINUED | OUTPATIENT
Start: 2024-04-18 | End: 2024-04-18 | Stop reason: ALTCHOICE

## 2024-04-18 RX ORDER — SODIUM CHLORIDE 0.9 % (FLUSH) 0.9 %
5-40 SYRINGE (ML) INJECTION EVERY 12 HOURS SCHEDULED
Status: DISCONTINUED | OUTPATIENT
Start: 2024-04-18 | End: 2024-04-19 | Stop reason: HOSPADM

## 2024-04-18 RX ORDER — SODIUM CHLORIDE 0.9 % (FLUSH) 0.9 %
5-40 SYRINGE (ML) INJECTION EVERY 12 HOURS SCHEDULED
Status: CANCELLED | OUTPATIENT
Start: 2024-04-18

## 2024-04-18 RX ORDER — DEXAMETHASONE SODIUM PHOSPHATE 4 MG/ML
10 INJECTION, SOLUTION INTRA-ARTICULAR; INTRALESIONAL; INTRAMUSCULAR; INTRAVENOUS; SOFT TISSUE ONCE
Status: COMPLETED | OUTPATIENT
Start: 2024-04-18 | End: 2024-04-18

## 2024-04-18 RX ORDER — DULOXETIN HYDROCHLORIDE 30 MG/1
30 CAPSULE, DELAYED RELEASE ORAL DAILY
Status: DISCONTINUED | OUTPATIENT
Start: 2024-04-19 | End: 2024-04-19 | Stop reason: HOSPADM

## 2024-04-18 RX ORDER — HYDROCODONE BITARTRATE AND ACETAMINOPHEN 5; 325 MG/1; MG/1
1 TABLET ORAL ONCE
Status: COMPLETED | OUTPATIENT
Start: 2024-04-18 | End: 2024-04-18

## 2024-04-18 RX ORDER — SODIUM CHLORIDE 9 MG/ML
INJECTION, SOLUTION INTRAVENOUS PRN
Status: DISCONTINUED | OUTPATIENT
Start: 2024-04-18 | End: 2024-04-18 | Stop reason: HOSPADM

## 2024-04-18 RX ORDER — PROPOFOL 10 MG/ML
INJECTION, EMULSION INTRAVENOUS PRN
Status: DISCONTINUED | OUTPATIENT
Start: 2024-04-18 | End: 2024-04-18 | Stop reason: SDUPTHER

## 2024-04-18 RX ORDER — HYDRALAZINE HYDROCHLORIDE 20 MG/ML
INJECTION INTRAMUSCULAR; INTRAVENOUS
Status: DISCONTINUED
Start: 2024-04-18 | End: 2024-04-18 | Stop reason: WASHOUT

## 2024-04-18 RX ORDER — ACETAMINOPHEN 650 MG/1
650 SUPPOSITORY RECTAL EVERY 6 HOURS PRN
Status: DISCONTINUED | OUTPATIENT
Start: 2024-04-18 | End: 2024-04-19 | Stop reason: HOSPADM

## 2024-04-18 RX ORDER — POTASSIUM CHLORIDE 7.45 MG/ML
10 INJECTION INTRAVENOUS PRN
Status: DISCONTINUED | OUTPATIENT
Start: 2024-04-18 | End: 2024-04-19 | Stop reason: HOSPADM

## 2024-04-18 RX ORDER — SODIUM CHLORIDE 9 MG/ML
INJECTION, SOLUTION INTRAVENOUS CONTINUOUS
Status: DISCONTINUED | OUTPATIENT
Start: 2024-04-18 | End: 2024-04-19 | Stop reason: HOSPADM

## 2024-04-18 RX ORDER — MEPERIDINE HYDROCHLORIDE 25 MG/ML
12.5 INJECTION INTRAMUSCULAR; INTRAVENOUS; SUBCUTANEOUS EVERY 5 MIN PRN
Status: COMPLETED | OUTPATIENT
Start: 2024-04-18 | End: 2024-04-18

## 2024-04-18 RX ORDER — POTASSIUM CHLORIDE 20 MEQ/1
40 TABLET, EXTENDED RELEASE ORAL PRN
Status: DISCONTINUED | OUTPATIENT
Start: 2024-04-18 | End: 2024-04-19 | Stop reason: HOSPADM

## 2024-04-18 RX ORDER — ONDANSETRON 2 MG/ML
INJECTION INTRAMUSCULAR; INTRAVENOUS PRN
Status: DISCONTINUED | OUTPATIENT
Start: 2024-04-18 | End: 2024-04-18 | Stop reason: SDUPTHER

## 2024-04-18 RX ORDER — CIPROFLOXACIN 500 MG/1
500 TABLET, FILM COATED ORAL 2 TIMES DAILY
Qty: 20 TABLET | Refills: 0 | Status: SHIPPED | OUTPATIENT
Start: 2024-04-18 | End: 2024-04-28

## 2024-04-18 RX ORDER — LIDOCAINE HYDROCHLORIDE 40 MG/ML
SOLUTION TOPICAL PRN
Status: DISCONTINUED | OUTPATIENT
Start: 2024-04-18 | End: 2024-04-18 | Stop reason: SDUPTHER

## 2024-04-18 RX ORDER — SODIUM CHLORIDE 9 MG/ML
INJECTION, SOLUTION INTRAVENOUS CONTINUOUS
Status: DISCONTINUED | OUTPATIENT
Start: 2024-04-18 | End: 2024-04-18 | Stop reason: HOSPADM

## 2024-04-18 RX ORDER — FENTANYL CITRATE 50 UG/ML
50 INJECTION, SOLUTION INTRAMUSCULAR; INTRAVENOUS EVERY 5 MIN PRN
Status: DISCONTINUED | OUTPATIENT
Start: 2024-04-18 | End: 2024-04-18 | Stop reason: HOSPADM

## 2024-04-18 RX ORDER — ONDANSETRON 4 MG/1
4 TABLET, ORALLY DISINTEGRATING ORAL EVERY 8 HOURS PRN
Status: DISCONTINUED | OUTPATIENT
Start: 2024-04-18 | End: 2024-04-19 | Stop reason: HOSPADM

## 2024-04-18 RX ORDER — ONDANSETRON 2 MG/ML
4 INJECTION INTRAMUSCULAR; INTRAVENOUS EVERY 6 HOURS PRN
Status: DISCONTINUED | OUTPATIENT
Start: 2024-04-18 | End: 2024-04-19 | Stop reason: HOSPADM

## 2024-04-18 RX ORDER — SODIUM CHLORIDE 9 MG/ML
INJECTION, SOLUTION INTRAVENOUS PRN
Status: DISCONTINUED | OUTPATIENT
Start: 2024-04-18 | End: 2024-04-19 | Stop reason: HOSPADM

## 2024-04-18 RX ORDER — SODIUM CHLORIDE 9 MG/ML
INJECTION, SOLUTION INTRAVENOUS PRN
Status: CANCELLED | OUTPATIENT
Start: 2024-04-18

## 2024-04-18 RX ORDER — MAGNESIUM SULFATE IN WATER 40 MG/ML
2000 INJECTION, SOLUTION INTRAVENOUS PRN
Status: DISCONTINUED | OUTPATIENT
Start: 2024-04-18 | End: 2024-04-19 | Stop reason: HOSPADM

## 2024-04-18 RX ORDER — SODIUM CHLORIDE 0.9 % (FLUSH) 0.9 %
5-40 SYRINGE (ML) INJECTION PRN
Status: CANCELLED | OUTPATIENT
Start: 2024-04-18

## 2024-04-18 RX ORDER — RIVASTIGMINE 4.6 MG/24H
1 PATCH, EXTENDED RELEASE TRANSDERMAL DAILY
COMMUNITY
Start: 2024-04-11 | End: 2024-08-09

## 2024-04-18 RX ORDER — ROCURONIUM BROMIDE 10 MG/ML
INJECTION, SOLUTION INTRAVENOUS PRN
Status: DISCONTINUED | OUTPATIENT
Start: 2024-04-18 | End: 2024-04-18 | Stop reason: SDUPTHER

## 2024-04-18 RX ORDER — ONDANSETRON 2 MG/ML
4 INJECTION INTRAMUSCULAR; INTRAVENOUS
Status: COMPLETED | OUTPATIENT
Start: 2024-04-18 | End: 2024-04-18

## 2024-04-18 RX ORDER — POLYETHYLENE GLYCOL 3350 17 G/17G
17 POWDER, FOR SOLUTION ORAL DAILY PRN
Status: DISCONTINUED | OUTPATIENT
Start: 2024-04-18 | End: 2024-04-19 | Stop reason: HOSPADM

## 2024-04-18 RX ORDER — ACETAMINOPHEN 325 MG/1
650 TABLET ORAL EVERY 6 HOURS PRN
Status: DISCONTINUED | OUTPATIENT
Start: 2024-04-18 | End: 2024-04-19 | Stop reason: HOSPADM

## 2024-04-18 RX ORDER — RIVASTIGMINE 4.6 MG/24H
1 PATCH, EXTENDED RELEASE TRANSDERMAL DAILY
Status: DISCONTINUED | OUTPATIENT
Start: 2024-04-19 | End: 2024-04-19 | Stop reason: HOSPADM

## 2024-04-18 RX ORDER — SODIUM CHLORIDE 0.9 % (FLUSH) 0.9 %
5-40 SYRINGE (ML) INJECTION PRN
Status: DISCONTINUED | OUTPATIENT
Start: 2024-04-18 | End: 2024-04-19 | Stop reason: HOSPADM

## 2024-04-18 RX ORDER — FENTANYL CITRATE 50 UG/ML
INJECTION, SOLUTION INTRAMUSCULAR; INTRAVENOUS PRN
Status: DISCONTINUED | OUTPATIENT
Start: 2024-04-18 | End: 2024-04-18 | Stop reason: SDUPTHER

## 2024-04-18 RX ADMIN — ONDANSETRON 4 MG: 2 INJECTION INTRAMUSCULAR; INTRAVENOUS at 16:50

## 2024-04-18 RX ADMIN — FENTANYL CITRATE 50 MCG: 50 INJECTION, SOLUTION INTRAMUSCULAR; INTRAVENOUS at 16:50

## 2024-04-18 RX ADMIN — SODIUM CHLORIDE: 9 INJECTION, SOLUTION INTRAVENOUS at 23:36

## 2024-04-18 RX ADMIN — ONDANSETRON 4 MG: 2 INJECTION INTRAMUSCULAR; INTRAVENOUS at 20:12

## 2024-04-18 RX ADMIN — FENTANYL CITRATE 50 MCG: 50 INJECTION, SOLUTION INTRAMUSCULAR; INTRAVENOUS at 17:20

## 2024-04-18 RX ADMIN — SODIUM CHLORIDE: 9 INJECTION, SOLUTION INTRAVENOUS at 17:19

## 2024-04-18 RX ADMIN — MEPERIDINE HYDROCHLORIDE 12.5 MG: 25 INJECTION, SOLUTION INTRAMUSCULAR; INTRAVENOUS; SUBCUTANEOUS at 18:05

## 2024-04-18 RX ADMIN — MEPERIDINE HYDROCHLORIDE 12.5 MG: 25 INJECTION, SOLUTION INTRAMUSCULAR; INTRAVENOUS; SUBCUTANEOUS at 18:10

## 2024-04-18 RX ADMIN — DEXAMETHASONE SODIUM PHOSPHATE 10 MG: 4 INJECTION, SOLUTION INTRA-ARTICULAR; INTRALESIONAL; INTRAMUSCULAR; INTRAVENOUS; SOFT TISSUE at 15:05

## 2024-04-18 RX ADMIN — SUGAMMADEX 200 MG: 100 INJECTION, SOLUTION INTRAVENOUS at 17:58

## 2024-04-18 RX ADMIN — MEPERIDINE HYDROCHLORIDE 12.5 MG: 25 INJECTION, SOLUTION INTRAMUSCULAR; INTRAVENOUS; SUBCUTANEOUS at 18:20

## 2024-04-18 RX ADMIN — HYDROCODONE BITARTRATE AND ACETAMINOPHEN 1 TABLET: 5; 325 TABLET ORAL at 23:43

## 2024-04-18 RX ADMIN — ROCURONIUM BROMIDE 50 MG: 10 INJECTION INTRAVENOUS at 16:50

## 2024-04-18 RX ADMIN — PROPOFOL 200 MG: 10 INJECTION, EMULSION INTRAVENOUS at 16:50

## 2024-04-18 RX ADMIN — PIPERACILLIN SODIUM AND TAZOBACTAM SODIUM 3375 MG: 3; .375 INJECTION, POWDER, LYOPHILIZED, FOR SOLUTION INTRAVENOUS at 16:45

## 2024-04-18 RX ADMIN — LIDOCAINE HYDROCHLORIDE 4 ML: 40 SOLUTION TOPICAL at 16:50

## 2024-04-18 RX ADMIN — SODIUM CHLORIDE: 9 INJECTION, SOLUTION INTRAVENOUS at 13:43

## 2024-04-18 RX ADMIN — MEPERIDINE HYDROCHLORIDE 12.5 MG: 25 INJECTION, SOLUTION INTRAMUSCULAR; INTRAVENOUS; SUBCUTANEOUS at 18:15

## 2024-04-18 ASSESSMENT — PAIN SCALES - GENERAL
PAINLEVEL_OUTOF10: 0
PAINLEVEL_OUTOF10: 5

## 2024-04-18 ASSESSMENT — PAIN DESCRIPTION - LOCATION: LOCATION: HEAD

## 2024-04-18 ASSESSMENT — PAIN DESCRIPTION - DESCRIPTORS: DESCRIPTORS: ACHING

## 2024-04-18 NOTE — ANESTHESIA PRE PROCEDURE
NOSE SURGERY N/A 2/1/2024    Biopsy Nasopharynx/Oropharynx Visible Lesion Simple performed by Federico Gutierrez MD at [facility] [place] OR    UPPER GASTROINTESTINAL ENDOSCOPY N/A 2/10/2023    EGD, POSS BIOPSIES,  MEJÍA performed by Marisol Guerrero MD at [facility] [place] Endoscopy    UPPER GASTROINTESTINAL ENDOSCOPY N/A 4/13/2023    EGD ESOPHAGEAL MANOMETRY AND PH MONITORING 24 HR performed by Rosalino Lu MD at [facility] [place] [facility]oscopy    UPPER GASTROINTESTINAL ENDOSCOPY N/A 10/26/2023    EGD performed by Aly Pollard MD at [facility] [place] ENDOSCOPY    UPPER GASTROINTESTINAL ENDOSCOPY  10/26/2023    EGD DILATION SAVORY performed by Aly Pollard MD at [facility] [place] ENDOSCOPY       Social History:    Social History     Tobacco Use    Smoking status: Never     Passive exposure: Never    Smokeless tobacco: Never   Substance Use Topics    Alcohol use: No     Comment: rare                                Counseling given: Not Answered      Vital Signs (Current):   Vitals:    04/10/24 0956 04/18/24 1300   BP:  (!) 158/83   Pulse:  64   Resp:  18   Temp:  96.8 °F (36 °C)   TempSrc:  Temporal   SpO2:  97%   Weight: 63.5 kg (140 lb) 65 kg (143 lb 6 oz)   Height: 1.651 m (5' 5\") 1.651 m (5' 5\")                                              BP Readings from Last 3 Encounters:   04/18/24 (!) 158/83   03/26/24 (!) 144/78   03/22/24 125/78       NPO Status: Time of last liquid consumption: 2330                        Time of last solid consumption: 2330                        Date of last liquid consumption: 04/17/24                        Date of last solid food consumption: 04/17/24    BMI:   Wt Readings from Last 3 Encounters:   04/18/24 65 kg (143 lb 6 oz)   03/26/24 65.3 kg (144 lb)   03/22/24 66 kg (145 lb 6.4 oz)     Body mass index is 23.86 kg/m².    CBC:   Lab Results   Component Value Date/Time    WBC 7.0 12/01/2023 10:01 AM    RBC 5.07 12/01/2023 10:01 AM    RBC 5.20 07/09/2011 08:24 AM    HGB 14.8 12/01/2023 10:01 AM    HCT 44.1 12/01/2023 10:01 AM    MCV

## 2024-04-18 NOTE — H&P
The patient tolerated the procedure well.     Nasopharyngoscopy images:                                                                          Vitals reviewed.     XR CHEST (2 VW)     Result Date: 2/27/2024  IMPRESSION: No acute cardiopulmonary disease Electronically Signed By: Lesly Robbins MD on 2/27/2024 5:12 PM           Lab Results   Component Value Date/Time      03/06/2024 03:11 PM      01/10/2024 03:56 PM      12/01/2023 10:01 AM      11/10/2023 03:20 PM     K 4.2 03/06/2024 03:11 PM     K 4.4 01/10/2024 03:56 PM     K 3.5 12/01/2023 10:01 AM     K 3.9 11/10/2023 03:20 PM     K 4.3 10/27/2023 05:54 AM     K 4.4 05/09/2023 05:02 AM     K 3.9 09/10/2021 09:20 AM      03/06/2024 03:11 PM      01/10/2024 03:56 PM      11/10/2023 03:20 PM     CO2 25 03/06/2024 03:11 PM     CO2 27 01/10/2024 03:56 PM     CO2 27 11/10/2023 03:20 PM     BUN 21 03/06/2024 03:11 PM     BUN 20 01/10/2024 03:56 PM     BUN 15 11/10/2023 03:20 PM     CREATININE 0.7 03/06/2024 03:11 PM     CREATININE 0.7 01/10/2024 03:56 PM     CREATININE 0.8 12/01/2023 10:01 AM     CREATININE 0.9 11/10/2023 03:20 PM     CALCIUM 9.2 03/06/2024 03:11 PM     CALCIUM 9.5 01/10/2024 03:56 PM     CALCIUM 9.7 11/10/2023 03:20 PM     PROT 6.6 12/01/2023 10:01 AM     PROT 7.2 10/26/2023 05:43 AM     PROT 8.1 06/09/2023 09:30 PM     LABALBU 4.2 03/06/2024 03:11 PM     LABALBU 4.3 01/10/2024 03:56 PM     LABALBU 3.8 12/01/2023 10:01 AM     LABALBU 4.3 07/09/2011 08:24 AM     BILITOT 0.3 12/01/2023 10:01 AM     BILITOT 0.9 10/26/2023 05:43 AM     BILITOT 0.4 06/09/2023 09:30 PM     ALKPHOS 65 12/01/2023 10:01 AM     ALKPHOS 72 10/26/2023 05:43 AM     ALKPHOS 94 06/09/2023 09:30 PM     AST 16 12/01/2023 10:01 AM     AST 16 10/26/2023 05:43 AM     AST 25 06/09/2023 09:30 PM     ALT 21 12/01/2023 10:01 AM     ALT 16 10/26/2023 05:43 AM     ALT 27 06/09/2023 09:30 PM         All of the past medical history, past surgical  about 6 weeks (around 5/7/2024) for Postop follow-up and to review pathology report.           **This report has been created using voice recognition software. It may contain minor errors which are inherent in voice recognition technology.**

## 2024-04-18 NOTE — DISCHARGE INSTRUCTIONS
- Resume home medications  - May use Norco for higher pain, Tylenol if pain is more mild  - Resume regular diet avoiding carbonation, citrus and hot food/liquids for the next 3 days  - Resume regular activities; no heavy lifting or straining until seen for your post op appointment  - If increased bleeding from the nose, may use 1-2 sprays of the Afrin (oxymetazoline) nasal spray

## 2024-04-18 NOTE — PROGRESS NOTES
1802 Pt arrives to PACU, resp easy and unlabored on simple mask. Pt vigorously shivering.    1805 12.5 mg of Demerol given for shivering.    1810 12.5 mg of Demerol given    1815 Pt continues to shiver, 12.5 mg of Demerol given.     1820 Last dose of 12.5 mg of Demerol given at this time.     1823 Simple mask removed, pt on room air.     1826 Pt placed on face tent, tolerating well. Resp easy and unlabored. VSS    1835 Pt asked for urinal to void. Resp easy and unlabored on face tent. VSS. Pt rates pain 5/10 and tolerable  at this time.     1846 Patient meets criteria for discharge from PACU at this time.     1855 Report given to Ashleigh GRAVES

## 2024-04-18 NOTE — OP NOTE
Operative Note      Patient: Javon Kenney  YOB: 1955  MRN: [id number]    Date of Procedure: 4/18/2024    Pre-Op Diagnosis Codes:     * Nasal septal deviation [J34.2]     * Nasal septal spur [J34.89]     * Primary tonsillar squamous cell carcinoma (HCC) [C09.9]    Post-Op Diagnosis: Same       Procedure(s):  LImited Septoplasty for Resection of Septal Spur Biopsy of Nasopharynx Visible Lesion Simple    Surgeon(s):  Federico Gutierrez MD    Assistant:   * No surgical staff found *    Anesthesia: General    Estimated Blood Loss (mL): less than 50     Complications: None    Specimens:   ID Type Source Tests Collected by Time Destination   A : Right Nasal Spur Tissue Nose SURGICAL PATHOLOGY Federico Gutierrez MD 4/18/2024 1725    B : Left Nasal Spur Tissue Nose SURGICAL PATHOLOGY Federico Gutierrez MD 4/18/2024 1727    C : Right Oralpharyngeal lesion Tissue Nasopharynx/Oropharynx SURGICAL PATHOLOGY Federico Gutierrez MD 4/18/2024 1800        Implants:  * No implants in log *      Drains: * No LDAs found *    Findings: 1.  1.5 to 2 cm area in the posterior oral cavity oropharynx confluence of granular tissue that was superficial in nature and biopsied to the submucosa to be sent for pathology  2.  Bilateral nasal septal spurs: Right side amputated with through biting forceps; left side injected and mucous membrane flap raised for deep resection with Blakesley forceps through biting forceps; mucosa stapled down with septal stapler to reduce risk of perforation          Detailed Description of Procedure:   The patient was taken to the operating room awake and placed in supine position.  General anesthesia was induced and the patient was intubated with a 6.5 endotracheal tube without difficulty or vital sign instability though a video laryngoscope was necessary secondary to the patient's postradiation trismus.  The table was turned 90 degrees and the patient was draped in usual fashion for transoral and

## 2024-04-18 NOTE — PROGRESS NOTES
Patient oriented to Same Day department and admitted to Same Day Surgery room 12.   Patient verbalized approval for first name, last initial with physician name on unit whiteboard.     Plan of care reviewed with patient.   Patient room whiteboard filled out and discussed with patient and responsible adult.     Call light in reach.   Bed in lowest position, locked, with one bed rail up.   SCDs and warming blanket in place.  Appropriate arm bands on patient.   Bathroom offered.   All questions and concerns of patient addressed.        Meds to Beds:   Patient informed of [facility] Meds to Beds program during admission. Patient has declined use of program.

## 2024-04-18 NOTE — ANESTHESIA POSTPROCEDURE EVALUATION
Department of Anesthesiology  Postprocedure Note    Patient: Javon Kenney  MRN: [id number]  YOB: 1955  Date of evaluation: 4/18/2024    Procedure Summary       Date: 04/18/24 Room / Location: [facility] OR  / [facility] OR    Anesthesia Start: 1645 Anesthesia Stop: 1804    Procedure: LImited Septoplasty for Resection of Septal Spur Biopsy of Nasopharynx Visible Lesion Simple (Nose) Diagnosis:       Nasal septal deviation      Nasal septal spur      Primary tonsillar squamous cell carcinoma (HCC)      (Nasal septal deviation [J34.2])      (Nasal septal spur [J34.89])      (Primary tonsillar squamous cell carcinoma (HCC) [C09.9])    Surgeons: Federico Gutierrez MD Responsible Provider: Husam Mcclendon DO    Anesthesia Type: general ASA Status: 3            Anesthesia Type: No value filed.    Gael Phase I: Gael Score: 8    Gael Phase II:      Anesthesia Post Evaluation    Patient location during evaluation: PACU  Patient participation: complete - patient participated  Level of consciousness: awake and alert  Pain score: 4  Airway patency: patent  Nausea & Vomiting: no nausea and no vomiting  Cardiovascular status: hemodynamically stable and blood pressure returned to baseline  Respiratory status: spontaneous ventilation, room air and acceptable  Hydration status: stable  Pain management: adequate and satisfactory to patient    No notable events documented.

## 2024-04-19 VITALS
HEART RATE: 71 BPM | HEIGHT: 65 IN | OXYGEN SATURATION: 98 % | BODY MASS INDEX: 23.89 KG/M2 | RESPIRATION RATE: 18 BRPM | WEIGHT: 143.38 LBS | DIASTOLIC BLOOD PRESSURE: 71 MMHG | TEMPERATURE: 97.9 F | SYSTOLIC BLOOD PRESSURE: 140 MMHG

## 2024-04-19 LAB
ANION GAP SERPL CALC-SCNC: 16 MEQ/L (ref 8–16)
BASOPHILS ABSOLUTE: 0 THOU/MM3 (ref 0–0.1)
BASOPHILS NFR BLD AUTO: 0.2 %
BUN SERPL-MCNC: 15 MG/DL (ref 7–22)
CALCIUM SERPL-MCNC: 8.2 MG/DL (ref 8.5–10.5)
CHLORIDE SERPL-SCNC: 106 MEQ/L (ref 98–111)
CO2 SERPL-SCNC: 19 MEQ/L (ref 23–33)
CREAT SERPL-MCNC: 0.8 MG/DL (ref 0.4–1.2)
DEPRECATED RDW RBC AUTO: 40.2 FL (ref 35–45)
EKG ATRIAL RATE: 86 BPM
EKG P AXIS: 43 DEGREES
EKG P-R INTERVAL: 148 MS
EKG Q-T INTERVAL: 402 MS
EKG QRS DURATION: 92 MS
EKG QTC CALCULATION (BAZETT): 481 MS
EKG R AXIS: 10 DEGREES
EKG T AXIS: 64 DEGREES
EKG VENTRICULAR RATE: 86 BPM
EOSINOPHIL NFR BLD AUTO: 0 %
EOSINOPHILS ABSOLUTE: 0 THOU/MM3 (ref 0–0.4)
ERYTHROCYTE [DISTWIDTH] IN BLOOD BY AUTOMATED COUNT: 12.4 % (ref 11.5–14.5)
GFR SERPL CREATININE-BSD FRML MDRD: > 90 ML/MIN/1.73M2
GLUCOSE SERPL-MCNC: 106 MG/DL (ref 70–108)
HCT VFR BLD AUTO: 43.5 % (ref 42–52)
HGB BLD-MCNC: 14.6 GM/DL (ref 14–18)
IMM GRANULOCYTES # BLD AUTO: 0.05 THOU/MM3 (ref 0–0.07)
IMM GRANULOCYTES NFR BLD AUTO: 0.5 %
LYMPHOCYTES ABSOLUTE: 0.5 THOU/MM3 (ref 1–4.8)
LYMPHOCYTES NFR BLD AUTO: 5.1 %
MCH RBC QN AUTO: 29.7 PG (ref 26–33)
MCHC RBC AUTO-ENTMCNC: 33.6 GM/DL (ref 32.2–35.5)
MCV RBC AUTO: 88.6 FL (ref 80–94)
MONOCYTES ABSOLUTE: 0.3 THOU/MM3 (ref 0.4–1.3)
MONOCYTES NFR BLD AUTO: 2.8 %
NEUTROPHILS NFR BLD AUTO: 91.4 %
NRBC BLD AUTO-RTO: 0 /100 WBC
PLATELET # BLD AUTO: 196 THOU/MM3 (ref 130–400)
PMV BLD AUTO: 8.9 FL (ref 9.4–12.4)
POTASSIUM SERPL-SCNC: 4.4 MEQ/L (ref 3.5–5.2)
RBC # BLD AUTO: 4.91 MILL/MM3 (ref 4.7–6.1)
SEGMENTED NEUTROPHILS ABSOLUTE COUNT: 8.9 THOU/MM3 (ref 1.8–7.7)
SODIUM SERPL-SCNC: 141 MEQ/L (ref 135–145)
WBC # BLD AUTO: 9.7 THOU/MM3 (ref 4.8–10.8)

## 2024-04-19 PROCEDURE — 85025 COMPLETE CBC W/AUTO DIFF WBC: CPT

## 2024-04-19 PROCEDURE — 99024 POSTOP FOLLOW-UP VISIT: CPT | Performed by: PHYSICIAN ASSISTANT

## 2024-04-19 PROCEDURE — G0378 HOSPITAL OBSERVATION PER HR: HCPCS

## 2024-04-19 PROCEDURE — 96360 HYDRATION IV INFUSION INIT: CPT

## 2024-04-19 PROCEDURE — 36415 COLL VENOUS BLD VENIPUNCTURE: CPT

## 2024-04-19 PROCEDURE — 6370000000 HC RX 637 (ALT 250 FOR IP)

## 2024-04-19 PROCEDURE — 80048 BASIC METABOLIC PNL TOTAL CA: CPT

## 2024-04-19 PROCEDURE — 96361 HYDRATE IV INFUSION ADD-ON: CPT

## 2024-04-19 PROCEDURE — 93010 ELECTROCARDIOGRAM REPORT: CPT | Performed by: INTERNAL MEDICINE

## 2024-04-19 RX ORDER — HYDROCODONE BITARTRATE AND ACETAMINOPHEN 5; 325 MG/1; MG/1
1 TABLET ORAL EVERY 6 HOURS PRN
Status: DISCONTINUED | OUTPATIENT
Start: 2024-04-19 | End: 2024-04-19 | Stop reason: HOSPADM

## 2024-04-19 RX ADMIN — HYDROCODONE BITARTRATE AND ACETAMINOPHEN 1 TABLET: 5; 325 TABLET ORAL at 08:52

## 2024-04-19 ASSESSMENT — PAIN DESCRIPTION - LOCATION: LOCATION: HEAD;NOSE

## 2024-04-19 ASSESSMENT — ENCOUNTER SYMPTOMS
GASTROINTESTINAL NEGATIVE: 1
SHORTNESS OF BREATH: 1
ALLERGIC/IMMUNOLOGIC NEGATIVE: 1
EYES NEGATIVE: 1

## 2024-04-19 ASSESSMENT — PAIN SCALES - GENERAL: PAINLEVEL_OUTOF10: 5

## 2024-04-19 ASSESSMENT — PAIN DESCRIPTION - FREQUENCY: FREQUENCY: INTERMITTENT

## 2024-04-19 ASSESSMENT — PAIN DESCRIPTION - DESCRIPTORS: DESCRIPTORS: ACHING

## 2024-04-19 NOTE — CARE COORDINATION
Case Management Assessment Initial Evaluation    Date/Time of Evaluation: 4/19/2024 7:47 AM  Assessment Completed by: Tabitha Mendez RN    If patient is discharged prior to next notation, then this note serves as note for discharge by case management.    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 H&H WNL this am. IVF at 75 ml/hr. BP at 0301 was 113/76.     Procedures: 4/18 LImited Septoplasty for Resection of Septal Spur Biopsy of Nasopharynx Visible Lesion Simple     Imaging: none    Patient Goals/Plan/Treatment Preferences: Planning to return home with his wife. Denies any discharge needs.            04/19/24 0955   Service Assessment   Patient Orientation Alert and Oriented   Cognition Alert   History Provided By Patient   Primary Caregiver Self   Accompanied By/Relationship wife   Support Systems Spouse/Significant Other;Family Members   Patient's Healthcare Decision Maker is: Legal Next of Kin   PCP Verified by CM Yes   Prior Functional Level Independent in ADLs/IADLs   Current Functional Level Independent in ADLs/IADLs   Can patient return to prior living arrangement Yes   Ability to make needs known: Good   Family able to assist with home care needs: Yes   Would you like for me to discuss the discharge plan with any other family members/significant others, and if so, who? No   Financial Resources Medicare   Community Resources None   Discharge Planning   Type of Residence House   Living

## 2024-04-19 NOTE — PROGRESS NOTES
1912- RN seen patient was very clampy and lethargic when entering room. Blood Pressure 71/56. Patient placed in [place] and fluids opened up. Dr. Mcclendon with anesthesia called. Phone order for EKG placed.    1915- Blood pressure 104/64    1921- Blood Pressure 128/86. Patient taken out of [place] position and sitting in semi fowlers.       1930-Blood pressure 156/92. EKG and Dr. Mcclendon in room.    1932-EKG resulted same as previous EKG.     2000- Blood pressure 160/100. Dr. Mcclendon okay with blood pressure but suggesting patient stay the night and to reach out to Dr. Gutierrez.     2005-Dr. Gutierrez notified and stated he would reach out to hospitalist to come evaluate the patient to see if the patient needed to be admitted.

## 2024-04-19 NOTE — H&P
Hospitalist - History & Physical      Patient: Javon Kenney    Unit/Bed:8A-19/019-A  YOB: 1955  MRN: [id number]   Acct: [id number]   PCP: Kasie Wilkes MD      Assessment and Plan:        Post operative hypotension:   Monitor  IV fluids  Orthostatic vital signs  Primary tonsillary squamous cell carcinoma:   POD 0 with ENT  ENT to manage any post operative concerns  H/o CVA:   Not currently taking a statin or ASA  Cognitive decline:   On rivastigmine patch and memantine for cognition      CC:  post operative hypotension    HPI: Patient evaluated post operatively for a hypotensive episode followed by some rebound hypertension.  Concern for hyperautomaticity following surgery.  Patient had limited septoplasty and was preparing for discharge but due to lability in blood pressures we were asked to observe him overnight.    Patient has no complaints at this time.  His currently medications are related to his cognitive decline and depression.  He is not currently taking any medications for blood pressure control.  Patient denies any chest pain, shortness of breath, vision changes, nausea or vomiting.    Patient will be observed overnight for any issues with his blood pressure.    ROS: Review of Systems   Constitutional:  Positive for activity change and diaphoresis.   HENT: Negative.     Eyes: Negative.    Respiratory:  Positive for shortness of breath.    Cardiovascular: Negative.    Gastrointestinal: Negative.    Endocrine: Negative.    Genitourinary: Negative.    Musculoskeletal: Negative.    Skin: Negative.    Allergic/Immunologic: Negative.    Neurological:  Positive for dizziness.   Hematological: Negative.    Psychiatric/Behavioral: Negative.       PMH:    Past Medical History:   Diagnosis Date    Anxiety     Arthritis     neck, back, hands bilat    Cancer (HCC) 2022    Melanoma removed from face    External hemorrhoid     bleeding from them and has internal    GERD (gastroesophageal  rashes  Psych:  Alert and oriented x3.  Affect appropriate  Lymph:  No supraclavicular adenopathy.  Neurologic:  CN II-XII grossly intact. No focal deficit.    Data: (All radiographs, tracings, PFTs, and imaging are personally viewed and interpreted unless otherwise noted).   EKG: rhythm: normal sinus rhythm, rate=86 bpm, bv=047 ms, qrs=92 ms, yi=459 ms, axis=43 degrees        Electronically signed by  Diane Trammell PA-C

## 2024-04-19 NOTE — PROGRESS NOTES
Department of Otolaryngology  Progress Note    Chief Complaint:  Post-op blood pressure issues    SUBJECTIVE 4/19/24:  Patient reports that he is overall doing well today. He reports his biggest complaint at this time is feeling hungry and hoping his breakfast tray arrives soon. He reports the pain is better than expected, most notable in his nose, but overall tolerable. He reports mild bleeding from the nose intermittently since surgery, but reports this has been gradually improving. He denies fevers, chills, CP, SOB, dizziness/lightheadedness.     REVIEW OF SYSTEMS:    Pertinent positives as noted in the HPI. All other systems reviewed and negative.    OBJECTIVE      Physical  VITALS:  BP (!) 140/71   Pulse 71   Temp 97.9 °F (36.6 °C) (Oral)   Resp 18   Ht 1.651 m (5' 5\")   Wt 65 kg (143 lb 6 oz)   SpO2 98%   BMI 23.86 kg/m²     This is a 68 y.o. male. Patient is alert and oriented to person, place and time. Patient appears well developed, well nourished. Mood is happy with normal affect. Not obviously hearing impaired.    Head:   Normocephalic, atraumatic.  No obvious masses or lesions noted.     Nose:    External nose: Appears midline. No obvious deformity or masses.  Septum:  relatively midline. No septal hematoma. No perforation.  Mucosa:  scabbed  Turbinates: normal and pink            Discharge:   Mild (expected) bloody coagulum bilaterally    Mouth/Throat:  Lips, tongue and oral cavity: Normal. No masses or lesions noted   Dentition: fair, no malocclusion  Oral mucosa: moist  Tonsils: Exam consistent with TORS right tonsillectomy/resection. Mild scabbed blood near the right tonsillar fossa, but no active/recent bleeding noted.  Oropharynx: normal-appearing mucosa  Hard and soft palates: symmetrical and intact.  Salivary glands: not enlarged and no tenderness to palpation.  Uvula: midline, no obvious lesions   Gag reflex is present.    Neck: Trachea midline.  Thyroid not enlarged, no palpable masses  or tenderness.  Lymphatic: No cervical lymphadenopathy noted.  Eyes: JOSE MANUEL, EOM intact. Conjunctiva moist without discharge.  Lungs: Normal effort of breathing, not obviously distressed.  Neuro: Cranial nerves II-XII grossly intact.  Extremities: No clubbing, edema, or cyanosis noted.    Data  CBC with Differential:    Lab Results   Component Value Date/Time    WBC 9.7 04/19/2024 05:48 AM    RBC 4.91 04/19/2024 05:48 AM    RBC 5.20 07/09/2011 08:24 AM    HGB 14.6 04/19/2024 05:48 AM    HCT 43.5 04/19/2024 05:48 AM     04/19/2024 05:48 AM    MCV 88.6 04/19/2024 05:48 AM    MCH 29.7 04/19/2024 05:48 AM    MCHC 33.6 04/19/2024 05:48 AM    RDW 13.0 12/01/2023 10:01 AM    NRBC 0 04/19/2024 05:48 AM    SEGSPCT 91.4 04/19/2024 05:48 AM    METASPCT 3 07/23/2015 08:25 AM    LYMPHOPCT 23.7 01/24/2022 01:00 PM    MONOPCT 2.8 04/19/2024 05:48 AM    MONOPCT 12.7 01/24/2022 01:00 PM    MYELOPCT 2 07/23/2015 08:25 AM    EOSPCT 0.3 01/24/2022 01:00 PM    BASOPCT 1.0 01/24/2022 01:00 PM    MONOSABS 0.3 04/19/2024 05:48 AM    LYMPHSABS 0.5 04/19/2024 05:48 AM    EOSABS 0.0 04/19/2024 05:48 AM    BASOSABS 0.0 04/19/2024 05:48 AM    DIFFTYPE NOT REPORTED 09/28/2020 09:31 AM     BMP:    Lab Results   Component Value Date/Time     04/19/2024 05:48 AM    K 4.4 04/19/2024 05:48 AM     04/19/2024 05:48 AM    CO2 19 04/19/2024 05:48 AM    BUN 15 04/19/2024 05:48 AM    CREATININE 0.8 04/19/2024 05:48 AM    CALCIUM 8.2 04/19/2024 05:48 AM    GFRAA >60 09/28/2020 09:31 AM    LABGLOM >90 04/19/2024 05:48 AM    GLUCOSE 106 04/19/2024 05:48 AM    GLUCOSE 80 01/24/2022 01:53 PM       Inpatient Medications  Current Facility-Administered Medications: HYDROcodone-acetaminophen (NORCO) 5-325 MG per tablet 1 tablet, 1 tablet, Oral, Q6H PRN  DULoxetine (CYMBALTA) extended release capsule 30 mg, 30 mg, Oral, Daily  memantine (NAMENDA) tablet 10 mg, 10 mg, Oral, BID  rivastigmine (EXELON) 4.6 MG/24HR 1 patch, 1 patch, TransDERmal,

## 2024-04-19 NOTE — PROGRESS NOTES
Discharge education and instructions provided. Patient and wife verbalized understanding at this time. No questions asked. IV access removed. All personal belongings, AVS  present with patient. Transport to main lobby.

## 2024-04-19 NOTE — PLAN OF CARE
Problem: Discharge Planning  Goal: Discharge to home or other facility with appropriate resources  Outcome: Adequate for Discharge  Flowsheets (Taken 4/19/2024 0820)  Discharge to home or other facility with appropriate resources:   Identify barriers to discharge with patient and caregiver   Arrange for needed discharge resources and transportation as appropriate   Identify discharge learning needs (meds, wound care, etc)     Problem: Pain  Goal: Verbalizes/displays adequate comfort level or baseline comfort level  Outcome: Adequate for Discharge     Problem: Skin/Tissue Integrity - Adult  Goal: Skin integrity remains intact  Outcome: Adequate for Discharge     Problem: Gastrointestinal - Adult  Goal: Minimal or absence of nausea and vomiting  Outcome: Adequate for Discharge     Problem: Infection - Adult  Goal: Absence of infection at discharge  Outcome: Adequate for Discharge     Problem: Hematologic - Adult  Goal: Maintains hematologic stability  Outcome: Adequate for Discharge     Problem: Safety - Adult  Goal: Free from fall injury  Outcome: Adequate for Discharge   Care plan reviewed with patient and wife.  Patient and wife verbalize understanding of the plan of care and contribute to goal setting.

## 2024-04-19 NOTE — DISCHARGE SUMMARY
Hospitalist Discharge Summary    Patient: Javon Kenney  YOB: 1955  MRN: [id number]   Acct: [id number]    Primary Care Physician: Kasie Wilkes MD    Admit date  4/18/2024    Discharge date: 4/19/2024     Chief Complaint on presentation: Postoperative hypotension      Discharge Assessment and Plan:  Postoperative hypotension: Patient noted to have hypotension postoperatively.  Lowest BP noted to be 71/56.  Patient was admitted to hospital overnight for observation.  IV fluids administered.  BP improved.  Current /71.  Orthostatic vital signs negative.  Patient is asymptomatic.  Follow-up with PCP.  Primary tonsillar squamous cell carcinoma: POD 1 s/p Limited Septoplasty for Resection of Septal Spur Biopsy of Nasopharynx Visible Lesion Simple. ENT to manage any postoperative concerns.  Follow-up with ENT on 4/29.  Cognitive decline: Continue rivastigmine patch and Namenda.  History of CVA: Not currently taking a statin or aspirin.    Initial H&P / Hospital Course:   Patient evaluated post operatively for a hypotensive episode followed by some rebound hypertension.  Concern for hyperautomaticity following surgery.  Patient had limited septoplasty and was preparing for discharge but due to lability in blood pressures we were asked to observe him overnight.     Patient has no complaints at this time.  His currently medications are related to his cognitive decline and depression.  He is not currently taking any medications for blood pressure control.  Patient denies any chest pain, shortness of breath, vision changes, nausea or vomiting.          Subjective (day of discharge):      Patient resting in bed.  Reports feeling well.  Denies any chest pain, shortness of breath.  Primary complaint is that he is hungry.  Orthostatic vitals were negative.  Okay to discharge from ENT standpoint.    Patient responded well to medical management. Consultants had signed off or were contacted and    HYDROcodone-acetaminophen 5-325 MG per tablet  Commonly known as: Norco  Take 1 tablet by mouth every 6 hours as needed for Pain for up to 5 days. Intended supply: 5 days. Take lowest dose possible to manage pain Max Daily Amount: 4 tablets            CONTINUE taking these medications      DULoxetine 30 MG extended release capsule  Commonly known as: CYMBALTA  take 1 capsule by mouth once daily     memantine 10 MG tablet  Commonly known as: NAMENDA     rivastigmine 4.6 MG/24HR  Commonly known as: EXELON               Where to Get Your Medications        These medications were sent to [id number] - [place], [place] - [address]  [facility] - P [phone] - F [phone]  [address]  [facility] [address]      Phone: [phone]   ciprofloxacin 500 MG tablet  HYDROcodone-acetaminophen 5-325 MG per tablet          Patient Instructions:    Discharge lab work: n/a  Activity: activity as tolerated  Diet: No diet orders on file      Follow-up visits:   Federico Gutierrez MD  [address] [place] [address]  [phone]    Follow up on 4/29/2024  Follow up in ENT office on 4/29/24 at 8:45 AM for post-op examination with Kasie Luciano MD  [address] [place] [address], [place] [address]  [phone]    Follow up in 1 week(s)  schedule follow up for mid next week to follow up on     Kasie Wilkes MD  [address] [place] [address], [facility] [address]  [place] [address]  [phone]               Disposition: home  Condition at Discharge: Stable    Time Spent: 35 minutes    Signed:    Thank you Kasie Wilkes MD for the opportunity to be involved in this patient's care.    Electronically signed by MARGAUX Pleitez CNP on 4/21/2024 at 4:47 PM  Discharging Hospitalist

## 2024-04-19 NOTE — PROGRESS NOTES
Report called to Hailee GRAVES. Patient transported upstairs by stretcher with chart and belongings.

## 2024-04-22 ENCOUNTER — CARE COORDINATION (OUTPATIENT)
Dept: CASE MANAGEMENT | Age: 69
End: 2024-04-22

## 2024-04-22 DIAGNOSIS — I95.81 POSTPROCEDURAL HYPOTENSION: Primary | ICD-10-CM

## 2024-04-22 DIAGNOSIS — C09.9 PRIMARY TONSILLAR SQUAMOUS CELL CARCINOMA (HCC): ICD-10-CM

## 2024-04-22 DIAGNOSIS — J34.89 NASAL SEPTAL SPUR: ICD-10-CM

## 2024-04-22 DIAGNOSIS — J34.2 NASAL SEPTAL DEVIATION: ICD-10-CM

## 2024-04-22 PROCEDURE — 1111F DSCHRG MED/CURRENT MED MERGE: CPT | Performed by: INTERNAL MEDICINE

## 2024-04-22 NOTE — CARE COORDINATION
Care Transitions Note    Initial Call - Call within 2 business days of discharge: Yes     Patient Current Location:  Home: [address] [place], [place] [id number]    Care Transition Nurse contacted the spouse/partner  by telephone to perform post hospital discharge assessment, verified name and  as identifiers. Provided introduction to self, and explanation of the Care Transition Nurse role.     Patient: Javon Kenney    Patient : 1955   MRN: [id number]    Reason for Admission: LImited Septoplasty for Resection of Septal Spur Biopsy of Nasopharynx Visible Lesion Simple   Discharge Date: 24  RURS: Readmission Risk Score: 15.3      Last Discharge Facility       Date Complaint Diagnosis Description Type Department Provider    24  Acute post-operative pain ... Admission (Discharged) FAISAL 8AB Stephani Zepeda APRN - [facility]            Was this an external facility discharge? No    Additional needs identified to be addressed with provider   No needs identified             Method of communication with provider: none.    Patients top risk factors for readmission: lack of knowledge about disease and medical condition-nasal surgery    Interventions to address risk factors:   Review of patient management of conditions/medications  Communication with providers: appts scheduled    Care Summary Note:   Spoke with wife, Torrie, said Chris is doing pretty good.  Still sleeping, doesn't get up until .  Said he felt a little rough when he came home.  Had some stomach upset from nasal bleeding.  Has not had any bleeding since home.  Reviewed medications/changes.  He is no longer needing Norco, takes Tylenol or Ibuprofen.  Only took Norco when absolutely needed.  Appetite and fluid intake is good.  Is eating soft, chopped up foods.  Will see ENT and PCP next week.  No other issues to report.  Denies any other needs.  No other questions or concerns at this time.  Will continue to follow.  Very appreciative of

## 2024-04-23 ENCOUNTER — TELEPHONE (OUTPATIENT)
Dept: INTERNAL MEDICINE CLINIC | Age: 69
End: 2024-04-23

## 2024-04-23 NOTE — TELEPHONE ENCOUNTER
Care Transitions Initial Follow Up Call    Call within 2 business days of discharge: Yes     Patient: Javon Kenney Patient : 1955 MRN: [id number]    [unfilled]    RARS: Readmission Risk Score: 15.3       Spoke with: 1st attempt - left message to call the office  . Spoke with    Javon Kenney  24    Discharge department/facility : [facility]     Non-face-to-face services provided: Transition of care questions.       Follow Up  Future Appointments   Date Time Provider Department Center   2024  8:45 AM Federico Gutierrez MD N ENT [facility]   2024  1:00 PM Kat Hayes DO SRPX Physic [facility]   5/3/2024 11:15 AM SCHEDULE, FAISAL ERAZO RAD NURSE [facility]   2024  2:00 PM Mellisa Berkowitz, PT [facility] HOD   2024  2:00 PM Pam Bryan, PTA [facility] [place] HOD   2024  2:00 PM Pam Bryan, PTA [facility] HOD   2024  2:00 PM Pam Bryan, PTA [facility] HOD   2024  2:00 PM Mellisa Berkowitz, PT [facility] HOD   2024  1:30 PM Pepe Torres MD SRPX Physic [facility]       YOLIS DEL CID LPN

## 2024-04-23 NOTE — TELEPHONE ENCOUNTER
Have the medications prescribed at discharge been filled? Yes  Does the patient understand what the medications are for? Yes  Has the patient experienced any new symptoms or have previous symptoms worsened? No  Is the patient experiencing any pain? No If yes, is the pain well controlled? N/A  We want to be sure the patient has the best recovery possible. Does the patient understand all the discharge instructions? Yes  Did someone talk to the patient and/or family about the patient needs prior to being discharged? Yes  Did the patient's doctor communicate well? Yes  Did the patient's nurse communicate well? Yes  Was the patient satisfied with the services and care received at [facility]? Yes

## 2024-04-29 ENCOUNTER — OFFICE VISIT (OUTPATIENT)
Dept: ENT CLINIC | Age: 69
End: 2024-04-29

## 2024-04-29 VITALS
HEIGHT: 65 IN | BODY MASS INDEX: 24.07 KG/M2 | RESPIRATION RATE: 18 BRPM | WEIGHT: 144.5 LBS | SYSTOLIC BLOOD PRESSURE: 118 MMHG | DIASTOLIC BLOOD PRESSURE: 58 MMHG | TEMPERATURE: 97.7 F | HEART RATE: 73 BPM | OXYGEN SATURATION: 97 %

## 2024-04-29 DIAGNOSIS — Z85.89 ENCOUNTER FOR FOLLOW-UP SURVEILLANCE OF HEAD AND NECK CANCER: ICD-10-CM

## 2024-04-29 DIAGNOSIS — C09.9 PRIMARY TONSILLAR SQUAMOUS CELL CARCINOMA (HCC): Primary | ICD-10-CM

## 2024-04-29 DIAGNOSIS — Z08 ENCOUNTER FOR FOLLOW-UP SURVEILLANCE OF HEAD AND NECK CANCER: ICD-10-CM

## 2024-04-30 ENCOUNTER — OFFICE VISIT (OUTPATIENT)
Dept: INTERNAL MEDICINE CLINIC | Age: 69
End: 2024-04-30

## 2024-04-30 VITALS
WEIGHT: 147.4 LBS | HEIGHT: 65 IN | BODY MASS INDEX: 24.56 KG/M2 | HEART RATE: 92 BPM | SYSTOLIC BLOOD PRESSURE: 120 MMHG | TEMPERATURE: 98.2 F | DIASTOLIC BLOOD PRESSURE: 72 MMHG

## 2024-04-30 DIAGNOSIS — R13.10 DYSPHAGIA, UNSPECIFIED TYPE: Primary | ICD-10-CM

## 2024-04-30 DIAGNOSIS — I95.81 POSTOPERATIVE HYPOTENSION: ICD-10-CM

## 2024-04-30 DIAGNOSIS — Z09 HOSPITAL DISCHARGE FOLLOW-UP: ICD-10-CM

## 2024-04-30 DIAGNOSIS — F32.A DEPRESSION, UNSPECIFIED DEPRESSION TYPE: ICD-10-CM

## 2024-04-30 PROBLEM — J35.8 TONSILLAR MASS: Status: RESOLVED | Noted: 2023-06-05 | Resolved: 2024-04-30

## 2024-04-30 PROBLEM — G89.18 ACUTE POST-OPERATIVE PAIN: Status: RESOLVED | Noted: 2023-06-25 | Resolved: 2024-04-30

## 2024-04-30 PROBLEM — R41.89 COGNITIVE DECLINE: Chronic | Status: ACTIVE | Noted: 2023-06-16

## 2024-04-30 PROBLEM — I69.30 SEQUELAE OF CEREBRAL INFARCTION: Status: ACTIVE | Noted: 2023-06-16

## 2024-04-30 NOTE — PROGRESS NOTES
proteins and keep well-hydrated since summer is coming, and if needed bring M-wife old grandson who lives with him for medical appointments.    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)    Pharyngeal dysphagia    Cerebral infarction due to embolism of unspecified precerebral artery (HCC)    Carotid stenosis, bilateral    Encounter for follow-up surveillance of head and neck cancer    Trismus    Odynophagia    Pharyngeal ulcer    Mucositis due to radiation therapy    History of carotid stenosis    History of CVA (cerebrovascular accident)    Pharyngeal mass    Neuropathy    Depression    Mild protein-calorie malnutrition (HCC)    Pharyngeal lesion    Nasal septal spur    Nasal septal deviation    Postprocedural hypotension    Sequelae of cerebral infarction    Cognitive decline       Medications listed as ordered at the time of discharge from hospital     Medication List            Accurate as of April 30, 2024  4:08 PM. If you have any questions, ask your nurse or doctor.                CONTINUE taking these medications      DULoxetine 30 MG extended release capsule  Commonly known as: CYMBALTA  take 1 capsule by mouth once daily     memantine 10 MG tablet  Commonly known as: NAMENDA     rivastigmine 4.6 MG/24HR  Commonly known as: EXELON                Medications marked \"taking\" at this time  Outpatient Medications Marked as Taking for the 4/30/24 encounter (Office

## 2024-05-01 ENCOUNTER — CARE COORDINATION (OUTPATIENT)
Dept: CASE MANAGEMENT | Age: 69
End: 2024-05-01

## 2024-05-01 NOTE — CARE COORDINATION
Care Transitions Follow Up Call    Patient Current Location:  Home: [address] [place] [address] Road  [place] [id number]    Care Transition Nurse contacted the patient by telephone to follow up after admission on 24.  Verified name and  with patient as identifiers.    Patient: Javon Kenney  Patient : 1955   MRN: [id number]  Reason for Admission: p/o hypotension  s/p Limited Septoplasty for Resection of Septal Spur Biopsy of Nasopharynx Visible Lesion Simple  Discharge Date: 24 RARS: Readmission Risk Score: 15.3      Needs to be reviewed by the provider   Additional needs identified to be addressed with provider: No  none             Method of communication with provider: none.    CTN call to Chris today and he says he si feeling ok. C/o some nausea today, taking antacids w/ some relief. Has Zofran will try that as well. Says he thinks the casue is something he ate last night.  Denies v/d, fever, chills, abd pain, sob, chest pain, swelling, malaise, dizziness.  Dysphagia same-eats slowly and chews food well small bites at a time and washing down w/ fluids.  Completed Cipro. Not taking Norco-denies pain.  PCP HFU 24-> no changes PF=626/72  Says BP good at home.  ENT 24-> no changes f/u 6/10/24  Rad Onc 5/3/24  PT 24 for neck muscles  ST ordered not scheduled yet.  Denies need for transportation.  Denies problems w/ urination/bowels.  No other concerns voiced at this time.  Torrie returned CTN call saying Chris is doing well. So happy pathology showed no cancer.  We discussed seeking ER medical attention if new or worsening sxs nausea.  She expressed understanding and appreciation for the care.      Addressed changes since last contact:  PT-24  Speech Therapy TBD  Discussed follow-up appointments. If no appointment was previously scheduled, appointment scheduling offered: Yes.   Is follow up appointment scheduled within 7 days of discharge? Yes.    Follow Up  Future Appointments   Date Time Provider

## 2024-05-08 ENCOUNTER — CARE COORDINATION (OUTPATIENT)
Dept: CASE MANAGEMENT | Age: 69
End: 2024-05-08

## 2024-05-08 NOTE — CARE COORDINATION
Care Transitions Follow Up Call    Patient: Javon Kenney  Patient : 1955   MRN: <[id number]>  Reason for Admission: p/o hypotension  s/p Limited Septoplasty for Resection of Septal Spur Biopsy of Nasopharynx Visible Lesion Simple  Discharge Date: 24 RARS: Readmission Risk Score: 15.3      Attempted to contact patient for follow up transition call. Left voicemail message to return call with an update on condition since discharge. Contact information provided. Will continue to follow up.      Follow Up  Future Appointments   Date Time Provider Department Center   2024  1:00 PM Nancy Brown, SLP [facility] [place] HOD   2024  2:00 PM Mellisa Berkowitz, PT [facility] [place] HOD   2024  2:00 PM Pam Bryan, PTA [facility] [place] HOD   2024  2:30 PM SCHEDULE, STRCOREY ERAZO RAD NURSE [facility] [place] HOD   2024  2:00 PM Pam Bryan, PTA [facility] [place] HOD   2024  2:00 PM Pam Bryan, PTA [facility] [place] HOD   2024  2:00 PM Mellisa Berkowitz, PT [facility] [place] HOD   6/10/2024  8:30 AM Federico Gutierrez MD N ENT [facility]   2024  1:30 PM Pepe Torres MD SRPX Physic [facility]       Care Transitions Subsequent and Final Call    Subsequent and Final Calls  Care Transitions Interventions     Transportation Support: Declined      DME Assistance: Declined   Disease Specific Clinic: Completed  Social Work: Completed    Disease Association: Completed      Other Interventions:             Summer Rosas LPN

## 2024-05-09 ENCOUNTER — HOSPITAL ENCOUNTER (OUTPATIENT)
Dept: SPEECH THERAPY | Age: 69
Setting detail: THERAPIES SERIES
Discharge: HOME | End: 2024-05-09
Attending: INTERNAL MEDICINE
Payer: MEDICARE

## 2024-05-09 ENCOUNTER — HOSPITAL ENCOUNTER (OUTPATIENT)
Dept: PHYSICAL THERAPY | Age: 69
Setting detail: THERAPIES SERIES
Discharge: HOME | End: 2024-05-09
Attending: INTERNAL MEDICINE
Payer: MEDICARE

## 2024-05-09 PROCEDURE — 97140 MANUAL THERAPY 1/> REGIONS: CPT

## 2024-05-09 PROCEDURE — 97110 THERAPEUTIC EXERCISES: CPT

## 2024-05-09 PROCEDURE — 92610 EVALUATE SWALLOWING FUNCTION: CPT | Performed by: SPEECH-LANGUAGE PATHOLOGIST

## 2024-05-09 NOTE — PROGRESS NOTES
** PLEASE SIGN, DATE AND TIME CERTIFICATION BELOW AND RETURN TO [facility] OUTPATIENT REHABILITATION (FAX #: [phone]).  ATTEST/CO-SIGN IF ACCESSING VIA [facility].  THANK YOU.**    I certify that I have examined the patient below and determined that Physical Medicine and Rehabilitation service is necessary and that I approve the established plan of care for up to 90 days or as specifically noted.  Attestation, signature or co-signature of physician indicates approval of certification requirements.    ________________________ ____________ __________  Physician Signature   Date   Time     [facility]  SPEECH THERAPY  [x] CLINICAL SWALLOW EVALUATION  [] DAILY NOTE   [] PROGRESS NOTE [] DISCHARGE NOTE    [x] OUTPATIENT REHABILITATION [facility]   [] [facility]    [] [facility]   [] [facility]    Date: 2024  Patient Name:  Javon Kenney  : 1955  MRN: [id number]  CSN: [id number]    Referring Practitioner Kasie Wilkes MD    Diagnosis Dysphagia, unspecified   Treatment Diagnosis R13.10 Dysphagia, unspecified   Date of Evaluation 24    Additional Pertinent History Please see below for details      Functional Outcome Measure Used JUANCHO NOMS: swallowing   Functional Outcome Score Level  (24)       Insurance: Primary: Payor: [facility] MEDICARE /  /  / ,   Secondary:    Authorization Information: No precert required   Approved Procedure Codes: Authorization of Specific CPT Codes Not Required  (Codes requested indicated by red font, codes approved indicated by black font)   Visit # 1, 1/10 for progress note (Reporting Period: 24 to 24 )   Visits Allowed: Unlimited visits based on medical necessity   Recertification Date: 24   Physician Follow-Up: 6/10/24 - Dr. Gutierrez; 24 - Dr. Torres (Dr. Wilkes)   Physician Orders: Evaluate and treat   History of Present Illness: Patient reports he started with a sore throat in

## 2024-05-09 NOTE — PROGRESS NOTES
[facility]  ONCOLOGY REHABILITATION  PHYSICAL THERAPY  [x] DAILY NOTE    [x] [facility] [place]     Date: 2024  Patient Name:  Javon Kenney  : 1955  MRN: [id number]  CSN: [id number]      Referring Practitioner Cyndi Mireles PA-C    Diagnosis Scar conditions and fibrosis of skin [L90.5]    Treatment Diagnosis Tonsillar Carcinoma, Neck Stiffness, Lymphedema of neck   Date of Evaluation 24    Additional Pertinent History Tonsillar Cancer, Diabetes, Memory Issues      Functional Outcome Measure Used O: MODIFIED [facility] QUALITY OF LIFE    Functional Outcome Score 675 (24)       Insurance: Primary: Payor: [facility] MEDICARE /  /  / ,   Secondary:    Authorization Information: PRE CERTIFICATION REQUIRED: No precert required.  INSURANCE THERAPY BENEFIT: UNLIMITED VISITS BASED ON MEDICAL NECESSITY. .   AQUATIC THERAPY COVERED: Yes  MODALITIES COVERED:  Yes   Visit # 2, 2/10 for progress note   Visits Allowed: Unlimited visits based on medical necessity   Recertification Date: 24   Physician Follow-Up: 5/3/24-Chi, 24-Dr. 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 Completed radiation.  Had PT and was discharged 2023.  Since then, Chris has had increased episodes of lightheadedness.  He does have a deviated septum which causes difficulty with breathing.  On  he is supposed to have a biopsy of a mass (possible infection) in his throat and correct his deviated septum.  At this time he feels his neck and jaw is getting stiffer which is why he is coming to therapy at this time.  Reports he had a CT scan completed a couple weeks ago and reports having swelling in head and neck.         SUBJECTIVE: Patient had septoplasty in April and this is patient's first

## 2024-05-13 ENCOUNTER — HOSPITAL ENCOUNTER (OUTPATIENT)
Dept: PHYSICAL THERAPY | Age: 69
Setting detail: THERAPIES SERIES
Discharge: HOME | End: 2024-05-13
Attending: INTERNAL MEDICINE
Payer: MEDICARE

## 2024-05-13 PROCEDURE — 97110 THERAPEUTIC EXERCISES: CPT

## 2024-05-13 PROCEDURE — 97140 MANUAL THERAPY 1/> REGIONS: CPT

## 2024-05-13 NOTE — PROGRESS NOTES
[facility]  ONCOLOGY REHABILITATION  PHYSICAL THERAPY  [x] DAILY NOTE    [x] [facility] [place]     Date: 2024  Patient Name:  Javon Kenney  : 1955  MRN: [id number]  CSN: [id number]      Referring Practitioner Cyndi Mireles PA-C    Diagnosis Scar conditions and fibrosis of skin [L90.5]    Treatment Diagnosis Tonsillar Carcinoma, Neck Stiffness, Lymphedema of neck   Date of Evaluation 24    Additional Pertinent History Tonsillar Cancer, Diabetes, Memory Issues      Functional Outcome Measure Used O: MODIFIED [facility] QUALITY OF LIFE    Functional Outcome Score 675 (24)       Insurance: Primary: Payor: [facility] MEDICARE /  /  / ,   Secondary:    Authorization Information: PRE CERTIFICATION REQUIRED: No precert required.  INSURANCE THERAPY BENEFIT: UNLIMITED VISITS BASED ON MEDICAL NECESSITY. .   AQUATIC THERAPY COVERED: Yes  MODALITIES COVERED:  Yes   Visit # 3, 3/10 for progress note   Visits Allowed: Unlimited visits based on medical necessity   Recertification Date: 24   Physician Follow-Up: 5/3/24-Chi, 24-Dr. 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 Completed radiation.  Had PT and was discharged 2023.  Since then, Chris has had increased episodes of lightheadedness.  He does have a deviated septum which causes difficulty with breathing.  On  he is supposed to have a biopsy of a mass (possible infection) in his throat and correct his deviated septum.  At this time he feels his neck and jaw is getting stiffer which is why he is coming to therapy at this time.  Reports he had a CT scan completed a couple weeks ago and reports having swelling in head and neck.         SUBJECTIVE: Patient arrives denying any current pain. Reports he has

## 2024-05-14 ENCOUNTER — HOSPITAL ENCOUNTER (OUTPATIENT)
Dept: SPEECH THERAPY | Age: 69
Setting detail: THERAPIES SERIES
Discharge: HOME | End: 2024-05-14
Attending: INTERNAL MEDICINE
Payer: MEDICARE

## 2024-05-14 ENCOUNTER — HOSPITAL ENCOUNTER (OUTPATIENT)
Dept: RADIATION ONCOLOGY | Age: 69
Discharge: HOME | End: 2024-05-14
Payer: MEDICARE

## 2024-05-14 VITALS
TEMPERATURE: 99.1 F | DIASTOLIC BLOOD PRESSURE: 78 MMHG | HEART RATE: 71 BPM | RESPIRATION RATE: 18 BRPM | BODY MASS INDEX: 24.63 KG/M2 | WEIGHT: 148 LBS | SYSTOLIC BLOOD PRESSURE: 156 MMHG | OXYGEN SATURATION: 98 %

## 2024-05-14 DIAGNOSIS — C09.9 PRIMARY TONSILLAR SQUAMOUS CELL CARCINOMA (HCC): Primary | ICD-10-CM

## 2024-05-14 DIAGNOSIS — R59.0 MEDIASTINAL LYMPHADENOPATHY: ICD-10-CM

## 2024-05-14 PROCEDURE — 99212 OFFICE O/P EST SF 10 MIN: CPT

## 2024-05-14 PROCEDURE — 92526 ORAL FUNCTION THERAPY: CPT | Performed by: SPEECH-LANGUAGE PATHOLOGIST

## 2024-05-14 PROCEDURE — 99214 OFFICE O/P EST MOD 30 MIN: CPT

## 2024-05-14 ASSESSMENT — ENCOUNTER SYMPTOMS
SINUS PAIN: 0
COUGH: 0
RECTAL PAIN: 0
NAUSEA: 0
VOICE CHANGE: 0
TROUBLE SWALLOWING: 1
SINUS PRESSURE: 0
SHORTNESS OF BREATH: 0
ABDOMINAL PAIN: 0
BACK PAIN: 1
FACIAL SWELLING: 0

## 2024-05-14 NOTE — PROGRESS NOTES
[facility]  SPEECH THERAPY  [] CLINICAL SWALLOW EVALUATION  [x] DAILY NOTE   [] PROGRESS NOTE [] DISCHARGE NOTE    [x] OUTPATIENT REHABILITATION CENTER [facility]   [] [facility] CARE [place]    [] [facility]   [] [facility]    Date: 2024  Patient Name:  Javon Kenney  : 1955  MRN: [id number]  CSN: [id number]    Referring Practitioner Kasie Wilkes MD    Diagnosis Dysphagia, unspecified   Treatment Diagnosis R13.10 Dysphagia, unspecified   Date of Evaluation 24    Additional Pertinent History Please see below for details      Functional Outcome Measure Used JUANCHO NOMS: swallowing   Functional Outcome Score Level  (24)       Insurance: Primary: Payor: [employer] MEDICARE /  /  / ,   Secondary:    Authorization Information: No precert required   Approved Procedure Codes: Authorization of Specific CPT Codes Not Required  (Codes requested indicated by red font, codes approved indicated by black font)   Visit # 2, 2/10 for progress note (Reporting Period: 24 to 24 )   Visits Allowed: Unlimited visits based on medical necessity   Recertification Date: 24   Physician Follow-Up: 6/10/24 - Dr. Gutierrez; 24 - Dr. Torres (Dr. Wilkes)   Physician Orders: Evaluate and treat   History of Present Illness: Patient reports he started with a sore throat in 2022 and he was treated with thrush and antibiotics multiple times, but the sore throat continued.  Patient then started noticing some dysphagia and he had a MBS completed on 22 and a regular diet with thin liquids was recommended. Patient had neck surgery with a plate and screws put in at C3-5 in the end of 2023. Patient then had the fundoplication in the beginning of May of 2023.  Patient went to see an ENT at [facility] sometime in May of 2023 and he told him the symptoms were related to acid reflux.  Patient went to see Dr. Gutierrez on 23 and he found a

## 2024-05-14 NOTE — PROGRESS NOTES
(37.3 °C) (Infrared)   Resp 18   Wt 67.1 kg (148 lb)   SpO2 98%   BMI 24.63 kg/m²     ECO - Symptomatic but completely ambulatory (Restricted in physically strenuous activity but ambulatory and able to carry out work of a light or sedentary nature. For example, light housework, office work)    Physical Exam  Constitutional:       General: He is not in acute distress.     Appearance: Normal appearance.   HENT:      Head: Normocephalic and atraumatic.   Neck:      Comments: No obvious cervical adenopathy. Fibrosis and tenderness of right neck  Pulmonary:      Effort: Pulmonary effort is normal. No respiratory distress.   Abdominal:      General: Abdomen is flat.   Neurological:      Mental Status: He is alert and oriented to person, place, and time.         ATTESTATION: 30 minutes were spent with the patient at today's visit reviewing pertinent information related to their oncologic diagnosis, including any recent labs, imaging, follow ups and plan of care going forward. >50% of time spent in counseling and coordinating care.    CC:Dr. Federico Gutierrez (ENT)  ACC:[facility]

## 2024-05-15 ENCOUNTER — TELEPHONE (OUTPATIENT)
Dept: INTERNAL MEDICINE CLINIC | Age: 69
End: 2024-05-15

## 2024-05-15 ENCOUNTER — CARE COORDINATION (OUTPATIENT)
Dept: CASE MANAGEMENT | Age: 69
End: 2024-05-15

## 2024-05-15 NOTE — CARE COORDINATION
Care Transitions Note    Final Call      Patient Current Location:  Home: [address] [place] [id number]    Care Transition Nurse contacted the patient by telephone. Verified name and  as identifiers.    Patient graduated from the Care Transitions program on 5/15/24.  Patient/family verbalizes confidence in the ability to self-manage at this time. has the ability to self manage at this time..      Advance Care Planning:   Does patient have an Advance Directive: reviewed during previous call, see note. .    Handoff:   Patient was not referred to the ACM team due to no additional needs identified.       Care Summary Note:  Spoke with Chris, said he is doing ok, just still weak/tired.  Denies nausea, said it was due to vertigo which has also resolved.  Denies fever, chills, chest pain, dyspnea.  BP is good at home but goes up when he is at PT, not being in a well ventilated room.  Saw oncology yesterday, will repeat PET scan next week since he is just not feeling back to normal, hoping for answers.  He isn't really able to complete the whole PT sessions d/t weakness but continues to try.  He is doing some things around home, taking dog out for walks, other household/yard work.  Does what he can tolerate.  Eating, drinking, sleeping ok.  No issues with B&B.  No other issues to report.  Denies any other needs.  No other questions or concerns at this time.  Final call, appreciative.    Assessments:  Care Transitions Subsequent and Final Call    Subsequent and Final Calls  Do you have any ongoing symptoms?: No  Have your medications changed?: No  Do you have any questions related to your medications?: No  Do you currently have any active services?: No  Do you have any needs or concerns that I can assist you with?: No  Identified Barriers: Lack of Education  Care Transitions Interventions     Transportation Support: Declined      DME Assistance: Declined   Disease Specific Clinic: Completed  Social Work: Completed

## 2024-05-15 NOTE — TELEPHONE ENCOUNTER
Patient's wife called stating that Rishi had another episode of lightheadedness at therapy , almost passed out , therapist suggested that they call about getting thyroid labs dur to on going fatigue and weakness. He had one done 1/10/2024 . Please advise.

## 2024-05-16 ENCOUNTER — HOSPITAL ENCOUNTER (OUTPATIENT)
Dept: SPEECH THERAPY | Age: 69
Setting detail: THERAPIES SERIES
Discharge: HOME | End: 2024-05-16
Attending: INTERNAL MEDICINE
Payer: MEDICARE

## 2024-05-16 ENCOUNTER — HOSPITAL ENCOUNTER (OUTPATIENT)
Dept: PHYSICAL THERAPY | Age: 69
Setting detail: THERAPIES SERIES
Discharge: HOME | End: 2024-05-16
Attending: INTERNAL MEDICINE
Payer: MEDICARE

## 2024-05-16 PROCEDURE — 92526 ORAL FUNCTION THERAPY: CPT | Performed by: SPEECH-LANGUAGE PATHOLOGIST

## 2024-05-16 PROCEDURE — 97110 THERAPEUTIC EXERCISES: CPT

## 2024-05-16 PROCEDURE — 97140 MANUAL THERAPY 1/> REGIONS: CPT

## 2024-05-16 NOTE — PROGRESS NOTES
[facility]  ONCOLOGY REHABILITATION  PHYSICAL THERAPY  [x] DAILY NOTE    [x] [facility] [place]     Date: 2024  Patient Name:  Javon Kenney  : 1955  MRN: [id number]  CSN: [id number]      Referring Practitioner Cyndi Mireles PA-C    Diagnosis Scar conditions and fibrosis of skin [L90.5]    Treatment Diagnosis Tonsillar Carcinoma, Neck Stiffness, Lymphedema of neck   Date of Evaluation 24    Additional Pertinent History Tonsillar Cancer, Diabetes, Memory Issues      Functional Outcome Measure Used O: MODIFIED [facility] QUALITY OF LIFE    Functional Outcome Score 675 (24)       Insurance: Primary: Payor: [facility] MEDICARE /  /  / ,   Secondary:    Authorization Information: PRE CERTIFICATION REQUIRED: No precert required.  INSURANCE THERAPY BENEFIT: UNLIMITED VISITS BASED ON MEDICAL NECESSITY. .   AQUATIC THERAPY COVERED: Yes  MODALITIES COVERED:  Yes   Visit # 4, /10 for progress note   Visits Allowed: Unlimited visits based on medical necessity   Recertification Date: 24   Physician Follow-Up: 5/3/24-Chi, 24-Dr. 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 Completed radiation.  Had PT and was discharged 2023.  Since then, Chris has had increased episodes of lightheadedness.  He does have a deviated septum which causes difficulty with breathing.  On  he is supposed to have a biopsy of a mass (possible infection) in his throat and correct his deviated septum.  At this time he feels his neck and jaw is getting stiffer which is why he is coming to therapy at this time.  Reports he had a CT scan completed a couple weeks ago and reports having swelling in head and neck.         SUBJECTIVE: Patient arrives denying any current pain. Reports he was

## 2024-05-16 NOTE — PROGRESS NOTES
family present.    GOALS:  Patient Goal: Swallow easier    Short Term Goals:  Time Frame for Short-Term Goals: 4 weeks    SHORT TERM GOAL #1: Patient will tolerate a soft and bite sized diet with thin liquids without overt s/s of aspiration or change in pulmonary status to safely maintain adequate nutrition and hydration.  INTERVENTIONS:  Patient reports he ate more food yesterday than he has in awhile.  Reports he had pancakes and milk for breakfast, 3 hard tacos and dr weiner for lunch and milk, beef and noodles and a peanut butter sandwich for supper.  Patient reports he did not feel well after eating supper because he has not eaten that much in a long time. Patient reports he continues to drink 3 Ensures per day, no matter how much food he is eating.  Discussed the possibility of cutting back on some of the Ensure if he is eating more food and he is getting enough calories. Patient reports it feels like Dr. Pepper goes down better than any other liquid, however, it causes more belching compared to others.    Patient continues with complaints of xerostomia, but reports he has not been using xlimelts consistently.    SHORT TERM GOAL #2: Patient will tolerate trials of advanced PO without overt difficulty and use of compensatory strategies with min cues for eventual advancement to a regular diet.  INTERVENTIONS: Patient tolerated trials of peanut butter crackers that he brought in this date with a delayed cough following 1/3 trials and no overt difficulty (outside of belching) noted following 2/3 trials.  Patient noted to take about 1/2 of the cracker when taking the first bite, which caused the coughing to occur.  Patient masticating on the left side of the oral cavity d/t the numbness on the right side and he is afraid that he will bite his tongue.  Patient admitted to biting his tongue a little on 1 trial of the cracker this session.  Patient noted to tilt his head back so that the liquid does not come out his

## 2024-05-17 ENCOUNTER — TELEPHONE (OUTPATIENT)
Dept: INTERNAL MEDICINE CLINIC | Age: 69
End: 2024-05-17

## 2024-05-17 NOTE — TELEPHONE ENCOUNTER
Called patient and spoke with him and with his wife about the pathology report being negative for cancer.  They were pleased.  I explained how important it was to stay ahead of anything that truly looks abnormal in his throat and that, while I hope this is not the case, it may come again that he has something that looks concerning in that region and still requires a biopsy to make sure it is not cancer.    They understood.    Federico Gutierrez MD

## 2024-05-17 NOTE — TELEPHONE ENCOUNTER
Pt's wife called requesting a TSH to be ordered. Chris has been very tired and weak lately and his speech therapist mentioned he might want to have that checked. OK to order?

## 2024-05-19 DIAGNOSIS — R53.83 FATIGUE, UNSPECIFIED TYPE: Primary | ICD-10-CM

## 2024-05-19 NOTE — TELEPHONE ENCOUNTER
I put in order for CBC, CMP and TSH - due now.  Ask how much he is drinking in a day - nutritional supplements and other beverages.  Consider starting to wear compression hose - thigh high to help with syncope.  Did he try to restart Lyrica (pregabapentin) or Neurontin (gabapentin) or is he taking any pain medication?  These can cause fatigue.

## 2024-05-20 ENCOUNTER — HOSPITAL ENCOUNTER (OUTPATIENT)
Dept: PHYSICAL THERAPY | Age: 69
Setting detail: THERAPIES SERIES
Discharge: HOME | End: 2024-05-20
Attending: INTERNAL MEDICINE
Payer: MEDICARE

## 2024-05-20 PROCEDURE — 97110 THERAPEUTIC EXERCISES: CPT

## 2024-05-20 NOTE — PROGRESS NOTES
[facility]  ONCOLOGY REHABILITATION  PHYSICAL THERAPY  [x] DAILY NOTE    [x] [facility] [place]     Date: 2024  Patient Name:  Javon Kenney  : 1955  MRN: [id number]  CSN: [id number]      Referring Practitioner Cyndi Mireles PA-C    Diagnosis Scar conditions and fibrosis of skin [L90.5]    Treatment Diagnosis Tonsillar Carcinoma, Neck Stiffness, Lymphedema of neck   Date of Evaluation 24    Additional Pertinent History Tonsillar Cancer, Diabetes, Memory Issues      Functional Outcome Measure Used O: MODIFIED [facility] QUALITY OF LIFE    Functional Outcome Score 675 (24)       Insurance: Primary: Payor: [facility] MEDICARE /  /  / ,   Secondary:    Authorization Information: PRE CERTIFICATION REQUIRED: No precert required.  INSURANCE THERAPY BENEFIT: UNLIMITED VISITS BASED ON MEDICAL NECESSITY. .   AQUATIC THERAPY COVERED: Yes  MODALITIES COVERED:  Yes   Visit # 5, 5/10 for progress note   Visits Allowed: Unlimited visits based on medical necessity   Recertification Date: 24   Physician Follow-Up: 5/3/24-Chi, 24-Dr. 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 Completed radiation.  Had PT and was discharged 2023.  Since then, Chris has had increased episodes of lightheadedness.  He does have a deviated septum which causes difficulty with breathing.  On  he is supposed to have a biopsy of a mass (possible infection) in his throat and correct his deviated septum.  At this time he feels his neck and jaw is getting stiffer which is why he is coming to therapy at this time.  Reports he had a CT scan completed a couple weeks ago and reports having swelling in head and neck.         SUBJECTIVE: Patient denies any current pain. Did have a rough time over

## 2024-05-21 ENCOUNTER — NURSE ONLY (OUTPATIENT)
Dept: LAB | Age: 69
End: 2024-05-21

## 2024-05-21 ENCOUNTER — HOSPITAL ENCOUNTER (OUTPATIENT)
Dept: SPEECH THERAPY | Age: 69
Setting detail: THERAPIES SERIES
Discharge: HOME | End: 2024-05-21
Attending: INTERNAL MEDICINE
Payer: MEDICARE

## 2024-05-21 DIAGNOSIS — M47.816 SPONDYLOSIS OF LUMBAR REGION WITHOUT MYELOPATHY OR RADICULOPATHY: ICD-10-CM

## 2024-05-21 DIAGNOSIS — R53.83 FATIGUE, UNSPECIFIED TYPE: ICD-10-CM

## 2024-05-21 DIAGNOSIS — M47.812 SPONDYLOSIS OF CERVICAL REGION WITHOUT MYELOPATHY OR RADICULOPATHY: ICD-10-CM

## 2024-05-21 LAB
ALBUMIN SERPL BCG-MCNC: 4.1 G/DL (ref 3.5–5.1)
ALP SERPL-CCNC: 88 U/L (ref 38–126)
ALT SERPL W/O P-5'-P-CCNC: 17 U/L (ref 11–66)
ANION GAP SERPL CALC-SCNC: 10 MEQ/L (ref 8–16)
AST SERPL-CCNC: 20 U/L (ref 5–40)
BASOPHILS ABSOLUTE: 0 THOU/MM3 (ref 0–0.1)
BASOPHILS NFR BLD AUTO: 0.5 %
BILIRUB SERPL-MCNC: 0.2 MG/DL (ref 0.3–1.2)
BUN SERPL-MCNC: 20 MG/DL (ref 7–22)
CALCIUM SERPL-MCNC: 9.4 MG/DL (ref 8.5–10.5)
CHLORIDE SERPL-SCNC: 101 MEQ/L (ref 98–111)
CO2 SERPL-SCNC: 28 MEQ/L (ref 23–33)
CREAT SERPL-MCNC: 0.8 MG/DL (ref 0.4–1.2)
DEPRECATED RDW RBC AUTO: 41.7 FL (ref 35–45)
EOSINOPHIL NFR BLD AUTO: 2.5 %
EOSINOPHILS ABSOLUTE: 0.2 THOU/MM3 (ref 0–0.4)
ERYTHROCYTE [DISTWIDTH] IN BLOOD BY AUTOMATED COUNT: 12.8 % (ref 11.5–14.5)
GFR SERPL CREATININE-BSD FRML MDRD: > 90 ML/MIN/1.73M2
GLUCOSE SERPL-MCNC: 116 MG/DL (ref 70–108)
HCT VFR BLD AUTO: 47.7 % (ref 42–52)
HGB BLD-MCNC: 15.8 GM/DL (ref 14–18)
IMM GRANULOCYTES # BLD AUTO: 0.09 THOU/MM3 (ref 0–0.07)
IMM GRANULOCYTES NFR BLD AUTO: 1.5 %
LYMPHOCYTES ABSOLUTE: 0.8 THOU/MM3 (ref 1–4.8)
LYMPHOCYTES NFR BLD AUTO: 12.5 %
MCH RBC QN AUTO: 29.8 PG (ref 26–33)
MCHC RBC AUTO-ENTMCNC: 33.1 GM/DL (ref 32.2–35.5)
MCV RBC AUTO: 89.8 FL (ref 80–94)
MONOCYTES ABSOLUTE: 0.5 THOU/MM3 (ref 0.4–1.3)
MONOCYTES NFR BLD AUTO: 8.5 %
NEUTROPHILS ABSOLUTE: 4.5 THOU/MM3 (ref 1.8–7.7)
NEUTROPHILS NFR BLD AUTO: 74.5 %
NRBC BLD AUTO-RTO: 0 /100 WBC
PLATELET # BLD AUTO: 211 THOU/MM3 (ref 130–400)
PMV BLD AUTO: 9 FL (ref 9.4–12.4)
POTASSIUM SERPL-SCNC: 4.7 MEQ/L (ref 3.5–5.2)
PROT SERPL-MCNC: 7.2 G/DL (ref 6.1–8)
RBC # BLD AUTO: 5.31 MILL/MM3 (ref 4.7–6.1)
SODIUM SERPL-SCNC: 139 MEQ/L (ref 135–145)
T4 FREE SERPL-MCNC: 0.9 NG/DL (ref 0.93–1.68)
TSH SERPL DL<=0.005 MIU/L-ACNC: 4.64 UIU/ML (ref 0.4–4.2)
WBC # BLD AUTO: 6.1 THOU/MM3 (ref 4.8–10.8)

## 2024-05-21 PROCEDURE — 92526 ORAL FUNCTION THERAPY: CPT

## 2024-05-21 NOTE — TELEPHONE ENCOUNTER
Informed patient about lab orders and recommendations . Patient states the vertigo has mostly resolved it is mostly the fatgue now . He has used some pain medication and muscle relaxers for his neck pain but not on Lyrica or Gabapentin .

## 2024-05-21 NOTE — PROGRESS NOTES
[facility]  SPEECH THERAPY  [] CLINICAL SWALLOW EVALUATION  [x] DAILY NOTE   [] PROGRESS NOTE [] DISCHARGE NOTE    [x] OUTPATIENT REHABILITATION CENTER [facility]   [] [facility] CARE [place]    [] [facility]   [] [facility]    Date: 2024  Patient Name:  Javon Kenney  : 1955  MRN: [id number]  CSN: [id number]    Referring Practitioner Kasie Wilkes MD    Diagnosis Dysphagia, unspecified   Treatment Diagnosis R13.10 Dysphagia, unspecified   Date of Evaluation 24    Additional Pertinent History Please see below for details      Functional Outcome Measure Used JUANCHO NOMS: swallowing   Functional Outcome Score Level  (24)       Insurance: Primary: Payor: [employer] MEDICARE /  /  / ,   Secondary:    Authorization Information: No precert required   Approved Procedure Codes: Authorization of Specific CPT Codes Not Required  (Codes requested indicated by red font, codes approved indicated by black font)   Visit # 4, 4/10 for progress note (Reporting Period: 24 to 24 )   Visits Allowed: Unlimited visits based on medical necessity   Recertification Date: 24   Physician Follow-Up: 6/10/24 - Dr. Gutierrez; 24 - Dr. Torres (Dr. Wilkes)   Physician Orders: Evaluate and treat   History of Present Illness: Patient reports he started with a sore throat in 2022 and he was treated with thrush and antibiotics multiple times, but the sore throat continued.  Patient then started noticing some dysphagia and he had a MBS completed on 22 and a regular diet with thin liquids was recommended. Patient had neck surgery with a plate and screws put in at C3-5 in the end of 2023. Patient then had the fundoplication in the beginning of May of 2023.  Patient went to see an ENT at [facility] sometime in May of 2023 and he told him the symptoms were related to acid reflux.  Patient went to see Dr. Gutierrez on 23 and he found a

## 2024-05-23 ENCOUNTER — HOSPITAL ENCOUNTER (OUTPATIENT)
Dept: PET IMAGING | Age: 69
Discharge: HOME | End: 2024-05-23
Payer: MEDICARE

## 2024-05-23 ENCOUNTER — HOSPITAL ENCOUNTER (OUTPATIENT)
Dept: SPEECH THERAPY | Age: 69
Setting detail: THERAPIES SERIES
Discharge: HOME | End: 2024-05-23
Attending: INTERNAL MEDICINE
Payer: MEDICARE

## 2024-05-23 ENCOUNTER — HOSPITAL ENCOUNTER (OUTPATIENT)
Dept: PHYSICAL THERAPY | Age: 69
Setting detail: THERAPIES SERIES
Discharge: HOME | End: 2024-05-23
Attending: INTERNAL MEDICINE
Payer: MEDICARE

## 2024-05-23 DIAGNOSIS — C09.9 PRIMARY TONSILLAR SQUAMOUS CELL CARCINOMA (HCC): ICD-10-CM

## 2024-05-23 DIAGNOSIS — R59.0 MEDIASTINAL LYMPHADENOPATHY: ICD-10-CM

## 2024-05-23 PROCEDURE — A9609 HC RX DIAGNOSTIC RADIOPHARMACEUTICAL: HCPCS

## 2024-05-23 PROCEDURE — 3430000000 HC RX DIAGNOSTIC RADIOPHARMACEUTICAL

## 2024-05-23 PROCEDURE — 97110 THERAPEUTIC EXERCISES: CPT

## 2024-05-23 PROCEDURE — 92526 ORAL FUNCTION THERAPY: CPT | Performed by: SPEECH-LANGUAGE PATHOLOGIST

## 2024-05-23 PROCEDURE — 78815 PET IMAGE W/CT SKULL-THIGH: CPT

## 2024-05-23 PROCEDURE — 97140 MANUAL THERAPY 1/> REGIONS: CPT

## 2024-05-23 RX ORDER — FLUDEOXYGLUCOSE F 18 200 MCI/ML
10.6 INJECTION, SOLUTION INTRAVENOUS
Status: COMPLETED | OUTPATIENT
Start: 2024-05-23 | End: 2024-05-23

## 2024-05-23 RX ADMIN — FLUDEOXYGLUCOSE F 18 10.6 MILLICURIE: 200 INJECTION, SOLUTION INTRAVENOUS at 10:06

## 2024-05-23 NOTE — PROGRESS NOTES
too little saliva = 30 30    Physical Domain Total 300        Social-Emotional Domain     Pain There is mild pain not needing medication = 75 75   Activity I am often too tired and have slowed down my activities although I still get out = 50 50   Recreation There are severe limitations to what I can do, I mostly stay home and watch TV = 25 25   Shoulder Pain or weakness in my shoulder has caused me to change my work/hobbies = 30 30   Mood My mood is generally good and only occasionally affected by my cancer = 75 75   Anxiety I am a little anxious about my cancer = 70 70         Social-Emotional Domain Total 325         Physical and Social-Emotional Total 625    % of Ideal Functioning (Total/12) 27.08%      Circumferential Measurements:   Head and Neck Measurements   NECK (cm)   Superior 40.0   Middle 39.6   Inferior 38.7   TOTAL NECK COMPOSITE 118.3cm         Jaw Depression 3.5 cm           116.3    TREATMENT   Precautions: H&N Lymphedema   Pain: 3/10 neck     “X” in shaded column indicates activity completed today   Modalities Parameters/  Location   Notes             Manual Therapy Time/Technique   Notes   Gentle stretching into cervical sidebend and rotation 10x5\" x    Occipital Release 5min x    Gentle massage to right lateral neck 8 min x              Exercise/Intervention     Notes   Diaphragmatic breathing 5x        Retro shoulder rolls 10x        Lateral neck bends 5 sec x10        Neck flexion 5 sec x10        Neck extension 5 sec x10        Cervical Rotation 10x5\"      Shoulder abduction 10x        Scapular retractions 10x        Shoulder Horizontal Abd 10x      Chin retractions 10x               Pulleys* - flex/scap 90\"             Upper trap stretch 3x20\"        Levator Stretch 3x20\"      Scalene Stretch 3x20\"      Corner Pec Stretch* 3x20\"      Wall Walks- flex 3x15\"           Specific Interventions Next Treatment: Manual lymphatic drainage and myofascial release, compression, gentle stretches,

## 2024-05-25 RX ORDER — CYCLOBENZAPRINE HCL 10 MG
10 TABLET ORAL EVERY 8 HOURS PRN
Qty: 30 TABLET | Refills: 0 | Status: SHIPPED | OUTPATIENT
Start: 2024-05-25

## 2024-05-28 ENCOUNTER — HOSPITAL ENCOUNTER (OUTPATIENT)
Dept: SPEECH THERAPY | Age: 69
Setting detail: THERAPIES SERIES
Discharge: HOME | End: 2024-05-28
Attending: INTERNAL MEDICINE
Payer: MEDICARE

## 2024-05-28 ENCOUNTER — TELEPHONE (OUTPATIENT)
Dept: INTERNAL MEDICINE CLINIC | Age: 69
End: 2024-05-28

## 2024-05-28 DIAGNOSIS — E03.9 ACQUIRED HYPOTHYROIDISM: Primary | ICD-10-CM

## 2024-05-28 PROCEDURE — 92526 ORAL FUNCTION THERAPY: CPT | Performed by: SPEECH-LANGUAGE PATHOLOGIST

## 2024-05-28 NOTE — TELEPHONE ENCOUNTER
Patient informed of results and recommendations . See refill encounter 5/28/24 . Labs ordered and mailed to patient .

## 2024-05-28 NOTE — PROGRESS NOTES
[facility]  SPEECH THERAPY  [] CLINICAL SWALLOW EVALUATION  [x] DAILY NOTE   [] PROGRESS NOTE [] DISCHARGE NOTE    [x] OUTPATIENT REHABILITATION CENTER [facility]   [] [facility] CARE [place]    [] [facility]   [] [facility]    Date: 2024  Patient Name:  Javon Kenney  : 1955  MRN: [id number]  CSN: [id number]    Referring Practitioner Kasie Wilkes MD    Diagnosis Dysphagia, unspecified   Treatment Diagnosis R13.10 Dysphagia, unspecified   Date of Evaluation 24    Additional Pertinent History Please see below for details      Functional Outcome Measure Used JUANCHO NOMS: swallowing   Functional Outcome Score Level  (24)       Insurance: Primary: Payor: [employer] MEDICARE /  /  / ,   Secondary:    Authorization Information: No precert required   Approved Procedure Codes: Authorization of Specific CPT Codes Not Required  (Codes requested indicated by red font, codes approved indicated by black font)   Visit # 6, 6/10 for progress note (Reporting Period: 24 to 24 )   Visits Allowed: Unlimited visits based on medical necessity   Recertification Date: 24   Physician Follow-Up: 6/10/24 - Dr. Gutierrez; 24 - Dr. Torres (Dr. Wilkes)   Physician Orders: Evaluate and treat   History of Present Illness: Patient reports he started with a sore throat in 2022 and he was treated with thrush and antibiotics multiple times, but the sore throat continued.  Patient then started noticing some dysphagia and he had a MBS completed on 22 and a regular diet with thin liquids was recommended. Patient had neck surgery with a plate and screws put in at C3-5 in the end of 2023. Patient then had the fundoplication in the beginning of May of 2023.  Patient went to see an ENT at [facility] sometime in May of 2023 and he told him the symptoms were related to acid reflux.  Patient went to see Dr. Gutierrez on 23 and he found a

## 2024-05-28 NOTE — TELEPHONE ENCOUNTER
----- Message from Kasie Wilkes MD sent at 5/25/2024  8:31 PM EDT -----  Please call patient and let them know results show slightly low thyroid, start levothyroxine 25 mcg p.o. daily -remind him to take this first thing in the morning- hour before any other medications or food.  Order TSH and free T4 to be drawn in 6 to 8 weeks.

## 2024-05-28 NOTE — TELEPHONE ENCOUNTER
Called script to [facility] , [facility] , left prescription information on prescriber's voicemail .

## 2024-05-29 RX ORDER — LEVOTHYROXINE SODIUM 0.03 MG/1
25 TABLET ORAL DAILY
Qty: 30 TABLET | Refills: 5 | OUTPATIENT
Start: 2024-05-29

## 2024-05-30 ENCOUNTER — HOSPITAL ENCOUNTER (OUTPATIENT)
Dept: SPEECH THERAPY | Age: 69
Setting detail: THERAPIES SERIES
Discharge: HOME | End: 2024-05-30
Attending: INTERNAL MEDICINE
Payer: MEDICARE

## 2024-05-30 ENCOUNTER — HOSPITAL ENCOUNTER (OUTPATIENT)
Dept: PHYSICAL THERAPY | Age: 69
Setting detail: THERAPIES SERIES
Discharge: HOME | End: 2024-05-30
Attending: INTERNAL MEDICINE
Payer: MEDICARE

## 2024-05-30 PROCEDURE — 92526 ORAL FUNCTION THERAPY: CPT | Performed by: SPEECH-LANGUAGE PATHOLOGIST

## 2024-05-30 PROCEDURE — 97110 THERAPEUTIC EXERCISES: CPT

## 2024-05-30 PROCEDURE — 97140 MANUAL THERAPY 1/> REGIONS: CPT

## 2024-05-30 NOTE — PROGRESS NOTES
[facility]  SPEECH THERAPY  [] CLINICAL SWALLOW EVALUATION  [x] DAILY NOTE   [] PROGRESS NOTE [] DISCHARGE NOTE    [x] OUTPATIENT REHABILITATION CENTER [facility]   [] [facility] CARE [place]    [] [facility]   [] [facility]    Date: 2024  Patient Name:  Javon Kenney  : 1955  MRN: [id number]  CSN: [id number]    Referring Practitioner Kasie Wilkes MD    Diagnosis Dysphagia, unspecified   Treatment Diagnosis R13.10 Dysphagia, unspecified   Date of Evaluation 24    Additional Pertinent History Please see below for details      Functional Outcome Measure Used JUANCHO NOMS: swallowing   Functional Outcome Score Level  (24)       Insurance: Primary: Payor: [employer] MEDICARE /  /  / ,   Secondary:    Authorization Information: No precert required   Approved Procedure Codes: Authorization of Specific CPT Codes Not Required  (Codes requested indicated by red font, codes approved indicated by black font)   Visit # 7, 7/10 for progress note (Reporting Period: 24 to 24 )   Visits Allowed: Unlimited visits based on medical necessity   Recertification Date: 24   Physician Follow-Up: 6/10/24 - Dr. Gutierrez; 24 - Dr. Torres (Dr. Wilkes)   Physician Orders: Evaluate and treat   History of Present Illness: Patient reports he started with a sore throat in 2022 and he was treated with thrush and antibiotics multiple times, but the sore throat continued.  Patient then started noticing some dysphagia and he had a MBS completed on 22 and a regular diet with thin liquids was recommended. Patient had neck surgery with a plate and screws put in at C3-5 in the end of 2023. Patient then had the fundoplication in the beginning of May of 2023.  Patient went to see an ENT at [facility] sometime in May of 2023 and he told him the symptoms were related to acid reflux.  Patient went to see Dr. Gutierrez on 23 and he found a

## 2024-05-30 NOTE — PROGRESS NOTES
to assist with turning head when driving.     Previously Met  Pt to demo shoulder flexion range of motion improved to 172 degrees to assist with functional mobility and daily activities.    Improve BFI to <5 to assist with keeping up with household chores.    Long Term Goals to be met in 8 weeks:   Pt to [facility] Quality of Life score improved by at least 10% (from 56.25% to 66.25%) for improved quality of life.    Previously Met  Pt to demo independence with donning/doffing compression garments/pump for daily and nighttime wear to maintain improved lymphedema volume for fit of clothing.    Pt will be independent with HEP and lymphedema management to help prevent edema relapse and reduce risk of infection.         Patient Education: amna burciaga   CompuMed Access code: [email or web address]   Education Outcome: Verbalized understanding  Education Barriers: None    PLAN:  Treatment Recommendations: Manual Lymphatic Drainage, Compression Bandaging/Garment(s), Strengthening, Range of Motion, Lymphedema Management and Education, Manual Techniques, Pain Management, Postural Re-Training, Body Mechanics/Ergonomics, Home Exercise Prescription, and Safety Education    Continue per POC  Plan to see patient up to 2-3 times per week for up to 8 weeks to address the treatment planned outlined above.  Time In 1345   Time Out 1433   Timed Code Minutes: 48 min   Total Treatment Time: 48 min       Electronically Signed by: Pam Bryan PTA, CLT 5/30/2024

## 2024-06-03 ENCOUNTER — HOSPITAL ENCOUNTER (OUTPATIENT)
Dept: PHYSICAL THERAPY | Age: 69
Setting detail: THERAPIES SERIES
Discharge: HOME | End: 2024-06-03
Attending: INTERNAL MEDICINE
Payer: MEDICARE

## 2024-06-03 PROCEDURE — 97140 MANUAL THERAPY 1/> REGIONS: CPT

## 2024-06-03 PROCEDURE — 97110 THERAPEUTIC EXERCISES: CPT

## 2024-06-03 NOTE — PROGRESS NOTES
[facility]  ONCOLOGY REHABILITATION  PHYSICAL THERAPY  [x] DAILY NOTE    [x] Specialized [facility] Services - [place]     Date: 6/3/2024  Patient Name:  Javon Kenney  : 1955  MRN: [id number]  CSN: [id number]      Referring Practitioner Cyndi Mireles PA-C    Diagnosis Scar conditions and fibrosis of skin [L90.5]    Treatment Diagnosis Tonsillar Carcinoma, Neck Stiffness, Lymphedema of neck   Date of Evaluation 24    Additional Pertinent History Tonsillar Cancer, Diabetes, Memory Issues      Functional Outcome Measure Used O: MODIFIED [facility]ated [facility] QUALITY OF LIFE    Functional Outcome Score 675 (24); 625 (24)      Insurance: Primary: Payor: [facility] MEDICARE /  /  / ,   Secondary:    Authorization Information: PRE CERTIFICATION REQUIRED: No precert required.  INSURANCE THERAPY BENEFIT: UNLIMITED VISITS BASED ON MEDICAL NECESSITY. .   AQUATIC THERAPY COVERED: Yes  MODALITIES COVERED:  Yes   Visit # 7, 2/ for progress note   Visits Allowed: Unlimited visits based on medical necessity   Recertification Date: 24   Physician Follow-Up: 5/3/24-Chi, 24-Dr. 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 Completed radiation.  Had PT and was discharged 2023.  Since then, Chris has had increased episodes of lightheadedness.  He does have a deviated septum which causes difficulty with breathing.  On  he is supposed to have a biopsy of a mass (possible infection) in his throat and correct his deviated septum.  At this time he feels his neck and jaw is getting stiffer which is why he is coming to therapy at this time.  Reports he had a CT scan completed a couple weeks ago and reports having swelling in head and neck.         SUBJECTIVE: Pt arrives reporting he had a pretty good

## 2024-06-04 ENCOUNTER — HOSPITAL ENCOUNTER (OUTPATIENT)
Dept: SPEECH THERAPY | Age: 69
Setting detail: THERAPIES SERIES
Discharge: HOME | End: 2024-06-04
Attending: INTERNAL MEDICINE
Payer: MEDICARE

## 2024-06-04 PROCEDURE — 92526 ORAL FUNCTION THERAPY: CPT | Performed by: SPEECH-LANGUAGE PATHOLOGIST

## 2024-06-04 NOTE — PROGRESS NOTES
alternating tongue tip back (3 second retraction) x 20 with good success, no cues    SHORT TERM GOAL #4: Patient will complete laryngeal elevation exercises x 15 for improved airway protection.  INTERVENTIONS:   *Isokinetic chin tuck against resistance x 20 with good success, no cues  *Isometric chin tuck against resistance for 60 second hold x 2 with good success, no cues  *mendelsohn maneuver: did not test this session d/t time constraints    *Patient reports he is completing his HEP about 1 time every other day. Patient inquired about whether his swallowing will get better as he just feels that he has learned to compensate for his swallowing issues.  Reviewed with the patient that he truly just got to the point of being able to complete the recommended number of repetitions of each exercise within a session and he has not been consistent with completing his HEP 2 x per day.  Ongoing education on the recommendation to continue with dysphagia treatment for about the next month with consistency with completing his HEP and see if he notices any progress, just like he would need to do with any other exercise routine. Patient verbalized understanding, however, still has some frustration with eating/drinking at times.    Long Term Goals:  Time Frame for Long-Term Goals: 8 weeks    LONG-TERM GOAL #1: Patient will tolerate a regular diet with thin liquids without overt s/s of aspiration or change in pulmonary status to safely maintain adequate nutrition and hydration. ONGOING    Rehabilitation Potential/Prognosis: good  Areas for Improvement: Impaired swallow function  Specific Interventions Next Treatment: advanced PO trials, diet monitor, pharyngeal strengthening exercises, laryngeal elevation exercises    Activity/Treatment Tolerance:  [x]  Patient tolerated treatment well []  Patient limited by fatigue  []  Patient limited by pain   []  Patient limited by other medical complications  []  Other:     Patient

## 2024-06-06 ENCOUNTER — HOSPITAL ENCOUNTER (OUTPATIENT)
Dept: PHYSICAL THERAPY | Age: 69
Setting detail: THERAPIES SERIES
Discharge: HOME | End: 2024-06-06
Attending: INTERNAL MEDICINE
Payer: MEDICARE

## 2024-06-06 ENCOUNTER — HOSPITAL ENCOUNTER (OUTPATIENT)
Dept: SPEECH THERAPY | Age: 69
Setting detail: THERAPIES SERIES
Discharge: HOME | End: 2024-06-06
Attending: INTERNAL MEDICINE
Payer: MEDICARE

## 2024-06-06 PROCEDURE — 92526 ORAL FUNCTION THERAPY: CPT | Performed by: SPEECH-LANGUAGE PATHOLOGIST

## 2024-06-06 PROCEDURE — 97110 THERAPEUTIC EXERCISES: CPT

## 2024-06-06 NOTE — PROGRESS NOTES
[facility]  SPEECH THERAPY  [] CLINICAL SWALLOW EVALUATION  [x] DAILY NOTE   [] PROGRESS NOTE [] DISCHARGE NOTE    [x] OUTPATIENT REHABILITATION CENTER [facility]   [] [facility] CARE [place]    [] [facility]   [] [facility]    Date: 2024  Patient Name:  Javon Kenney  : 1955  MRN: [id number]  CSN: [id number]    Referring Practitioner Kasie Wilkes MD    Diagnosis Dysphagia, unspecified   Treatment Diagnosis R13.10 Dysphagia, unspecified   Date of Evaluation 24    Additional Pertinent History Please see below for details      Functional Outcome Measure Used JUANCHO NOMS: swallowing   Functional Outcome Score Level  (24)       Insurance: Primary: Payor: [employer] MEDICARE /  /  / ,   Secondary:    Authorization Information: No precert required   Approved Procedure Codes: Authorization of Specific CPT Codes Not Required  (Codes requested indicated by red font, codes approved indicated by black font)   Visit # 9, /10 for progress note (Reporting Period: 24 to 24 )   Visits Allowed: Unlimited visits based on medical necessity   Recertification Date: 24   Physician Follow-Up: 6/10/24 - Dr. Gutierrez; 24 - Dr. Torres (Dr. Wilkes)   Physician Orders: Evaluate and treat   History of Present Illness: Patient reports he started with a sore throat in 2022 and he was treated with thrush and antibiotics multiple times, but the sore throat continued.  Patient then started noticing some dysphagia and he had a MBS completed on 22 and a regular diet with thin liquids was recommended. Patient had neck surgery with a plate and screws put in at C3-5 in the end of 2023. Patient then had the fundoplication in the beginning of May of 2023.  Patient went to see an ENT at [facility] sometime in May of 2023 and he told him the symptoms were related to acid reflux.  Patient went to see Dr. Gutierrez on 23 and he found a

## 2024-06-06 NOTE — PROGRESS NOTES
[facility]  ONCOLOGY REHABILITATION  PHYSICAL THERAPY  [x] DAILY NOTE    [x] [facility] [place]     Date: 2024  Patient Name:  Javon Kenney  : 1955  MRN: [id number]  CSN: [id number]      Referring Practitioner Cyndi Mireles PA-C    Diagnosis Scar conditions and fibrosis of skin [L90.5]    Treatment Diagnosis Tonsillar Carcinoma, Neck Stiffness, Lymphedema of neck   Date of Evaluation 24    Additional Pertinent History Tonsillar Cancer, Diabetes, Memory Issues      Functional Outcome Measure Used O: MODIFIED [facility] QUALITY OF LIFE    Functional Outcome Score 675 (24); 625 (24)      Insurance: Primary: Payor: [facility] MEDICARE /  /  / ,   Secondary:    Authorization Information: PRE CERTIFICATION REQUIRED: No precert required.  INSURANCE THERAPY BENEFIT: UNLIMITED VISITS BASED ON MEDICAL NECESSITY. .   AQUATIC THERAPY COVERED: Yes  MODALITIES COVERED:  Yes   Visit # 8, 3/5 for progress note   Visits Allowed: Unlimited visits based on medical necessity   Recertification Date: 24   Physician Follow-Up: 5/3/24-Chi, 24-Dr. 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 Completed radiation.  Had PT and was discharged 2023.  Since then, Chris has had increased episodes of lightheadedness.  He does have a deviated septum which causes difficulty with breathing.  On  he is supposed to have a biopsy of a mass (possible infection) in his throat and correct his deviated septum.  At this time he feels his neck and jaw is getting stiffer which is why he is coming to therapy at this time.  Reports he had a CT scan completed a couple weeks ago and reports having swelling in head and neck.         SUBJECTIVE: Pt from speech therapy with reports he is not

## 2024-06-08 ENCOUNTER — APPOINTMENT (OUTPATIENT)
Dept: GENERAL RADIOLOGY | Age: 69
DRG: 558 | End: 2024-06-08
Payer: MEDICARE

## 2024-06-08 ENCOUNTER — HOSPITAL ENCOUNTER (EMERGENCY)
Age: 69
Discharge: HOME | DRG: 558 | End: 2024-06-08
Payer: MEDICARE

## 2024-06-08 VITALS
SYSTOLIC BLOOD PRESSURE: 138 MMHG | OXYGEN SATURATION: 100 % | HEART RATE: 88 BPM | TEMPERATURE: 98 F | WEIGHT: 148 LBS | BODY MASS INDEX: 24.63 KG/M2 | DIASTOLIC BLOOD PRESSURE: 79 MMHG | RESPIRATION RATE: 16 BRPM

## 2024-06-08 DIAGNOSIS — M71.121 SEPTIC OLECRANON BURSITIS OF RIGHT ELBOW: Primary | ICD-10-CM

## 2024-06-08 LAB
ANION GAP SERPL CALC-SCNC: 11 MEQ/L (ref 8–16)
BASOPHILS ABSOLUTE: 0 THOU/MM3 (ref 0–0.1)
BASOPHILS NFR BLD AUTO: 0.3 %
BUN SERPL-MCNC: 17 MG/DL (ref 7–22)
CALCIUM SERPL-MCNC: 9.6 MG/DL (ref 8.5–10.5)
CHLORIDE SERPL-SCNC: 101 MEQ/L (ref 98–111)
CO2 SERPL-SCNC: 25 MEQ/L (ref 23–33)
CREAT SERPL-MCNC: 0.9 MG/DL (ref 0.4–1.2)
CRP SERPL-MCNC: 4.98 MG/DL (ref 0–1)
DEPRECATED RDW RBC AUTO: 41.1 FL (ref 35–45)
EOSINOPHIL NFR BLD AUTO: 0.1 %
EOSINOPHILS ABSOLUTE: 0 THOU/MM3 (ref 0–0.4)
ERYTHROCYTE [DISTWIDTH] IN BLOOD BY AUTOMATED COUNT: 12.8 % (ref 11.5–14.5)
ERYTHROCYTE [SEDIMENTATION RATE] IN BLOOD BY WESTERGREN METHOD: 7 MM/HR (ref 0–10)
GFR SERPL CREATININE-BSD FRML MDRD: > 90 ML/MIN/1.73M2
GLUCOSE SERPL-MCNC: 122 MG/DL (ref 70–108)
HCT VFR BLD AUTO: 46.9 % (ref 42–52)
HGB BLD-MCNC: 15.8 GM/DL (ref 14–18)
IMM GRANULOCYTES # BLD AUTO: 0.05 THOU/MM3 (ref 0–0.07)
IMM GRANULOCYTES NFR BLD AUTO: 0.4 %
LYMPHOCYTES ABSOLUTE: 0.6 THOU/MM3 (ref 1–4.8)
LYMPHOCYTES NFR BLD AUTO: 4.6 %
MCH RBC QN AUTO: 29.5 PG (ref 26–33)
MCHC RBC AUTO-ENTMCNC: 33.7 GM/DL (ref 32.2–35.5)
MCV RBC AUTO: 87.7 FL (ref 80–94)
MONOCYTES ABSOLUTE: 0.8 THOU/MM3 (ref 0.4–1.3)
MONOCYTES NFR BLD AUTO: 6.4 %
NEUTROPHILS ABSOLUTE: 11.1 THOU/MM3 (ref 1.8–7.7)
NEUTROPHILS NFR BLD AUTO: 88.2 %
NRBC BLD AUTO-RTO: 0 /100 WBC
OSMOLALITY SERPL CALC.SUM OF ELEC: 276.7 MOSMOL/KG (ref 275–300)
PLATELET # BLD AUTO: 184 THOU/MM3 (ref 130–400)
PMV BLD AUTO: 8.5 FL (ref 9.4–12.4)
POTASSIUM SERPL-SCNC: 4.3 MEQ/L (ref 3.5–5.2)
RBC # BLD AUTO: 5.35 MILL/MM3 (ref 4.7–6.1)
SODIUM SERPL-SCNC: 137 MEQ/L (ref 135–145)
WBC # BLD AUTO: 12.6 THOU/MM3 (ref 4.8–10.8)

## 2024-06-08 PROCEDURE — 85025 COMPLETE CBC W/AUTO DIFF WBC: CPT

## 2024-06-08 PROCEDURE — 87040 BLOOD CULTURE FOR BACTERIA: CPT

## 2024-06-08 PROCEDURE — 86140 C-REACTIVE PROTEIN: CPT

## 2024-06-08 PROCEDURE — 36415 COLL VENOUS BLD VENIPUNCTURE: CPT

## 2024-06-08 PROCEDURE — 2580000003 HC RX 258: Performed by: NURSE PRACTITIONER

## 2024-06-08 PROCEDURE — 73080 X-RAY EXAM OF ELBOW: CPT

## 2024-06-08 PROCEDURE — 6360000002 HC RX W HCPCS: Performed by: NURSE PRACTITIONER

## 2024-06-08 PROCEDURE — 85651 RBC SED RATE NONAUTOMATED: CPT

## 2024-06-08 PROCEDURE — 80048 BASIC METABOLIC PNL TOTAL CA: CPT

## 2024-06-08 RX ORDER — 0.9 % SODIUM CHLORIDE 0.9 %
1000 INTRAVENOUS SOLUTION INTRAVENOUS ONCE
Status: COMPLETED | OUTPATIENT
Start: 2024-06-08 | End: 2024-06-08

## 2024-06-08 RX ORDER — CEPHALEXIN 500 MG/1
500 CAPSULE ORAL 4 TIMES DAILY
Qty: 28 CAPSULE | Refills: 0 | Status: ON HOLD | OUTPATIENT
Start: 2024-06-08 | End: 2024-06-11

## 2024-06-08 RX ORDER — ONDANSETRON 2 MG/ML
4 INJECTION INTRAMUSCULAR; INTRAVENOUS ONCE
Status: COMPLETED | OUTPATIENT
Start: 2024-06-08 | End: 2024-06-08

## 2024-06-08 RX ORDER — SULFAMETHOXAZOLE AND TRIMETHOPRIM 800; 160 MG/1; MG/1
1 TABLET ORAL 2 TIMES DAILY
Qty: 20 TABLET | Refills: 0 | Status: ON HOLD | OUTPATIENT
Start: 2024-06-08 | End: 2024-06-11

## 2024-06-08 RX ADMIN — ONDANSETRON 4 MG: 2 INJECTION INTRAMUSCULAR; INTRAVENOUS at 17:05

## 2024-06-08 RX ADMIN — VANCOMYCIN HYDROCHLORIDE 1500 MG: 5 INJECTION, POWDER, LYOPHILIZED, FOR SOLUTION INTRAVENOUS at 18:05

## 2024-06-08 RX ADMIN — SODIUM CHLORIDE 1000 ML: 9 INJECTION, SOLUTION INTRAVENOUS at 17:04

## 2024-06-08 RX ADMIN — CEFTRIAXONE SODIUM 1000 MG: 1 INJECTION, POWDER, FOR SOLUTION INTRAMUSCULAR; INTRAVENOUS at 17:30

## 2024-06-08 ASSESSMENT — PAIN DESCRIPTION - ORIENTATION: ORIENTATION: RIGHT

## 2024-06-08 ASSESSMENT — PAIN SCALES - GENERAL: PAINLEVEL_OUTOF10: 4

## 2024-06-08 ASSESSMENT — PAIN - FUNCTIONAL ASSESSMENT: PAIN_FUNCTIONAL_ASSESSMENT: 0-10

## 2024-06-08 ASSESSMENT — PAIN DESCRIPTION - LOCATION: LOCATION: ELBOW

## 2024-06-08 NOTE — ED PROVIDER NOTES
olecranon bursa region that can   reflect underlying olecranon bursitis.               **This report has been created using voice recognition software.  It may contain   minor errors which are inherent in voice recognition technology.**      Electronically signed by Dr. Courtney Giles          LABS: (none if blank)  Labs Reviewed   CBC WITH AUTO DIFFERENTIAL - Abnormal; Notable for the following components:       Result Value    WBC 12.6 (*)     MPV 8.5 (*)     Neutrophils Absolute 11.1 (*)     Lymphocytes Absolute 0.6 (*)     All other components within normal limits   BASIC METABOLIC PANEL - Abnormal; Notable for the following components:    Glucose 122 (*)     All other components within normal limits   C-REACTIVE PROTEIN - Abnormal; Notable for the following components:    CRP 4.98 (*)     All other components within normal limits   CULTURE, BLOOD 1    Narrative:     Source: blood-Adult-suboptimal <5.5oz./set volume       Site: (single bottle)Peripheral;            Current Antibiotics: none   CULTURE, BLOOD 2    Narrative:     Source: blood-Adult-suboptimal <5.5oz./set volume       Site: (single bottle)Peripheral;            Current Antibiotics: none   SEDIMENTATION RATE   ANION GAP   GLOMERULAR FILTRATION RATE, ESTIMATED   OSMOLALITY       (Any cultures that may have been sent were not resulted at the time of this patient visit)    MEDICAL DECISION MAKING / ED COURSE:     Summary of Patient Presentation:      Plan:labs, imaging, abx    1) Number and Complexity of Problems                    Differential Diagnosis includes (but not limited to):  Bursitis, infection, cellulitis                   Pertinent Comorbid Conditions:    Reviewed per the chart    2)  Data Reviewed (none if left blank, additional information can be found in the ED course)          My Independent interpretations:     EKG:           Imaging: olecranon bursitis    Labs:      mildly elevated WBC, CRP.  Normal ESR.                     Decision  medications on file       FINAL IMPRESSION    No diagnosis found.      DISPOSITION/PLAN   DISPOSITION        OUTPATIENT FOLLOW UP THE PATIENT:  No follow-up provider specified.    Paty Gan, APRN - CNP

## 2024-06-08 NOTE — DISCHARGE INSTRUCTIONS
It is very important that you see orthopedics on Monday.  They have a walk-in clinic from 8-4.  They are expecting you.    If you notice any changes or worsening swelling or redness, fevers, any changes at all, please return to the emergency room for further evaluation.

## 2024-06-08 NOTE — ED TRIAGE NOTES
Pt presents to the ED for right elbow pain and swelling that started yesterday. Pt states earlier in the week he was playing with his dog and is unsure if that is what caused the pain and swelling.

## 2024-06-09 ENCOUNTER — HOSPITAL ENCOUNTER (INPATIENT)
Age: 69
LOS: 1 days | Discharge: HOME | DRG: 558 | End: 2024-06-11
Attending: STUDENT IN AN ORGANIZED HEALTH CARE EDUCATION/TRAINING PROGRAM
Payer: MEDICARE

## 2024-06-09 DIAGNOSIS — L03.113 CELLULITIS OF RIGHT ELBOW: ICD-10-CM

## 2024-06-09 DIAGNOSIS — M71.121 SEPTIC OLECRANON BURSITIS OF RIGHT ELBOW: Primary | ICD-10-CM

## 2024-06-09 DIAGNOSIS — M25.521 PAIN AND SWELLING OF RIGHT ELBOW: ICD-10-CM

## 2024-06-09 DIAGNOSIS — M25.421 PAIN AND SWELLING OF RIGHT ELBOW: ICD-10-CM

## 2024-06-09 DIAGNOSIS — M71.121 SEPTIC BURSITIS OF ELBOW, RIGHT: ICD-10-CM

## 2024-06-09 LAB
ANION GAP SERPL CALC-SCNC: 11 MEQ/L (ref 8–16)
BACTERIA BLD AEROBE CULT: NORMAL
BACTERIA BLD AEROBE CULT: NORMAL
BASOPHILS ABSOLUTE: 0 THOU/MM3 (ref 0–0.1)
BASOPHILS NFR BLD AUTO: 0.3 %
BUN SERPL-MCNC: 15 MG/DL (ref 7–22)
CALCIUM SERPL-MCNC: 9.1 MG/DL (ref 8.5–10.5)
CHLORIDE SERPL-SCNC: 102 MEQ/L (ref 98–111)
CO2 SERPL-SCNC: 21 MEQ/L (ref 23–33)
CREAT SERPL-MCNC: 0.9 MG/DL (ref 0.4–1.2)
CRP SERPL-MCNC: 7.91 MG/DL (ref 0–1)
DEPRECATED RDW RBC AUTO: 41.7 FL (ref 35–45)
EOSINOPHIL NFR BLD AUTO: 2 %
EOSINOPHILS ABSOLUTE: 0.2 THOU/MM3 (ref 0–0.4)
ERYTHROCYTE [DISTWIDTH] IN BLOOD BY AUTOMATED COUNT: 12.7 % (ref 11.5–14.5)
GFR SERPL CREATININE-BSD FRML MDRD: > 90 ML/MIN/1.73M2
GLUCOSE SERPL-MCNC: 127 MG/DL (ref 70–108)
HCT VFR BLD AUTO: 42.5 % (ref 42–52)
HGB BLD-MCNC: 14.2 GM/DL (ref 14–18)
IMM GRANULOCYTES # BLD AUTO: 0.05 THOU/MM3 (ref 0–0.07)
IMM GRANULOCYTES NFR BLD AUTO: 0.5 %
LACTIC ACID, SEPSIS: 1.9 MMOL/L (ref 0.5–1.9)
LYMPHOCYTES ABSOLUTE: 0.8 THOU/MM3 (ref 1–4.8)
LYMPHOCYTES NFR BLD AUTO: 7.6 %
MAGNESIUM SERPL-MCNC: 2.1 MG/DL (ref 1.6–2.4)
MCH RBC QN AUTO: 29.8 PG (ref 26–33)
MCHC RBC AUTO-ENTMCNC: 33.4 GM/DL (ref 32.2–35.5)
MCV RBC AUTO: 89.3 FL (ref 80–94)
MONOCYTES ABSOLUTE: 0.9 THOU/MM3 (ref 0.4–1.3)
MONOCYTES NFR BLD AUTO: 8 %
NEUTROPHILS ABSOLUTE: 8.9 THOU/MM3 (ref 1.8–7.7)
NEUTROPHILS NFR BLD AUTO: 81.6 %
NRBC BLD AUTO-RTO: 0 /100 WBC
OSMOLALITY SERPL CALC.SUM OF ELEC: 270.7 MOSMOL/KG (ref 275–300)
PLATELET # BLD AUTO: 188 THOU/MM3 (ref 130–400)
PMV BLD AUTO: 8.8 FL (ref 9.4–12.4)
POTASSIUM SERPL-SCNC: 3.6 MEQ/L (ref 3.5–5.2)
RBC # BLD AUTO: 4.76 MILL/MM3 (ref 4.7–6.1)
SODIUM SERPL-SCNC: 134 MEQ/L (ref 135–145)
WBC # BLD AUTO: 10.9 THOU/MM3 (ref 4.8–10.8)

## 2024-06-09 PROCEDURE — 99285 EMERGENCY DEPT VISIT HI MDM: CPT

## 2024-06-09 PROCEDURE — 80048 BASIC METABOLIC PNL TOTAL CA: CPT

## 2024-06-09 PROCEDURE — 6370000000 HC RX 637 (ALT 250 FOR IP)

## 2024-06-09 PROCEDURE — 83930 ASSAY OF BLOOD OSMOLALITY: CPT

## 2024-06-09 PROCEDURE — 85025 COMPLETE CBC W/AUTO DIFF WBC: CPT

## 2024-06-09 PROCEDURE — 86140 C-REACTIVE PROTEIN: CPT

## 2024-06-09 PROCEDURE — 83605 ASSAY OF LACTIC ACID: CPT

## 2024-06-09 PROCEDURE — 36415 COLL VENOUS BLD VENIPUNCTURE: CPT

## 2024-06-09 PROCEDURE — 83735 ASSAY OF MAGNESIUM: CPT

## 2024-06-09 RX ORDER — ACETAMINOPHEN 500 MG
1000 TABLET ORAL ONCE
Status: COMPLETED | OUTPATIENT
Start: 2024-06-09 | End: 2024-06-09

## 2024-06-09 RX ADMIN — ACETAMINOPHEN 1000 MG: 500 TABLET ORAL at 23:14

## 2024-06-09 ASSESSMENT — PAIN - FUNCTIONAL ASSESSMENT: PAIN_FUNCTIONAL_ASSESSMENT: 0-10

## 2024-06-09 ASSESSMENT — PAIN DESCRIPTION - LOCATION: LOCATION: ELBOW

## 2024-06-09 ASSESSMENT — PAIN DESCRIPTION - ORIENTATION: ORIENTATION: LEFT

## 2024-06-09 ASSESSMENT — PAIN SCALES - GENERAL: PAINLEVEL_OUTOF10: 4

## 2024-06-10 ENCOUNTER — APPOINTMENT (OUTPATIENT)
Dept: GENERAL RADIOLOGY | Age: 69
DRG: 558 | End: 2024-06-10
Payer: MEDICARE

## 2024-06-10 ENCOUNTER — TELEPHONE (OUTPATIENT)
Dept: INTERNAL MEDICINE CLINIC | Age: 69
End: 2024-06-10

## 2024-06-10 ENCOUNTER — APPOINTMENT (OUTPATIENT)
Dept: CT IMAGING | Age: 69
DRG: 558 | End: 2024-06-10
Payer: MEDICARE

## 2024-06-10 PROBLEM — M43.00 SPONDYLOLYSIS: Status: RESOLVED | Noted: 2017-10-23 | Resolved: 2024-06-10

## 2024-06-10 PROBLEM — I63.10: Status: RESOLVED | Noted: 2023-06-16 | Resolved: 2024-06-10

## 2024-06-10 PROBLEM — M47.817 SPONDYLOSIS OF LUMBOSACRAL SPINE WITHOUT MYELOPATHY: Status: RESOLVED | Noted: 2017-04-04 | Resolved: 2024-06-10

## 2024-06-10 PROBLEM — J39.2 PHARYNGEAL MASS: Status: RESOLVED | Noted: 2024-01-15 | Resolved: 2024-06-10

## 2024-06-10 PROBLEM — M25.512 PAIN IN LEFT SHOULDER: Status: RESOLVED | Noted: 2020-02-06 | Resolved: 2024-06-10

## 2024-06-10 PROBLEM — M71.121 SEPTIC BURSITIS OF ELBOW, RIGHT: Status: ACTIVE | Noted: 2024-06-10

## 2024-06-10 PROBLEM — L03.113 CELLULITIS OF RIGHT ELBOW: Status: ACTIVE | Noted: 2024-06-10

## 2024-06-10 PROBLEM — K44.9 HIATAL HERNIA: Status: RESOLVED | Noted: 2023-05-08 | Resolved: 2024-06-10

## 2024-06-10 PROBLEM — M99.59 INTERVERTEBRAL DISC STENOSIS OF NEURAL CANAL: Status: RESOLVED | Noted: 2018-02-05 | Resolved: 2024-06-10

## 2024-06-10 PROBLEM — G62.9 NEUROPATHY: Status: RESOLVED | Noted: 2024-03-03 | Resolved: 2024-06-10

## 2024-06-10 PROBLEM — Z78.9 FAILURE OF OUTPATIENT TREATMENT: Status: ACTIVE | Noted: 2024-06-10

## 2024-06-10 PROBLEM — H92.01 REFERRED OTALGIA OF RIGHT EAR: Status: RESOLVED | Noted: 2023-06-05 | Resolved: 2024-06-10

## 2024-06-10 PROBLEM — R73.02 IMPAIRED GLUCOSE TOLERANCE: Status: ACTIVE | Noted: 2024-06-10

## 2024-06-10 PROBLEM — E03.9 HYPOTHYROIDISM: Status: ACTIVE | Noted: 2024-06-10

## 2024-06-10 LAB
AMPHETAMINES UR QL SCN: NEGATIVE
ANION GAP SERPL CALC-SCNC: 8 MEQ/L (ref 8–16)
ANION GAP SERPL CALC-SCNC: 9 MEQ/L (ref 8–16)
BARBITURATES UR QL SCN: NEGATIVE
BASOPHILS ABSOLUTE: 0 THOU/MM3 (ref 0–0.1)
BASOPHILS NFR BLD AUTO: 0.3 %
BENZODIAZ UR QL SCN: NEGATIVE
BILIRUB UR QL STRIP: NEGATIVE
BUN SERPL-MCNC: 13 MG/DL (ref 7–22)
BUN SERPL-MCNC: 15 MG/DL (ref 7–22)
BZE UR QL SCN: NEGATIVE
CALCIUM SERPL-MCNC: 8.7 MG/DL (ref 8.5–10.5)
CALCIUM SERPL-MCNC: 8.7 MG/DL (ref 8.5–10.5)
CANNABINOIDS UR QL SCN: NEGATIVE
CHARACTER UR: CLEAR
CHLORIDE SERPL-SCNC: 104 MEQ/L (ref 98–111)
CHLORIDE SERPL-SCNC: 106 MEQ/L (ref 98–111)
CO2 SERPL-SCNC: 23 MEQ/L (ref 23–33)
CO2 SERPL-SCNC: 25 MEQ/L (ref 23–33)
COLOR UR: YELLOW
CREAT SERPL-MCNC: 0.9 MG/DL (ref 0.4–1.2)
CREAT SERPL-MCNC: 0.9 MG/DL (ref 0.4–1.2)
DEPRECATED RDW RBC AUTO: 40.9 FL (ref 35–45)
EKG ATRIAL RATE: 68 BPM
EKG P AXIS: 40 DEGREES
EKG P-R INTERVAL: 148 MS
EKG Q-T INTERVAL: 382 MS
EKG QRS DURATION: 96 MS
EKG QTC CALCULATION (BAZETT): 406 MS
EKG R AXIS: 10 DEGREES
EKG T AXIS: 82 DEGREES
EKG VENTRICULAR RATE: 68 BPM
EOSINOPHIL NFR BLD AUTO: 2.1 %
EOSINOPHILS ABSOLUTE: 0.2 THOU/MM3 (ref 0–0.4)
ERYTHROCYTE [DISTWIDTH] IN BLOOD BY AUTOMATED COUNT: 12.6 % (ref 11.5–14.5)
FENTANYL: NEGATIVE
GFR SERPL CREATININE-BSD FRML MDRD: > 90 ML/MIN/1.73M2
GFR SERPL CREATININE-BSD FRML MDRD: > 90 ML/MIN/1.73M2
GLUCOSE SERPL-MCNC: 101 MG/DL (ref 70–108)
GLUCOSE SERPL-MCNC: 87 MG/DL (ref 70–108)
GLUCOSE UR QL STRIP.AUTO: NEGATIVE MG/DL
HCT VFR BLD AUTO: 40.2 % (ref 42–52)
HGB UR QL STRIP.AUTO: NEGATIVE
IMM GRANULOCYTES # BLD AUTO: 0.05 THOU/MM3 (ref 0–0.07)
IMM GRANULOCYTES NFR BLD AUTO: 0.6 %
KETONES UR QL STRIP.AUTO: NEGATIVE
LACTIC ACID, SEPSIS: 1.8 MMOL/L (ref 0.5–1.9)
LEUKOCYTE ESTERASE UR QL STRIP.AUTO: NEGATIVE
LYMPHOCYTES ABSOLUTE: 0.5 THOU/MM3 (ref 1–4.8)
LYMPHOCYTES NFR BLD AUTO: 6.3 %
MCH RBC QN AUTO: 29.5 PG (ref 26–33)
MCHC RBC AUTO-ENTMCNC: 33.3 GM/DL (ref 32.2–35.5)
MCV RBC AUTO: 88.4 FL (ref 80–94)
MONOCYTES ABSOLUTE: 0.8 THOU/MM3 (ref 0.4–1.3)
MONOCYTES NFR BLD AUTO: 10.4 %
MRSA DNA SPEC QL NAA+PROBE: NEGATIVE
NEUTROPHILS ABSOLUTE: 6.4 THOU/MM3 (ref 1.8–7.7)
NEUTROPHILS NFR BLD AUTO: 80.3 %
NITRITE UR QL STRIP.AUTO: NEGATIVE
NRBC BLD AUTO-RTO: 0 /100 WBC
OPIATES UR QL SCN: NEGATIVE
OSMOLALITY SERPL CALC.SUM OF ELEC: 272.2 MOSMOL/KG (ref 275–300)
OSMOLALITY SERPL CALC.SUM OF ELEC: 277.8 MOSMOL/KG (ref 275–300)
OSMOLALITY SERPL: 288 MOSMOL/KG (ref 275–295)
OXYCODONE: NEGATIVE
PCP UR QL SCN: NEGATIVE
PH UR STRIP.AUTO: 6.5 [PH] (ref 5–9)
PLATELET # BLD AUTO: 158 THOU/MM3 (ref 130–400)
PMV BLD AUTO: 8.8 FL (ref 9.4–12.4)
POTASSIUM SERPL-SCNC: 3.8 MEQ/L (ref 3.5–5.2)
POTASSIUM SERPL-SCNC: 4 MEQ/L (ref 3.5–5.2)
PROT UR STRIP.AUTO-MCNC: NEGATIVE MG/DL
RBC # BLD AUTO: 4.55 MILL/MM3 (ref 4.7–6.1)
SODIUM SERPL-SCNC: 136 MEQ/L (ref 135–145)
SODIUM SERPL-SCNC: 139 MEQ/L (ref 135–145)
SP GR UR REFRACT.AUTO: > 1.03 (ref 1–1.03)
URATE SERPL-MCNC: 3.4 MG/DL (ref 3.7–7)
UROBILINOGEN UR QL STRIP.AUTO: 0.2 EU/DL (ref 0–1)
WBC # BLD AUTO: 8 THOU/MM3 (ref 4.8–10.8)

## 2024-06-10 PROCEDURE — 87641 MR-STAPH DNA AMP PROBE: CPT

## 2024-06-10 PROCEDURE — 99223 1ST HOSP IP/OBS HIGH 75: CPT | Performed by: NURSE PRACTITIONER

## 2024-06-10 PROCEDURE — 93005 ELECTROCARDIOGRAM TRACING: CPT | Performed by: NURSE PRACTITIONER

## 2024-06-10 PROCEDURE — 81003 URINALYSIS AUTO W/O SCOPE: CPT

## 2024-06-10 PROCEDURE — 6360000002 HC RX W HCPCS: Performed by: NURSE PRACTITIONER

## 2024-06-10 PROCEDURE — 83605 ASSAY OF LACTIC ACID: CPT

## 2024-06-10 PROCEDURE — 36415 COLL VENOUS BLD VENIPUNCTURE: CPT

## 2024-06-10 PROCEDURE — 6370000000 HC RX 637 (ALT 250 FOR IP): Performed by: NURSE PRACTITIONER

## 2024-06-10 PROCEDURE — 2580000003 HC RX 258: Performed by: NURSE PRACTITIONER

## 2024-06-10 PROCEDURE — 6360000002 HC RX W HCPCS

## 2024-06-10 PROCEDURE — 1200000000 HC SEMI PRIVATE

## 2024-06-10 PROCEDURE — 80307 DRUG TEST PRSMV CHEM ANLYZR: CPT

## 2024-06-10 PROCEDURE — 85025 COMPLETE CBC W/AUTO DIFF WBC: CPT

## 2024-06-10 PROCEDURE — 84550 ASSAY OF BLOOD/URIC ACID: CPT

## 2024-06-10 PROCEDURE — 73080 X-RAY EXAM OF ELBOW: CPT

## 2024-06-10 PROCEDURE — 6360000004 HC RX CONTRAST MEDICATION: Performed by: NURSE PRACTITIONER

## 2024-06-10 PROCEDURE — 73201 CT UPPER EXTREMITY W/DYE: CPT

## 2024-06-10 PROCEDURE — 80048 BASIC METABOLIC PNL TOTAL CA: CPT

## 2024-06-10 PROCEDURE — 2580000003 HC RX 258

## 2024-06-10 PROCEDURE — 96365 THER/PROPH/DIAG IV INF INIT: CPT

## 2024-06-10 RX ORDER — MAGNESIUM SULFATE IN WATER 40 MG/ML
2000 INJECTION, SOLUTION INTRAVENOUS PRN
Status: DISCONTINUED | OUTPATIENT
Start: 2024-06-10 | End: 2024-06-11 | Stop reason: HOSPADM

## 2024-06-10 RX ORDER — POTASSIUM CHLORIDE 20 MEQ/1
40 TABLET, EXTENDED RELEASE ORAL PRN
Status: DISCONTINUED | OUTPATIENT
Start: 2024-06-10 | End: 2024-06-11 | Stop reason: HOSPADM

## 2024-06-10 RX ORDER — POLYETHYLENE GLYCOL 3350 17 G/17G
17 POWDER, FOR SOLUTION ORAL DAILY PRN
Status: DISCONTINUED | OUTPATIENT
Start: 2024-06-10 | End: 2024-06-11 | Stop reason: HOSPADM

## 2024-06-10 RX ORDER — SODIUM CHLORIDE 9 MG/ML
INJECTION, SOLUTION INTRAVENOUS PRN
Status: DISCONTINUED | OUTPATIENT
Start: 2024-06-10 | End: 2024-06-11 | Stop reason: HOSPADM

## 2024-06-10 RX ORDER — ONDANSETRON 4 MG/1
4 TABLET, ORALLY DISINTEGRATING ORAL EVERY 8 HOURS PRN
Status: DISCONTINUED | OUTPATIENT
Start: 2024-06-10 | End: 2024-06-11 | Stop reason: HOSPADM

## 2024-06-10 RX ORDER — RIVASTIGMINE 4.6 MG/24H
1 PATCH, EXTENDED RELEASE TRANSDERMAL DAILY
Status: DISCONTINUED | OUTPATIENT
Start: 2024-06-10 | End: 2024-06-11 | Stop reason: HOSPADM

## 2024-06-10 RX ORDER — POTASSIUM CHLORIDE 7.45 MG/ML
10 INJECTION INTRAVENOUS PRN
Status: DISCONTINUED | OUTPATIENT
Start: 2024-06-10 | End: 2024-06-11 | Stop reason: HOSPADM

## 2024-06-10 RX ORDER — ONDANSETRON 2 MG/ML
4 INJECTION INTRAMUSCULAR; INTRAVENOUS EVERY 6 HOURS PRN
Status: DISCONTINUED | OUTPATIENT
Start: 2024-06-10 | End: 2024-06-11 | Stop reason: HOSPADM

## 2024-06-10 RX ORDER — MEMANTINE HYDROCHLORIDE 10 MG/1
10 TABLET ORAL 2 TIMES DAILY
Status: DISCONTINUED | OUTPATIENT
Start: 2024-06-10 | End: 2024-06-11 | Stop reason: HOSPADM

## 2024-06-10 RX ORDER — OXYCODONE HYDROCHLORIDE 5 MG/1
2.5 TABLET ORAL EVERY 4 HOURS PRN
Status: DISCONTINUED | OUTPATIENT
Start: 2024-06-10 | End: 2024-06-11 | Stop reason: HOSPADM

## 2024-06-10 RX ORDER — DULOXETIN HYDROCHLORIDE 30 MG/1
30 CAPSULE, DELAYED RELEASE ORAL DAILY
Status: DISCONTINUED | OUTPATIENT
Start: 2024-06-10 | End: 2024-06-11 | Stop reason: HOSPADM

## 2024-06-10 RX ORDER — ACETAMINOPHEN 650 MG/1
650 SUPPOSITORY RECTAL EVERY 6 HOURS PRN
Status: DISCONTINUED | OUTPATIENT
Start: 2024-06-10 | End: 2024-06-11 | Stop reason: HOSPADM

## 2024-06-10 RX ORDER — SODIUM CHLORIDE 0.9 % (FLUSH) 0.9 %
5-40 SYRINGE (ML) INJECTION PRN
Status: DISCONTINUED | OUTPATIENT
Start: 2024-06-10 | End: 2024-06-11 | Stop reason: HOSPADM

## 2024-06-10 RX ORDER — ENOXAPARIN SODIUM 100 MG/ML
40 INJECTION SUBCUTANEOUS DAILY
Status: DISCONTINUED | OUTPATIENT
Start: 2024-06-10 | End: 2024-06-11 | Stop reason: HOSPADM

## 2024-06-10 RX ORDER — SENNOSIDES A AND B 8.6 MG/1
1 TABLET, FILM COATED ORAL NIGHTLY
Status: DISCONTINUED | OUTPATIENT
Start: 2024-06-10 | End: 2024-06-11 | Stop reason: HOSPADM

## 2024-06-10 RX ORDER — OXYCODONE HYDROCHLORIDE 5 MG/1
5 TABLET ORAL EVERY 4 HOURS PRN
Status: DISCONTINUED | OUTPATIENT
Start: 2024-06-10 | End: 2024-06-11 | Stop reason: HOSPADM

## 2024-06-10 RX ORDER — SODIUM CHLORIDE 0.9 % (FLUSH) 0.9 %
5-40 SYRINGE (ML) INJECTION EVERY 12 HOURS SCHEDULED
Status: DISCONTINUED | OUTPATIENT
Start: 2024-06-10 | End: 2024-06-11 | Stop reason: HOSPADM

## 2024-06-10 RX ORDER — DOCUSATE SODIUM 100 MG/1
100 CAPSULE, LIQUID FILLED ORAL 2 TIMES DAILY PRN
Status: DISCONTINUED | OUTPATIENT
Start: 2024-06-10 | End: 2024-06-11 | Stop reason: HOSPADM

## 2024-06-10 RX ORDER — LEVOTHYROXINE SODIUM 0.03 MG/1
25 TABLET ORAL
Status: DISCONTINUED | OUTPATIENT
Start: 2024-06-10 | End: 2024-06-11 | Stop reason: HOSPADM

## 2024-06-10 RX ORDER — CYCLOBENZAPRINE HCL 10 MG
10 TABLET ORAL EVERY 8 HOURS PRN
Status: DISCONTINUED | OUTPATIENT
Start: 2024-06-10 | End: 2024-06-11 | Stop reason: HOSPADM

## 2024-06-10 RX ORDER — ACETAMINOPHEN 325 MG/1
650 TABLET ORAL EVERY 6 HOURS PRN
Status: DISCONTINUED | OUTPATIENT
Start: 2024-06-10 | End: 2024-06-11 | Stop reason: HOSPADM

## 2024-06-10 RX ADMIN — IOPAMIDOL 80 ML: 755 INJECTION, SOLUTION INTRAVENOUS at 02:05

## 2024-06-10 RX ADMIN — PIPERACILLIN AND TAZOBACTAM 3375 MG: 3; .375 INJECTION, POWDER, FOR SOLUTION INTRAVENOUS at 18:09

## 2024-06-10 RX ADMIN — VANCOMYCIN HYDROCHLORIDE 1500 MG: 5 INJECTION, POWDER, LYOPHILIZED, FOR SOLUTION INTRAVENOUS at 23:47

## 2024-06-10 RX ADMIN — MEMANTINE HYDROCHLORIDE 10 MG: 10 TABLET ORAL at 08:26

## 2024-06-10 RX ADMIN — MEMANTINE HYDROCHLORIDE 10 MG: 10 TABLET ORAL at 22:22

## 2024-06-10 RX ADMIN — PIPERACILLIN AND TAZOBACTAM 4500 MG: 4; .5 INJECTION, POWDER, LYOPHILIZED, FOR SOLUTION INTRAVENOUS at 03:33

## 2024-06-10 RX ADMIN — ONDANSETRON 4 MG: 2 INJECTION INTRAMUSCULAR; INTRAVENOUS at 02:33

## 2024-06-10 RX ADMIN — SODIUM CHLORIDE: 9 INJECTION, SOLUTION INTRAVENOUS at 03:31

## 2024-06-10 RX ADMIN — CEFTRIAXONE SODIUM 2000 MG: 2 INJECTION, POWDER, FOR SOLUTION INTRAMUSCULAR; INTRAVENOUS at 00:55

## 2024-06-10 RX ADMIN — ONDANSETRON 4 MG: 2 INJECTION INTRAMUSCULAR; INTRAVENOUS at 15:19

## 2024-06-10 RX ADMIN — CYCLOBENZAPRINE 10 MG: 10 TABLET, FILM COATED ORAL at 03:29

## 2024-06-10 RX ADMIN — ENOXAPARIN SODIUM 40 MG: 100 INJECTION SUBCUTANEOUS at 08:26

## 2024-06-10 RX ADMIN — Medication 1500 MG: at 01:34

## 2024-06-10 RX ADMIN — DULOXETINE HYDROCHLORIDE 30 MG: 30 CAPSULE, DELAYED RELEASE ORAL at 08:26

## 2024-06-10 RX ADMIN — PIPERACILLIN AND TAZOBACTAM 3375 MG: 3; .375 INJECTION, POWDER, FOR SOLUTION INTRAVENOUS at 10:23

## 2024-06-10 RX ADMIN — LEVOTHYROXINE SODIUM 25 MCG: 0.03 TABLET ORAL at 05:30

## 2024-06-10 ASSESSMENT — PAIN DESCRIPTION - LOCATION
LOCATION: NECK;BACK
LOCATION: NECK

## 2024-06-10 ASSESSMENT — PAIN DESCRIPTION - DESCRIPTORS: DESCRIPTORS: SORE;CRAMPING

## 2024-06-10 ASSESSMENT — PAIN SCALES - GENERAL
PAINLEVEL_OUTOF10: 3
PAINLEVEL_OUTOF10: 4
PAINLEVEL_OUTOF10: 2

## 2024-06-10 NOTE — PLAN OF CARE
Problem: Skin/Tissue Integrity - Adult  Goal: Skin integrity remains intact  Outcome: Progressing  Flowsheets (Taken 6/10/2024 0310)  Skin Integrity Remains Intact:   Monitor for areas of redness and/or skin breakdown   Assess vascular access sites hourly     Problem: Skin/Tissue Integrity - Adult  Goal: Incisions, wounds, or drain sites healing without S/S of infection  Outcome: Progressing  Flowsheets (Taken 6/10/2024 0310)  Incisions, Wounds, or Drain Sites Healing Without Sign and Symptoms of Infection: TWICE DAILY: Assess and document dressing/incision, wound bed, drain sites and surrounding tissue     Problem: Gastrointestinal - Adult  Goal: Minimal or absence of nausea and vomiting  Outcome: Progressing  Flowsheets (Taken 6/10/2024 0310)  Minimal or absence of nausea and vomiting:   Administer IV fluids as ordered to ensure adequate hydration   Nasogastric tube to low intermittent suction as ordered   Provide nonpharmacologic comfort measures as appropriate   Advance diet as tolerated, if ordered   Administer ordered antiemetic medications as needed     Problem: Infection - Adult  Goal: Absence of infection at discharge  Outcome: Progressing  Flowsheets (Taken 6/10/2024 0310)  Absence of infection at discharge:   Assess and monitor for signs and symptoms of infection   Monitor lab/diagnostic results   Administer medications as ordered   Instruct and encourage patient and family to use good hand hygiene technique     Problem: Infection - Adult  Goal: Absence of infection during hospitalization  Outcome: Progressing  Flowsheets (Taken 6/10/2024 0310)  Absence of infection during hospitalization:   Assess and monitor for signs and symptoms of infection   Monitor lab/diagnostic results   Monitor all insertion sites i.e., indwelling lines, tubes and drains   Administer medications as ordered   Instruct and encourage patient and family to use good hand hygiene technique   Care plan reviewed with patient.  Patient

## 2024-06-10 NOTE — H&P
Taking? Authorizing Provider   sulfamethoxazole-trimethoprim (BACTRIM DS) 800-160 MG per tablet Take 1 tablet by mouth 2 times daily for 10 days 6/8/24 6/18/24  Paty Gan APRN - CNP   cephALEXin (KEFLEX) 500 MG capsule Take 1 capsule by mouth 4 times daily for 7 days 6/8/24 6/15/24  Paty Gan APRN - CNP   levothyroxine (SYNTHROID) 25 MCG tablet Take 1 tablet by mouth daily Take first thing in the morning one hour before other medications or food. 5/29/24   Kasie Wilkes MD   cyclobenzaprine (FLEXERIL) 10 MG tablet Take 1 tablet by mouth every 8 hours as needed for Muscle spasms 5/25/24   Kasie Wilkes MD   rivastigmine (EXELON) 4.6 MG/24HR Place 1 patch onto the skin daily 4/11/24 8/9/24  ProviderKirsten MD   DULoxetine (CYMBALTA) 30 MG extended release capsule take 1 capsule by mouth once daily 4/11/24   Kasie Wilkes MD   memantine (NAMENDA) 10 MG tablet Take 1 tablet by mouth 2 times daily 4/2/24   ProviderKirsten MD       Allergies:  Patient has no known allergies.    Social History:   TOBACCO:   reports that he has never smoked. He has never been exposed to tobacco smoke. He has never used smokeless tobacco.  ETOH:   reports no history of alcohol use.  OCCUPATION:     Family History:       Problem Relation Age of Onset    Asthma Mother     Heart Disease Mother     Heart Disease Father     Other Father         CHF    Stroke Other     Heart Disease Other        GENERAL: Positive for fatigue, weakness, fever, poor appetite  SKN: Positive for red warm swelling right elbow  HEAD: No headaches or recent injury  EYES: No acute changes in vision, no diplopia or blurred vision, no drainage  EARS: No hearing loss, no tinnitus, no drainage  NOSE/THROAT: No rhinorrhea or pharyngitis, no nasal drainage  NECK: No lumps or unusual neck stiffness  PULMONARY: No dyspnea, orthopnea or paroxysmal nocturnal dyspnea, stridor or wheezing,  CARDIAC: No chest pain, pressure,

## 2024-06-10 NOTE — PROGRESS NOTES
Patient arrived to the unit at approximately 0210 via stretcher. Patient ambulated safely to hospital bed and bed alarm turned on. IV Vancomycin infusing into IV. Proved patient with call light and instructions on use.

## 2024-06-10 NOTE — CONSULTS
sensation to light touch intact, distal pulses palpable.    Labs:   CBC:   Lab Results   Component Value Date/Time    WBC 10.9 06/09/2024 10:55 PM    RBC 4.76 06/09/2024 10:55 PM    RBC 5.20 07/09/2011 08:24 AM     BMP:  Lab Results   Component Value Date/Time    GLUCOSE 101 06/10/2024 06:17 AM    GLUCOSE 80 01/24/2022 01:53 PM    CO2 25 06/10/2024 06:17 AM    BUN 13 06/10/2024 06:17 AM    CREATININE 0.9 06/10/2024 06:17 AM    CALCIUM 8.7 06/10/2024 06:17 AM     PT/INR:   Lab Results   Component Value Date/Time    INR 1.02 02/09/2023 04:26 PM    APTT 33.3 02/09/2023 04:26 PM     Type and Screen: No results found for: \"LABABO\", \"RH\", \"LABANTI\"  CRP:   Lab Results   Component Value Date/Time    CRP 7.91 06/09/2024 10:55 PM     ESR:   Lab Results   Component Value Date/Time    SEDRATE 7 06/08/2024 02:22 PM     HgBA1c:  No results found for: \"LABA1C\"        Radiology:   XR: Exam: Three-view right elbow     Comparison: 06/08/2024     Findings:  Severe dorsal soft tissue swelling of the elbow.  Dorsal olecranon spur.  No elbow joint effusion  No acute fracture or dislocation.  Mild elbow arthritis  No radiodense foreign bodies.     IMPRESSION:  Impression:  1. No acute osseous process.  2. Dorsal elbow soft tissue swelling appears stable compared to 06/08/2024      CT: xam: CT of the right upper extremity with contrast     Comparison: 06/10/2024, 06/08/2024     Clinical history: Pain warmth swelling right elbow cellulitis of right   elbow     Findings:  No acute glenohumeral fracture or dislocation  Moderate acromioclavicular joint arthritis.  Mild glenohumeral arthritis.  No humeral shaft fracture or osseous lesion  No elbow joint fracture or dislocation. Mild elbow arthritis. Dorsal   olecranon spur.  No osseous erosions of the elbow to suggest osteomyelitis.  Edema in the subcutaneous fat of the dorsal elbow reflective of a   nonspecific cellulitis. Cellulitis extends beyond the field-of-view into   the

## 2024-06-10 NOTE — ED TRIAGE NOTES
Patient ambulates into the ED for reevaluation of an elbow injury that happened several days ago. Patient reports he was playing with his dog when his dog jumped up and hit his elbow. Patient reports he was evaluated here yesterday and discharged with bactrim and keflex. Patient reports worsening pain, redness, and swelling. VSS.

## 2024-06-10 NOTE — ED NOTES
ED to inpatient nurses report      Chief Complaint:  Chief Complaint   Patient presents with    Elbow Injury     Present to ED from: home    MOA:     LOC: alert and orientated to name, place, date  Mobility: Independent  Oxygen Baseline: room air    Current needs required: room air     Code Status:   Prior    What abnormal results were found and what did you give/do to treat them? Septic bursitis - Rocephin, Vancomycin, Tylenol,       Mental Status:  Level of Consciousness: Alert (0)    Psych Assessment:        Vitals:  Patient Vitals for the past 24 hrs:   BP Temp Temp src Pulse Resp SpO2 Weight   06/10/24 0130 (!) 153/84 -- -- -- -- -- --   06/10/24 0115 (!) 165/90 -- -- 66 16 96 % --   06/09/24 2231 (!) 155/100 100.2 °F (37.9 °C) Oral 82 16 98 % 67.1 kg (148 lb)        LDAs:   Peripheral IV 06/09/24 Left Antecubital (Active)   Site Assessment Clean, dry & intact 06/09/24 2247   Line Status Normal saline locked;Specimen collected 06/09/24 2247   Dressing Status Clean, dry & intact 06/09/24 2247   Dressing Type Transparent 06/09/24 2247   Dressing Intervention New 06/09/24 2247       Ambulatory Status:  No data recorded    Diagnosis:  DISPOSITION Admitted 06/10/2024 01:33:02 AM   Final diagnoses:   Septic olecranon bursitis of right elbow   Pain and swelling of right elbow        Consults:  PHARMACY TO DOSE VANCOMYCIN     Pain Score:  Pain Assessment  Pain Assessment: 0-10  Pain Level: 3  Patient's Stated Pain Goal: 3  Pain Location: Elbow  Pain Orientation: Left    C-SSRS:   Risk of Suicide: No Risk    Sepsis Screening:  Sepsis Risk Score: 0.75    [place] Fall Risk:       Swallow Screening        Preferred Language:   English      ALLERGIES     Patient has no known allergies.    SURGICAL HISTORY       Past Surgical History:   Procedure Laterality Date    BRONCHOSCOPY N/A 6/21/2023    BRONCHOSCOPY performed by Federico Gutierrez MD at [facility] OR    EYE SURGERY  plate in right eye    GASTRIC FUNDOPLICATION N/A 5/8/2023

## 2024-06-10 NOTE — ED PROVIDER NOTES
results are individually reviewed and interpreted by me in the clinical context of this patient.  See ED course below for results interpretation if applicable.  (A negative COVID-19 test should be interpreted as COVID no longer suspected unless otherwise noted in this encounter documentation note)  (Any cultures that may have been sent were not resulted at the time of this patient ED visit)      Radiologic studies results available at the moment of this note (None if blank):  XR ELBOW RIGHT (MIN 3 VIEWS)   Final Result   Impression:   1. No acute osseous process.   2. Dorsal elbow soft tissue swelling appears stable compared to 06/08/2024      This document has been electronically signed by: Bertha Eugene MD on    06/10/2024 12:58 AM        See ED course below for my interpretation if applicable.  All radiology images independently reviewed by me in the clinical context of this patient, in addition to interpretation provided by the radiologist.      EKG interpretation (none if blank):  Not applicable  All EKG results are individually reviewed and interpreted by me in the clinical context of this patient.  All EKGs are also interpreted by our Cardiology department, final interpretation may not be available as of the writing of this note.      PREVIOUS RECORDS  AND EXTERNAL INFORMATION REVIEWED   History obtained from: the patient.  Pertinent previous and/or external records reviewed:  prior ED visit .  Case discussed with specialties other than Emergency Medicine: Hospitalist      MEDICAL DECISION MAKING   Initial plan:  CBC, BMP, Mg  CRP, Uric acid  Lactate  XR R-elbow  Tylenol for low-grade fever and pain  Rocephin and Vanc    Comorbid conditions pertinent to this ED encounter:  Not applicable      Differential Diagnosis includes but is not limited to:  -Worsening septic olecranon bursitis, cellulitis, abscess; r/o osteomyelitis    Decision Rules/Clinical Scores utilized:  Not Applicable    Code Status:  Not

## 2024-06-10 NOTE — PROCEDURES
PROCEDURE NOTE  Date: 6/10/2024   Name: Javon Kenney  YOB: 1955    Procedures        EKG was completed and handed to Rico GRAVES

## 2024-06-10 NOTE — PROGRESS NOTES
[facility]   Pharmacy Pharmacokinetic Monitoring Service - Vancomycin     Javon Kenney is a 68 y.o. male starting on vancomycin therapy for SSTI. Pharmacy consulted by Nathanael Moncada for monitoring and adjustment.    Target Concentration: Goal AUC/SIVA 400-600 mg*hr/L    Additional Antimicrobials: Zosyn    Pertinent Laboratory Values:   Wt Readings from Last 1 Encounters:   06/10/24 68 kg (149 lb 14.6 oz)     Temp Readings from Last 1 Encounters:   06/10/24 98 °F (36.7 °C) (Oral)     Estimated Creatinine Clearance: 68 mL/min (based on SCr of 0.9 mg/dL).  Recent Labs     06/08/24  1422 06/09/24  2255 06/10/24  0228   CREATININE 0.9 0.9 0.9   BUN 17 15 15   WBC 12.6* 10.9*  --      MRSA Nasal Swab: N/A. Non-respiratory infection.    Plan:  Dosing recommendations based on Bayesian software  Start vancomycin 1500 mg iv every 24 hours  Anticipated AUC of 470 and trough concentration of 10.3 at steady state  Renal labs as indicated  Pharmacy will continue to monitor patient and adjust therapy as indicated    Thank you for the consult,  Melissa Gonzalez [facility] PharmD  6/10/2024   3:23 AM

## 2024-06-10 NOTE — FLOWSHEET NOTE
06/10/24 0341   Treatment Team Notification   Reason for Communication Abnormal vitals   Name of Team Member Notified Nathanael Hilliard   Treatment Team Role Advanced Practice Nurse   Method of Communication Secure Message   Response No new orders   Notification Time 0341     Notified NP at 0341 stating patient's blood pressure on arrival to unit was 174/89. Recheck approximately one hour later was 187/91. Stated patient reporting little neck pain but otherwise comfortable. PRN Flexeril given per patient request. NP stated to monitor for now, recheck patient status and blood pressure one hour after flexeril given.

## 2024-06-10 NOTE — PLAN OF CARE
Problem: Discharge Planning  Goal: Discharge to home or other facility with appropriate resources  Outcome: Progressing  Flowsheets (Taken 6/10/2024 0739)  Discharge to home or other facility with appropriate resources:   Identify barriers to discharge with patient and caregiver   Arrange for needed discharge resources and transportation as appropriate   Identify discharge learning needs (meds, wound care, etc)   Refer to discharge planning if patient needs post-hospital services based on physician order or complex needs related to functional status, cognitive ability or social support system     Problem: Skin/Tissue Integrity - Adult  Goal: Skin integrity remains intact  6/10/2024 1113 by Rosalina Contreras RN  Outcome: Progressing  Flowsheets (Taken 6/10/2024 0739)  Skin Integrity Remains Intact:   Monitor for areas of redness and/or skin breakdown   Assess vascular access sites hourly     Problem: Skin/Tissue Integrity - Adult  Goal: Incisions, wounds, or drain sites healing without S/S of infection  6/10/2024 1113 by Rosalina Contreras RN  Outcome: Progressing  Flowsheets (Taken 6/10/2024 0739)  Incisions, Wounds, or Drain Sites Healing Without Sign and Symptoms of Infection:   TWICE DAILY: Assess and document skin integrity   TWICE DAILY: Assess and document dressing/incision, wound bed, drain sites and surrounding tissue     Problem: Gastrointestinal - Adult  Goal: Minimal or absence of nausea and vomiting  6/10/2024 1113 by Rosalina Contreras RN  Outcome: Progressing  Flowsheets (Taken 6/10/2024 0739)  Minimal or absence of nausea and vomiting:   Administer IV fluids as ordered to ensure adequate hydration   Administer ordered antiemetic medications as needed   Provide nonpharmacologic comfort measures as appropriate     Problem: Infection - Adult  Goal: Absence of infection at discharge  6/10/2024 1113 by Rosalina Contreras RN  Outcome: Progressing  Flowsheets (Taken 6/10/2024 0739)  Absence of

## 2024-06-10 NOTE — ED NOTES
Patient transported to  6A09 by cart in stable condition.    IV infusing and line is patent.   Pt to Ct scan before going to floor     Virtual excursion done with patient and his wife. They answered all questions accurately.

## 2024-06-10 NOTE — PLAN OF CARE
Chris is a 67 y/o male with presented early this AM (6/10) for right elbow pain and swelling. Pt was admitted to hospitalist service, ortho has consulted.     Agree with Hospitalist H&P from this AM.     Subjective:   6/10: PT seen resting in bed with wife at bedside. Ortho wrapped elbow in compressive dressing. Discussed with pt to continue monitoring area of redness and to trend CRP with possible irrigation and debridement in the AM. Continue IV antibiotics.       Electronically signed by Alicia Simons PA-C on 6/10/2024 at 10:42 AM

## 2024-06-11 ENCOUNTER — APPOINTMENT (OUTPATIENT)
Dept: SPEECH THERAPY | Age: 69
End: 2024-06-11
Attending: INTERNAL MEDICINE
Payer: MEDICARE

## 2024-06-11 ENCOUNTER — HOSPITAL ENCOUNTER (OUTPATIENT)
Dept: PHYSICAL THERAPY | Age: 69
Setting detail: THERAPIES SERIES
End: 2024-06-11
Attending: INTERNAL MEDICINE
Payer: MEDICARE

## 2024-06-11 VITALS
HEIGHT: 65 IN | TEMPERATURE: 98 F | OXYGEN SATURATION: 99 % | RESPIRATION RATE: 16 BRPM | BODY MASS INDEX: 24.98 KG/M2 | HEART RATE: 69 BPM | WEIGHT: 149.91 LBS | DIASTOLIC BLOOD PRESSURE: 80 MMHG | SYSTOLIC BLOOD PRESSURE: 124 MMHG

## 2024-06-11 LAB
ANION GAP SERPL CALC-SCNC: 10 MEQ/L (ref 8–16)
BASOPHILS ABSOLUTE: 0 THOU/MM3 (ref 0–0.1)
BASOPHILS NFR BLD AUTO: 0.5 %
BUN SERPL-MCNC: 11 MG/DL (ref 7–22)
CALCIUM SERPL-MCNC: 9 MG/DL (ref 8.5–10.5)
CHLORIDE SERPL-SCNC: 104 MEQ/L (ref 98–111)
CO2 SERPL-SCNC: 24 MEQ/L (ref 23–33)
CREAT SERPL-MCNC: 0.9 MG/DL (ref 0.4–1.2)
CRP SERPL-MCNC: 4.71 MG/DL (ref 0–1)
DEPRECATED RDW RBC AUTO: 40.5 FL (ref 35–45)
EOSINOPHIL NFR BLD AUTO: 2.4 %
EOSINOPHILS ABSOLUTE: 0.2 THOU/MM3 (ref 0–0.4)
ERYTHROCYTE [DISTWIDTH] IN BLOOD BY AUTOMATED COUNT: 12.6 % (ref 11.5–14.5)
GFR SERPL CREATININE-BSD FRML MDRD: > 90 ML/MIN/1.73M2
GLUCOSE SERPL-MCNC: 88 MG/DL (ref 70–108)
HCT VFR BLD AUTO: 44 % (ref 42–52)
HGB BLD-MCNC: 14.6 GM/DL (ref 14–18)
IMM GRANULOCYTES # BLD AUTO: 0.06 THOU/MM3 (ref 0–0.07)
IMM GRANULOCYTES NFR BLD AUTO: 0.7 %
LYMPHOCYTES ABSOLUTE: 0.7 THOU/MM3 (ref 1–4.8)
LYMPHOCYTES NFR BLD AUTO: 8.3 %
MCH RBC QN AUTO: 29.2 PG (ref 26–33)
MCHC RBC AUTO-ENTMCNC: 33.2 GM/DL (ref 32.2–35.5)
MCV RBC AUTO: 88 FL (ref 80–94)
MONOCYTES ABSOLUTE: 0.8 THOU/MM3 (ref 0.4–1.3)
MONOCYTES NFR BLD AUTO: 9.4 %
NEUTROPHILS ABSOLUTE: 6.4 THOU/MM3 (ref 1.8–7.7)
NEUTROPHILS NFR BLD AUTO: 78.7 %
NRBC BLD AUTO-RTO: 0 /100 WBC
PLATELET # BLD AUTO: 199 THOU/MM3 (ref 130–400)
PMV BLD AUTO: 8.7 FL (ref 9.4–12.4)
POTASSIUM SERPL-SCNC: 3.9 MEQ/L (ref 3.5–5.2)
RBC # BLD AUTO: 5 MILL/MM3 (ref 4.7–6.1)
SODIUM SERPL-SCNC: 138 MEQ/L (ref 135–145)
VANCOMYCIN SERPL-MCNC: 11.9 UG/ML (ref 0.1–39.9)
WBC # BLD AUTO: 8.1 THOU/MM3 (ref 4.8–10.8)

## 2024-06-11 PROCEDURE — 80202 ASSAY OF VANCOMYCIN: CPT

## 2024-06-11 PROCEDURE — 99239 HOSP IP/OBS DSCHRG MGMT >30: CPT

## 2024-06-11 PROCEDURE — 6370000000 HC RX 637 (ALT 250 FOR IP): Performed by: NURSE PRACTITIONER

## 2024-06-11 PROCEDURE — 80048 BASIC METABOLIC PNL TOTAL CA: CPT

## 2024-06-11 PROCEDURE — 6360000002 HC RX W HCPCS: Performed by: NURSE PRACTITIONER

## 2024-06-11 PROCEDURE — 2580000003 HC RX 258: Performed by: NURSE PRACTITIONER

## 2024-06-11 PROCEDURE — 85025 COMPLETE CBC W/AUTO DIFF WBC: CPT

## 2024-06-11 PROCEDURE — 86140 C-REACTIVE PROTEIN: CPT

## 2024-06-11 PROCEDURE — 36415 COLL VENOUS BLD VENIPUNCTURE: CPT

## 2024-06-11 RX ORDER — SULFAMETHOXAZOLE AND TRIMETHOPRIM 800; 160 MG/1; MG/1
1 TABLET ORAL 2 TIMES DAILY
Qty: 20 TABLET | Refills: 0 | Status: SHIPPED | OUTPATIENT
Start: 2024-06-11 | End: 2024-06-21

## 2024-06-11 RX ORDER — CEPHALEXIN 500 MG/1
500 CAPSULE ORAL 4 TIMES DAILY
Qty: 28 CAPSULE | Refills: 0 | Status: SHIPPED | OUTPATIENT
Start: 2024-06-11 | End: 2024-06-18

## 2024-06-11 RX ADMIN — PIPERACILLIN AND TAZOBACTAM 3375 MG: 3; .375 INJECTION, POWDER, FOR SOLUTION INTRAVENOUS at 01:51

## 2024-06-11 RX ADMIN — ENOXAPARIN SODIUM 40 MG: 100 INJECTION SUBCUTANEOUS at 09:12

## 2024-06-11 RX ADMIN — DULOXETINE HYDROCHLORIDE 30 MG: 30 CAPSULE, DELAYED RELEASE ORAL at 09:12

## 2024-06-11 RX ADMIN — ACETAMINOPHEN 650 MG: 325 TABLET ORAL at 06:11

## 2024-06-11 RX ADMIN — MEMANTINE HYDROCHLORIDE 10 MG: 10 TABLET ORAL at 09:12

## 2024-06-11 RX ADMIN — LEVOTHYROXINE SODIUM 25 MCG: 0.03 TABLET ORAL at 06:10

## 2024-06-11 RX ADMIN — PIPERACILLIN AND TAZOBACTAM 3375 MG: 3; .375 INJECTION, POWDER, FOR SOLUTION INTRAVENOUS at 09:39

## 2024-06-11 ASSESSMENT — PAIN - FUNCTIONAL ASSESSMENT: PAIN_FUNCTIONAL_ASSESSMENT: PREVENTS OR INTERFERES SOME ACTIVE ACTIVITIES AND ADLS

## 2024-06-11 ASSESSMENT — PAIN DESCRIPTION - LOCATION: LOCATION: ARM

## 2024-06-11 ASSESSMENT — PAIN SCALES - GENERAL
PAINLEVEL_OUTOF10: 4
PAINLEVEL_OUTOF10: 0
PAINLEVEL_OUTOF10: 4

## 2024-06-11 ASSESSMENT — PAIN DESCRIPTION - ORIENTATION: ORIENTATION: RIGHT

## 2024-06-11 ASSESSMENT — PAIN DESCRIPTION - DESCRIPTORS: DESCRIPTORS: ACHING

## 2024-06-11 NOTE — PROGRESS NOTES
Orthopaedic Progress Note      SUBJECTIVE     Mr. Kenney is hospital day 2, R olecranon bursitis     Seen at bedside this morning  no adverse events overnight  NPO out of caution of OR  Overall states pain has improved, swelling as well      OBJECTIVE      Physical    VITALS:  /73   Pulse 77   Temp 98.2 °F (36.8 °C) (Oral)   Resp 18   Ht 1.651 m (5' 5\")   Wt 68 kg (149 lb 14.6 oz)   SpO2 98%   BMI 24.95 kg/m²   I/O last 3 completed shifts:  In: 1844.3 [P.O.:1120; I.V.:287; IV Piggyback:437.3]  Out: -     GENERAL APPEARANCE: Awake and oriented x4. No acute distress, except appropriate to injury.  MOOD AND AFFECT: Calm appropriate to situation  HEAD: normocephalic, atraumatic   EYES: equal and reactive to light, Extraocular movements are normal. Pupils are equal in size.  Hematologic/Lymphatic: no lymphadenopathy to upper or lower extremity.   MSK  RUE- atraumatic clavicle, shoulder, elbow wrist,  neutral alignment, no lacerations or lesions, effusion and erythema about the elbow, none through the forearm, boggyness and tenderness about the olecranon but this has improved, erythema has improved as well, nontender to palpation clavicle anterior posterior shoulder humeral shaft forearm wrist, painless active and passive ROM in all planes through shoulder, no pain with flexion extension pronation supination elbow and wrist, composite fist without deficit, sensation to light touch intact, distal pulses palpable.      Data  CBC:   Lab Results   Component Value Date/Time    WBC 8.0 06/10/2024 11:16 AM    HGB 13.4 06/10/2024 11:16 AM    HGB 16.0 07/09/2011 08:24 AM     06/10/2024 11:16 AM     BMP:    Lab Results   Component Value Date/Time     06/10/2024 06:17 AM    K 4.0 06/10/2024 06:17 AM    K 4.4 04/19/2024 05:48 AM     06/10/2024 06:17 AM    CO2 25 06/10/2024 06:17 AM    BUN 13 06/10/2024 06:17 AM    CREATININE 0.9 06/10/2024 06:17 AM    CALCIUM 8.7 06/10/2024 06:17 AM    GLUCOSE 101

## 2024-06-11 NOTE — PLAN OF CARE
Problem: Discharge Planning  Goal: Discharge to home or other facility with appropriate resources  6/10/2024 2133 by Amelia Ibanez RN  Outcome: Progressing  Flowsheets (Taken 6/10/2024 1939)  Discharge to home or other facility with appropriate resources:   Arrange for needed discharge resources and transportation as appropriate   Identify barriers to discharge with patient and caregiver   Identify discharge learning needs (meds, wound care, etc)   Refer to discharge planning if patient needs post-hospital services based on physician order or complex needs related to functional status, cognitive ability or social support system  6/10/2024 1113 by Rosalina Contreras RN  Outcome: Progressing  Flowsheets (Taken 6/10/2024 0739)  Discharge to home or other facility with appropriate resources:   Identify barriers to discharge with patient and caregiver   Arrange for needed discharge resources and transportation as appropriate   Identify discharge learning needs (meds, wound care, etc)   Refer to discharge planning if patient needs post-hospital services based on physician order or complex needs related to functional status, cognitive ability or social support system     Problem: Skin/Tissue Integrity - Adult  Goal: Skin integrity remains intact  6/10/2024 2133 by Amelia Ibanez RN  Outcome: Progressing  Flowsheets  Taken 6/10/2024 2132 by Amelia Ibanez RN  Skin Integrity Remains Intact:   Monitor for areas of redness and/or skin breakdown   Assess vascular access sites hourly  Taken 6/10/2024 1939 by Amelia Ibanez RN  Skin Integrity Remains Intact:   Monitor for areas of redness and/or skin breakdown   Assess vascular access sites hourly  Taken 6/10/2024 1114 by Rosalina Contreras RN  Skin Integrity Remains Intact:   Monitor for areas of redness and/or skin breakdown   Assess vascular access sites hourly  6/10/2024 1113 by Rosalina Contreras RN  Outcome: Progressing  Flowsheets (Taken 6/10/2024

## 2024-06-11 NOTE — CARE COORDINATION
6/11/24, 8:40 AM EDT    DISCHARGE ON GOING EVALUATION    Javon GRESHAM [facility] day: 1  Location: 6A-09/009-A Reason for admit: Septic olecranon bursitis of right elbow [M71.121]  Pain and swelling of right elbow [M25.521, M25.421]  Cellulitis of right elbow [L03.113]     Procedures:   none    Imaging since last note: none    Barriers to Discharge: pain and nausea control, Orthopedics following, Lovenox, IV Zosyn, IV Vancomycin, Exelon patch.     PCP: Kasie Wilkes MD  Readmission Risk Score: 13.2    Patient Goals/Plan/Treatment Preferences: Plans return home with his spouse. Denies needs.    
By Patient   Primary Caregiver Self   Support Systems Spouse/Significant Other   Patient's Healthcare Decision Maker is: Patient Declined (Legal Next of Kin Remains as Decision Maker)   PCP Verified by CM Yes   Last Visit to PCP Within last 6 months   Prior Functional Level Independent in ADLs/IADLs   Current Functional Level Independent in ADLs/IADLs   Can patient return to prior living arrangement Yes   Ability to make needs known: Good   Family able to assist with home care needs: Yes   Would you like for me to discuss the discharge plan with any other family members/significant others, and if so, who? No   Financial Resources Medicare   Community Resources None   Social/Functional History   Home Equipment None   Active  Yes   Discharge Planning   Type of Residence House   Living Arrangements Spouse/Significant Other   Current Services Prior To Admission None   Potential Assistance Needed N/A   DME Ordered? No   Potential Assistance Purchasing Medications No   Type of Home Care Services None   Patient expects to be discharged to: House   Services At/After Discharge   Confirm Follow Up Transport Self   Condition of Participation: Discharge Planning   The Plan for Transition of Care is related to the following treatment goals: To get my arm feeling better

## 2024-06-11 NOTE — DISCHARGE SUMMARY
Hospitalist Discharge Summary    Patient: Javon Kenney  YOB: 1955  MRN: [id number]   Acct: [id number]    Primary Care Physician: Kasie Wilkes MD    Admit date  6/9/2024    Discharge date:      Chief Complaint on presentation: \"Right elbow pain and swelling\"     Initial H&P and Hospital course:  History obtained from patient and chart review:     \"Chris Breaux is a 68-year-old  male presented to [facility] ER 6/8/2024 with chief complaint of right elbow pain and swelling.  Onset 6/7/2024.  Patient denies any known injury outside of playing with his dog and his dog hitting his head on patient's elbow.  Patient was discharged with recommendations to follow-up with [facility] 6/10/2024 prescriptions were given for Bactrim and Keflex.     Patient returns to [facility] ER for reevaluation on 6/9/24. Chris states he did fill and take antibiotics that were prescribed. Complaints of redness, warmth and increase in swelling and pain with movement. Positive for nausea. Xray right elbow completed.\"     6/10: PT seen resting in bed with wife at bedside. Ortho wrapped elbow in compressive dressing. Discussed with pt to continue monitoring area of redness and to trend CRP with possible irrigation and debridement in the AM. Continue IV antibiotics.     Subjective (day of discharge):   6/11: Pt doing well today, agreeable to be discharged. Pt asked questions about antibiotics which were addressed. No acute events overnight. Denies chest pain, shortness of breath, fever, chills, abdominal pain. Regular appetite, PO intake and BM.      Patient responded well to medical management. Consultants had signed off or were contacted and agree with discharge plan. The patient was discharged in stable condition with appropriate outpatient follow up arranged.      Discharge Assessment and Plan:  Right olecranon bursitis: Improved. Initial concern for failure of OP antibiotics. CRP trend: 4.98 (6/8), 7.91(6/9), 4.71

## 2024-06-11 NOTE — DISCHARGE INSTRUCTIONS
Dr Jones Discharge Instructions  -light activity only right arm, no heavy lifting  -continue ice, compression to the arm  -follow up within one week with Dr Jones  -take oral antibiotics as written  -notify office if return of warmth, pain, or any concern for fever chills or generally feeling unwell

## 2024-06-12 ENCOUNTER — CARE COORDINATION (OUTPATIENT)
Dept: CASE MANAGEMENT | Age: 69
End: 2024-06-12

## 2024-06-12 ENCOUNTER — TELEPHONE (OUTPATIENT)
Dept: INTERNAL MEDICINE CLINIC | Age: 69
End: 2024-06-12

## 2024-06-12 DIAGNOSIS — L03.113 CELLULITIS OF RIGHT ELBOW: Primary | ICD-10-CM

## 2024-06-12 DIAGNOSIS — M71.121 SEPTIC BURSITIS OF ELBOW, RIGHT: ICD-10-CM

## 2024-06-12 PROCEDURE — 1111F DSCHRG MED/CURRENT MED MERGE: CPT | Performed by: INTERNAL MEDICINE

## 2024-06-12 NOTE — TELEPHONE ENCOUNTER
Have the medications prescribed at discharge been filled? Yes  Does the patient understand what the medications are for? Yes  Has the patient experienced any new symptoms or have previous symptoms worsened? Yes  Is the patient experiencing any pain? No If yes, is the pain well controlled? N/A  We want to be sure the patient has the best recovery possible. Does the patient understand all the discharge instructions? Yes  Did someone talk to the patient and/or family about the patient needs prior to being discharged? Yes  Did the patient's doctor communicate well? Yes  Did the patient's nurse communicate well? Yes  Was the patient satisfied with the services and care received at [facility]? Yes

## 2024-06-12 NOTE — TELEPHONE ENCOUNTER
Care Transitions Initial Follow Up Call    Call within 2 business days of discharge: Yes     Patient: Javon Kenney Patient : 1955 MRN: [id number]    [unfilled]    RARS: Readmission Risk Score: 13.2       Spoke with: Javon Saulenson    Discharge department/facility: [facility]     Non-face-to-face services provided: Teansition of care questions .      Follow Up  Future Appointments   Date Time Provider Department Center   2024  2:30 PM Pepe Torres MD [facility] [place]   2024  2:00 PM SCHEDULE, FAISAL GONZALES NURSE FA[facility] LPN

## 2024-06-12 NOTE — CARE COORDINATION
Patient has hospital follow up appointment scheduled within 7 days of discharge.   Future Appointments         Provider Specialty Dept Phone    6/24/2024 2:30 PM Pepe Torres MD Internal Medicine [phone]    8/16/2024 2:00 PM Cyndi Mireles PA-C; SCHEDULE, FAISAL GONZALES NURSE Radiation Oncology [phone]            Care Transition Nurse provided contact information.  Plan for follow-up call in 2-5 days based on severity of symptoms and risk factors.  Plan for next call: symptom management-redness, pain, swelling, fever, chills  self management-compression wrap to RUE/ice      Kaylee Singh RN

## 2024-06-13 ENCOUNTER — APPOINTMENT (OUTPATIENT)
Dept: PHYSICAL THERAPY | Age: 69
End: 2024-06-13
Attending: INTERNAL MEDICINE
Payer: MEDICARE

## 2024-06-13 ENCOUNTER — APPOINTMENT (OUTPATIENT)
Dept: SPEECH THERAPY | Age: 69
End: 2024-06-13
Attending: INTERNAL MEDICINE
Payer: MEDICARE

## 2024-06-13 LAB
BACTERIA BLD AEROBE CULT: NORMAL
BACTERIA BLD AEROBE CULT: NORMAL

## 2024-06-14 ENCOUNTER — CARE COORDINATION (OUTPATIENT)
Dept: CASE MANAGEMENT | Age: 69
End: 2024-06-14

## 2024-06-14 NOTE — CARE COORDINATION
Disease Specific Clinic: Completed  Social Work: Completed    Disease Association: Completed      Other Interventions:              Follow Up Appointment:   Reviewed upcoming appointment(s).  Future Appointments         Provider Specialty Dept Phone    6/24/2024 2:30 PM Pepe Torres MD Internal Medicine [phone]    8/16/2024 2:00 PM Cyndi Mireles PA-C; SCHEDULE, FAISAL GONZALES NURSE Radiation Oncology [phone]            N Care Coordinator provided contact information.  Plan for follow-up call in 6-10 days based on severity of symptoms and risk factors.  Plan for next call: symptom management-pain, swelling, redness, diarrhea  self management-ice, ADLs, BP  follow-up appointment-ortho, neuro f/u  medication management-new or changed meds  ACP docs      Diane Webster LPN

## 2024-06-17 ENCOUNTER — HOSPITAL ENCOUNTER (OUTPATIENT)
Age: 69
Discharge: HOME | End: 2024-06-17
Payer: MEDICARE

## 2024-06-17 DIAGNOSIS — L03.113 CELLULITIS OF RIGHT ELBOW: ICD-10-CM

## 2024-06-17 LAB
BASOPHILS ABSOLUTE: 0.1 THOU/MM3 (ref 0–0.1)
BASOPHILS NFR BLD AUTO: 1.5 %
DEPRECATED RDW RBC AUTO: 40.6 FL (ref 35–45)
EOSINOPHIL NFR BLD AUTO: 1.5 %
EOSINOPHILS ABSOLUTE: 0.1 THOU/MM3 (ref 0–0.4)
ERYTHROCYTE [DISTWIDTH] IN BLOOD BY AUTOMATED COUNT: 12.7 % (ref 11.5–14.5)
HCT VFR BLD AUTO: 46.5 % (ref 42–52)
HGB BLD-MCNC: 15.2 GM/DL (ref 14–18)
IMM GRANULOCYTES # BLD AUTO: 0.27 THOU/MM3 (ref 0–0.07)
IMM GRANULOCYTES NFR BLD AUTO: 4.6 %
LYMPHOCYTES ABSOLUTE: 0.8 THOU/MM3 (ref 1–4.8)
LYMPHOCYTES NFR BLD AUTO: 13 %
MCH RBC QN AUTO: 28.8 PG (ref 26–33)
MCHC RBC AUTO-ENTMCNC: 32.7 GM/DL (ref 32.2–35.5)
MCV RBC AUTO: 88.2 FL (ref 80–94)
MONOCYTES ABSOLUTE: 0.5 THOU/MM3 (ref 0.4–1.3)
MONOCYTES NFR BLD AUTO: 7.9 %
NEUTROPHILS ABSOLUTE: 4.1 THOU/MM3 (ref 1.8–7.7)
NEUTROPHILS NFR BLD AUTO: 71.5 %
NRBC BLD AUTO-RTO: 0 /100 WBC
PLATELET # BLD AUTO: 275 THOU/MM3 (ref 130–400)
PMV BLD AUTO: 8.4 FL (ref 9.4–12.4)
RBC # BLD AUTO: 5.27 MILL/MM3 (ref 4.7–6.1)
WBC # BLD AUTO: 5.8 THOU/MM3 (ref 4.8–10.8)

## 2024-06-17 PROCEDURE — 85025 COMPLETE CBC W/AUTO DIFF WBC: CPT

## 2024-06-17 PROCEDURE — 36415 COLL VENOUS BLD VENIPUNCTURE: CPT

## 2024-06-21 ENCOUNTER — CARE COORDINATION (OUTPATIENT)
Dept: CASE MANAGEMENT | Age: 69
End: 2024-06-21

## 2024-06-21 ENCOUNTER — HOSPITAL ENCOUNTER (OUTPATIENT)
Age: 69
Discharge: HOME | End: 2024-06-21
Payer: MEDICARE

## 2024-06-21 DIAGNOSIS — E03.9 ACQUIRED HYPOTHYROIDISM: ICD-10-CM

## 2024-06-21 LAB
FOLATE SERPL-MCNC: > 20 NG/ML (ref 4.8–24.2)
HOMOCYSTEINE: 9.1 UMOL/L (ref 0–15)
T4 FREE SERPL-MCNC: 1.09 NG/DL (ref 0.93–1.68)
TSH SERPL DL<=0.005 MIU/L-ACNC: 3.54 UIU/ML (ref 0.4–4.2)
VIT B12 SERPL-MCNC: 844 PG/ML (ref 211–911)

## 2024-06-21 PROCEDURE — 36415 COLL VENOUS BLD VENIPUNCTURE: CPT

## 2024-06-21 PROCEDURE — 82607 VITAMIN B-12: CPT

## 2024-06-21 PROCEDURE — 83921 ORGANIC ACID SINGLE QUANT: CPT

## 2024-06-21 PROCEDURE — 84443 ASSAY THYROID STIM HORMONE: CPT

## 2024-06-21 PROCEDURE — 82746 ASSAY OF FOLIC ACID SERUM: CPT

## 2024-06-21 PROCEDURE — 84439 ASSAY OF FREE THYROXINE: CPT

## 2024-06-21 PROCEDURE — 83090 ASSAY OF HOMOCYSTEINE: CPT

## 2024-06-21 NOTE — CARE COORDINATION
Care Transitions Note    Follow Up Call     Patient Current Location:  Home: [address] [place] [id number]    Care Transition Nurse contacted the patient by telephone. Verified name and  as identifiers.    Additional needs identified to be addressed with provider   No needs identified                 Method of communication with provider: none.    Care Summary Note:  Spoke with Chris, said he is doing pretty good.  R elbow swelling has resolved except for 1 spot.  Saw ortho this week and said it may take 6 months for that to resolve.  Pain is minimal.  Denies fever, chills, chest pain, dyspnea, dizziness.  Eating, drinking, sleeping good.  No issues with B&B.  No other issues to report.  Denies any other needs.  No other questions or concerns at this time.  Will continue to follow.    Plan of care updates since last contact:  No changes       Advance Care Planning:   Does patient have an Advance Directive: reviewed during previous call, see note. .    Medication Review:  No changes since last call.     Remote Patient Monitoring:  Offered patient enrollment in the Remote Patient Monitoring (RPM) program for in-home monitoring: Patient is not eligible for RPM program because: patient does not have qualifying diagnosis.    Assessments:  Care Transitions Subsequent and Final Call    Subsequent and Final Calls  Do you have any ongoing symptoms?: No  Have your medications changed?: No  Do you have any questions related to your medications?: No  Do you currently have any active services?: No  Do you have any needs or concerns that I can assist you with?: No  Identified Barriers: Lack of Education  Care Transitions Interventions     Transportation Support: Declined      DME Assistance: Declined   Disease Specific Clinic: Completed  Social Work: Completed    Disease Association: Completed      Other Interventions:              Follow Up Appointment:   Reviewed upcoming appointment(s).  Future Appointments         Provider

## 2024-06-24 ENCOUNTER — OFFICE VISIT (OUTPATIENT)
Dept: INTERNAL MEDICINE CLINIC | Age: 69
End: 2024-06-24

## 2024-06-24 VITALS
SYSTOLIC BLOOD PRESSURE: 152 MMHG | HEIGHT: 65 IN | HEART RATE: 73 BPM | WEIGHT: 147.8 LBS | BODY MASS INDEX: 24.62 KG/M2 | DIASTOLIC BLOOD PRESSURE: 80 MMHG | OXYGEN SATURATION: 96 %

## 2024-06-24 DIAGNOSIS — M70.21 OLECRANON BURSITIS OF RIGHT ELBOW: Primary | ICD-10-CM

## 2024-06-24 DIAGNOSIS — E03.9 ACQUIRED HYPOTHYROIDISM: ICD-10-CM

## 2024-06-24 DIAGNOSIS — Z09 HOSPITAL DISCHARGE FOLLOW-UP: ICD-10-CM

## 2024-06-24 DIAGNOSIS — F41.9 ANXIETY: ICD-10-CM

## 2024-06-24 LAB — METHYLMALONATE SERPL-SCNC: NORMAL

## 2024-06-24 RX ORDER — DONEPEZIL HYDROCHLORIDE 10 MG/1
TABLET, FILM COATED ORAL
COMMUNITY
Start: 2024-06-24 | End: 2024-07-24

## 2024-06-24 NOTE — PROGRESS NOTES
Post-Discharge Transitional Care  Follow Up      Javon Kenney   YOB: 1955    Date of Office Visit:  6/24/2024  Date of Hospital Admission: 6/9/24  Date of Hospital Discharge: 6/11/24  Risk of hospital readmission (high >=14%. Medium >=10%) :Readmission Risk Score: 13.2      Care management risk score Rising risk (score 2-5) and Complex Care (Scores >=6): No Risk Score On File     Non face to face  following discharge, date last encounter closed (first attempt may have been earlier): 06/12/2024    Call initiated 2 business days of discharge: Yes    ASSESSMENT/PLAN:   Hospital discharge follow-up  -     CO DISCHARGE MEDS RECONCILED W/ CURRENT OUTPATIENT MED LIST      Medical Decision Making: moderate complexity  Return in about 3 months (around 9/24/2024).           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 Gutierrez last seen 4/29/2024, biopsy taken at last visit negative for malignancy.    Neuropathy  Follows with Dr. Wagner  Slowly increasing aricept  Continue memantine     Moderate protein calorie malnutrition: Visit weight 3/18/2024 was 141 pounds, now 147  CBC 6/17/2024 within normal limits  Recommend increasing protein intake     Right-sided olecranon bursitis  Recommend follow-up with orthopedic institute [facility]    CTA 4/5/24 50% stenosis at the origin of the left internal carotid artery. No significant hemodynamic stenosis in the left common carotid artery. Consider yearly fu ultrasound    Hypothyroid  Continue synthroid repeat numbers wnl    If neck problems in the future may consider pt    Inpatient course: Discharge summary reviewed- see chart.    Pt is a 68 y.o. male who presents for follow-up visit last seen 3/18/2024 for pharyngeal esophageal dysphagia, neuropathy, moderate protein calorie malnutrition.  Patient was hospitalized 6/9 through 6/11

## 2024-06-27 ENCOUNTER — CARE COORDINATION (OUTPATIENT)
Dept: CASE MANAGEMENT | Age: 69
End: 2024-06-27

## 2024-06-27 NOTE — CARE COORDINATION
Care Transitions Note    Follow Up Call     Attempted to reach patient for transitions of care follow up.  Unable to reach patient.      Outreach Attempts:   HIPAA compliant voicemail left for patient, spouse/partner .   [employer] message sent.       Follow Up Appointment:   Future Appointments         Provider Specialty Dept Phone    8/16/2024 2:00 PM Cyndi Mireles PA-C; SCHEDULE, FAISAL GONZALES NURSE Radiation Oncology [phone]    9/30/2024 2:00 PM Pepe Torres MD Internal Medicine [phone]            Plan for follow-up call in 2-5 days based on severity of symptoms and risk factors. Plan for next call: symptom management-new or worsening symptoms, R elbow  follow-up appointment-review f/u 6/14    Shasha Campo RN

## 2024-07-01 ENCOUNTER — CARE COORDINATION (OUTPATIENT)
Dept: CASE MANAGEMENT | Age: 69
End: 2024-07-01

## 2024-07-01 NOTE — CARE COORDINATION
Care Transitions Note    Follow Up Call     Attempted to reach patient for transitions of care follow up.  Unable to reach patient.    If no return call, CTN will sign off-2nd attempt.  UTR letter not sent, end of program.    Outreach Attempts:   Multiple attempts to contact patient, spouse/partner  at phone numbers on file.   HIPAA compliant voicemail left for patient, spouse/partner.    Follow Up Appointment:   Future Appointments         Provider Specialty Dept Phone    8/16/2024 2:00 PM Cyndi Mireles PA-C; SCHEDULE, FAISAL GONZALES NURSE Radiation Oncology [phone]    9/30/2024 2:00 PM Pepe Torres MD Internal Medicine [phone]            No further follow-up call indicated based on severity of symptoms and risk factors.     Shasha Campo RN

## 2024-07-05 ENCOUNTER — HOSPITAL ENCOUNTER (EMERGENCY)
Age: 69
Discharge: HOME | End: 2024-07-05
Payer: MEDICARE

## 2024-07-05 ENCOUNTER — APPOINTMENT (OUTPATIENT)
Dept: GENERAL RADIOLOGY | Age: 69
End: 2024-07-05
Payer: MEDICARE

## 2024-07-05 VITALS
DIASTOLIC BLOOD PRESSURE: 95 MMHG | RESPIRATION RATE: 16 BRPM | TEMPERATURE: 97.7 F | HEIGHT: 65 IN | BODY MASS INDEX: 24.16 KG/M2 | SYSTOLIC BLOOD PRESSURE: 138 MMHG | WEIGHT: 145 LBS | HEART RATE: 78 BPM | OXYGEN SATURATION: 98 %
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
## 2024-07-05 DIAGNOSIS — R07.81 RIB PAIN ON RIGHT SIDE: ICD-10-CM

## 2024-07-05 DIAGNOSIS — M25.511 ACUTE PAIN OF RIGHT SHOULDER: ICD-10-CM

## 2024-07-05 PROCEDURE — 6370000000 HC RX 637 (ALT 250 FOR IP): Performed by: NURSE PRACTITIONER

## 2024-07-05 PROCEDURE — 71101 X-RAY EXAM UNILAT RIBS/CHEST: CPT

## 2024-07-05 PROCEDURE — 6360000002 HC RX W HCPCS: Performed by: NURSE PRACTITIONER

## 2024-07-05 PROCEDURE — 96372 THER/PROPH/DIAG INJ SC/IM: CPT

## 2024-07-05 PROCEDURE — 99284 EMERGENCY DEPT VISIT MOD MDM: CPT

## 2024-07-05 RX ORDER — ORPHENADRINE CITRATE 30 MG/ML
60 INJECTION INTRAMUSCULAR; INTRAVENOUS ONCE
Status: COMPLETED | OUTPATIENT
Start: 2024-07-05 | End: 2024-07-05

## 2024-07-05 RX ORDER — TIZANIDINE 4 MG/1
4 TABLET ORAL EVERY 6 HOURS PRN
Qty: 20 TABLET | Refills: 0 | Status: SHIPPED | OUTPATIENT
Start: 2024-07-05

## 2024-07-05 RX ORDER — LIDOCAINE 4 G/G
1 PATCH TOPICAL DAILY
Status: DISCONTINUED | OUTPATIENT
Start: 2024-07-05 | End: 2024-07-05 | Stop reason: HOSPADM

## 2024-07-05 RX ADMIN — ORPHENADRINE CITRATE 60 MG: 60 INJECTION INTRAMUSCULAR; INTRAVENOUS at 09:55

## 2024-07-05 ASSESSMENT — PAIN DESCRIPTION - DESCRIPTORS: DESCRIPTORS: SHARP

## 2024-07-05 ASSESSMENT — PAIN SCALES - GENERAL: PAINLEVEL_OUTOF10: 6

## 2024-07-05 ASSESSMENT — PAIN DESCRIPTION - PAIN TYPE: TYPE: ACUTE PAIN

## 2024-07-05 ASSESSMENT — PAIN - FUNCTIONAL ASSESSMENT: PAIN_FUNCTIONAL_ASSESSMENT: 0-10

## 2024-07-05 ASSESSMENT — PAIN DESCRIPTION - ORIENTATION: ORIENTATION: RIGHT

## 2024-07-05 ASSESSMENT — PAIN DESCRIPTION - FREQUENCY: FREQUENCY: CONTINUOUS

## 2024-07-05 ASSESSMENT — PAIN DESCRIPTION - LOCATION: LOCATION: RIB CAGE

## 2024-07-05 NOTE — ED NOTES
Patient presents with c/o rib pain, shoulder pain. Pt states fell on right side on Wednesday and pain has increased. Pt states has been taking hydrocodone for pain with successful therapeutic relief. Pt denies shortness of breath, but states pain with deep breath.

## 2024-07-05 NOTE — DISCHARGE INSTRUCTIONS
Take aleve twice a day with food for the next 7 days. Continue to use your pain medicine for break through pain. Use muscle relaxer for muscle spasms. Place lidocaine patch on tender ribs daily.

## 2024-07-05 NOTE — ED PROVIDER NOTES
[facility] Emergency Department    CHIEF COMPLAINT       Chief Complaint   Patient presents with    Rib Injury       Nurses Notes reviewed and I agree except as noted in the HPI.    HISTORY OF PRESENT ILLNESS   Javon Kenney is a 68 y.o. male who presents to the ED for evaluation of rib and chest pain. He reports falling 2 days ago and landing on his right side, hitting his arm, chest, and head. He denies any loss of consciousness following the fall. He endorses sharp chest pain with deep breathing and movement of his right arm. States he felt a \"pop\" in his chest last night. Denies use of anticoagulation. Denies radiation of pain.       HPI was provided by the patient.         PAST MEDICAL HISTORY     Past Medical History:   Diagnosis Date    Anxiety     Arthritis     neck, back, hands bilat    Cancer (HCC) 2022    Melanoma removed from face    External hemorrhoid     bleeding from them and has internal    GERD (gastroesophageal reflux disease)     Hiatal hernia     EGD    History of carotid stenosis 11/05/2023    Hypothyroidism 6/10/2024    Neoplasm of tonsillar fossa 07/03/2023    R - Squamous Cell    PONV (postoperative nausea and vomiting)     \"pretty bad\"    PUD (peptic ulcer disease)     LONG TIME  AGO    Rectal bleeding     2o11- colonoscopy - dr david    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  (No tx indicated.)       SURGICALHISTORY      has a past surgical history that includes Mandible surgery (1979); eye surgery (plate in right eye); Neck surgery (2001); Upper gastrointestinal endoscopy (N/A, 2/10/2023); Upper gastrointestinal endoscopy (N/A, 4/13/2023); Gastric fundoplication (N/A, 5/8/2023); laryngoscopy (N/A, 6/21/2023); bronchoscopy (N/A, 6/21/2023); Mouth surgery (N/A, 7/21/2023); Neck surgery (N/A, 7/28/2023); laryngoscopy (N/A, 7/28/2023); Upper gastrointestinal endoscopy (N/A, 10/26/2023);  the chest, pneumothorax,  DIAGNOSTIC RESULTS         RADIOLOGY: non-plainfilm images(s) such as CT, Ultrasound and MRI are read by the radiologist.  Plain radiographic images are visualized and preliminarily interpreted by the emergency physician unless otherwise stated below.  XR RIBS RIGHT INCLUDE CHEST (MIN 3 VIEWS)   Final Result   Normal PA chest and  ribs.            **This report has been created using voice recognition software.  It may contain   minor errors which are inherent in voice recognition technology.**      Electronically signed by Dr. Ralph Be          LABS:   Labs Reviewed - No data to display    EMERGENCY DEPARTMENT COURSE:   Vitals:    Vitals:    07/05/24 0859   BP: (!) 138/95   Pulse: 78   Resp: 16   Temp: 97.7 °F (36.5 °C)   TempSrc: Oral   SpO2: 98%   Weight: 65.8 kg (145 lb)   Height: 1.651 m (5' 5\")       MDM    Patient was seen and evaluated in the emergency department, patient appeared to be in mild distress, vital signs reviewed, hypertension noted.  Physical exam completed, noted chest wall tenderness, no decreased range of motion of the right upper extremity, no bruising noted.  Lungs are clear on auscultation.  X-rays were obtained, normal PA chest and ribs noted.  Discussed my findings and plan of care with the patient is amenable with discharge.  History with medications below while here, will be discharged with Zanaflex, has is on hydrocodone at home, advised to also take Aleve.  Advised to return to the ER with worsening symptoms.  Follow-up with his family doctor.  He verbalized understanding of plan of care.  Medications   orphenadrine (NORFLEX) injection 60 mg (60 mg IntraMUSCular Given 7/5/24 0955)       Patient was seen independently by myself. The patient's final impression and disposition and plan was determined by myself.     CRITICAL CARE:   None    CONSULTS:  None    PROCEDURES:  None    FINAL IMPRESSION     1. Fall on same level from slipping, tripping or

## 2024-07-08 ENCOUNTER — TELEPHONE (OUTPATIENT)
Dept: INTERNAL MEDICINE CLINIC | Age: 69
End: 2024-07-08

## 2024-07-22 ENCOUNTER — OFFICE VISIT (OUTPATIENT)
Dept: ENT CLINIC | Age: 69
End: 2024-07-22
Payer: MEDICARE

## 2024-07-22 ENCOUNTER — TELEPHONE (OUTPATIENT)
Dept: ENT CLINIC | Age: 69
End: 2024-07-22

## 2024-07-22 VITALS
SYSTOLIC BLOOD PRESSURE: 138 MMHG | WEIGHT: 148.5 LBS | HEART RATE: 77 BPM | OXYGEN SATURATION: 96 % | DIASTOLIC BLOOD PRESSURE: 76 MMHG | TEMPERATURE: 97.8 F | BODY MASS INDEX: 24.74 KG/M2 | HEIGHT: 65 IN | RESPIRATION RATE: 18 BRPM

## 2024-07-22 DIAGNOSIS — K44.9 HIATAL HERNIA: ICD-10-CM

## 2024-07-22 DIAGNOSIS — Z08 ENCOUNTER FOR FOLLOW-UP SURVEILLANCE OF HEAD AND NECK CANCER: ICD-10-CM

## 2024-07-22 DIAGNOSIS — K22.2 ESOPHAGEAL STRICTURE: Primary | ICD-10-CM

## 2024-07-22 DIAGNOSIS — L59.8 RADIATION FIBROSIS OF SOFT TISSUE FROM THERAPEUTIC PROCEDURE: ICD-10-CM
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
## 2024-07-22 DIAGNOSIS — R13.14 PHARYNGOESOPHAGEAL DYSPHAGIA: ICD-10-CM

## 2024-07-22 DIAGNOSIS — K21.9 GASTROESOPHAGEAL REFLUX DISEASE WITHOUT ESOPHAGITIS: ICD-10-CM

## 2024-07-22 DIAGNOSIS — Z85.89 ENCOUNTER FOR FOLLOW-UP SURVEILLANCE OF HEAD AND NECK CANCER: ICD-10-CM

## 2024-07-22 DIAGNOSIS — C09.9 PRIMARY TONSILLAR SQUAMOUS CELL CARCINOMA (HCC): ICD-10-CM

## 2024-07-22 DIAGNOSIS — J39.2 PHARYNGEAL MASS: ICD-10-CM

## 2024-07-22 PROCEDURE — 43197 ESOPHAGOSCOPY FLEX DX BRUSH: CPT | Performed by: OTOLARYNGOLOGY

## 2024-07-22 PROCEDURE — 1124F ACP DISCUSS-NO DSCNMKR DOCD: CPT | Performed by: OTOLARYNGOLOGY

## 2024-07-22 PROCEDURE — 99214 OFFICE O/P EST MOD 30 MIN: CPT | Performed by: OTOLARYNGOLOGY

## 2024-07-22 RX ORDER — OMEPRAZOLE 40 MG/1
40 CAPSULE, DELAYED RELEASE ORAL
Qty: 90 CAPSULE | Refills: 1 | Status: SHIPPED | OUTPATIENT
Start: 2024-07-22

## 2024-07-22 RX ORDER — FAMOTIDINE 40 MG/1
40 TABLET, FILM COATED ORAL 2 TIMES DAILY
Qty: 180 TABLET | Refills: 3 | Status: SHIPPED | OUTPATIENT
Start: 2024-07-22

## 2024-07-22 NOTE — PROGRESS NOTES
Gastroesophageal reflux disease without esophagitis        5. Hiatal hernia      Sliding      6. Pharyngeal mass  PET CT SKULL BASE MID THIGH RESTAGE            The findings were explained and his questions were answered.  Based on patient's history and these physical findings today particularly those of his nasopharyngoscopy, the patient no return of the ulcerated area that was biopsied twice in the last few months.  No new changes were seen.  Therefore, he continued without evidence of malignancy on his exam today.    Regarding his obstructive dysphagia symptoms, I saw redundant soft tissues of the posterior larynx at the esophageal inlet.  I looked very carefully and the patient was very cooperative with examination of the distal hypopharynx and esophageal inlet and I found no Zenker's diverticulum or [place]'s dehiscence pouch that might explain his pill dysphagia.  He may still have a cicatricial stenosis of that area but because I was using a very small scope and no rigid instrumentation, that pathology could be missed.  The length of his esophagus was otherwise only abnormal for very shallow distal esophageal cicatrix and a sliding hiatal hernia with minimal esophagitis.  The patient already has a relationship with a gastroenterologist in the community.  I recommended getting in touch with that clinician and seeing if they can get him on their schedule for EGD with dilations of the proximal and distal esophagus.  That would be a lot faster than I can get him on my schedule.  If he can get on the schedule quickly ease to let me know and I will find a place to fit him in as soon as I can.    He is also due for a PET CT scan in approximately 2 months I will get 1 just before he returns to see me.    I spent over 30 minutes of face-to-face time with the patient not counting the 15 minutes of his endoscopy the majority of which was dedicated to reviewing his complex history and structuring his follow-up care

## 2024-07-24 ENCOUNTER — TELEPHONE (OUTPATIENT)
Dept: ENT CLINIC | Age: 69
End: 2024-07-24

## 2024-07-24 NOTE — TELEPHONE ENCOUNTER
I received a call from Chris's wife, Torrie, this morning.   She is concerned because she contacted Chris's GI physician after his appointment here with Dr. Gutierrez on Monday.  Dr. Gutierrez had recommended that he get his throat dilated soon.   They are unable to get him in to see his GI until late August with a possible surgery date in September.    She wasn't sure if she should contact another GI physician or if we can fit him onto surgery schedule with us.      Please advise.

## 2024-07-25 ENCOUNTER — PREP FOR PROCEDURE (OUTPATIENT)
Dept: ENT CLINIC | Age: 69
End: 2024-07-25

## 2024-07-25 NOTE — TELEPHONE ENCOUNTER
Lets see if he can find 30 minutes on my schedule to fit him in.  This can be done under propofol sedation.  Thank you  [facility]

## 2024-07-26 NOTE — PROGRESS NOTES
Follow all instructions given by your physician  Do not eat or drink anything after midnight prior to surgery(includes water, chewing gum, mints and ice chips)  Sips of water am of surgery with allowed medications  May brush teeth   Do not smoke or chew tobacco, drink alcoholic beverages or use any illicit drugs for 24 hours prior to surgery  Bring insurance info and photo ID  Bring pertinent paperwork with you from Doctor or surgeons's office  Wear clean comfortable, loose-fitting clothing  No make-up, nail polish, jewelry, piercings, or contact lenses to be worn day of surgery  No glue on dentures morning of surgery; you will be asked to remove them for surgery. Case for glasses.  Shower the night before and the morning of surgery with cleansing soap provided or a liquid antibacterial soap, dry with new fresh clean towel after each shower, no lotions, creams or powder.  Clean sheets and pillowcase on bed night before surgery  Bring medications in original bottles, Bring rescue inhalers with you  Bring CPAP/BIPAP machine if you have one ( you may be charged if one is needed in recovery room ) No     Our pharmacy has a Meds to Beds program where they will deliver any new prescriptions you may have to your room before you leave. Our Pharmacy will clear it through your insurance; for example (same co pay). This enables you to take your new RX as soon as you need when you get home and avoids stop/wait delays on the way home.  Please have a form of payment with you and have someone designated as your Pharmacy contact with their phone # as you may not feel well or still be under the influence of anesthesia.    Please refer to the SSI-Surgical Site Infection Flyer you hopefully received in the mail-together we can prevent infections; signs and symptoms reviewed.  When discharged be sure you understand how to care for your wound and that you have the Dr./office phone # to call if you have any concerns or questions about

## 2024-07-29 ENCOUNTER — ANESTHESIA EVENT (OUTPATIENT)
Dept: OPERATING ROOM | Age: 69
End: 2024-07-29
Payer: MEDICARE

## 2024-07-30 ENCOUNTER — ANESTHESIA (OUTPATIENT)
Dept: OPERATING ROOM | Age: 69
End: 2024-07-30
Payer: MEDICARE

## 2024-07-30 ENCOUNTER — HOSPITAL ENCOUNTER (OUTPATIENT)
Age: 69
Setting detail: OUTPATIENT SURGERY
Discharge: HOME | End: 2024-07-30
Attending: OTOLARYNGOLOGY | Admitting: OTOLARYNGOLOGY
Payer: MEDICARE

## 2024-07-30 VITALS
DIASTOLIC BLOOD PRESSURE: 82 MMHG | HEIGHT: 65 IN | WEIGHT: 145.4 LBS | RESPIRATION RATE: 16 BRPM | HEART RATE: 74 BPM | BODY MASS INDEX: 24.22 KG/M2 | TEMPERATURE: 97.1 F | OXYGEN SATURATION: 98 % | SYSTOLIC BLOOD PRESSURE: 156 MMHG

## 2024-07-30 DIAGNOSIS — G89.18 ACUTE POSTOPERATIVE PAIN: Primary | ICD-10-CM

## 2024-07-30 PROCEDURE — 7100000001 HC PACU RECOVERY - ADDTL 15 MIN: Performed by: OTOLARYNGOLOGY

## 2024-07-30 PROCEDURE — 3700000000 HC ANESTHESIA ATTENDED CARE: Performed by: OTOLARYNGOLOGY

## 2024-07-30 PROCEDURE — C1726 CATH, BAL DIL, NON-VASCULAR: HCPCS | Performed by: OTOLARYNGOLOGY

## 2024-07-30 PROCEDURE — 3600000004 HC SURGERY LEVEL 4 BASE: Performed by: OTOLARYNGOLOGY

## 2024-07-30 PROCEDURE — 2709999900 HC NON-CHARGEABLE SUPPLY: Performed by: OTOLARYNGOLOGY

## 2024-07-30 PROCEDURE — 3600000014 HC SURGERY LEVEL 4 ADDTL 15MIN: Performed by: OTOLARYNGOLOGY

## 2024-07-30 PROCEDURE — 2580000003 HC RX 258: Performed by: OTOLARYNGOLOGY

## 2024-07-30 PROCEDURE — 7100000000 HC PACU RECOVERY - FIRST 15 MIN: Performed by: OTOLARYNGOLOGY

## 2024-07-30 PROCEDURE — 7100000011 HC PHASE II RECOVERY - ADDTL 15 MIN: Performed by: OTOLARYNGOLOGY

## 2024-07-30 PROCEDURE — 7100000010 HC PHASE II RECOVERY - FIRST 15 MIN: Performed by: OTOLARYNGOLOGY

## 2024-07-30 PROCEDURE — 6360000002 HC RX W HCPCS: Performed by: OTOLARYNGOLOGY

## 2024-07-30 PROCEDURE — 3700000001 HC ADD 15 MINUTES (ANESTHESIA): Performed by: OTOLARYNGOLOGY

## 2024-07-30 PROCEDURE — 6360000002 HC RX W HCPCS: Performed by: NURSE ANESTHETIST, CERTIFIED REGISTERED

## 2024-07-30 PROCEDURE — 2500000003 HC RX 250 WO HCPCS: Performed by: NURSE ANESTHETIST, CERTIFIED REGISTERED

## 2024-07-30 RX ORDER — SODIUM CHLORIDE 0.9 % (FLUSH) 0.9 %
5-40 SYRINGE (ML) INJECTION PRN
Status: CANCELLED | OUTPATIENT
Start: 2024-07-30

## 2024-07-30 RX ORDER — TRAMADOL HYDROCHLORIDE 50 MG/1
50 TABLET ORAL EVERY 6 HOURS PRN
Qty: 12 TABLET | Refills: 0 | Status: SHIPPED | OUTPATIENT
Start: 2024-07-30 | End: 2024-08-02

## 2024-07-30 RX ORDER — ONDANSETRON 2 MG/ML
INJECTION INTRAMUSCULAR; INTRAVENOUS PRN
Status: DISCONTINUED | OUTPATIENT
Start: 2024-07-30 | End: 2024-07-30 | Stop reason: SDUPTHER

## 2024-07-30 RX ORDER — SODIUM CHLORIDE 9 MG/ML
INJECTION, SOLUTION INTRAVENOUS PRN
Status: CANCELLED | OUTPATIENT
Start: 2024-07-30

## 2024-07-30 RX ORDER — PHENYLEPHRINE HCL IN 0.9% NACL 1 MG/10 ML
SYRINGE (ML) INTRAVENOUS PRN
Status: DISCONTINUED | OUTPATIENT
Start: 2024-07-30 | End: 2024-07-30 | Stop reason: SDUPTHER

## 2024-07-30 RX ORDER — ONDANSETRON 2 MG/ML
4 INJECTION INTRAMUSCULAR; INTRAVENOUS
Status: CANCELLED | OUTPATIENT
Start: 2024-07-30 | End: 2024-07-31

## 2024-07-30 RX ORDER — PROPOFOL 10 MG/ML
INJECTION, EMULSION INTRAVENOUS PRN
Status: DISCONTINUED | OUTPATIENT
Start: 2024-07-30 | End: 2024-07-30 | Stop reason: SDUPTHER

## 2024-07-30 RX ORDER — SODIUM CHLORIDE 0.9 % (FLUSH) 0.9 %
5-40 SYRINGE (ML) INJECTION EVERY 12 HOURS SCHEDULED
Status: CANCELLED | OUTPATIENT
Start: 2024-07-30

## 2024-07-30 RX ORDER — FENTANYL CITRATE 50 UG/ML
INJECTION, SOLUTION INTRAMUSCULAR; INTRAVENOUS PRN
Status: DISCONTINUED | OUTPATIENT
Start: 2024-07-30 | End: 2024-07-30 | Stop reason: SDUPTHER

## 2024-07-30 RX ORDER — SODIUM CHLORIDE 0.9 % (FLUSH) 0.9 %
5-40 SYRINGE (ML) INJECTION EVERY 12 HOURS SCHEDULED
Status: DISCONTINUED | OUTPATIENT
Start: 2024-07-30 | End: 2024-07-30 | Stop reason: HOSPADM

## 2024-07-30 RX ORDER — SODIUM CHLORIDE 9 MG/ML
INJECTION, SOLUTION INTRAVENOUS PRN
Status: DISCONTINUED | OUTPATIENT
Start: 2024-07-30 | End: 2024-07-30 | Stop reason: HOSPADM

## 2024-07-30 RX ORDER — HYDRALAZINE HYDROCHLORIDE 20 MG/ML
10 INJECTION INTRAMUSCULAR; INTRAVENOUS
Status: CANCELLED | OUTPATIENT
Start: 2024-07-30

## 2024-07-30 RX ORDER — SODIUM CHLORIDE 0.9 % (FLUSH) 0.9 %
5-40 SYRINGE (ML) INJECTION PRN
Status: DISCONTINUED | OUTPATIENT
Start: 2024-07-30 | End: 2024-07-30 | Stop reason: HOSPADM

## 2024-07-30 RX ORDER — DEXAMETHASONE SODIUM PHOSPHATE 10 MG/ML
INJECTION, EMULSION INTRAMUSCULAR; INTRAVENOUS PRN
Status: DISCONTINUED | OUTPATIENT
Start: 2024-07-30 | End: 2024-07-30 | Stop reason: SDUPTHER

## 2024-07-30 RX ORDER — NALOXONE HYDROCHLORIDE 0.4 MG/ML
INJECTION, SOLUTION INTRAMUSCULAR; INTRAVENOUS; SUBCUTANEOUS PRN
Status: CANCELLED | OUTPATIENT
Start: 2024-07-30

## 2024-07-30 RX ORDER — LABETALOL HYDROCHLORIDE 5 MG/ML
10 INJECTION, SOLUTION INTRAVENOUS
Status: CANCELLED | OUTPATIENT
Start: 2024-07-30

## 2024-07-30 RX ORDER — LIDOCAINE HCL/PF 100 MG/5ML
SYRINGE (ML) INJECTION PRN
Status: DISCONTINUED | OUTPATIENT
Start: 2024-07-30 | End: 2024-07-30 | Stop reason: SDUPTHER

## 2024-07-30 RX ORDER — ROCURONIUM BROMIDE 10 MG/ML
INJECTION, SOLUTION INTRAVENOUS PRN
Status: DISCONTINUED | OUTPATIENT
Start: 2024-07-30 | End: 2024-07-30 | Stop reason: SDUPTHER

## 2024-07-30 RX ORDER — EPHEDRINE SULFATE/0.9% NACL/PF 25 MG/5 ML
SYRINGE (ML) INTRAVENOUS PRN
Status: DISCONTINUED | OUTPATIENT
Start: 2024-07-30 | End: 2024-07-30 | Stop reason: SDUPTHER

## 2024-07-30 RX ADMIN — Medication 200 MCG: at 09:49

## 2024-07-30 RX ADMIN — EPHEDRINE SULFATE 10 MG: 5 INJECTION INTRAVENOUS at 09:29

## 2024-07-30 RX ADMIN — EPHEDRINE SULFATE 5 MG: 5 INJECTION INTRAVENOUS at 09:50

## 2024-07-30 RX ADMIN — FENTANYL CITRATE 50 MCG: 50 INJECTION, SOLUTION INTRAMUSCULAR; INTRAVENOUS at 09:38

## 2024-07-30 RX ADMIN — EPHEDRINE SULFATE 10 MG: 5 INJECTION INTRAVENOUS at 09:46

## 2024-07-30 RX ADMIN — SODIUM CHLORIDE 100 ML/HR: 9 INJECTION, SOLUTION INTRAVENOUS at 07:58

## 2024-07-30 RX ADMIN — SUGAMMADEX 200 MG: 100 INJECTION, SOLUTION INTRAVENOUS at 09:52

## 2024-07-30 RX ADMIN — ONDANSETRON 4 MG: 2 INJECTION INTRAMUSCULAR; INTRAVENOUS at 09:38

## 2024-07-30 RX ADMIN — WATER 2000 MG: 1 INJECTION INTRAMUSCULAR; INTRAVENOUS; SUBCUTANEOUS at 09:27

## 2024-07-30 RX ADMIN — DEXAMETHASONE SODIUM PHOSPHATE 4 MG: 10 INJECTION, EMULSION INTRAMUSCULAR; INTRAVENOUS at 09:38

## 2024-07-30 RX ADMIN — ROCURONIUM BROMIDE 40 MG: 10 INJECTION INTRAVENOUS at 09:19

## 2024-07-30 RX ADMIN — PROPOFOL 150 MG: 10 INJECTION, EMULSION INTRAVENOUS at 09:19

## 2024-07-30 RX ADMIN — Medication 100 MG: at 09:19

## 2024-07-30 RX ADMIN — FENTANYL CITRATE 50 MCG: 50 INJECTION, SOLUTION INTRAMUSCULAR; INTRAVENOUS at 09:14

## 2024-07-30 ASSESSMENT — PAIN DESCRIPTION - DESCRIPTORS
DESCRIPTORS: ACHING
DESCRIPTORS: SORE

## 2024-07-30 ASSESSMENT — PAIN SCALES - GENERAL
PAINLEVEL_OUTOF10: 0

## 2024-07-30 ASSESSMENT — PAIN - FUNCTIONAL ASSESSMENT
PAIN_FUNCTIONAL_ASSESSMENT: 0-10
PAIN_FUNCTIONAL_ASSESSMENT: PREVENTS OR INTERFERES SOME ACTIVE ACTIVITIES AND ADLS
PAIN_FUNCTIONAL_ASSESSMENT: 0-10

## 2024-07-30 NOTE — PROGRESS NOTES
1001 Awake and oriented on arrival to PACU , HOB elevated , c/o slight sore throat ,  1005 pt given ice chips , pt states that helped a lot   1020 resting on and off   1030 pt denies any needs , sore throat about the same    1035  Meets criteria for discharge , transported to [facility]

## 2024-07-30 NOTE — PROGRESS NOTES
Pt returned to [facility] room 9. Vitals and assessment as charted. 0.9 infusing, @ 100 ml to count from PACU. Pt has a pudding and water. Family at the bedside. Pt and family verbalized understanding of discharge criteria and call light use. Call light in reach.

## 2024-07-30 NOTE — OP NOTE
was encountered on the left fingertip examining the area.  He did have significant trismus however.    After fashioning a custom made heat activated dental guard, I passed the Anushka laryngoscope into the patient's oral cavity and extended the vallecula anteriorly to provide a direct line of sight view of the laryngopharynx and esophageal inlet.  Using a 30 degree optical telescope to carefully examine the esophageal lumen space and found no obvious cicatricial scar or excrescence of any type.  I passed an 8 cm 18 to 20 mm pneumatic balloon dilator into the esophageal lumen with direct visualization and then inflated it to 2 paulie without any signs of shearing.  I incremented an atmosphere at a time until I was to the full 20 mm aperture still without any signs of mucous membrane shearing.  I opted to overpressure eyes first  1 paulie and then 2 paulie above rating likely reaching between 21 and 22 mm and still no signs of shearing.  I deflated the balloon completely and examined the esophageal inlet and saw a tiny amount of bloody discharge indicating at least a minimal amount of shearing.    I then obtained a flexible esophagoscope and passed it through the oral cavity next to the laryngoscope into the esophageal inlet and traversed the length of the esophagus without difficulty.  I found no blood in the esophagus and no lesions in the esophagus all the way to the GEJ which was at 45 cm from the dentition.  His Z-line was somewhat irregular but no tongues of salmon-colored mucosa were seen.  No cicatricial process was seen.  I evacuated the stomach and repeated my exam with narrowband imaging and again found no pathology of significance all the way up to the esophageal inlet.  As such I consider the operation concluded.  I remove the transoral hardware and turned the patient back to anesthesia for reversal extubation in the operating room.  This was carried out without incident and the patient was taken to recovery in

## 2024-07-30 NOTE — H&P
Preoperative History and Physical    The patient's clinic note is less than a week old and attached below.  The details of the patient's condition were reviewed at bedside and surgical plans were also reviewed and accepted by the patient for informed consent.    MD Brenda Gonzalez Paul, MD  Physician  Specialty: Otolaryngology     Progress Notes     Addendum     Encounter Date: 7/22/2024     Expand All Collapse All      [facility] [place] [facility]  [facility]AR, NOSE AND THROAT  [address]  [address]  [facility] [address]  Dept: [phone]  Dept Fax: [phone]  Loc: [phone]     Javon Kenney is a 68 y.o. male who was referred by No ref. provider found for:       Chief Complaint   Patient presents with    Follow-up       Patient is here for cancer surveillance. Patient states that he has been having trouble swallowing. He states that bigger pills are hard to swallow. Patient states that the R side of his tongue doesn't have feeling and states within the past few weeks he has been getting hoarse.          HPI:      Javon Kenney is a 68 y.o. 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  He is status post transoral robotic assisted radical tonsillectomy and reconstruction followed by a reresection of a positive margin at the same time as his modified radical neck dissection was performed.  His initial biopsies were performed on June 21, 2023 with his transoral robotic assisted resection on July 21, 2023 demonstrated a close/positive margin in the inferior anterior aspect of the primary specimen.  He had his reresection and his selective neck dissection on July 28, 2023 and his biopsy of a presumed recurrence/persistent cancer on February 1.  The biopsies of that tissue area were all negative for malignancy.       More recently, he was thought to have

## 2024-07-30 NOTE — ANESTHESIA POSTPROCEDURE EVALUATION
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    Gael Phase I: Gael Score: 10    Gael Phase II:      Anesthesia Post Evaluation    Patient location during evaluation: PACU  Patient participation: complete - patient participated  Level of consciousness: awake and alert  Airway patency: patent  Nausea & Vomiting: no nausea  Cardiovascular status: blood pressure returned to baseline and hemodynamically stable  Respiratory status: acceptable and spontaneous ventilation  Hydration status: euvolemic  Pain management: adequate    No notable events documented.

## 2024-07-30 NOTE — ANESTHESIA PRE PROCEDURE
Department of Anesthesiology  Preprocedure Note       Name:  Javon Kenney   Age:  69 y.o.  :  1955                                          MRN:  [id number]         Date:  2024      Surgeon: Surgeon(s):  Federico Gutierrez MD    Procedure: Procedure(s):  Esophagoscopy Rigid with Dilation    Medications prior to admission:   Prior to Admission medications    Medication Sig Start Date End Date Taking? Authorizing Provider   famotidine (PEPCID) 40 MG tablet Take 1 tablet by mouth in the morning and at bedtime  Patient not taking: Reported on 2024   Federico Gutierrez MD   omeprazole (PRILOSEC) 40 MG delayed release capsule Take 1 capsule by mouth every morning (before breakfast)  Patient not taking: Reported on 2024   Federico Gutierrez MD   tiZANidine (ZANAFLEX) 4 MG tablet Take 1 tablet by mouth every 6 hours as needed (muscle spasm) 24   Fitz Wolff APRN - CNP   levothyroxine (SYNTHROID) 25 MCG tablet Take 1 tablet by mouth daily Take first thing in the morning one hour before other medications or food. 24   Kasie Wilkes MD   DULoxetine (CYMBALTA) 30 MG extended release capsule take 1 capsule by mouth once daily 24   Kasie Wilkes MD   memantine (NAMENDA) 10 MG tablet Take 1 tablet by mouth 2 times daily 24   ProviderKirsten MD       Current medications:    Current Facility-Administered Medications   Medication Dose Route Frequency Provider Last Rate Last Admin   • sodium chloride flush 0.9 % injection 5-40 mL  5-40 mL IntraVENous 2 times per day Federico Gutierrez MD       • sodium chloride flush 0.9 % injection 5-40 mL  5-40 mL IntraVENous PRN Federico Gutierrez MD       • 0.9 % sodium chloride infusion   IntraVENous PRN Federico Gutierrez  mL/hr at 24 08 NoRateChange at 24 08   • ceFAZolin (ANCEF) 2,000 mg in sterile water 20 mL IV syringe  2,000 mg IntraVENous NOW Federico Gutierrez MD           Allergies:

## 2024-07-30 NOTE — PROGRESS NOTES
Patient oriented to Same Day department and admitted to Same Day Surgery room 9.   Patient verbalized approval for first name, last initial with physician name on unit whiteboard.     Plan of care reviewed with patient.   Patient room whiteboard filled out and discussed with patient and responsible adult.   Patient and responsible adult offered Same Day Welcome Packet to review.    Call light in reach.   Bed in lowest position, locked, with one bed rail up.   SCDs and warming blanket in place.  Appropriate arm bands on patient.   Bathroom offered.   All questions and concerns of patient addressed.        Meds to Beds:   Patient informed of [facility] program during admission. Patient is agreeable to program.   Contact information for the pharmacy and the Meds to Beds program:     Torrie Kenney (Spouse)   [phone] (Mobile)

## 2024-08-05 ENCOUNTER — TELEPHONE (OUTPATIENT)
Dept: INTERNAL MEDICINE CLINIC | Age: 69
End: 2024-08-05

## 2024-08-05 ENCOUNTER — HOSPITAL ENCOUNTER (OUTPATIENT)
Dept: PHYSICAL THERAPY | Age: 69
Setting detail: THERAPIES SERIES
End: 2024-08-05
Attending: INTERNAL MEDICINE
Payer: MEDICARE

## 2024-08-05 NOTE — TELEPHONE ENCOUNTER
Wife calling requesting paxlovid for . Positive COVID test 8/5 with symptoms beginning 8/4.    Please advise.

## 2024-08-06 ENCOUNTER — CARE COORDINATION (OUTPATIENT)
Dept: CARE COORDINATION | Age: 69
End: 2024-08-06

## 2024-08-13 ENCOUNTER — OFFICE VISIT (OUTPATIENT)
Dept: ENT CLINIC | Age: 69
End: 2024-08-13
Payer: MEDICARE

## 2024-08-13 VITALS
TEMPERATURE: 97 F | HEART RATE: 68 BPM | OXYGEN SATURATION: 97 % | SYSTOLIC BLOOD PRESSURE: 92 MMHG | HEIGHT: 65 IN | WEIGHT: 144.8 LBS | DIASTOLIC BLOOD PRESSURE: 62 MMHG | BODY MASS INDEX: 24.12 KG/M2

## 2024-08-13 DIAGNOSIS — R13.14 PHARYNGOESOPHAGEAL DYSPHAGIA: ICD-10-CM

## 2024-08-13 DIAGNOSIS — Z08 ENCOUNTER FOR FOLLOW-UP SURVEILLANCE OF HEAD AND NECK CANCER: ICD-10-CM

## 2024-08-13 DIAGNOSIS — C09.9 PRIMARY TONSILLAR SQUAMOUS CELL CARCINOMA (HCC): Primary | ICD-10-CM
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
## 2024-08-13 DIAGNOSIS — L59.8 RADIATION FIBROSIS OF SOFT TISSUE FROM THERAPEUTIC PROCEDURE: ICD-10-CM

## 2024-08-13 DIAGNOSIS — Z85.89 ENCOUNTER FOR FOLLOW-UP SURVEILLANCE OF HEAD AND NECK CANCER: ICD-10-CM

## 2024-08-13 PROCEDURE — 99214 OFFICE O/P EST MOD 30 MIN: CPT | Performed by: OTOLARYNGOLOGY

## 2024-08-13 PROCEDURE — 1124F ACP DISCUSS-NO DSCNMKR DOCD: CPT | Performed by: OTOLARYNGOLOGY

## 2024-08-13 NOTE — PROGRESS NOTES
[facility] [place] [facility]  [facility]AR, [facility]SE AND TH[facility]AT  [address]  [address]  [facility] [address]  Dept: [phone]  Dept Fax: [phone]  Loc: [phone]    Javon Kenney is a 69 y.o. male who was referred by No ref. provider found for:  Chief Complaint   Patient presents with    Post-Op Check     Patient here for post Op. Patient tired and fatigued. Diagnosed with Covid last Sunday.        HPI:     Javon Kenney is a 69 y.o. 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  The patient also has had obstructive dysphagia symptoms from upper esophageal sphincter dysfunction versus stricture.  He is status post a suspension laryngoscopy with exam under anesthesia a week ago and balloon dilation of the esophageal inlet.  At that time no evidence of recurrent disease was found on physical exam and his balloon dilation was performed uneventfully.  He returns today with his wife describing having had very low energy and some sense of a viral illness that a week ago was positive for COVID.  He reports feeling much better as of the last 48 hours and considering his energy is slowly returning.  He is requesting medication for anxiety and even for depression.  He is on Cymbalta which she was off several days prior to his dilation and for some reason he only recently restarted it.     History:     No Known Allergies  Current Outpatient Medications   Medication Sig Dispense Refill    levothyroxine (SYNTHROID) 25 MCG tablet Take 1 tablet by mouth daily Take first thing in the morning one hour before other medications or food. 30 tablet 5    DULoxetine (CYMBALTA) 30 MG extended release capsule take 1 capsule by mouth once daily 90 capsule 1    memantine (NAMENDA) 10 MG tablet Take 1 tablet by mouth

## 2024-08-16 ENCOUNTER — HOSPITAL ENCOUNTER (OUTPATIENT)
Dept: RADIATION ONCOLOGY | Age: 69
Discharge: HOME | End: 2024-08-16
Payer: MEDICARE

## 2024-08-16 VITALS
OXYGEN SATURATION: 98 % | BODY MASS INDEX: 24.13 KG/M2 | TEMPERATURE: 98.4 F | DIASTOLIC BLOOD PRESSURE: 68 MMHG | RESPIRATION RATE: 18 BRPM | HEART RATE: 71 BPM | SYSTOLIC BLOOD PRESSURE: 140 MMHG | WEIGHT: 145 LBS

## 2024-08-16 PROCEDURE — 99213 OFFICE O/P EST LOW 20 MIN: CPT

## 2024-08-16 ASSESSMENT — ENCOUNTER SYMPTOMS
SORE THROAT: 1
SINUS PRESSURE: 0
ABDOMINAL PAIN: 0
VOICE CHANGE: 0
SHORTNESS OF BREATH: 0
TROUBLE SWALLOWING: 0
NAUSEA: 0
BLOOD IN STOOL: 0
FACIAL SWELLING: 0
BACK PAIN: 1
SINUS PAIN: 0
COUGH: 0

## 2024-08-16 NOTE — PROGRESS NOTES
(chronic, stable (mostly since esophageal stretching)) and tinnitus (chronic, stable). Negative for congestion, ear pain, facial swelling, hearing loss, nosebleeds, sinus pressure, sinus pain, trouble swallowing and voice change.    Eyes:  Negative for visual disturbance.   Respiratory:  Negative for cough and shortness of breath.    Cardiovascular:  Negative for chest pain.   Gastrointestinal:  Negative for abdominal pain, blood in stool, nausea and rectal pain.   Genitourinary:  Negative for dysuria, frequency and urgency.   Musculoskeletal:  Positive for back pain (improving), neck pain and neck stiffness. Negative for arthralgias.   Neurological:  Positive for light-headedness and headaches (radiates to the head from the neck stiffness). Negative for facial asymmetry, speech difficulty, weakness and numbness.   Hematological:  Negative for adenopathy.   Psychiatric/Behavioral:  Negative for confusion.        CHAPERONE: n/a    PAIN: 0/10    LABORATORY STUDIES:  Onc labs:   Lab Results   Component Value Date/Time    PSA 1.96 09/28/2020 09:31 AM       MEDICATIONS:   Current Outpatient Medications   Medication Sig Dispense Refill    nirmatrelvir/ritonavir 300/100 (PAXLOVID, 300/100,) 20 x 150 MG & 10 x 100MG TBPK Take 3 tablets (two 150 mg nirmatrelvir and one 100 mg ritonavir tablets) by mouth every 12 hours for 5 days. 30 tablet 0    levothyroxine (SYNTHROID) 25 MCG tablet Take 1 tablet by mouth daily Take first thing in the morning one hour before other medications or food. 30 tablet 5    DULoxetine (CYMBALTA) 30 MG extended release capsule take 1 capsule by mouth once daily 90 capsule 1    memantine (NAMENDA) 10 MG tablet Take 1 tablet by mouth 2 times daily       No current facility-administered medications for this encounter.       All portions of this note that were completed during the initial nursing assessment were discussed and reviewed in detail with the nursing staff member who completed this portion of

## 2024-08-30 ENCOUNTER — HOSPITAL ENCOUNTER (OUTPATIENT)
Dept: SPEECH THERAPY | Age: 69
Setting detail: THERAPIES SERIES
Discharge: HOME | End: 2024-08-30
Attending: INTERNAL MEDICINE
Payer: MEDICARE

## 2024-08-30 PROCEDURE — 92526 ORAL FUNCTION THERAPY: CPT | Performed by: SPEECH-LANGUAGE PATHOLOGIST

## 2024-08-30 NOTE — PROGRESS NOTES
** PLEASE SIGN, DATE AND TIME CERTIFICATION BELOW AND RETURN TO [facility] OUTPATIENT REHABILITATION (FAX #: [phone]).  ATTEST/CO-SIGN IF ACCESSING VIA [employer].  THANK YOU.**    I certify that I have examined the patient below and determined that Physical Medicine and Rehabilitation service is necessary and that I approve the established plan of care for up to 90 days or as specifically noted.  Attestation, signature or co-signature of physician indicates approval of certification requirements.    ________________________ ____________ __________  Physician Signature   Date   Time    [facility]  SPEECH THERAPY  [] CLINICAL SWALLOW EVALUATION  [] DAILY NOTE   [x] PROGRESS NOTE [] DISCHARGE NOTE    [x] OUTPATIENT REHABILITATION [facility]   [] [facility]    [] [facility]   [] [facility]    Date: 2024  Patient Name:  Javon Kenney  : 1955  MRN: [id number]  CSN: [id number]    Referring Practitioner Kasie Wilkes MD    Diagnosis Dysphagia, unspecified   Treatment Diagnosis R13.10 Dysphagia, unspecified   Date of Evaluation 24    Additional Pertinent History Please see below for details      Functional Outcome Measure Used JUANCHO NOMS: swallowing   Functional Outcome Score Level  (24)       Insurance: Primary: Payor: [employer] MEDICARE /  /  / ,   Secondary:    Authorization Information: Received auth for [address] visits for CPT code 54947 from 24 through 10/10/24.    Approved Procedure Codes: 45604 - Dysphagia Therapy  (Codes requested indicated by red font, codes approved indicated by black font)   Visit # 10, 10/10 for progress note (Reporting Period: 24 to 24 )   Visits Allowed: ; Received auth for [address] visits for CPT code 36035 from 24 through 10/10/24.    Recertification Date: 10/11/24    Physician Follow-Up:    Physician Orders: Evaluate and treat   History of Present Illness: Patient reports he started

## 2024-09-03 ENCOUNTER — TELEPHONE (OUTPATIENT)
Dept: BARIATRICS/WEIGHT MGMT | Age: 69
End: 2024-09-03

## 2024-09-03 DIAGNOSIS — Z98.890 STATUS POST NISSEN FUNDOPLICATION: ICD-10-CM

## 2024-09-03 DIAGNOSIS — R11.2 NAUSEA AND VOMITING, UNSPECIFIED VOMITING TYPE: Primary | ICD-10-CM

## 2024-09-03 NOTE — TELEPHONE ENCOUNTER
Pt is requesting follow up appt with Dr. Collins. Pt is c/o stomach nausea-he had tonsil cancer & a portion of his tongue removed surgery w/ dr. Gutierrez. Pt is c/o of nausea vomiting since throat surgery - no acid reflux. Pt completed  radiation .  Pt is taking zofran daily - he has had episodes of dry heaves. Pt's wife is wondering if \"something slipped\"-is this the reason for his constant nausea\"?  They have not seen any Gi physician -he could have an ulcer that may be causing his symptoms- I did let her know he saw Dr. Guerrero for his MEJÍA.

## 2024-09-05 ENCOUNTER — HOSPITAL ENCOUNTER (OUTPATIENT)
Dept: SPEECH THERAPY | Age: 69
Setting detail: THERAPIES SERIES
Discharge: HOME | End: 2024-09-05
Attending: INTERNAL MEDICINE
Payer: MEDICARE

## 2024-09-05 ENCOUNTER — HOSPITAL ENCOUNTER (OUTPATIENT)
Dept: GENERAL RADIOLOGY | Age: 69
Discharge: HOME | End: 2024-09-05
Attending: SURGERY
Payer: MEDICARE

## 2024-09-05 DIAGNOSIS — R11.2 NAUSEA AND VOMITING, UNSPECIFIED VOMITING TYPE: ICD-10-CM

## 2024-09-05 DIAGNOSIS — Z98.890 STATUS POST NISSEN FUNDOPLICATION: ICD-10-CM

## 2024-09-05 PROCEDURE — 92526 ORAL FUNCTION THERAPY: CPT | Performed by: SPEECH-LANGUAGE PATHOLOGIST

## 2024-09-05 PROCEDURE — 2500000003 HC RX 250 WO HCPCS: Performed by: SURGERY

## 2024-09-05 PROCEDURE — 6370000000 HC RX 637 (ALT 250 FOR IP): Performed by: SURGERY

## 2024-09-05 PROCEDURE — 74246 X-RAY XM UPR GI TRC 2CNTRST: CPT

## 2024-09-05 RX ADMIN — BARIUM SULFATE 140 ML: 980 POWDER, FOR SUSPENSION ORAL at 09:22

## 2024-09-05 RX ADMIN — ANTACID/ANTIFLATULENT 1 EACH: 380; 550; 10; 10 GRANULE, EFFERVESCENT ORAL at 09:22

## 2024-09-05 NOTE — PROGRESS NOTES
not received his Therabite but he thinks it is supposed to be delivered today sometime.    SHORT TERM GOAL #2: NO NEW GOAL.   INTERVENTIONS: n/a    SHORT TERM GOAL #3: Patient will complete pharyngeal strengthening exercises (effortful swallows, tongue base retraction, etc) x 20 for improved passage of the bolus through the pharynx and overall reduced residue.   INTERVENTIONS:   Patient completed the following exercises this date:  *Effortful swallows: x 10 with good success with head in neutral position; moderate belching noted during completion.    *Nhung: did not test due to the patient not feeling well  *Lingual protrusion with alternating tongue tip back (3 second retraction): did not complete due to the patient not feeling well    SHORT TERM GOAL #4: Patient will complete laryngeal elevation exercises x 15 for improved airway protection.   INTERVENTIONS:   Patient completed the following this session:  *Isokinetic chin tuck against resistance: did not complete due to the patient not feeling well  *Isometric chin tuck against resistance for 60 second hold x 1 and 30 second hold x 1 with good success, however the patient reported his stomach hurt too much and had to discontinue completion.  *mendelsohn maneuver: did not complete due to the patient not feeling well    Long Term Goals:  Time Frame for Long-Term Goals: 8 weeks    LONG-TERM GOAL #1: Patient will report completion of his home exercise program at least 1 time daily for improved independence and carryover outside of therapy. ONGOING    Rehabilitation Potential/Prognosis: good  Areas for Improvement: Impaired swallow function  Specific Interventions Next Treatment: diet monitor, pharyngeal strengthening exercises, laryngeal elevation exercises    Activity/Treatment Tolerance:  [x]  Patient tolerated treatment well []  Patient limited by fatigue  []  Patient limited by pain   []  Patient limited by other medical complications  []  Other:     Patient

## 2024-09-07 ENCOUNTER — HOSPITAL ENCOUNTER (EMERGENCY)
Age: 69
Discharge: HOME | End: 2024-09-07
Attending: FAMILY MEDICINE
Payer: MEDICARE

## 2024-09-07 ENCOUNTER — APPOINTMENT (OUTPATIENT)
Dept: CT IMAGING | Age: 69
End: 2024-09-07
Payer: MEDICARE

## 2024-09-07 ENCOUNTER — APPOINTMENT (OUTPATIENT)
Dept: GENERAL RADIOLOGY | Age: 69
End: 2024-09-07
Payer: MEDICARE

## 2024-09-07 VITALS
WEIGHT: 144 LBS | BODY MASS INDEX: 23.96 KG/M2 | RESPIRATION RATE: 16 BRPM | OXYGEN SATURATION: 96 % | TEMPERATURE: 97.9 F | SYSTOLIC BLOOD PRESSURE: 157 MMHG | HEART RATE: 62 BPM | DIASTOLIC BLOOD PRESSURE: 81 MMHG

## 2024-09-07 DIAGNOSIS — R10.13 ABDOMINAL PAIN, EPIGASTRIC: Primary | ICD-10-CM

## 2024-09-07 LAB
ALBUMIN SERPL BCG-MCNC: 4.1 G/DL (ref 3.5–5.1)
ALP SERPL-CCNC: 84 U/L (ref 38–126)
ALT SERPL W/O P-5'-P-CCNC: 16 U/L (ref 11–66)
ANION GAP SERPL CALC-SCNC: 10 MEQ/L (ref 8–16)
AST SERPL-CCNC: 19 U/L (ref 5–40)
BACTERIA: NORMAL
BASOPHILS ABSOLUTE: 0.1 THOU/MM3 (ref 0–0.1)
BASOPHILS NFR BLD AUTO: 0.6 %
BILIRUB CONJ SERPL-MCNC: < 0.1 MG/DL (ref 0.1–13.8)
BILIRUB SERPL-MCNC: 0.3 MG/DL (ref 0.3–1.2)
BILIRUB UR QL STRIP: NEGATIVE
BUN SERPL-MCNC: 16 MG/DL (ref 7–22)
CALCIUM SERPL-MCNC: 8.9 MG/DL (ref 8.5–10.5)
CASTS #/AREA URNS LPF: NORMAL /LPF
CASTS #/AREA URNS LPF: NORMAL /LPF
CHARACTER UR: CLEAR
CHARCOAL URNS QL MICRO: NORMAL
CHLORIDE SERPL-SCNC: 101 MEQ/L (ref 98–111)
CO2 SERPL-SCNC: 25 MEQ/L (ref 23–33)
COLOR UR: YELLOW
CREAT SERPL-MCNC: 0.7 MG/DL (ref 0.4–1.2)
CRYSTALS URNS QL MICRO: NORMAL
DEPRECATED RDW RBC AUTO: 40.9 FL (ref 35–45)
EKG ATRIAL RATE: 64 BPM
EKG P AXIS: 46 DEGREES
EKG P-R INTERVAL: 130 MS
EKG Q-T INTERVAL: 416 MS
EKG QRS DURATION: 88 MS
EKG QTC CALCULATION (BAZETT): 429 MS
EKG R AXIS: 11 DEGREES
EKG T AXIS: 84 DEGREES
EKG VENTRICULAR RATE: 64 BPM
EOSINOPHIL NFR BLD AUTO: 1.4 %
EOSINOPHILS ABSOLUTE: 0.1 THOU/MM3 (ref 0–0.4)
EPITHELIAL CELLS, UA: NORMAL /HPF
ERYTHROCYTE [DISTWIDTH] IN BLOOD BY AUTOMATED COUNT: 13.2 % (ref 11.5–14.5)
GFR SERPL CREATININE-BSD FRML MDRD: > 90 ML/MIN/1.73M2
GLUCOSE SERPL-MCNC: 125 MG/DL (ref 70–108)
GLUCOSE UR QL STRIP.AUTO: NEGATIVE MG/DL
HCT VFR BLD AUTO: 44.7 % (ref 42–52)
HGB BLD-MCNC: 15.1 GM/DL (ref 14–18)
HGB UR QL STRIP.AUTO: NEGATIVE
IMM GRANULOCYTES # BLD AUTO: 0.05 THOU/MM3 (ref 0–0.07)
IMM GRANULOCYTES NFR BLD AUTO: 0.6 %
KETONES UR QL STRIP.AUTO: NEGATIVE
LEUKOCYTE ESTERASE UR QL STRIP.AUTO: NEGATIVE
LYMPHOCYTES ABSOLUTE: 1 THOU/MM3 (ref 1–4.8)
LYMPHOCYTES NFR BLD AUTO: 11.4 %
MCH RBC QN AUTO: 29.1 PG (ref 26–33)
MCHC RBC AUTO-ENTMCNC: 33.8 GM/DL (ref 32.2–35.5)
MCV RBC AUTO: 86.1 FL (ref 80–94)
MONOCYTES ABSOLUTE: 0.6 THOU/MM3 (ref 0.4–1.3)
MONOCYTES NFR BLD AUTO: 7.2 %
NEUTROPHILS ABSOLUTE: 7.1 THOU/MM3 (ref 1.8–7.7)
NEUTROPHILS NFR BLD AUTO: 78.8 %
NITRITE UR QL STRIP.AUTO: NEGATIVE
NRBC BLD AUTO-RTO: 0 /100 WBC
OSMOLALITY SERPL CALC.SUM OF ELEC: 274.6 MOSMOL/KG (ref 275–300)
PH UR STRIP.AUTO: 6.5 [PH] (ref 5–9)
PLATELET # BLD AUTO: 217 THOU/MM3 (ref 130–400)
PMV BLD AUTO: 9 FL (ref 9.4–12.4)
POTASSIUM SERPL-SCNC: 4 MEQ/L (ref 3.5–5.2)
PROT SERPL-MCNC: 6.8 G/DL (ref 6.1–8)
PROT UR STRIP.AUTO-MCNC: NEGATIVE MG/DL
RBC # BLD AUTO: 5.19 MILL/MM3 (ref 4.7–6.1)
RBC #/AREA URNS HPF: NORMAL /HPF
RENAL EPI CELLS #/AREA URNS HPF: NORMAL /[HPF]
SODIUM SERPL-SCNC: 136 MEQ/L (ref 135–145)
SPECIFIC GRAVITY UA: 1.02 (ref 1–1.03)
T4 FREE SERPL-MCNC: 1.03 NG/DL (ref 0.93–1.68)
TSH SERPL DL<=0.005 MIU/L-ACNC: 3.28 UIU/ML (ref 0.4–4.2)
UROBILINOGEN, URINE: 0.2 EU/DL (ref 0–1)
WBC # BLD AUTO: 9 THOU/MM3 (ref 4.8–10.8)
WBC #/AREA URNS HPF: NORMAL /HPF
YEAST LIKE FUNGI URNS QL MICRO: NORMAL

## 2024-09-07 PROCEDURE — 71046 X-RAY EXAM CHEST 2 VIEWS: CPT

## 2024-09-07 PROCEDURE — 80053 COMPREHEN METABOLIC PANEL: CPT

## 2024-09-07 PROCEDURE — 6370000000 HC RX 637 (ALT 250 FOR IP)

## 2024-09-07 PROCEDURE — 84439 ASSAY OF FREE THYROXINE: CPT

## 2024-09-07 PROCEDURE — 71250 CT THORAX DX C-: CPT

## 2024-09-07 PROCEDURE — 85025 COMPLETE CBC W/AUTO DIFF WBC: CPT

## 2024-09-07 PROCEDURE — 81001 URINALYSIS AUTO W/SCOPE: CPT

## 2024-09-07 PROCEDURE — 6360000002 HC RX W HCPCS

## 2024-09-07 PROCEDURE — 36415 COLL VENOUS BLD VENIPUNCTURE: CPT

## 2024-09-07 PROCEDURE — 84443 ASSAY THYROID STIM HORMONE: CPT

## 2024-09-07 PROCEDURE — 99285 EMERGENCY DEPT VISIT HI MDM: CPT

## 2024-09-07 PROCEDURE — 93010 ELECTROCARDIOGRAM REPORT: CPT | Performed by: INTERNAL MEDICINE

## 2024-09-07 PROCEDURE — 93005 ELECTROCARDIOGRAM TRACING: CPT | Performed by: FAMILY MEDICINE

## 2024-09-07 PROCEDURE — 82248 BILIRUBIN DIRECT: CPT

## 2024-09-07 RX ORDER — ONDANSETRON 4 MG/1
4 TABLET, ORALLY DISINTEGRATING ORAL 3 TIMES DAILY PRN
Qty: 21 TABLET | Refills: 0 | Status: SHIPPED | OUTPATIENT
Start: 2024-09-07 | End: 2024-09-14

## 2024-09-07 RX ORDER — ACETAMINOPHEN 500 MG
1000 TABLET ORAL
Status: COMPLETED | OUTPATIENT
Start: 2024-09-07 | End: 2024-09-07

## 2024-09-07 RX ORDER — ONDANSETRON 2 MG/ML
4 INJECTION INTRAMUSCULAR; INTRAVENOUS ONCE
Status: COMPLETED | OUTPATIENT
Start: 2024-09-07 | End: 2024-09-07

## 2024-09-07 RX ADMIN — ACETAMINOPHEN 1000 MG: 500 TABLET ORAL at 17:29

## 2024-09-07 RX ADMIN — ONDANSETRON 4 MG: 2 INJECTION INTRAMUSCULAR; INTRAVENOUS at 17:29

## 2024-09-07 ASSESSMENT — ENCOUNTER SYMPTOMS
SHORTNESS OF BREATH: 0
CONSTIPATION: 0
ABDOMINAL DISTENTION: 1
RHINORRHEA: 0
COUGH: 1
NAUSEA: 1
VOMITING: 0
BLOOD IN STOOL: 0
BACK PAIN: 1
WHEEZING: 0
CHOKING: 1
TROUBLE SWALLOWING: 1
RECTAL PAIN: 0
EYES NEGATIVE: 1
CHEST TIGHTNESS: 0
SORE THROAT: 0
PHOTOPHOBIA: 0
ABDOMINAL PAIN: 1

## 2024-09-07 NOTE — ED NOTES
Presents to ER with concern of right sided rib pain. Pt reports falling 3 weeks ago. He reports not getting better since fall. Reports having a barium test. Reports after swallowing a bubbly medication his abd has been more sore then his normal. Pt reports this am having episode of dark \"chocolate\" colored blood. Reports he only has 1 episode of emesis. He reports he has felt bad since he has had his cancer radiation. Reports increased dizziness. Reports feeling \"lousy\". Reports pain in abd and ribs 3-4. EKG complete. Placed on cardiac monitor. Will monitor

## 2024-09-07 NOTE — DISCHARGE INSTRUCTIONS
Please return to ED for any concerns  Please follow-up with your PCP for reevaluation  You may take Zofran for your nausea

## 2024-09-07 NOTE — ED PROVIDER NOTES
transcription may contain errors not detected in proofreading.    Electronically signed by Marianela Stockton MD on 9/7/24 at 4:45 PM EDT    minimal

## 2024-09-09 ENCOUNTER — TELEPHONE (OUTPATIENT)
Dept: INTERNAL MEDICINE CLINIC | Age: 69
End: 2024-09-09

## 2024-09-09 DIAGNOSIS — R13.14 PHARYNGOESOPHAGEAL DYSPHAGIA: ICD-10-CM

## 2024-09-09 DIAGNOSIS — G62.9 NEUROPATHY: ICD-10-CM

## 2024-09-09 DIAGNOSIS — F32.A DEPRESSION, UNSPECIFIED DEPRESSION TYPE: ICD-10-CM

## 2024-09-10 RX ORDER — DULOXETIN HYDROCHLORIDE 30 MG/1
30 CAPSULE, DELAYED RELEASE ORAL DAILY
Qty: 90 CAPSULE | Refills: 1 | Status: SHIPPED | OUTPATIENT
Start: 2024-09-10

## 2024-09-12 ENCOUNTER — HOSPITAL ENCOUNTER (OUTPATIENT)
Dept: SPEECH THERAPY | Age: 69
Setting detail: THERAPIES SERIES
Discharge: HOME | End: 2024-09-12
Attending: INTERNAL MEDICINE
Payer: MEDICARE

## 2024-09-12 PROCEDURE — 92526 ORAL FUNCTION THERAPY: CPT | Performed by: SPEECH-LANGUAGE PATHOLOGIST

## 2024-09-16 ENCOUNTER — TELEPHONE (OUTPATIENT)
Dept: INTERNAL MEDICINE CLINIC | Age: 69
End: 2024-09-16

## 2024-09-16 DIAGNOSIS — E03.9 ACQUIRED HYPOTHYROIDISM: Primary | ICD-10-CM

## 2024-09-18 ENCOUNTER — HOSPITAL ENCOUNTER (OUTPATIENT)
Dept: PET IMAGING | Age: 69
Discharge: HOME | End: 2024-09-18
Attending: OTOLARYNGOLOGY
Payer: MEDICARE

## 2024-09-18 DIAGNOSIS — J39.2 PHARYNGEAL MASS: ICD-10-CM

## 2024-09-18 DIAGNOSIS — C09.9 PRIMARY TONSILLAR SQUAMOUS CELL CARCINOMA (HCC): ICD-10-CM

## 2024-09-18 PROCEDURE — 78815 PET IMAGE W/CT SKULL-THIGH: CPT

## 2024-09-18 PROCEDURE — 3430000000 HC RX DIAGNOSTIC RADIOPHARMACEUTICAL: Performed by: OTOLARYNGOLOGY

## 2024-09-18 PROCEDURE — A9609 HC RX DIAGNOSTIC RADIOPHARMACEUTICAL: HCPCS | Performed by: OTOLARYNGOLOGY

## 2024-09-18 RX ORDER — FLUDEOXYGLUCOSE F 18 200 MCI/ML
10.25 INJECTION, SOLUTION INTRAVENOUS
Status: COMPLETED | OUTPATIENT
Start: 2024-09-18 | End: 2024-09-18

## 2024-09-18 RX ORDER — LEVOTHYROXINE SODIUM 50 UG/1
50 TABLET ORAL DAILY
Qty: 30 TABLET | Refills: 3 | Status: SHIPPED | OUTPATIENT
Start: 2024-09-18

## 2024-09-18 RX ADMIN — FLUDEOXYGLUCOSE F 18 10.25 MILLICURIE: 200 INJECTION, SOLUTION INTRAVENOUS at 12:41

## 2024-09-19 ENCOUNTER — APPOINTMENT (OUTPATIENT)
Dept: SPEECH THERAPY | Age: 69
End: 2024-09-19
Attending: INTERNAL MEDICINE
Payer: MEDICARE

## 2024-09-24 ENCOUNTER — TELEPHONE (OUTPATIENT)
Dept: ENT CLINIC | Age: 69
End: 2024-09-24

## 2024-09-26 ENCOUNTER — OFFICE VISIT (OUTPATIENT)
Dept: INTERNAL MEDICINE CLINIC | Age: 69
End: 2024-09-26

## 2024-09-26 ENCOUNTER — APPOINTMENT (OUTPATIENT)
Dept: SPEECH THERAPY | Age: 69
End: 2024-09-26
Attending: INTERNAL MEDICINE
Payer: MEDICARE

## 2024-09-26 VITALS
DIASTOLIC BLOOD PRESSURE: 76 MMHG | SYSTOLIC BLOOD PRESSURE: 124 MMHG | WEIGHT: 145.2 LBS | HEIGHT: 65 IN | BODY MASS INDEX: 24.19 KG/M2 | HEART RATE: 88 BPM | TEMPERATURE: 98.1 F

## 2024-09-26 DIAGNOSIS — F32.A DEPRESSION, UNSPECIFIED DEPRESSION TYPE: ICD-10-CM

## 2024-09-26 DIAGNOSIS — R13.10 DYSPHAGIA, UNSPECIFIED TYPE: ICD-10-CM

## 2024-09-26 DIAGNOSIS — K29.50 CHRONIC GASTRITIS WITHOUT BLEEDING, UNSPECIFIED GASTRITIS TYPE: ICD-10-CM

## 2024-09-26 DIAGNOSIS — E03.9 ACQUIRED HYPOTHYROIDISM: Primary | ICD-10-CM

## 2024-09-26 RX ORDER — DONEPEZIL HYDROCHLORIDE 10 MG/1
10 TABLET, FILM COATED ORAL 2 TIMES DAILY
COMMUNITY
Start: 2024-09-17 | End: 2024-10-29 | Stop reason: SINTOL

## 2024-09-26 RX ORDER — LEVOTHYROXINE SODIUM 50 UG/1
50 TABLET ORAL DAILY
Qty: 30 TABLET | Refills: 3 | Status: SHIPPED | OUTPATIENT
Start: 2024-09-26 | End: 2024-11-07 | Stop reason: SDUPTHER

## 2024-09-26 NOTE — PROGRESS NOTES
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 Modified barium swallow study is recommended for further evaluation.  PET CT 9/18/24: Mildly FDG-avid alveolar and reticular opacities in the superior left lower lobe are likely infectious or inflammatory  No fever, cough, unlikely infection      Gastritis: h/o chronic gastritis, c/o gastric pain. No nausea, vomiting. Advised to continue home omeprazole     Neuropathy  Follows with Dr. Wagner       Right-sided olecranon bursitis  Recommend follow-up with orthopedic institute [facility]     CTA 4/5/24 50% stenosis at the origin of the left internal carotid artery. No significant hemodynamic stenosis in the left common carotid artery. Consider yearly fu ultrasound     Hypothyroid  On synthroid 50mcg daily.       Depression: on Cymbalta 30mg. Reports improved symptoms/energy since starting. Continue same dose    Patient is concerned about his swallowing difficulties and finding of inflammation on PET CT. He has a h/o hiatal hernia with fundoplication following with Dr Collins in 2023. Recent Fl UGI shows aspiration of contrast, recommendation for MBS. He likely has dysphagia with aspiration 2/2 tonsillar ca/radiation, follow MBS next visit. He is following with speech therapy.    Past Medical History:   Diagnosis Date    Anxiety     Arthritis     neck, back, hands bilat    Cancer (HCC) 2022    Melanoma removed from face    External hemorrhoid     bleeding from them and has internal    GERD (gastroesophageal reflux disease)     Hiatal hernia     EGD    History of carotid stenosis 11/05/2023    Hypothyroidism 06/10/2024    Neoplasm of tonsillar fossa 07/03/2023    R - Squamous Cell    PONV (postoperative nausea and vomiting)

## 2024-09-27 ENCOUNTER — HOSPITAL ENCOUNTER (OUTPATIENT)
Dept: ULTRASOUND IMAGING | Age: 69
Discharge: HOME | End: 2024-09-27
Payer: MEDICARE

## 2024-09-27 ENCOUNTER — APPOINTMENT (OUTPATIENT)
Dept: NUCLEAR MEDICINE | Age: 69
End: 2024-09-27
Payer: MEDICARE

## 2024-09-27 DIAGNOSIS — R11.0 NAUSEA: ICD-10-CM

## 2024-09-27 DIAGNOSIS — R14.3 EXCESSIVE GAS: ICD-10-CM

## 2024-09-27 DIAGNOSIS — R10.13 EPIGASTRIC PAIN: ICD-10-CM

## 2024-09-27 PROCEDURE — 76705 ECHO EXAM OF ABDOMEN: CPT

## 2024-09-30 ENCOUNTER — TELEPHONE (OUTPATIENT)
Dept: INTERNAL MEDICINE CLINIC | Age: 69
End: 2024-09-30

## 2024-09-30 NOTE — TELEPHONE ENCOUNTER
I called rehab and they state they have several  people out on PTO and short staffed.  They said Chris should be notified Tues or Wed of this week 10/1/24 or 10/2/24.  I called Chris to let him know of this and to expect a call.

## 2024-10-07 ENCOUNTER — HOSPITAL ENCOUNTER (OUTPATIENT)
Dept: GENERAL RADIOLOGY | Age: 69
Discharge: HOME | End: 2024-10-07
Payer: MEDICARE

## 2024-10-07 DIAGNOSIS — R13.10 DYSPHAGIA, UNSPECIFIED TYPE: ICD-10-CM

## 2024-10-07 PROCEDURE — 74230 X-RAY XM SWLNG FUNCJ C+: CPT

## 2024-10-07 PROCEDURE — 2500000003 HC RX 250 WO HCPCS

## 2024-10-07 PROCEDURE — 92611 MOTION FLUOROSCOPY/SWALLOW: CPT

## 2024-10-07 RX ADMIN — BARIUM SULFATE 10 ML: 400 PASTE ORAL at 09:02

## 2024-10-07 RX ADMIN — BARIUM SULFATE 50 ML: 0.81 POWDER, FOR SUSPENSION ORAL at 09:02

## 2024-10-07 NOTE — DISCHARGE SUMMARY
[facility]  SPEECH THERAPY  [facility] RADIOLOGY  Modified Barium Swallow    DIET ORDER RECOMMENDATIONS AFTER EVALUATION: Minced and moist with thin liquids  Strategies:  Full Upright Position, Small Bite/Sip, Multiple Swallow, Pulmonary Monitoring, Oral Care after all Meals, Medications Crushed with Puree, Alternate Solids and Liquids, Limit Distractions, and Monitor for Fatigue     SLP Individual Minutes  Time In: 0840  Time Out: 0928  Minutes: 48  Timed Code Treatment Minutes: 0 Minutes       Date: 10/7/2024  Patient Name: Javon Kenney      CSN: [id number]   : 1955  (69 y.o.)  Gender: male   Referring Physician:  Saurabh Hernandez MD - Kasie Wilkes MD to follow  Diagnosis: R13.10 (ICD-10-CM) - Dysphagia, unspecified type   Precautions: Aspiration precautions, standard swallow precautions, risk for malnutrition, fall risk  History of Present Illness/Injury: This patient is known to treating SLP due to history of OP ST for dysphagia management intermittently for the past 2 years. Patient had ACDF surgery located C3-C5 in 2023. A fundoplication surgery was also completed in May of 2023. Patient was diagnosed with a tonsillar mass on the right side by Dr. Gutierrez (ENT) in 2023. MBS completed in 2023 after this visit recommending a regular diet with thin liquids. Patient had a biopsy of the tonsillar mass on 23 and on 23 he had a tonsillectomy, right partial tongue base resection, palatoplasty and pharyngoplasty. Patient then had \"selective neck dissection\" and \"laser resection of positive margin of tonsil mass\" on 23. Patient had another MBS completed on 23 with the recommendation for NPO, but okay for ice chips and thin liquids via teaspoon. Patient had home health ST for about 3 weeks with significant difficulty noted with all PO. Patient had a repeat MBS completed on 23 with the ultimate diet recommendation of NPO,

## 2024-10-09 ENCOUNTER — CARE COORDINATION (OUTPATIENT)
Dept: CARE COORDINATION | Age: 69
End: 2024-10-09

## 2024-10-09 RX ORDER — ONDANSETRON 8 MG/1
4 TABLET, ORALLY DISINTEGRATING ORAL EVERY 8 HOURS PRN
COMMUNITY
End: 2024-10-09

## 2024-10-09 RX ORDER — ONDANSETRON 4 MG/1
4 TABLET, ORALLY DISINTEGRATING ORAL EVERY 8 HOURS PRN
COMMUNITY

## 2024-10-09 SDOH — SOCIAL STABILITY: SOCIAL NETWORK: ARE YOU MARRIED, WIDOWED, DIVORCED, SEPARATED, NEVER MARRIED, OR LIVING WITH A PARTNER?: MARRIED

## 2024-10-09 SDOH — ECONOMIC STABILITY: FOOD INSECURITY: WITHIN THE PAST 12 MONTHS, THE FOOD YOU BOUGHT JUST DIDN'T LAST AND YOU DIDN'T HAVE MONEY TO GET MORE.: NEVER TRUE

## 2024-10-09 SDOH — ECONOMIC STABILITY: INCOME INSECURITY: IN THE LAST 12 MONTHS, WAS THERE A TIME WHEN YOU WERE NOT ABLE TO PAY THE MORTGAGE OR RENT ON TIME?: NO

## 2024-10-09 SDOH — HEALTH STABILITY: MENTAL HEALTH
STRESS IS WHEN SOMEONE FEELS TENSE, NERVOUS, ANXIOUS, OR CAN'T SLEEP AT NIGHT BECAUSE THEIR MIND IS TROUBLED. HOW STRESSED ARE YOU?: TO SOME EXTENT

## 2024-10-09 SDOH — ECONOMIC STABILITY: INCOME INSECURITY: HOW HARD IS IT FOR YOU TO PAY FOR THE VERY BASICS LIKE FOOD, HOUSING, MEDICAL CARE, AND HEATING?: NOT HARD AT ALL

## 2024-10-09 SDOH — ECONOMIC STABILITY: TRANSPORTATION INSECURITY
IN THE PAST 12 MONTHS, HAS THE LACK OF TRANSPORTATION KEPT YOU FROM MEDICAL APPOINTMENTS OR FROM GETTING MEDICATIONS?: NO

## 2024-10-09 SDOH — ECONOMIC STABILITY: FOOD INSECURITY: WITHIN THE PAST 12 MONTHS, YOU WORRIED THAT YOUR FOOD WOULD RUN OUT BEFORE YOU GOT MONEY TO BUY MORE.: NEVER TRUE

## 2024-10-09 SDOH — SOCIAL STABILITY: SOCIAL NETWORK
IN A TYPICAL WEEK, HOW MANY TIMES DO YOU TALK ON THE PHONE WITH FAMILY, FRIENDS, OR NEIGHBORS?: MORE THAN THREE TIMES A WEEK

## 2024-10-09 SDOH — ECONOMIC STABILITY: TRANSPORTATION INSECURITY
IN THE PAST 12 MONTHS, HAS LACK OF TRANSPORTATION KEPT YOU FROM MEETINGS, WORK, OR FROM GETTING THINGS NEEDED FOR DAILY LIVING?: NO

## 2024-10-09 SDOH — SOCIAL STABILITY: SOCIAL NETWORK: HOW OFTEN DO YOU GET TOGETHER WITH FRIENDS OR RELATIVES?: MORE THAN THREE TIMES A WEEK

## 2024-10-09 NOTE — CARE COORDINATION
Ambulatory Care Coordination Note     10/9/2024 10:44 AM     Patient Current Location:  [place]     This patient was received as a referral from Ambulatory Care Manager .    ACM contacted the spouse/partner by telephone. Verified name and  with spouse/partner as identifiers. Provided introduction to self, and explanation of the ACM role.   Spouse/Partner accepted care management services at this time.          ACM: Teri Tinajero RN     Challenges to be reviewed by the provider   Additional needs identified to be addressed with provider No  none               Method of communication with provider: none.    Has the patient been seen in the ED since your last call? no    Care Summary Note: Chris was referred to care coordination by [facility].    Pt has h/o: depression, GERD, tonsil ca, hypothyroidism, depression.   Drinking nutritional drinks 2-3x per day.   Pt very fatigued. Levothyroxine dose recently increased.  Pt will have repeat labs end of Oct./first part of Nov.   Following with Dr. Gutierrez closely.  Had recent swallowing eval d/t dysphagia, severe discomfort when eating.  PEG tube was discussed.  Wife reports that pt understands he is at high risk for aspiration. Pt wanting pursue PO intake at this time.  Wife reports that he is back to pureeing all of his foods.  Drinking Ensure high protein drinks 2-3x per day. Spoke with office staff. They will have Ensure coupons at  for pt as they are currently out.  Will also have  mail coupons to them.    Pt monitors BP daily.   Battles with frequent nausea.  Taking zofran PRN.      Offered patient enrollment in the Remote Patient Monitoring (RPM) program for in-home monitoring: Patient is not eligible for RPM program because: patient does not have qualifying diagnosis.     Assessments Completed:   General Assessment    Do you have any symptoms that are causing concern?: Yes  Progression since Onset: Unchanged  Reported Symptoms: Other  From cardiology standpoint I don't see any major interactions, only thing I could find is that he should monitor his blood pressure because when metoprolol is taken with seroquel it can lower blood pressure. I would feel comfortable with him trialing this to see if it helps with his sleep.

## 2024-10-11 ENCOUNTER — CARE COORDINATION (OUTPATIENT)
Dept: CARE COORDINATION | Age: 69
End: 2024-10-11

## 2024-10-11 NOTE — CARE COORDINATION
Mailed out Ensure coupons per Teri Tinajero [facility] request.      Tabitha Desai [facility]  CC   Medication Assistance  [place]    P) [phone]  (F) [phone]

## 2024-10-11 NOTE — TELEPHONE ENCOUNTER
I spoke with Torrie initially and she recommended I speak with Chris.   Spoke with Chris. He reports that he feels his chest is getting better on it's own.  Pt does not feel he needs atb at this time. Pt states that he will call if s/s begin to worsen.

## 2024-10-14 ENCOUNTER — HOSPITAL ENCOUNTER (OUTPATIENT)
Dept: NUCLEAR MEDICINE | Age: 69
Discharge: HOME | End: 2024-10-14
Payer: MEDICARE

## 2024-10-14 DIAGNOSIS — R11.0 NAUSEA: ICD-10-CM

## 2024-10-14 DIAGNOSIS — R14.3 EXCESSIVE GAS: ICD-10-CM

## 2024-10-14 DIAGNOSIS — R10.13 EPIGASTRIC PAIN: ICD-10-CM

## 2024-10-14 PROCEDURE — 3430000000 HC RX DIAGNOSTIC RADIOPHARMACEUTICAL: Performed by: NURSE PRACTITIONER

## 2024-10-14 PROCEDURE — A9537 TC99M MEBROFENIN: HCPCS | Performed by: NURSE PRACTITIONER

## 2024-10-14 PROCEDURE — 78227 HEPATOBIL SYST IMAGE W/DRUG: CPT

## 2024-10-14 RX ADMIN — Medication 6.7 MILLICURIE: at 10:45

## 2024-10-16 ENCOUNTER — CARE COORDINATION (OUTPATIENT)
Dept: CARE COORDINATION | Age: 69
End: 2024-10-16

## 2024-10-16 NOTE — CARE COORDINATION
Ambulatory Care Coordination Note     10/16/2024 4:41 PM     Patient Current Location:  Home: [address] [place] [address]     ACM contacted the patient by telephone. Verified name and  with patient as identifiers.         ACM: Teri Tinajero RN     Challenges to be reviewed by the provider   Additional needs identified to be addressed with provider No  none               Method of communication with provider: none.    Has the patient been seen in the ED since your last call? no    Care Summary Note: Chris was referred to care coordination by [facility].    Pt has h/o: depression, GERD, tonsil ca, hypothyroidism, depression.   Spoke with Chris today for f/u.   Pt reports that he is doing ok.   Hs tenderness on left side of chest but not painful like it was before.  Has some mild congestion producing white colored sputum.  Advised to continue monitoring closely and to call should he develop any new or worsening s/s.   Pt continues to be very cautious on foods he is eating.  Reports that his stomach hurts all the time. Just had hepatobiliary scan 10/14.  Has upcoming f/u with GI to discuss results of recent testing.    Pt aware that at some point he will most likely need to get a PEG tube.  States today that he just isn't at that point yet.  Offered support to pt.    Drinking Ensure routinely.    Offered patient enrollment in the Remote Patient Monitoring (RPM) program for in-home monitoring: Patient is not eligible for RPM program because: patient does not have qualifying diagnosis.     Assessments Completed:   General Assessment    Do you have any symptoms that are causing concern?: Yes  Progression since Onset: Gradually Improving  Reported Symptoms:  (Comment: left sided chest discomfort.  has tenderness but no longer having pain in that area.)          Medications Reviewed:   Completed during a previous call     Advance Care Planning:   Not reviewed during this call     Care Planning:   Education

## 2024-10-21 ENCOUNTER — PREP FOR PROCEDURE (OUTPATIENT)
Dept: ENT CLINIC | Age: 69
End: 2024-10-21

## 2024-10-21 ENCOUNTER — OFFICE VISIT (OUTPATIENT)
Dept: ENT CLINIC | Age: 69
End: 2024-10-21
Payer: MEDICARE

## 2024-10-21 VITALS
OXYGEN SATURATION: 98 % | TEMPERATURE: 97.6 F | BODY MASS INDEX: 24.29 KG/M2 | DIASTOLIC BLOOD PRESSURE: 68 MMHG | HEART RATE: 76 BPM | HEIGHT: 65 IN | WEIGHT: 145.8 LBS | SYSTOLIC BLOOD PRESSURE: 142 MMHG

## 2024-10-21 DIAGNOSIS — C09.9 PRIMARY TONSILLAR SQUAMOUS CELL CARCINOMA (HCC): Primary | ICD-10-CM

## 2024-10-21 DIAGNOSIS — K21.9 GASTROESOPHAGEAL REFLUX DISEASE WITHOUT ESOPHAGITIS: ICD-10-CM

## 2024-10-21 DIAGNOSIS — K22.2 ESOPHAGEAL STRICTURE: ICD-10-CM
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
## 2024-10-21 DIAGNOSIS — C09.9 PRIMARY TONSILLAR SQUAMOUS CELL CARCINOMA (HCC): ICD-10-CM

## 2024-10-21 DIAGNOSIS — L59.8 RADIATION FIBROSIS OF SOFT TISSUE FROM THERAPEUTIC PROCEDURE: ICD-10-CM

## 2024-10-21 DIAGNOSIS — K22.2 ESOPHAGEAL STRICTURE: Primary | ICD-10-CM

## 2024-10-21 DIAGNOSIS — R13.14 PHARYNGOESOPHAGEAL DYSPHAGIA: Primary | ICD-10-CM

## 2024-10-21 DIAGNOSIS — Z01.818 PRE-OP TESTING: Primary | ICD-10-CM

## 2024-10-21 DIAGNOSIS — R13.14 PHARYNGOESOPHAGEAL DYSPHAGIA: ICD-10-CM

## 2024-10-21 PROCEDURE — 43197 ESOPHAGOSCOPY FLEX DX BRUSH: CPT | Performed by: OTOLARYNGOLOGY

## 2024-10-21 PROCEDURE — 1159F MED LIST DOCD IN RCRD: CPT | Performed by: OTOLARYNGOLOGY

## 2024-10-21 PROCEDURE — 99214 OFFICE O/P EST MOD 30 MIN: CPT | Performed by: OTOLARYNGOLOGY

## 2024-10-21 PROCEDURE — 1124F ACP DISCUSS-NO DSCNMKR DOCD: CPT | Performed by: OTOLARYNGOLOGY

## 2024-10-21 RX ORDER — OMEPRAZOLE 40 MG/1
40 CAPSULE, DELAYED RELEASE ORAL
COMMUNITY
Start: 2024-09-30

## 2024-10-21 NOTE — PROGRESS NOTES
levothyroxine (SYNTHROID) 50 MCG tablet Take 1 tablet by mouth daily 30 tablet 3    DULoxetine (CYMBALTA) 30 MG extended release capsule TAKE 1 CAPSULE BY MOUTH ONCE DAILY 90 capsule 1    memantine (NAMENDA) 10 MG tablet Take 1 tablet by mouth 2 times daily       No current facility-administered medications for this visit.     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  (No tx indicated.)      Past Surgical History:   Procedure Laterality Date    BRONCHOSCOPY N/A 6/21/2023    BRONCHOSCOPY performed by Federico Gutierrez MD at [facility] OR    ESOPHAGOSCOPY N/A 7/30/2024    Esophagoscopy Rigid with balloon Dilation performed by Federico Gutierrez MD at [facility] OR    EYE SURGERY  plate in right eye    GASTRIC FUNDOPLICATION N/A 5/8/2023    ROBOTIC HIATAL HERNIA REPAIR AND NISSEN FUNDOPLICATION performed by Med Collins MD at [facility] OR    LARYNGOSCOPY N/A 6/21/2023    Laryngoscopy with Biopsy, Flexible Esophagoscopy - Diagnostic Bronchoscopy with Broncheal Alveolar Lavage, Biopsy of Oropharynx performed by Federico Gutierrez MD at [facility] OR    LARYNGOSCOPY N/A 7/28/2023    Laser Resection of Positive Margin Of Tonsil Mass performed by Federico Gutierrez MD at [facility] OR    MANDIBLE SURGERY  1979    MOUTH SURGERY N/A 7/21/2023    Transoral Robotic Radical Tonsillectomy, Right

## 2024-10-28 ENCOUNTER — CARE COORDINATION (OUTPATIENT)
Dept: CARE COORDINATION | Age: 69
End: 2024-10-28

## 2024-10-28 NOTE — CARE COORDINATION
Ambulatory Care Coordination Note     10/28/2024 4:03 PM     Patient outreach attempt by this ACM today to perform care management follow up . ACM was unable to reach the patient by telephone today; left voice message requesting a return phone call to this ACM.     ACM: Teri Tinajero RN     Care Summary Note:  Chris was referred to care coordination by [facility].    Pt has h/o: depression, GERD, tonsil ca, hypothyroidism, depression.     PCP/Specialist follow up:   Future Appointments         Provider Specialty Dept Phone    11/7/2024 1:20 PM Saurabh Hernandez MD Internal Medicine [phone]    11/18/2024 11:00 AM Federico Gutierrez MD Otolaryngology [phone]    12/18/2024 9:30 AM Federico Gutierrez MD Otolaryngology [phone]    1/20/2025 9:30 AM Federico Gutierrez MD Otolaryngology [phone]    2/18/2025 9:30 AM Federico Gutierrez MD Otolaryngology [phone]    2/21/2025 1:00 PM Cyndi Mireles PA-C; SCHEDULE, FAISAL ERAZO [facility] NURSE Radiation Oncology [phone]            Follow Up:   Plan for next AC outreach in approximately 1 week to complete:  - CC Protocol assessments  - disease specific assessments  - goal progression  - education   - follow-up appointment with GI and new referral to [facility] .

## 2024-10-29 NOTE — PROGRESS NOTES
PAT call completed. Hx and home medications reviewed. Patient ok to take all meds on AM of surgery. Patient verbalized understanding of instructions. No additional concerns or questions voiced.

## 2024-10-29 NOTE — PROGRESS NOTES
Follow all instructions given by your physician  Do not eat or drink anything after midnight prior to surgery(includes water, chewing gum, mints and ice chips)  Sips of water am of surgery with allowed medications  May brush teeth   Do not smoke or chew tobacco, drink alcoholic beverages or use any illicit drugs for 24 hours prior to surgery  Bring insurance info and photo ID  Bring pertinent paperwork with you from Doctor or surgeons's office  Wear clean comfortable, loose-fitting clothing  No make-up, nail polish, jewelry, piercings, or contact lenses to be worn day of surgery  No glue on dentures morning of surgery; you will be asked to remove them for surgery. Case for glasses.  Shower the night before and the morning of surgery with cleansing soap provided or a liquid antibacterial soap, dry with new fresh clean towel after each shower, no lotions, creams or powder.  Clean sheets and pillowcase on bed night before surgery  Bring medications in original bottles, Bring rescue inhalers with you  Bring CPAP/BIPAP machine if you have one ( you may be charged if one is needed in recovery room )    Do you have a DNR? No DNR on file; no ACP docs on file  Please Bring Healthcare Directive or Healthcare Power of  in so we can scan it into your chart.    Our pharmacy has a Meds to Beds program where they will deliver any new prescriptions you may have to your room before you leave. Our Pharmacy will clear it through your insurance; for example (same co pay). This enables you to take your new RX as soon as you need when you get home and avoids stop/wait delays on the way home.  Please have a form of payment with you and have someone designated as your Pharmacy contact with their phone # as you may not feel well or still be under the influence of anesthesia.    Please refer to the SSI-Surgical Site Infection Flyer you hopefully received in the mail-together we can prevent infections; signs and symptoms

## 2024-10-31 ENCOUNTER — HOSPITAL ENCOUNTER (OUTPATIENT)
Age: 69
Discharge: HOME | End: 2024-10-31
Payer: MEDICARE

## 2024-10-31 DIAGNOSIS — E03.9 ACQUIRED HYPOTHYROIDISM: ICD-10-CM

## 2024-10-31 DIAGNOSIS — Z01.818 PRE-OP TESTING: ICD-10-CM

## 2024-10-31 DIAGNOSIS — C09.9 PRIMARY TONSILLAR SQUAMOUS CELL CARCINOMA (HCC): ICD-10-CM

## 2024-10-31 LAB
ANION GAP SERPL CALC-SCNC: 11 MEQ/L (ref 8–16)
BASOPHILS ABSOLUTE: 0.1 THOU/MM3 (ref 0–0.1)
BASOPHILS NFR BLD AUTO: 1 %
BUN SERPL-MCNC: 16 MG/DL (ref 7–22)
CALCIUM SERPL-MCNC: 9 MG/DL (ref 8.5–10.5)
CHLORIDE SERPL-SCNC: 101 MEQ/L (ref 98–111)
CO2 SERPL-SCNC: 25 MEQ/L (ref 23–33)
CREAT SERPL-MCNC: 0.7 MG/DL (ref 0.4–1.2)
DEPRECATED RDW RBC AUTO: 41.4 FL (ref 35–45)
EOSINOPHIL NFR BLD AUTO: 1.8 %
EOSINOPHILS ABSOLUTE: 0.1 THOU/MM3 (ref 0–0.4)
ERYTHROCYTE [DISTWIDTH] IN BLOOD BY AUTOMATED COUNT: 13 % (ref 11.5–14.5)
GFR SERPL CREATININE-BSD FRML MDRD: > 90 ML/MIN/1.73M2
GLUCOSE SERPL-MCNC: 101 MG/DL (ref 70–108)
HCT VFR BLD AUTO: 45.7 % (ref 42–52)
HGB BLD-MCNC: 15.1 GM/DL (ref 14–18)
IMM GRANULOCYTES # BLD AUTO: 0.04 THOU/MM3 (ref 0–0.07)
IMM GRANULOCYTES NFR BLD AUTO: 0.8 %
LYMPHOCYTES ABSOLUTE: 0.9 THOU/MM3 (ref 1–4.8)
LYMPHOCYTES NFR BLD AUTO: 18.3 %
MCH RBC QN AUTO: 28.9 PG (ref 26–33)
MCHC RBC AUTO-ENTMCNC: 33 GM/DL (ref 32.2–35.5)
MCV RBC AUTO: 87.4 FL (ref 80–94)
MONOCYTES ABSOLUTE: 0.4 THOU/MM3 (ref 0.4–1.3)
MONOCYTES NFR BLD AUTO: 7.7 %
NEUTROPHILS ABSOLUTE: 3.5 THOU/MM3 (ref 1.8–7.7)
NEUTROPHILS NFR BLD AUTO: 70.4 %
NRBC BLD AUTO-RTO: 0 /100 WBC
PLATELET # BLD AUTO: 202 THOU/MM3 (ref 130–400)
PMV BLD AUTO: 8.7 FL (ref 9.4–12.4)
POTASSIUM SERPL-SCNC: 4 MEQ/L (ref 3.5–5.2)
RBC # BLD AUTO: 5.23 MILL/MM3 (ref 4.7–6.1)
SODIUM SERPL-SCNC: 137 MEQ/L (ref 135–145)
T4 FREE SERPL-MCNC: 1.15 NG/DL (ref 0.93–1.68)
TSH SERPL DL<=0.005 MIU/L-ACNC: 3.3 UIU/ML (ref 0.4–4.2)
WBC # BLD AUTO: 5 THOU/MM3 (ref 4.8–10.8)

## 2024-10-31 PROCEDURE — 36415 COLL VENOUS BLD VENIPUNCTURE: CPT

## 2024-10-31 PROCEDURE — 84443 ASSAY THYROID STIM HORMONE: CPT

## 2024-10-31 PROCEDURE — 80048 BASIC METABOLIC PNL TOTAL CA: CPT

## 2024-10-31 PROCEDURE — 85025 COMPLETE CBC W/AUTO DIFF WBC: CPT

## 2024-10-31 PROCEDURE — 84439 ASSAY OF FREE THYROXINE: CPT

## 2024-11-01 ENCOUNTER — PREP FOR PROCEDURE (OUTPATIENT)
Dept: ENT CLINIC | Age: 69
End: 2024-11-01

## 2024-11-04 ENCOUNTER — CARE COORDINATION (OUTPATIENT)
Dept: CARE COORDINATION | Age: 69
End: 2024-11-04

## 2024-11-04 NOTE — CARE COORDINATION
Ambulatory Care Coordination Note     2024 2:30 PM     Patient Current Location:  Home: [address] [place] [address]     ACM contacted the patient by telephone. Verified name and  with patient as identifiers.         ACM: Teri Tinajero RN     Challenges to be reviewed by the provider   Additional needs identified to be addressed with provider No  none               Method of communication with provider: none.    Has the patient been seen in the ED since your last call? no    Care Summary Note: Chris was referred to care coordination by [facility].    Pt has h/o: depression, GERD, tonsil ca, hypothyroidism, depression.   Spoke with Chris today.   Pt is very pleased as he reports swallowing and nausea issues have improved since coming off of memantine and donepazil.  Pt reports that he decided to stop both medications d/t swallowing worsening and reports that he has seen improvement.   Scheduled for esophagoscopy with balloon dilation   Has f/u with PCP   Has ENT appt with [facility]   Still on Xifaxin at this time.  Reports last dose should be around   Denies other concerns at this time.     Offered patient enrollment in the Remote Patient Monitoring (RPM) program for in-home monitoring: Patient is not eligible for RPM program because: patient does not have qualifying diagnosis.     Assessments Completed:   General Assessment    Do you have any symptoms that are causing concern?: Yes  Progression since Onset: Gradually Improving  Reported Symptoms: Other (Comment: swallowing has improved since pt stopped aricept and memantine)          Medications Reviewed:   Completed during a previous call     Advance Care Planning:   Not reviewed during this call     Care Planning:   Education Documentation  No documentation found.  Education Comments  No comments found.     ,    Goals Addressed    None          PCP/Specialist follow up:   Future Appointments         Provider Specialty Dept Phone    2024 1:20 PM

## 2024-11-06 ENCOUNTER — ANESTHESIA (OUTPATIENT)
Dept: OPERATING ROOM | Age: 69
End: 2024-11-06
Payer: MEDICARE

## 2024-11-06 ENCOUNTER — ANESTHESIA EVENT (OUTPATIENT)
Dept: OPERATING ROOM | Age: 69
End: 2024-11-06
Payer: MEDICARE

## 2024-11-06 ENCOUNTER — HOSPITAL ENCOUNTER (OUTPATIENT)
Age: 69
Setting detail: OUTPATIENT SURGERY
Discharge: HOME | End: 2024-11-06
Attending: OTOLARYNGOLOGY | Admitting: OTOLARYNGOLOGY
Payer: MEDICARE

## 2024-11-06 VITALS
WEIGHT: 145.6 LBS | BODY MASS INDEX: 24.26 KG/M2 | OXYGEN SATURATION: 97 % | HEART RATE: 70 BPM | SYSTOLIC BLOOD PRESSURE: 167 MMHG | RESPIRATION RATE: 18 BRPM | HEIGHT: 65 IN | TEMPERATURE: 97 F | DIASTOLIC BLOOD PRESSURE: 86 MMHG

## 2024-11-06 PROBLEM — K22.4 CRICOPHARYNGEUS MUSCLE DYSFUNCTION: Status: ACTIVE | Noted: 2024-11-06

## 2024-11-06 PROCEDURE — C1726 CATH, BAL DIL, NON-VASCULAR: HCPCS | Performed by: OTOLARYNGOLOGY

## 2024-11-06 PROCEDURE — 7100000011 HC PHASE II RECOVERY - ADDTL 15 MIN: Performed by: OTOLARYNGOLOGY

## 2024-11-06 PROCEDURE — 2580000003 HC RX 258: Performed by: NURSE PRACTITIONER

## 2024-11-06 PROCEDURE — 7100000001 HC PACU RECOVERY - ADDTL 15 MIN: Performed by: OTOLARYNGOLOGY

## 2024-11-06 PROCEDURE — 3700000000 HC ANESTHESIA ATTENDED CARE: Performed by: OTOLARYNGOLOGY

## 2024-11-06 PROCEDURE — 3600000004 HC SURGERY LEVEL 4 BASE: Performed by: OTOLARYNGOLOGY

## 2024-11-06 PROCEDURE — 7100000000 HC PACU RECOVERY - FIRST 15 MIN: Performed by: OTOLARYNGOLOGY

## 2024-11-06 PROCEDURE — 2500000003 HC RX 250 WO HCPCS: Performed by: NURSE ANESTHETIST, CERTIFIED REGISTERED

## 2024-11-06 PROCEDURE — APPNB45 APP NON BILLABLE 31-45 MINUTES: Performed by: NURSE PRACTITIONER

## 2024-11-06 PROCEDURE — 2709999900 HC NON-CHARGEABLE SUPPLY: Performed by: OTOLARYNGOLOGY

## 2024-11-06 PROCEDURE — 6360000002 HC RX W HCPCS: Performed by: NURSE ANESTHETIST, CERTIFIED REGISTERED

## 2024-11-06 PROCEDURE — 3600000014 HC SURGERY LEVEL 4 ADDTL 15MIN: Performed by: OTOLARYNGOLOGY

## 2024-11-06 PROCEDURE — 7100000010 HC PHASE II RECOVERY - FIRST 15 MIN: Performed by: OTOLARYNGOLOGY

## 2024-11-06 PROCEDURE — 3700000001 HC ADD 15 MINUTES (ANESTHESIA): Performed by: OTOLARYNGOLOGY

## 2024-11-06 PROCEDURE — 6360000002 HC RX W HCPCS: Performed by: NURSE PRACTITIONER

## 2024-11-06 RX ORDER — EPHEDRINE SULFATE/0.9% NACL/PF 25 MG/5 ML
SYRINGE (ML) INTRAVENOUS
Status: DISCONTINUED | OUTPATIENT
Start: 2024-11-06 | End: 2024-11-06 | Stop reason: SDUPTHER

## 2024-11-06 RX ORDER — SODIUM CHLORIDE 9 MG/ML
INJECTION, SOLUTION INTRAVENOUS PRN
Status: DISCONTINUED | OUTPATIENT
Start: 2024-11-06 | End: 2024-11-06 | Stop reason: HOSPADM

## 2024-11-06 RX ORDER — SODIUM CHLORIDE 9 MG/ML
INJECTION, SOLUTION INTRAVENOUS PRN
Status: CANCELLED | OUTPATIENT
Start: 2024-11-06

## 2024-11-06 RX ORDER — SODIUM CHLORIDE 0.9 % (FLUSH) 0.9 %
5-40 SYRINGE (ML) INJECTION EVERY 12 HOURS SCHEDULED
Status: CANCELLED | OUTPATIENT
Start: 2024-11-06

## 2024-11-06 RX ORDER — ONDANSETRON 2 MG/ML
4 INJECTION INTRAMUSCULAR; INTRAVENOUS
Status: CANCELLED | OUTPATIENT
Start: 2024-11-06 | End: 2024-11-07

## 2024-11-06 RX ORDER — SODIUM CHLORIDE 0.9 % (FLUSH) 0.9 %
5-40 SYRINGE (ML) INJECTION PRN
Status: CANCELLED | OUTPATIENT
Start: 2024-11-06

## 2024-11-06 RX ORDER — SODIUM CHLORIDE 0.9 % (FLUSH) 0.9 %
5-40 SYRINGE (ML) INJECTION PRN
Status: DISCONTINUED | OUTPATIENT
Start: 2024-11-06 | End: 2024-11-06 | Stop reason: HOSPADM

## 2024-11-06 RX ORDER — LIDOCAINE HCL/PF 100 MG/5ML
SYRINGE (ML) INJECTION
Status: DISCONTINUED | OUTPATIENT
Start: 2024-11-06 | End: 2024-11-06 | Stop reason: SDUPTHER

## 2024-11-06 RX ORDER — FENTANYL CITRATE 50 UG/ML
INJECTION, SOLUTION INTRAMUSCULAR; INTRAVENOUS
Status: DISCONTINUED | OUTPATIENT
Start: 2024-11-06 | End: 2024-11-06 | Stop reason: SDUPTHER

## 2024-11-06 RX ORDER — SODIUM CHLORIDE 0.9 % (FLUSH) 0.9 %
5-40 SYRINGE (ML) INJECTION EVERY 12 HOURS SCHEDULED
Status: DISCONTINUED | OUTPATIENT
Start: 2024-11-06 | End: 2024-11-06 | Stop reason: HOSPADM

## 2024-11-06 RX ORDER — PROPOFOL 10 MG/ML
INJECTION, EMULSION INTRAVENOUS
Status: DISCONTINUED | OUTPATIENT
Start: 2024-11-06 | End: 2024-11-06 | Stop reason: SDUPTHER

## 2024-11-06 RX ORDER — DEXAMETHASONE SODIUM PHOSPHATE 10 MG/ML
INJECTION, EMULSION INTRAMUSCULAR; INTRAVENOUS
Status: DISCONTINUED | OUTPATIENT
Start: 2024-11-06 | End: 2024-11-06 | Stop reason: SDUPTHER

## 2024-11-06 RX ORDER — ROCURONIUM BROMIDE 10 MG/ML
INJECTION, SOLUTION INTRAVENOUS
Status: DISCONTINUED | OUTPATIENT
Start: 2024-11-06 | End: 2024-11-06 | Stop reason: SDUPTHER

## 2024-11-06 RX ORDER — ONDANSETRON 2 MG/ML
INJECTION INTRAMUSCULAR; INTRAVENOUS
Status: DISCONTINUED | OUTPATIENT
Start: 2024-11-06 | End: 2024-11-06 | Stop reason: SDUPTHER

## 2024-11-06 RX ORDER — PHENYLEPHRINE HCL IN 0.9% NACL 1 MG/10 ML
SYRINGE (ML) INTRAVENOUS
Status: DISCONTINUED | OUTPATIENT
Start: 2024-11-06 | End: 2024-11-06 | Stop reason: SDUPTHER

## 2024-11-06 RX ORDER — NALOXONE HYDROCHLORIDE 0.4 MG/ML
INJECTION, SOLUTION INTRAMUSCULAR; INTRAVENOUS; SUBCUTANEOUS PRN
Status: CANCELLED | OUTPATIENT
Start: 2024-11-06

## 2024-11-06 RX ADMIN — PROPOFOL 160 MG: 10 INJECTION, EMULSION INTRAVENOUS at 13:42

## 2024-11-06 RX ADMIN — Medication 100 MCG: at 13:58

## 2024-11-06 RX ADMIN — FENTANYL CITRATE 50 MCG: 50 INJECTION, SOLUTION INTRAMUSCULAR; INTRAVENOUS at 13:47

## 2024-11-06 RX ADMIN — DEXAMETHASONE SODIUM PHOSPHATE 4 MG: 10 INJECTION, EMULSION INTRAMUSCULAR; INTRAVENOUS at 14:04

## 2024-11-06 RX ADMIN — ROCURONIUM BROMIDE 40 MG: 10 INJECTION INTRAVENOUS at 13:42

## 2024-11-06 RX ADMIN — PIPERACILLIN SODIUM AND TAZOBACTAM SODIUM 3375 MG: 3; .375 INJECTION, POWDER, LYOPHILIZED, FOR SOLUTION INTRAVENOUS at 13:46

## 2024-11-06 RX ADMIN — SODIUM CHLORIDE: 9 INJECTION, SOLUTION INTRAVENOUS at 12:46

## 2024-11-06 RX ADMIN — Medication 100 MG: at 13:42

## 2024-11-06 RX ADMIN — ONDANSETRON 4 MG: 2 INJECTION INTRAMUSCULAR; INTRAVENOUS at 14:04

## 2024-11-06 RX ADMIN — EPHEDRINE SULFATE 10 MG: 5 INJECTION INTRAVENOUS at 14:00

## 2024-11-06 RX ADMIN — FENTANYL CITRATE 50 MCG: 50 INJECTION, SOLUTION INTRAMUSCULAR; INTRAVENOUS at 13:39

## 2024-11-06 RX ADMIN — SUGAMMADEX 200 MG: 100 INJECTION, SOLUTION INTRAVENOUS at 14:22

## 2024-11-06 NOTE — PROGRESS NOTES
Patient admitted to [facility] room 9  with wife at bedside. Bed in low position side rails up call light in reach. Patient denies questions at this time.

## 2024-11-06 NOTE — H&P
Adapted from prior ENT note:    Pharyngoesophageal dysphagia, History of tonsillar cancer with radiation fibrosis, Reflux  No new symptoms    Past Medical History:   Diagnosis Date    Anxiety     Arthritis     neck, back, hands bilat    Cancer (HCC) 2022    Melanoma removed from face    External hemorrhoid     bleeding from them and has internal    GERD (gastroesophageal reflux disease)     Hiatal hernia     EGD    History of carotid stenosis 11/05/2023    Hypothyroidism 06/10/2024    Neoplasm of tonsillar fossa 07/03/2023    R - Squamous Cell    PONV (postoperative nausea and vomiting)     \"pretty bad\"    PUD (peptic ulcer disease)     LONG TIME  AGO    Rectal bleeding     2o11- colonoscopy - dr david    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  (No tx indicated.)       Past Surgical History:   Procedure Laterality Date    BRONCHOSCOPY N/A 06/21/2023    BRONCHOSCOPY performed by Federico Gutierrez MD at [facility] OR    COLONOSCOPY      ENDOSCOPY, COLON, DIAGNOSTIC      ESOPHAGOSCOPY N/A 07/30/2024    Esophagoscopy Rigid with balloon Dilation performed by Federico Gutierrez MD at [facility] OR    EYE SURGERY  plate in right eye    GASTRIC FUNDOPLICATION N/A 05/08/2023    ROBOTIC HIATAL HERNIA REPAIR AND NISSEN FUNDOPLICATION performed by Med Collins MD at [facility] OR    LARYNGOSCOPY N/A 06/21/2023    Laryngoscopy with Biopsy, Flexible Esophagoscopy - Diagnostic Bronchoscopy with Broncheal Alveolar Lavage, Biopsy of Oropharynx performed by Federico Gutierrez MD at [facility] OR    LARYNGOSCOPY N/A 07/28/2023    Laser Resection of Positive Margin Of Tonsil Mass performed by Federico Gutierrez MD at [facility] OR    MANDIBLE SURGERY  01/01/1979    MOUTH SURGERY N/A 07/21/2023    Transoral Robotic Radical Tonsillectomy, Right Partial Tongue Base Resection with Closure, Palatoplasty, Pharyngoplasty performed by Federico Gutierrez MD at [facility] OR

## 2024-11-06 NOTE — ANESTHESIA PRE PROCEDURE
Department of Anesthesiology  Preprocedure Note       Name:  Javon Kenney   Age:  69 y.o.  :  1955                                          MRN:  [id number]         Date:  2024      Surgeon: Surgeon(s):  Federico Gutierrez MD    Procedure: Procedure(s):  Esophagoscopy with balloon dilation    Medications prior to admission:   Prior to Admission medications    Medication Sig Start Date End Date Taking? Authorizing Provider   rifAXIMin (XIFAXAN) 550 MG tablet Take 1 tablet by mouth 3 times daily  24 Yes ProviderKirsten MD   ondansetron (ZOFRAN-ODT) 4 MG disintegrating tablet Take 1 tablet by mouth every 8 hours as needed for Nausea or Vomiting   Yes ProviderKirsten MD   levothyroxine (SYNTHROID) 50 MCG tablet Take 1 tablet by mouth daily 24  Yes Saurabh Hernandez MD   DULoxetine (CYMBALTA) 30 MG extended release capsule TAKE 1 CAPSULE BY MOUTH ONCE DAILY 9/10/24  Yes Kasie Wilkes MD       Current medications:    Current Facility-Administered Medications   Medication Dose Route Frequency Provider Last Rate Last Admin    piperacillin-tazobactam (ZOSYN) 3,375 mg in sodium chloride 0.9 % 50 mL IVPB (mini-bag)  3,375 mg IntraVENous Once Dunifon, Crystal L, APRN - CNP        sodium chloride flush 0.9 % injection 5-40 mL  5-40 mL IntraVENous 2 times per day Dunifon, Crystal L, APRN - CNP        sodium chloride flush 0.9 % injection 5-40 mL  5-40 mL IntraVENous PRN Dunifon, Crystal L, APRN - CNP        0.9 % sodium chloride infusion   IntraVENous PRN Dunifon, Crystal L, APRN - CNP 50 mL/hr at 24 1246 New Bag at 24 1246       Allergies:  No Known Allergies    Problem List:    Patient Active Problem List   Diagnosis Code    Anxiety F41.9    Pharyngoesophageal dysphagia R13.14    Hoarseness R49.0    Primary tonsillar squamous cell carcinoma (HCC) C09.9    Tonsil cancer (HCC) C09.9    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

## 2024-11-06 NOTE — ANESTHESIA POSTPROCEDURE EVALUATION
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    Gael Phase I: Gael Score: 10    Gael Phase II: Gael Score: 10    Anesthesia Post Evaluation    Patient location during evaluation: PACU  Patient participation: complete - patient participated  Level of consciousness: awake and alert  Airway patency: patent  Nausea & Vomiting: no nausea  Cardiovascular status: blood pressure returned to baseline and hemodynamically stable  Respiratory status: acceptable and spontaneous ventilation  Hydration status: euvolemic  Pain management: adequate    No notable events documented.

## 2024-11-06 NOTE — OP NOTE
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  Trismus consistent with postradiation fibrosis  2.  Tight esophageal inlet and distal pharynx without cicatrix  3.  Dilated up to 20 mm +2 paulie without mucous membrane shearing    Detailed Description of Procedure:     The patient was taken to the operating awake and placed in supine position.  General anesthesia was induced and the patient was intubated with a 6.0 tube by anesthesia though his oral aperture was difficult to instrument with the video laryngoscope.  The patient's larynx was considered anterior.  No vital sign instability was encountered.  The table was turned 90 degrees and the patient was draped in usual fashion for transoral surgery.  A timeout was performed verifying the patient's identity and planned procedure.     I initially began with a bimanual exam with the patient's posterior oral cavity and pharynx ipsilateral to the patient's cancer.  I found no area of hard raised tissue or ulceration.  All the mucosa on the medial aspect of the ramus of the mandible was smooth.  No blood was encountered on the left fingertip examining the area.  He did have significant trismus however.     After

## 2024-11-06 NOTE — PROGRESS NOTES
Pt has met discharge criteria and states he is ready for discharge to home. IV removed, gauze and tape applied. Dressed in own clothes and personal belongings gathered. Discharge instructions (with opioid medication education information) given to pt and family; pt and family verbalized understanding of discharge instructions, prescriptions and follow up appointments. Pt offered transport downstairs by wheelchair but refused and walked down with wife

## 2024-11-06 NOTE — PROGRESS NOTES
Pt returned to [facility] room 9. Vitals and assessment as charted. 0.9 infusing, @250ml to count from PACU. Pt has crackers and water. Family at the bedside. Pt and family verbalized understanding of discharge criteria and call light use. Call light in reach.

## 2024-11-06 NOTE — PROGRESS NOTES
1432- pt to pacu, resp easy and unlabored, VSS, pt appears in no acute distress, pt denies pain and nausea  1445- pt resting in bed with eyes closed, resp easy and unlabored, VSS, pt denies pain, pt appears in no acute distress  1502- pt meets criteria for discharge from pacu, pt [facility] to [facility] condition

## 2024-11-07 ENCOUNTER — OFFICE VISIT (OUTPATIENT)
Dept: INTERNAL MEDICINE CLINIC | Age: 69
End: 2024-11-07

## 2024-11-07 VITALS
SYSTOLIC BLOOD PRESSURE: 120 MMHG | OXYGEN SATURATION: 98 % | HEART RATE: 68 BPM | BODY MASS INDEX: 25.19 KG/M2 | WEIGHT: 151.2 LBS | HEIGHT: 65 IN | DIASTOLIC BLOOD PRESSURE: 70 MMHG

## 2024-11-07 DIAGNOSIS — R13.14 PHARYNGOESOPHAGEAL DYSPHAGIA: ICD-10-CM

## 2024-11-07 DIAGNOSIS — Z23 NEED FOR PNEUMOCOCCAL VACCINATION: ICD-10-CM

## 2024-11-07 DIAGNOSIS — E03.9 ACQUIRED HYPOTHYROIDISM: Primary | ICD-10-CM

## 2024-11-07 DIAGNOSIS — Z23 NEED FOR INFLUENZA VACCINATION: ICD-10-CM

## 2024-11-07 DIAGNOSIS — F32.A DEPRESSION, UNSPECIFIED DEPRESSION TYPE: ICD-10-CM

## 2024-11-07 RX ORDER — ASPIRIN 81 MG/1
81 TABLET ORAL DAILY
COMMUNITY

## 2024-11-07 RX ORDER — LEVOTHYROXINE SODIUM 50 UG/1
50 TABLET ORAL DAILY
Qty: 30 TABLET | Refills: 3 | Status: SHIPPED | OUTPATIENT
Start: 2024-11-07 | End: 2024-11-19 | Stop reason: SDUPTHER

## 2024-11-07 RX ORDER — MULTIVITAMIN WITH IRON
1 TABLET ORAL DAILY
COMMUNITY

## 2024-11-07 NOTE — PROGRESS NOTES
After obtaining consent, and per orders of Dr. padron, injection of Prevnar 20 given in Right deltoid by JENI WASHINGTON MA. Patient instructed to remain in clinic for 20 minutes afterwards, and to report any adverse reaction to me immediately.   Satisfactory

## 2024-11-07 NOTE — PROGRESS NOTES
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 Wagner          CTA 4/5/24 50% stenosis at the origin of the left internal carotid artery. No significant hemodynamic stenosis in the left common carotid artery. Consider yearly fu ultrasound     Hypothyroid  On synthroid 50mcg daily.      Depression: on Cymbalta 30mg. Reports improved symptoms/energy since starting. Continue same dose    Today patient feels well. States his energy has improved. No constipation, dry skin, hair fall.He is following with ENT for his dysphagia, underwent balloon dilation yesterday. No fever, chills, throat pain.      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  (No tx indicated.)       Past Surgical History:   Procedure Laterality Date    BRONCHOSCOPY N/A 06/21/2023    BRONCHOSCOPY performed by Federico Gutierrez MD at [facility] OR    COLONOSCOPY      ENDOSCOPY, COLON, DIAGNOSTIC      ESOPHAGOSCOPY N/A 07/30/2024    Esophagoscopy Rigid with balloon Dilation performed by Federico Gutierrez MD at [facility] OR    EYE SURGERY  plate in right eye    GASTRIC

## 2024-11-07 NOTE — PROGRESS NOTES
After obtaining consent, and per orders of Dr. Wilkes, injection of influenza vaccine given in Left deltoid by JENI WASHINGTON MA. Patient instructed to remain in clinic for 20 minutes afterwards, and to report any adverse reaction to me immediately.

## 2024-11-18 ENCOUNTER — OFFICE VISIT (OUTPATIENT)
Dept: ENT CLINIC | Age: 69
End: 2024-11-18
Payer: MEDICARE

## 2024-11-18 ENCOUNTER — CARE COORDINATION (OUTPATIENT)
Dept: CARE COORDINATION | Age: 69
End: 2024-11-18

## 2024-11-18 VITALS
SYSTOLIC BLOOD PRESSURE: 116 MMHG | DIASTOLIC BLOOD PRESSURE: 70 MMHG | WEIGHT: 149 LBS | TEMPERATURE: 97.8 F | HEART RATE: 78 BPM | OXYGEN SATURATION: 97 % | HEIGHT: 65 IN | BODY MASS INDEX: 24.83 KG/M2

## 2024-11-18 DIAGNOSIS — R13.14 PHARYNGOESOPHAGEAL DYSPHAGIA: ICD-10-CM

## 2024-11-18 DIAGNOSIS — Z08 ENCOUNTER FOR FOLLOW-UP SURVEILLANCE OF HEAD AND NECK CANCER: ICD-10-CM
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
## 2024-11-18 DIAGNOSIS — R13.13 PHARYNGEAL DYSPHAGIA: ICD-10-CM

## 2024-11-18 DIAGNOSIS — L59.8 RADIATION FIBROSIS OF SOFT TISSUE FROM THERAPEUTIC PROCEDURE: ICD-10-CM

## 2024-11-18 DIAGNOSIS — Z85.89 ENCOUNTER FOR FOLLOW-UP SURVEILLANCE OF HEAD AND NECK CANCER: ICD-10-CM

## 2024-11-18 DIAGNOSIS — C09.9 PRIMARY TONSILLAR SQUAMOUS CELL CARCINOMA (HCC): Primary | ICD-10-CM

## 2024-11-18 PROCEDURE — 1159F MED LIST DOCD IN RCRD: CPT | Performed by: OTOLARYNGOLOGY

## 2024-11-18 PROCEDURE — 92511 NASOPHARYNGOSCOPY: CPT | Performed by: OTOLARYNGOLOGY

## 2024-11-18 PROCEDURE — 99215 OFFICE O/P EST HI 40 MIN: CPT | Performed by: OTOLARYNGOLOGY

## 2024-11-18 PROCEDURE — 1124F ACP DISCUSS-NO DSCNMKR DOCD: CPT | Performed by: OTOLARYNGOLOGY

## 2024-11-18 NOTE — CARE COORDINATION
Attempted to reach patient for continued Care Coordination follow up and education.  Patient was unavailable at the time of my call, and a generic voicemail message was left asking patient to return my call at [phone].

## 2024-11-19 DIAGNOSIS — G62.9 NEUROPATHY: ICD-10-CM

## 2024-11-19 DIAGNOSIS — R13.14 PHARYNGOESOPHAGEAL DYSPHAGIA: ICD-10-CM

## 2024-11-19 DIAGNOSIS — F32.A DEPRESSION, UNSPECIFIED DEPRESSION TYPE: ICD-10-CM

## 2024-11-20 ENCOUNTER — PREP FOR PROCEDURE (OUTPATIENT)
Dept: ENT CLINIC | Age: 69
End: 2024-11-20

## 2024-11-20 NOTE — PROGRESS NOTES
specifically looked at the entire region of his radical tonsillectomy on the right side inclusive of the tongue base and vallecula as well as the root of the soft palate.  No ulcers leukoplakia erythroplakia was seen in any of these regions.  I suctioned away saliva was able to get a thorough look into all of these regions without encumbrance.  I could also see into his floor of mouth and saw no raw exposed bone or ulcers in that region either.  The patient tolerated the procedure well    Flexible nasopharyngoscopy images:    Images from this visit were not available at the time of this dictation        Laryngoscopy and esophagoscopy images from visit 2 months ago:                                                                    Vitals reviewed.    No results found.   Lab Results   Component Value Date/Time     10/31/2024 02:46 PM     09/07/2024 04:25 PM     06/11/2024 06:15 AM    K 4.0 10/31/2024 02:46 PM    K 4.0 09/07/2024 04:25 PM    K 3.9 06/11/2024 06:15 AM    K 4.0 06/10/2024 06:17 AM    K 4.4 04/19/2024 05:48 AM    K 3.9 11/10/2023 03:20 PM     10/31/2024 02:46 PM     09/07/2024 04:25 PM     06/11/2024 06:15 AM    CO2 25 10/31/2024 02:46 PM    CO2 25 09/07/2024 04:25 PM    CO2 24 06/11/2024 06:15 AM    BUN 16 10/31/2024 02:46 PM    BUN 16 09/07/2024 04:25 PM    BUN 11 06/11/2024 06:15 AM    CREATININE 0.7 10/31/2024 02:46 PM    CREATININE 0.7 09/07/2024 04:25 PM    CREATININE 0.9 06/11/2024 06:15 AM    CALCIUM 9.0 10/31/2024 02:46 PM    CALCIUM 8.9 09/07/2024 04:25 PM    CALCIUM 9.0 06/11/2024 06:15 AM    BILITOT 0.3 09/07/2024 04:25 PM    BILITOT 0.2 05/21/2024 05:29 PM    BILITOT 0.3 12/01/2023 10:01 AM    ALKPHOS 84 09/07/2024 04:25 PM    ALKPHOS 88 05/21/2024 05:29 PM    ALKPHOS 65 12/01/2023 10:01 AM    AST 19 09/07/2024 04:25 PM    AST 20 05/21/2024 05:29 PM    AST 16 12/01/2023 10:01 AM    ALT 16 09/07/2024 04:25 PM    ALT 17 05/21/2024 05:29 PM    ALT 21

## 2024-11-21 ENCOUNTER — CLINICAL DOCUMENTATION (OUTPATIENT)
Dept: SPIRITUAL SERVICES | Age: 69
End: 2024-11-21

## 2024-11-21 RX ORDER — DULOXETIN HYDROCHLORIDE 30 MG/1
30 CAPSULE, DELAYED RELEASE ORAL DAILY
Qty: 90 CAPSULE | Refills: 1 | Status: SHIPPED | OUTPATIENT
Start: 2024-11-21

## 2024-11-21 RX ORDER — LEVOTHYROXINE SODIUM 50 UG/1
50 TABLET ORAL DAILY
Qty: 90 TABLET | Refills: 1 | Status: SHIPPED | OUTPATIENT
Start: 2024-11-21

## 2024-11-21 NOTE — ACP (ADVANCE CARE PLANNING)
Advance Care Planning     Advance Care Planning Clinical Specialist  Conversation Note      Date of ACP Conversation: 11/21/2024    Conversation Conducted with: Patient with Decision Making Capacity and Legal next of kin    ACP Clinical Specialist: PATEL Hood    Healthcare Decision Maker:     Current Designated Healthcare Decision Maker:     Primary Decision Maker: Torrie Kenney - Spouse - [phone]    Secondary Decision Maker: Paty Luo - Child - [phone]    Supplemental (Other) Decision Maker: Violet Kohli - Child - [phone]    Today we assisted pt with completing his HCPOA document.     Care Preferences    Ventilation:  \"If you were in your present state of health and suddenly became very ill and were unable to breathe on your own, what would your preference be about the use of a ventilator (breathing machine) if it were available to you?\"      If the patient would desire the use of ventilator (breathing machine), answer \"yes\".  If not, \"no\": yes, pt voiced clear understanding of when living will is applicable.     \"If your health worsens and it becomes clear that your chance of recovery is unlikely, what would your preference be about the use of a ventilator (breathing machine) if it were available to you?\"     Would the patient desire the use of ventilator (breathing machine)?: No; pt is completing his living will today for future, end of life care decisions.       Resuscitation  \"CPR works best to restart the heart when there is a sudden event, like a heart attack, in someone who is otherwise healthy. Unfortunately, CPR does not typically restart the heart for people who have serious health conditions or who are very sick.\"    \"In the event your heart stopped as a result of an underlying serious health condition, would you want attempts to be made to restart your heart (answer \"yes\" for attempt to resuscitate) or would you prefer a natural death (answer \"no\" for do not attempt to

## 2024-11-25 ENCOUNTER — CARE COORDINATION (OUTPATIENT)
Dept: CARE COORDINATION | Age: 69
End: 2024-11-25

## 2024-11-25 NOTE — CARE COORDINATION
Ambulatory Care Coordination Note     2024 3:57 PM     Patient Current Location:  Home: [address] [place] [address]     ACM contacted the patient by telephone. Verified name and  with patient as identifiers.         ACM: Teri Tinajero RN     Challenges to be reviewed by the provider   Additional needs identified to be addressed with provider No  none               Method of communication with provider: none.    Utilization: Patient has not had any utilization since our last call.     Care Summary Note: Chris was referred to care coordination by [facility].    Pt has h/o: depression, GERD, tonsil ca, hypothyroidism, depression.   Spoke with Chris today for f/u  Pt reports that he is doing well.   Had recent appt at [facility] and f/u with ENT.  Pt reports significant improvement with his swallowing.  Eating is not painful like it had been before.  Scheduled to have esophagoscopy 1/10/25  Reports that he is maintaining wt.   No new concerns or barriers voiced.   Has f/u with GI in Dec.   Offered patient enrollment in the Remote Patient Monitoring (RPM) program for in-home monitoring: Patient is not eligible for RPM program because: patient does not have qualifying diagnosis.     Assessments Completed:   General Assessment    Do you have any symptoms that are causing concern?: Yes  Progression since Onset: Gradually Improving  Reported Symptoms: Other (Comment: swallowing improved.  following closely with ENT. next esophagoscopy 1/10)          Medications Reviewed:   Completed during a previous call     Advance Care Planning:   Reviewed and current     Care Planning:   Education Documentation  Educate reporting changes in condition, taught by Teri Tinajero RN at 2024  3:57 PM.  Learner: Patient  Readiness: Acceptance  Method: Explanation  Response: Verbalizes Understanding, Needs Reinforcement    Educate Patient on When to Call for Symptoms, taught by Teri Tinajero RN at 2024  3:57 PM.  Learner:

## 2024-12-16 ENCOUNTER — TELEPHONE (OUTPATIENT)
Dept: ENT CLINIC | Age: 69
End: 2024-12-16

## 2024-12-16 NOTE — TELEPHONE ENCOUNTER
Patients wife called in this morning stating that he has been having pain in his jaw that radiates to his neck for the past 2 weeks on the Right side of his face. Patients wife isn't sure if this is related to the surgery that Dr. Gutierrez preformed or if this is a dental issue and is wanting to know what they should do. They are wondering if we need to order scans or labs or if this is something she should call their dentist about. Please advise.

## 2024-12-16 NOTE — TELEPHONE ENCOUNTER
He is s/p EGD W/ DILATION ON 11/06. If he would have had soreness from the dilation wither from the dilation itself or oral manipulation of procedure I would have expected this to be felt within a couple of days post op not weeks later.     Please ask if he has a hx of dental problems in this area? If not, this sounds close to the area that he had cancer resected.    Please print this for Dr Gutierrez to review this afternoon when in office to determine the best course of action. Please ask if he would like to see him earlier than his EGD w/ dilation 1/10 for a cancer surveillance scope (last documented 10/21/2024).

## 2024-12-16 NOTE — TELEPHONE ENCOUNTER
He states he has not really had dental issues but isn't sure if its dental or not. Printing for Dr. Gutierrez to review.

## 2024-12-17 ENCOUNTER — CARE COORDINATION (OUTPATIENT)
Dept: CARE COORDINATION | Age: 69
End: 2024-12-17

## 2024-12-17 NOTE — CARE COORDINATION
assessments  - goal progression  - education .   Will evaluate readiness for graduation with next outreach.   Patient  is agreeable to this plan.

## 2024-12-17 NOTE — TELEPHONE ENCOUNTER
Dr. Gutierrez said his jaw looked great and that he should seek advice from his dentist.    I called and left a detailed message for his wife and let her know to call the office if she had any further questions.

## 2024-12-26 PROBLEM — Z78.9 FAILURE OF OUTPATIENT TREATMENT: Status: RESOLVED | Noted: 2024-06-10 | Resolved: 2024-12-26

## 2024-12-26 PROBLEM — K44.9 HIATAL HERNIA: Status: RESOLVED | Noted: 2024-07-22 | Resolved: 2024-12-26

## 2024-12-26 PROBLEM — M71.121 SEPTIC BURSITIS OF ELBOW, RIGHT: Status: RESOLVED | Noted: 2024-06-10 | Resolved: 2024-12-26

## 2024-12-26 PROBLEM — R13.13 PHARYNGEAL DYSPHAGIA: Status: RESOLVED | Noted: 2023-08-10 | Resolved: 2024-12-26

## 2024-12-26 PROBLEM — R25.2 TRISMUS: Status: RESOLVED | Noted: 2023-08-29 | Resolved: 2024-12-26

## 2024-12-26 PROBLEM — L03.113 CELLULITIS OF RIGHT ELBOW: Status: RESOLVED | Noted: 2024-06-10 | Resolved: 2024-12-26

## 2024-12-26 PROBLEM — J39.2 PHARYNGEAL ULCER: Status: RESOLVED | Noted: 2023-11-05 | Resolved: 2024-12-26

## 2024-12-26 PROBLEM — C09.9 PRIMARY TONSILLAR SQUAMOUS CELL CARCINOMA (HCC): Status: RESOLVED | Noted: 2023-07-04 | Resolved: 2024-12-26

## 2025-01-02 ENCOUNTER — CARE COORDINATION (OUTPATIENT)
Dept: CARE COORDINATION | Age: 70
End: 2025-01-02

## 2025-01-02 ENCOUNTER — TELEPHONE (OUTPATIENT)
Dept: ENT CLINIC | Age: 70
End: 2025-01-02

## 2025-01-02 NOTE — CARE COORDINATION
Ambulatory Care Coordination Note     2025 4:32 PM     Patient Current Location:  Home: [address] [place] [address]     ACM contacted the patient by telephone. Verified name and  with patient as identifiers.         ACM: Teri Tinajero RN     Challenges to be reviewed by the provider   Additional needs identified to be addressed with provider yes   reports that he continues to have discomfort in back of throat and jaw. No improvement but no worse either.  Pt has noticed white patch in that location but unsure if that has been there or not.  Pt does not think it's thrush. Pt had dental appt as was recommended (see  encounter) and was told it was not a tooth issue.  Dentist had recommended he continue using mouthwash.     Pt is scheduled for EGD with dilation on 1/10 with Dr. Gutierrez.  He states that since he has not had any change in his s/s he planned on waiting to be evaluated then.  Pt denies fevers.  Only other s/s is fatigue. Please review and evaluate if pt needs to be seen prior to EGD that is scheduled on 1/10.                Method of communication with provider: none.    Utilization: Patient has not had any utilization since our last call.     Care Summary Note: Chris was referred to care coordination by [facility].    Pt has h/o: depression, GERD, tonsil ca, hypothyroidism, depression.   Spoke with Chris today.  Pt reports that he continues to have discomfort in back of throat and jaw. No improvement but no worse either.  Pt has noticed white patch in that location but unsure if that has been there or not.  Pt had dental appt as was recommended and was told it was not a tooth issue.  Dentist had recommended he continue using mouthwash.     Pt is scheduled for EGD with dilation on 1/10.  He states that since he has not had any change in his s/s he planned on waiting to be evaluated at that visit. Pt denies fevers.  Only other s/s is fatigue.   Encouraged to avoid high acidic or spicy foods.

## 2025-01-02 NOTE — TELEPHONE ENCOUNTER
Teri Tinajero RN at 2025  4:08 PM    Status: Sign when Signing Visit   Expand All Collapse All  Ambulatory Care Coordination Note     2025 4:32 PM     Patient Current Location:  Home: [address] [place] [address]     ACM contacted the patient by telephone. Verified name and  with patient as identifiers.         ACM: Teri Tinajero RN          Challenges to be reviewed by the provider    Additional needs identified to be addressed with provider yes   reports that he continues to have discomfort in back of throat and jaw. No improvement but no worse either.  Pt has noticed white patch in that location but unsure if that has been there or not.  Pt does not think it's thrush. Pt had dental appt as was recommended (see  encounter) and was told it was not a tooth issue.  Dentist had recommended he continue using mouthwash.     Pt is scheduled for EGD with dilation on 1/10 with Dr. Gutierrez.  He states that since he has not had any change in his s/s he planned on waiting to be evaluated then.  Pt denies fevers.  Only other s/s is fatigue. Please review and evaluate if pt needs to be seen prior to EGD that is scheduled on 1/10.

## 2025-01-03 ENCOUNTER — HOSPITAL ENCOUNTER (OUTPATIENT)
Age: 70
Discharge: HOME | End: 2025-01-03
Payer: MEDICARE

## 2025-01-03 DIAGNOSIS — Z01.818 PRE-OP TESTING: ICD-10-CM

## 2025-01-03 DIAGNOSIS — J34.2 NASAL SEPTAL DEVIATION: ICD-10-CM

## 2025-01-03 LAB
ALBUMIN SERPL BCG-MCNC: 4.2 G/DL (ref 3.5–5.1)
ALP SERPL-CCNC: 74 U/L (ref 38–126)
ALT SERPL W/O P-5'-P-CCNC: 21 U/L (ref 11–66)
ANION GAP SERPL CALC-SCNC: 9 MEQ/L (ref 8–16)
AST SERPL-CCNC: 20 U/L (ref 5–40)
BASOPHILS ABSOLUTE: 0.1 THOU/MM3 (ref 0–0.1)
BASOPHILS NFR BLD AUTO: 1.1 %
BILIRUB SERPL-MCNC: 0.4 MG/DL (ref 0.3–1.2)
BUN SERPL-MCNC: 17 MG/DL (ref 7–22)
CALCIUM SERPL-MCNC: 9.4 MG/DL (ref 8.5–10.5)
CHLORIDE SERPL-SCNC: 101 MEQ/L (ref 98–111)
CO2 SERPL-SCNC: 29 MEQ/L (ref 23–33)
CREAT SERPL-MCNC: 0.9 MG/DL (ref 0.4–1.2)
DEPRECATED RDW RBC AUTO: 39.9 FL (ref 35–45)
EOSINOPHIL NFR BLD AUTO: 1.6 %
EOSINOPHILS ABSOLUTE: 0.1 THOU/MM3 (ref 0–0.4)
ERYTHROCYTE [DISTWIDTH] IN BLOOD BY AUTOMATED COUNT: 12.8 % (ref 11.5–14.5)
GFR SERPL CREATININE-BSD FRML MDRD: > 90 ML/MIN/1.73M2
GLUCOSE SERPL-MCNC: 78 MG/DL (ref 70–108)
HCT VFR BLD AUTO: 47.6 % (ref 42–52)
HGB BLD-MCNC: 15.8 GM/DL (ref 14–18)
IMM GRANULOCYTES # BLD AUTO: 0.07 THOU/MM3 (ref 0–0.07)
IMM GRANULOCYTES NFR BLD AUTO: 1.3 %
LYMPHOCYTES ABSOLUTE: 0.8 THOU/MM3 (ref 1–4.8)
LYMPHOCYTES NFR BLD AUTO: 14.5 %
MCH RBC QN AUTO: 28.8 PG (ref 26–33)
MCHC RBC AUTO-ENTMCNC: 33.2 GM/DL (ref 32.2–35.5)
MCV RBC AUTO: 86.7 FL (ref 80–94)
MONOCYTES ABSOLUTE: 0.5 THOU/MM3 (ref 0.4–1.3)
MONOCYTES NFR BLD AUTO: 8.3 %
NEUTROPHILS ABSOLUTE: 4.1 THOU/MM3 (ref 1.8–7.7)
NEUTROPHILS NFR BLD AUTO: 73.2 %
NRBC BLD AUTO-RTO: 0 /100 WBC
PLATELET # BLD AUTO: 205 THOU/MM3 (ref 130–400)
PMV BLD AUTO: 8.8 FL (ref 9.4–12.4)
POTASSIUM SERPL-SCNC: 4.4 MEQ/L (ref 3.5–5.2)
PROT SERPL-MCNC: 7 G/DL (ref 6.1–8)
RBC # BLD AUTO: 5.49 MILL/MM3 (ref 4.7–6.1)
SODIUM SERPL-SCNC: 139 MEQ/L (ref 135–145)
WBC # BLD AUTO: 5.6 THOU/MM3 (ref 4.8–10.8)

## 2025-01-03 PROCEDURE — 80053 COMPREHEN METABOLIC PANEL: CPT

## 2025-01-03 PROCEDURE — 36415 COLL VENOUS BLD VENIPUNCTURE: CPT

## 2025-01-03 PROCEDURE — 85025 COMPLETE CBC W/AUTO DIFF WBC: CPT

## 2025-01-03 RX ORDER — MEMANTINE HYDROCHLORIDE 10 MG/1
10 TABLET ORAL 2 TIMES DAILY
COMMUNITY
Start: 2024-12-06

## 2025-01-03 NOTE — TELEPHONE ENCOUNTER
Dr Gutierrez, the patient does not want to postpone his surgery for balloon dilation. He wants to discuss his symptoms with you the day of his procedure (next Friday 1/10) unless you feel you need to see him sooner in the office.    Please advise.

## 2025-01-03 NOTE — PROGRESS NOTES
Follow all instructions given by your physician  If Urology case Call [phone] the weekday before procedure to find out Arrival Time.  Do not eat or drink anything after midnight prior to surgery(includes water, chewing gum, mints and ice chips)  Sips of water am of surgery with allowed medications  May brush teeth   Do not smoke or chew tobacco, drink alcoholic beverages or use any illicit drugs for 24 hours prior to surgery  Bring insurance info and photo ID  Bring pertinent paperwork with you from Doctor or surgeons's office  Wear clean comfortable, loose-fitting clothing  No make-up, nail polish, jewelry, piercings, or contact lenses to be worn day of surgery  No glue on dentures morning of surgery; you will be asked to remove them for surgery. Case for glasses.  Shower the night before and the morning of surgery with cleansing soap provided or a liquid antibacterial soap, dry with new fresh clean towel after each shower, no lotions, creams or powder.  Clean sheets and pillowcase on bed night before surgery  Bring medications in original bottles, Bring rescue inhalers with you  Bring CPAP/BIPAP machine if you have one ( you may be charged if one is needed in recovery room)    Do you have a DNR?   Please Bring Healthcare Directive or Healthcare Power of  in so we can scan it into your chart.    Our pharmacy has a Meds to Beds program where they will deliver any new prescriptions you may have to your room before you leave. Our Pharmacy will clear it through your insurance; for example (same co pay). This enables you to take your new RX as soon as you need when you get home and avoids stop/wait delays on the way home.  Please have a form of payment with you and have someone designated as your Pharmacy contact with their phone # as you may not feel well or still be under the influence of anesthesia.    Please refer to the SSI-Surgical Site Infection Flyer you hopefully received in the mail-together we  can prevent infections; signs and symptoms reviewed.  When discharged be sure you understand how to care for your wound and that you have the Dr./office phone # to call if you have any concerns or questions about your wound.     needed at discharge and someone over 18 to stay with you for 24 hours overnight (surgery may be cancelled if you don't have this)  Report to hospitals on 2nd floor  If you would become ill prior to surgery, please call the surgeon  May have a visitor with you, we request that you limit to 2 visitors in pre-op area  Masks are recommended but not required, new masks at entrance desk  Call [phone] for any questions

## 2025-01-06 ENCOUNTER — PREP FOR PROCEDURE (OUTPATIENT)
Dept: ENT CLINIC | Age: 70
End: 2025-01-06

## 2025-01-08 ENCOUNTER — HOSPITAL ENCOUNTER (OUTPATIENT)
Dept: MRI IMAGING | Age: 70
Discharge: HOME | End: 2025-01-08
Payer: MEDICARE

## 2025-01-08 DIAGNOSIS — K74.60 HEPATIC CIRRHOSIS, UNSPECIFIED HEPATIC CIRRHOSIS TYPE, UNSPECIFIED WHETHER ASCITES PRESENT (HCC): ICD-10-CM

## 2025-01-08 DIAGNOSIS — R11.0 NAUSEA: ICD-10-CM

## 2025-01-08 DIAGNOSIS — R10.13 EPIGASTRIC PAIN: ICD-10-CM

## 2025-01-08 PROCEDURE — A9579 GAD-BASE MR CONTRAST NOS,1ML: HCPCS | Performed by: NURSE PRACTITIONER

## 2025-01-08 PROCEDURE — 6360000004 HC RX CONTRAST MEDICATION: Performed by: NURSE PRACTITIONER

## 2025-01-08 PROCEDURE — 74183 MRI ABD W/O CNTR FLWD CNTR: CPT

## 2025-01-08 RX ORDER — SODIUM CHLORIDE 9 MG/ML
INJECTION, SOLUTION INTRAVENOUS PRN
Status: CANCELLED | OUTPATIENT
Start: 2025-01-08

## 2025-01-08 RX ORDER — SODIUM CHLORIDE 0.9 % (FLUSH) 0.9 %
5-40 SYRINGE (ML) INJECTION EVERY 12 HOURS SCHEDULED
Status: CANCELLED | OUTPATIENT
Start: 2025-01-08

## 2025-01-08 RX ORDER — SODIUM CHLORIDE 0.9 % (FLUSH) 0.9 %
5-40 SYRINGE (ML) INJECTION PRN
Status: CANCELLED | OUTPATIENT
Start: 2025-01-08

## 2025-01-08 RX ADMIN — GADOTERIDOL 15 ML: 279.3 INJECTION, SOLUTION INTRAVENOUS at 06:24

## 2025-01-08 NOTE — H&P
65.4 kg (144 lb 2 oz)   SpO2 98%   BMI 23.98 kg/m²     Proceed with original surgical plan:  ESOPHAGOSCOPY, RIGID, WITH BALLOON DILATATION     Electronically signed by MARGAUX Deal CNP on 1/10/2025 at 7:19 AM      -----------------------------------------  -----------------------------------------      [facility] PHYSICIANS [facility] EAR, NOSE AND THROAT  [address] [id number]  Dept: [phone]  Dept Fax: [phone]  Loc: [phone]     Javon Kenney is a 69 y.o. male who was referred by No ref. provider found for:       Chief Complaint   Patient presents with    Follow-up       1 Month Cancer surveillance post op esoph w/dilation 11/6/24 (0 global) *repeat dilation scheduled on 1/10/25 *See Elif               HPI:      Javon Kenney is a 69 y.o. 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  The patient also has had obstructive dysphagia symptoms from upper esophageal sphincter dysfunction versus stricture.  He is status post a suspension laryngoscopy with exam under anesthesia 2 months ago and balloon dilation of the esophageal inlet.  At that time no evidence of recurrent disease was found on physical exam and his balloon dilation was performed uneventfully.    On his last visit the patient was complaining about very limited benefit from his dilations for his obstructive dysphagia so he had a dysphagia treatment with me for overinflation of a 20 mm balloon that I performed several weeks ago.  He states that that markedly improved his swallowing ability and he is still better able to swallow now than he was previously.  He has been, at the meantime, evaluated at the [facility] \"dysphagia center\" who also sent him to the head and neck oncology service  will try to obtain a larger diameter balloon for his specific care in order to shoot of the muscle fibers more effectively with the below process in order to give him a longer more definitive benefit.  I will also try to actually visualize the esophageal inlet through the [facility] laryngoscope to the extent that at some point I may offer him a transoral suspension laryngoscopy reconstruction of the esophageal inlet with a division of the cricopharyngeus muscle fibers and a pyloroplasty reconstruction of the distal pharynx to widen the circumference.  Since he is already had a fundoplication, assuming that is intact and functional, he should be free from the risk of free regurgitant reflux that could harm his lungs and larynx.        I will see him back in approximately 1 months for an interval exam that will include his routine nasopharyngoscopy.            I spent over 40 minutes of face-to-face time with the patient, not counting the 5 minutes his endoscopy required  the majority of which was dedicated to reviewing his complex history structuring his follow-up care plan.        Return in about 4 weeks (around 12/16/2024) for Cancer surveillance.                 **This report has been created using voice recognition software. It may contain minor errors which are inherent in voice recognition technology.**

## 2025-01-10 ENCOUNTER — HOSPITAL ENCOUNTER (OUTPATIENT)
Age: 70
Setting detail: OUTPATIENT SURGERY
Discharge: HOME | End: 2025-01-10
Attending: OTOLARYNGOLOGY | Admitting: OTOLARYNGOLOGY
Payer: MEDICARE

## 2025-01-10 ENCOUNTER — TELEPHONE (OUTPATIENT)
Dept: ENT CLINIC | Age: 70
End: 2025-01-10

## 2025-01-10 ENCOUNTER — ANESTHESIA EVENT (OUTPATIENT)
Dept: OPERATING ROOM | Age: 70
End: 2025-01-10
Payer: MEDICARE

## 2025-01-10 ENCOUNTER — ANESTHESIA (OUTPATIENT)
Dept: OPERATING ROOM | Age: 70
End: 2025-01-10
Payer: MEDICARE

## 2025-01-10 VITALS
SYSTOLIC BLOOD PRESSURE: 137 MMHG | DIASTOLIC BLOOD PRESSURE: 86 MMHG | BODY MASS INDEX: 24.01 KG/M2 | TEMPERATURE: 97 F | HEIGHT: 65 IN | RESPIRATION RATE: 18 BRPM | OXYGEN SATURATION: 99 % | HEART RATE: 69 BPM | WEIGHT: 144.13 LBS

## 2025-01-10 PROBLEM — K22.0: Status: ACTIVE | Noted: 2025-01-10

## 2025-01-10 PROCEDURE — 3700000000 HC ANESTHESIA ATTENDED CARE: Performed by: OTOLARYNGOLOGY

## 2025-01-10 PROCEDURE — 2500000003 HC RX 250 WO HCPCS: Performed by: NURSE ANESTHETIST, CERTIFIED REGISTERED

## 2025-01-10 PROCEDURE — 3700000001 HC ADD 15 MINUTES (ANESTHESIA): Performed by: OTOLARYNGOLOGY

## 2025-01-10 PROCEDURE — 6360000002 HC RX W HCPCS: Performed by: NURSE ANESTHETIST, CERTIFIED REGISTERED

## 2025-01-10 PROCEDURE — 3600000004 HC SURGERY LEVEL 4 BASE: Performed by: OTOLARYNGOLOGY

## 2025-01-10 PROCEDURE — 7100000010 HC PHASE II RECOVERY - FIRST 15 MIN: Performed by: OTOLARYNGOLOGY

## 2025-01-10 PROCEDURE — 7100000001 HC PACU RECOVERY - ADDTL 15 MIN: Performed by: OTOLARYNGOLOGY

## 2025-01-10 PROCEDURE — 6360000002 HC RX W HCPCS: Performed by: NURSE PRACTITIONER

## 2025-01-10 PROCEDURE — 43233 EGD BALLOON DIL ESOPH30 MM/>: CPT | Performed by: OTOLARYNGOLOGY

## 2025-01-10 PROCEDURE — 2709999900 HC NON-CHARGEABLE SUPPLY: Performed by: OTOLARYNGOLOGY

## 2025-01-10 PROCEDURE — 2580000003 HC RX 258: Performed by: NURSE PRACTITIONER

## 2025-01-10 PROCEDURE — APPNB45 APP NON BILLABLE 31-45 MINUTES: Performed by: NURSE PRACTITIONER

## 2025-01-10 PROCEDURE — 7100000011 HC PHASE II RECOVERY - ADDTL 15 MIN: Performed by: OTOLARYNGOLOGY

## 2025-01-10 PROCEDURE — C1726 CATH, BAL DIL, NON-VASCULAR: HCPCS | Performed by: OTOLARYNGOLOGY

## 2025-01-10 PROCEDURE — 7100000000 HC PACU RECOVERY - FIRST 15 MIN: Performed by: OTOLARYNGOLOGY

## 2025-01-10 PROCEDURE — 6360000002 HC RX W HCPCS

## 2025-01-10 PROCEDURE — 3600000014 HC SURGERY LEVEL 4 ADDTL 15MIN: Performed by: OTOLARYNGOLOGY

## 2025-01-10 RX ORDER — ONDANSETRON 2 MG/ML
INJECTION INTRAMUSCULAR; INTRAVENOUS
Status: DISCONTINUED | OUTPATIENT
Start: 2025-01-10 | End: 2025-01-10 | Stop reason: SDUPTHER

## 2025-01-10 RX ORDER — SODIUM CHLORIDE 0.9 % (FLUSH) 0.9 %
5-40 SYRINGE (ML) INJECTION EVERY 12 HOURS SCHEDULED
Status: DISCONTINUED | OUTPATIENT
Start: 2025-01-10 | End: 2025-01-10 | Stop reason: HOSPADM

## 2025-01-10 RX ORDER — LIDOCAINE HCL/PF 100 MG/5ML
SYRINGE (ML) INJECTION
Status: DISCONTINUED | OUTPATIENT
Start: 2025-01-10 | End: 2025-01-10 | Stop reason: SDUPTHER

## 2025-01-10 RX ORDER — ROCURONIUM BROMIDE 10 MG/ML
INJECTION, SOLUTION INTRAVENOUS
Status: DISCONTINUED | OUTPATIENT
Start: 2025-01-10 | End: 2025-01-10 | Stop reason: SDUPTHER

## 2025-01-10 RX ORDER — LABETALOL HYDROCHLORIDE 5 MG/ML
10 INJECTION, SOLUTION INTRAVENOUS
Status: DISCONTINUED | OUTPATIENT
Start: 2025-01-10 | End: 2025-01-10 | Stop reason: HOSPADM

## 2025-01-10 RX ORDER — SODIUM CHLORIDE 9 MG/ML
INJECTION, SOLUTION INTRAVENOUS PRN
Status: DISCONTINUED | OUTPATIENT
Start: 2025-01-10 | End: 2025-01-10 | Stop reason: HOSPADM

## 2025-01-10 RX ORDER — LABETALOL HYDROCHLORIDE 5 MG/ML
INJECTION, SOLUTION INTRAVENOUS
Status: COMPLETED
Start: 2025-01-10 | End: 2025-01-10

## 2025-01-10 RX ORDER — MEPERIDINE HYDROCHLORIDE 25 MG/ML
INJECTION INTRAMUSCULAR; INTRAVENOUS; SUBCUTANEOUS
Status: COMPLETED
Start: 2025-01-10 | End: 2025-01-10

## 2025-01-10 RX ORDER — PROPOFOL 10 MG/ML
INJECTION, EMULSION INTRAVENOUS
Status: DISCONTINUED | OUTPATIENT
Start: 2025-01-10 | End: 2025-01-10 | Stop reason: SDUPTHER

## 2025-01-10 RX ORDER — SODIUM CHLORIDE 0.9 % (FLUSH) 0.9 %
5-40 SYRINGE (ML) INJECTION PRN
Status: DISCONTINUED | OUTPATIENT
Start: 2025-01-10 | End: 2025-01-10 | Stop reason: HOSPADM

## 2025-01-10 RX ORDER — PHENYLEPHRINE HCL IN 0.9% NACL 1 MG/10 ML
SYRINGE (ML) INTRAVENOUS
Status: DISCONTINUED | OUTPATIENT
Start: 2025-01-10 | End: 2025-01-10 | Stop reason: SDUPTHER

## 2025-01-10 RX ORDER — MEPERIDINE HYDROCHLORIDE 25 MG/ML
25 INJECTION INTRAMUSCULAR; INTRAVENOUS; SUBCUTANEOUS ONCE
Status: COMPLETED | OUTPATIENT
Start: 2025-01-10 | End: 2025-01-10

## 2025-01-10 RX ORDER — MIDAZOLAM HYDROCHLORIDE 1 MG/ML
INJECTION, SOLUTION INTRAMUSCULAR; INTRAVENOUS
Status: DISCONTINUED | OUTPATIENT
Start: 2025-01-10 | End: 2025-01-10 | Stop reason: SDUPTHER

## 2025-01-10 RX ORDER — DEXAMETHASONE SODIUM PHOSPHATE 10 MG/ML
10 INJECTION, EMULSION INTRAMUSCULAR; INTRAVENOUS ONCE
Status: COMPLETED | OUTPATIENT
Start: 2025-01-10 | End: 2025-01-10

## 2025-01-10 RX ORDER — FENTANYL CITRATE 50 UG/ML
INJECTION, SOLUTION INTRAMUSCULAR; INTRAVENOUS
Status: DISCONTINUED | OUTPATIENT
Start: 2025-01-10 | End: 2025-01-10 | Stop reason: SDUPTHER

## 2025-01-10 RX ADMIN — ONDANSETRON 4 MG: 2 INJECTION INTRAMUSCULAR; INTRAVENOUS at 08:22

## 2025-01-10 RX ADMIN — PROPOFOL 140 MG: 10 INJECTION, EMULSION INTRAVENOUS at 07:46

## 2025-01-10 RX ADMIN — FENTANYL CITRATE 100 MCG: 50 INJECTION, SOLUTION INTRAMUSCULAR; INTRAVENOUS at 07:46

## 2025-01-10 RX ADMIN — Medication 150 MCG: at 07:51

## 2025-01-10 RX ADMIN — MEPERIDINE HYDROCHLORIDE 25 MG: 25 INJECTION INTRAMUSCULAR; INTRAVENOUS; SUBCUTANEOUS at 08:45

## 2025-01-10 RX ADMIN — PIPERACILLIN SODIUM AND TAZOBACTAM SODIUM 3375 MG: 3; .375 INJECTION, POWDER, LYOPHILIZED, FOR SOLUTION INTRAVENOUS at 07:38

## 2025-01-10 RX ADMIN — DEXAMETHASONE SODIUM PHOSPHATE 10 MG: 10 INJECTION, EMULSION INTRAMUSCULAR; INTRAVENOUS at 07:10

## 2025-01-10 RX ADMIN — SUGAMMADEX 200 MG: 100 INJECTION, SOLUTION INTRAVENOUS at 08:22

## 2025-01-10 RX ADMIN — LABETALOL HYDROCHLORIDE 10 MG: 5 INJECTION, SOLUTION INTRAVENOUS at 08:50

## 2025-01-10 RX ADMIN — MIDAZOLAM 2 MG: 1 INJECTION INTRAMUSCULAR; INTRAVENOUS at 07:38

## 2025-01-10 RX ADMIN — SODIUM CHLORIDE: 9 INJECTION, SOLUTION INTRAVENOUS at 07:09

## 2025-01-10 RX ADMIN — SUGAMMADEX 200 MG: 100 INJECTION, SOLUTION INTRAVENOUS at 08:27

## 2025-01-10 RX ADMIN — Medication 200 MCG: at 08:02

## 2025-01-10 RX ADMIN — ROCURONIUM BROMIDE 50 MG: 10 INJECTION INTRAVENOUS at 07:46

## 2025-01-10 RX ADMIN — Medication 150 MCG: at 08:09

## 2025-01-10 RX ADMIN — Medication 60 MG: at 07:46

## 2025-01-10 ASSESSMENT — PAIN SCALES - WONG BAKER
WONGBAKER_NUMERICALRESPONSE: HURTS A LITTLE BIT
WONGBAKER_NUMERICALRESPONSE: HURTS A LITTLE BIT

## 2025-01-10 ASSESSMENT — PAIN DESCRIPTION - ORIENTATION
ORIENTATION: LOWER
ORIENTATION: INNER

## 2025-01-10 ASSESSMENT — PAIN SCALES - GENERAL
PAINLEVEL_OUTOF10: 5

## 2025-01-10 ASSESSMENT — PAIN DESCRIPTION - ONSET
ONSET: ON-GOING
ONSET: ON-GOING

## 2025-01-10 ASSESSMENT — PAIN DESCRIPTION - DESCRIPTORS
DESCRIPTORS: DISCOMFORT
DESCRIPTORS: SORE

## 2025-01-10 ASSESSMENT — PAIN DESCRIPTION - FREQUENCY
FREQUENCY: CONTINUOUS
FREQUENCY: INTERMITTENT

## 2025-01-10 ASSESSMENT — PAIN DESCRIPTION - PAIN TYPE
TYPE: SURGICAL PAIN
TYPE: ACUTE PAIN

## 2025-01-10 ASSESSMENT — PAIN DESCRIPTION - LOCATION
LOCATION: THROAT
LOCATION: BACK;NECK

## 2025-01-10 ASSESSMENT — PAIN - FUNCTIONAL ASSESSMENT: PAIN_FUNCTIONAL_ASSESSMENT: NONE - DENIES PAIN

## 2025-01-10 NOTE — TELEPHONE ENCOUNTER
Patient had thorough cancer surveillance exam under anesthesia today.  Dr Gutierrez would like to move his office visit out 2 months from now.  Please look at scheduling.  Thank you.

## 2025-01-10 NOTE — PROGRESS NOTES
Patient oriented to Same Day department and admitted to Same Day Surgery room 6.   Patient verbalized approval for first name, last initial with physician name on unit whiteboard.     Plan of care reviewed with patient.   Patient room whiteboard filled out and discussed with patient and responsible adult.       Call light in reach.   Bed in lowest position, locked, with one bed rail up.   SCDs and warming blanket in place.  Appropriate arm bands on patient.   Bathroom offered.   All questions and concerns of patient addressed.        Meds to Beds:   Patient informed of [facility] Meds to Beds program during admission. Patient has declined use of program.

## 2025-01-10 NOTE — ANESTHESIA POSTPROCEDURE EVALUATION
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    Gael Phase I: Gael Score: 9    Gael Phase II: Gael Score: 10    Anesthesia Post Evaluation    Patient location during evaluation: PACU  Patient participation: complete - patient participated  Level of consciousness: awake  Airway patency: patent  Nausea & Vomiting: no nausea  Cardiovascular status: hemodynamically stable  Respiratory status: acceptable  Hydration status: stable  Pain management: adequate    No notable events documented.

## 2025-01-10 NOTE — ANESTHESIA PRE PROCEDURE
MD at [facility] ENDOSCOPY   • UPPER GASTROINTESTINAL ENDOSCOPY  10/26/2023    EGD DILATION SAVORY performed by Aly Pollard MD at [facility] ENDOSCOPY       Social History:    Social History     Tobacco Use   • Smoking status: Never     Passive exposure: Never   • Smokeless tobacco: Never   Substance Use Topics   • Alcohol use: Not Currently                                Counseling given: Not Answered      Vital Signs (Current):   Vitals:    01/03/25 1426 01/10/25 0630   BP:  115/82   Pulse:  74   Resp:  14   Temp:  97.6 °F (36.4 °C)   TempSrc:  Temporal   SpO2:  98%   Weight: 65.8 kg (145 lb) 65.4 kg (144 lb 2 oz)   Height: 1.664 m (5' 5.5\") 1.651 m (5' 5\")                                              BP Readings from Last 3 Encounters:   01/10/25 115/82   11/18/24 116/70   11/07/24 120/70       NPO Status: Time of last liquid consumption: 2000                        Time of last solid consumption: 2100                        Date of last liquid consumption: 01/09/25                        Date of last solid food consumption: 01/09/25    BMI:   Wt Readings from Last 3 Encounters:   01/10/25 65.4 kg (144 lb 2 oz)   01/08/25 65.8 kg (145 lb)   11/18/24 67.6 kg (149 lb)     Body mass index is 23.98 kg/m².    CBC:   Lab Results   Component Value Date/Time    WBC 5.6 01/03/2025 01:47 PM    RBC 5.49 01/03/2025 01:47 PM    RBC 5.20 07/09/2011 08:24 AM    HGB 15.8 01/03/2025 01:47 PM    HGB 16.0 07/09/2011 08:24 AM    HCT 47.6 01/03/2025 01:47 PM    MCV 86.7 01/03/2025 01:47 PM    RDW 13.0 12/01/2023 10:01 AM     01/03/2025 01:47 PM       CMP:   Lab Results   Component Value Date/Time     01/03/2025 01:47 PM    K 4.4 01/03/2025 01:47 PM    K 3.9 06/11/2024 06:15 AM     01/03/2025 01:47 PM    CO2 29 01/03/2025 01:47 PM    BUN 17 01/03/2025 01:47 PM    CREATININE 0.9 01/03/2025 01:47 PM    GFRAA >60 09/28/2020 09:31 AM    AGRATIO 1.3 01/24/2022 01:53 PM    LABGLOM > 90 01/03/2025 01:47 PM    LABGLOM >90

## 2025-01-10 NOTE — PROGRESS NOTES
Pt has met discharge criteria and states he is ready for discharge to home. IV removed, gauze and tape applied. Dressed in own clothes and personal belongings gathered. Discharge instructions (with opioid medication education information) given to pt and family; pt and family verbalized understanding of discharge instructions, prescriptions and follow up appointments. Pt transported to discharge lobby by [facility] staff.     PROVIDER:[TOKEN:[[id number]:[id number]],FOLLOWUP:[1-3 Days]]

## 2025-01-10 NOTE — OP NOTE
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  A thorough examination of the patient's cancer field was also performed and no signs of malignancy, ulcers, leukoplakia or erythroplakia were seen.  2.  The patient's esophageal inlet was well-visualized and a 30 mm achalasia balloon was introduced without difficulty  3.  Sterile saline was used to inflate the balloon to 6 paulie until no further work was necessary to maintain that pressure    Detailed Description of Procedure:   The patient was taken to the operating awake and placed in supine position.  General anesthesia was induced and the patient was intubated with an #6 endotracheal tube without difficulty or vital sign instability.  The table was turned 90 degrees and the patient was draped in usual fashion for transoral surgery.  A timeout was employed verifying the patient's identity and planned procedure.  A custom dental guard was fashioned by BRIAN Burleson.    Rigid esophagoscopy with balloon dilation to 30 mm: With the patient asleep, a Advanced Circulatory rigid endoscope was passed into the hypopharynx and the larynx was extended anteriorly to provide a direct line of sight view of  the esophageal inlet.  A 30 mm achalasia balloon was then introduced through the esophageal inlet without difficulty or resistance.  With direct visualization I gradually inflated the balloon using sterile saline to a total volume of approximately 80 cc and a working pressure of 6 paulie.  At 6 paulie the pressure will gradually drop and I kept increasing it back to 6 paulie until it stayed there.  Once there and left it in position for another 30 seconds and deflated it.  On the removal of the balloon there was a minimal amount of blood staining of the secretions in the hypopharynx.  No wilmar bleeding was encountered.    After removing the balloon we removed the endoscope around to be able to visualize the patient's resection bed and the surrounding tissues that had previously shown pathology.  No pathological appearing tissue was seen whatsoever.  No ulcers, no leukoplakia, no erythroplakia, and no submucosal abnormalities were seen in the resection bed, the surrounding palate, or the adjacent tongue base.  As such I consider the operation concluded.  The transoral system was removed and the patient was turned back to anesthesia for reversal extubation in the operating.  This was carried out without incident and the patient was taken to recovery in satisfactory condition.    Estimated blood loss in the case was minuscule and the patient received less than half a liter of intravenous lactated Ringer's intraoperatively.  No blood products were transfused.  No intraoperative complications were detected.  Counts were considered accurate x 3.    I was present for and participated in the patient's entire operation as was my assistant surgery, BRIAN Burleson.    Electronically signed by Federico Gutierrez MD on 1/10/2025 at 8:45 AM

## 2025-01-10 NOTE — PROGRESS NOTES
0836 pt arrived to PACU, awakens to voice. Denies pain. VSS. No drainage present at this time  0845 pt shivering, medicated with 25 mg Demerol  0850 BP elevated, medicated with 10mg labetalol  0900 pt awake in bed, states pain 5/10 and tolerable. VSS  0910 meets criteria for discharge from PACU, transported to [facility] in stable condition

## 2025-01-14 ENCOUNTER — CARE COORDINATION (OUTPATIENT)
Dept: CARE COORDINATION | Age: 70
End: 2025-01-14

## 2025-01-14 NOTE — CARE COORDINATION
concern?: Yes  Reported Symptoms: Other (Comment: pt is s/p esophagoscopy with balloon dilation.  following closely with Dr. Gutierrez.  having some throat pain since procedure.  eating small amts of food.  next f/u is in March.)          Medications Reviewed:   Completed during a previous call     Advance Care Planning:   Not reviewed during this call     Care Planning:   Education Documentation  Educate reporting changes in condition, taught by Teri Tinajero RN at 1/14/2025  3:03 PM.  Learner: Patient  Readiness: Acceptance  Method: Explanation, Teachback  Response: Verbalizes Understanding    Educate Patient on When to Call for Symptoms, taught by Teri Tinajero RN at 1/14/2025  3:03 PM.  Learner: Patient  Readiness: Acceptance  Method: Explanation, Teachback  Response: Verbalizes Understanding    Educate limitations or barriers to activity and/or exercise on discharge, taught by Teri Tinajero RN at 1/14/2025  3:03 PM.  Learner: Patient  Readiness: Acceptance  Method: Explanation, Teachback  Response: Verbalizes Understanding    Educate dietary adherence benefits, taught by Teri Tinajero RN at 1/14/2025  3:03 PM.  Learner: Patient  Readiness: Acceptance  Method: Explanation, Teachback  Response: Verbalizes Understanding    Education Comments  No comments found.     ,    Goals Addressed    None          PCP/Specialist follow up:   Future Appointments         Provider Specialty Dept Phone    2/21/2025 1:00 PM Cyndi Mireles PA-C; SCHEDULE, [facility] Radiation Oncology [phone]    3/24/2025 4:15 PM Federico Gutierrez MD Otolaryngology [phone]    5/8/2025 1:00 PM Saurabh Hernandez MD Internal Medicine [phone]            Follow Up:   No further Ambulatory Care Management follow-up scheduled at this time.  Patient  has Ambulatory Care Manager's contact information for any further questions, concerns or needs.

## 2025-01-20 ENCOUNTER — PREP FOR PROCEDURE (OUTPATIENT)
Dept: ENT CLINIC | Age: 70
End: 2025-01-20

## 2025-02-21 ENCOUNTER — HOSPITAL ENCOUNTER (OUTPATIENT)
Dept: RADIATION ONCOLOGY | Age: 70
Discharge: HOME | End: 2025-02-21
Payer: MEDICARE

## 2025-02-21 VITALS
SYSTOLIC BLOOD PRESSURE: 147 MMHG | TEMPERATURE: 98.2 F | RESPIRATION RATE: 18 BRPM | DIASTOLIC BLOOD PRESSURE: 72 MMHG | WEIGHT: 149 LBS | OXYGEN SATURATION: 98 % | HEART RATE: 72 BPM | BODY MASS INDEX: 24.79 KG/M2

## 2025-02-21 PROCEDURE — 99214 OFFICE O/P EST MOD 30 MIN: CPT

## 2025-02-21 PROCEDURE — 99212 OFFICE O/P EST SF 10 MIN: CPT

## 2025-02-21 RX ORDER — OMEPRAZOLE 40 MG/1
40 CAPSULE, DELAYED RELEASE ORAL DAILY
COMMUNITY

## 2025-02-21 ASSESSMENT — ENCOUNTER SYMPTOMS
ABDOMINAL PAIN: 1
FACIAL SWELLING: 0
SHORTNESS OF BREATH: 0
BACK PAIN: 0
BLOOD IN STOOL: 0
COUGH: 1
TROUBLE SWALLOWING: 1
VOICE CHANGE: 0
SINUS PAIN: 0
SORE THROAT: 0
NAUSEA: 1
RECTAL PAIN: 0
SINUS PRESSURE: 0

## 2025-02-21 ASSESSMENT — PAIN SCALES - GENERAL: PAINLEVEL_OUTOF10: 2

## 2025-02-21 ASSESSMENT — PAIN DESCRIPTION - LOCATION: LOCATION: JAW

## 2025-02-21 ASSESSMENT — PAIN DESCRIPTION - ORIENTATION: ORIENTATION: RIGHT

## 2025-03-05 NOTE — PROGRESS NOTES
PAT call attempted, patient unavailable, left message to please call us back at your earliest convenience; [phone]

## 2025-03-05 NOTE — PROGRESS NOTES
PAT Call Date: 3/11   Surgery Date: 3/13    Surgeon: Brenda   Surgery: esophagoscopy    Any Isolation Precautions? No      Type of Isolation Precaution: Not Applicable   Is patient from a nursing home? No  Name of Nursing Home:   Any equipment assist needed for moving patient? No   Type of Equipment: Not Applicable  Patient last weight: 149 lb  1/10 LAST ESOPHAGOSCOPY   Hard Copy on Chart  In EPIC Pending/Notes   Consent -   Within 30 days; signed, dated & timed by patient and physician     [x] On Arrival     [] Blood      [] DNR   H&P -   Within 30 days    [] Physician To Do     Clearance -      []Medical     []Cardiac     [] Pulmonary    Orders -   Signed and Dated      [] Physician To Do    Labs -   Within 3 months   1/3  [x] CBC-OK    [x] CMP-OK   [x] GFR-OK   [] INR    [] PTT    [] Urine    [] Liver Enzymes    [] MRSA Nasal      Others:     Radiology Studies -   Within 1 year  9/7  [x] Chest X-Ray-OK   [] MRI    [] CT    [] Vascular   [] US     Pulmonary -     [] RAFIQ   [] CPAP     Cardiac Workup -   Stress Test, Echo, Cath within 18 months  9/7  [x] EKG -OK     [] Cath                 [] Stress Test                      [] Echo/CHER   [] CABG   [] Holter Monitor      [] Pacemaker/ICD        Brand:        Where does patient have checked:         Last check:         Rep Notified:

## 2025-03-06 ENCOUNTER — PREP FOR PROCEDURE (OUTPATIENT)
Dept: ENT CLINIC | Age: 70
End: 2025-03-06

## 2025-03-10 ENCOUNTER — HOSPITAL ENCOUNTER (OUTPATIENT)
Age: 70
Discharge: HOME | End: 2025-03-10
Payer: MEDICARE

## 2025-03-10 LAB — AMMONIA PLAS-MCNC: 20 UMOL/L (ref 16–60)

## 2025-03-10 PROCEDURE — 36415 COLL VENOUS BLD VENIPUNCTURE: CPT

## 2025-03-10 PROCEDURE — 82140 ASSAY OF AMMONIA: CPT

## 2025-03-11 NOTE — PROGRESS NOTES
Follow all instructions given by your physician  If Urology case Call [phone] the weekday before procedure to find out Arrival Time.  Do not eat or drink anything after midnight prior to surgery(includes water, chewing gum, mints and ice chips)  Sips of water am of surgery with allowed medications  May brush teeth   Do not smoke or chew tobacco, drink alcoholic beverages or use any illicit drugs for 24 hours prior to surgery  Bring insurance info and photo ID  Bring pertinent paperwork with you from Doctor or surgeons's office  Wear clean comfortable, loose-fitting clothing  No make-up, nail polish, jewelry, piercings, or contact lenses to be worn day of surgery  No glue on dentures morning of surgery; you will be asked to remove them for surgery. Case for glasses.  Shower the night before and the morning of surgery with cleansing soap provided or a liquid antibacterial soap, dry with new fresh clean towel after each shower, no lotions, creams or powder.  Clean sheets and pillowcase on bed night before surgery  Bring medications in original bottles, Bring rescue inhalers with you  Bring CPAP/BIPAP machine if you have one ( you may be charged if one is needed in recovery room )    Do you have a DNR? no  Please Bring Healthcare Directive or Healthcare Power of  in so we can scan it into your chart.    Our pharmacy has a Meds to Beds program where they will deliver any new prescriptions you may have to your room before you leave. Our Pharmacy will clear it through your insurance; for example (same co pay). This enables you to take your new RX as soon as you need when you get home and avoids stop/wait delays on the way home.  Please have a form of payment with you and have someone designated as your Pharmacy contact with their phone # as you may not feel well or still be under the influence of anesthesia.    Please refer to the SSI-Surgical Site Infection Flyer you hopefully received in the mail-together we

## 2025-03-13 ENCOUNTER — ANESTHESIA EVENT (OUTPATIENT)
Dept: OPERATING ROOM | Age: 70
End: 2025-03-13
Payer: MEDICARE

## 2025-03-13 RX ORDER — SODIUM CHLORIDE 0.9 % (FLUSH) 0.9 %
5-40 SYRINGE (ML) INJECTION PRN
Status: CANCELLED | OUTPATIENT
Start: 2025-03-13

## 2025-03-13 RX ORDER — SODIUM CHLORIDE 9 MG/ML
INJECTION, SOLUTION INTRAVENOUS PRN
Status: CANCELLED | OUTPATIENT
Start: 2025-03-13

## 2025-03-13 RX ORDER — SODIUM CHLORIDE 0.9 % (FLUSH) 0.9 %
5-40 SYRINGE (ML) INJECTION EVERY 12 HOURS SCHEDULED
Status: CANCELLED | OUTPATIENT
Start: 2025-03-13

## 2025-03-13 NOTE — H&P
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; Pharyngoesophageal dysphagia  No new symptoms    Past Medical History:   Diagnosis Date    Anxiety     Arthritis     neck, back, hands bilat    Cancer (HCC) 2022    Melanoma removed from face    External hemorrhoid     bleeding from them and has internal    GERD (gastroesophageal reflux disease)     Hiatal hernia     EGD    History of carotid stenosis 11/05/2023    Hypothyroidism 06/10/2024    Neoplasm of tonsillar fossa 07/03/2023    R - Squamous Cell    PONV (postoperative nausea and vomiting)     \"pretty bad\"    PUD (peptic ulcer disease)     LONG TIME  AGO    Rectal bleeding     2o11- colonoscopy - dr david    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  (No tx indicated.)       Past Surgical History:   Procedure Laterality Date    BRONCHOSCOPY N/A 06/21/2023    BRONCHOSCOPY performed by Federico Gutierrez MD at [facility] OR    COLONOSCOPY      ENDOSCOPY, COLON, DIAGNOSTIC      ESOPHAGEAL DILATATION N/A 1/10/2025    ESOPHAGOSCOPY, RIGID, WITH BALLOON DILATATION performed by Federico Gutierrez MD at [facility] OR    ESOPHAGOSCOPY N/A 07/30/2024    Esophagoscopy Rigid with balloon Dilation performed by Federico Gutierrez MD at [facility] OR    ESOPHAGOSCOPY N/A 11/6/2024    Esophagoscopy with balloon dilation performed by Federico Gutierrez MD at [facility] OR    EYE SURGERY  plate in right eye    GASTRIC FUNDOPLICATION N/A 05/08/2023    ROBOTIC HIATAL HERNIA REPAIR AND NISSEN FUNDOPLICATION performed by Med Collins MD at [facility] OR    LARYNGOSCOPY N/A 06/21/2023    Laryngoscopy with Biopsy, Flexible Esophagoscopy - Diagnostic Bronchoscopy with Broncheal Alveolar Lavage, Biopsy of Oropharynx performed by Federico Gutierrez MD at [facility] OR    LARYNGOSCOPY N/A 07/28/2023    Laser Resection of Positive Margin Of Tonsil Mass  aspect of where his faucial arch would be and saw no ulceration and no granular pathology.  His neck had extensive postsurgical changes and radiation fibrosis but no masses were palpated on either side.        Procedure: Nasopharyngoscopy   indication: Cancer surveillance and worsening dysphagia  Findings: Following informed consent the spraying of atomized decongestant and lidocaine into the nose, I performed an upper aerodigestive a nasopharyngoscopy to include his cancer bearing tissues and his superior posterior larynx.  The patient's larynx and hypopharynx were thoroughly evaluated finding no areas of concerning pathology in the region of his prior cancer just the radiation fibrosis from the treatment effect.  I specifically looked at the entire region of his radical tonsillectomy on the right side inclusive of the tongue base and vallecula as well as the root of the soft palate.  No ulcers leukoplakia erythroplakia was seen in any of these regions.  I suctioned away saliva was able to get a thorough look into all of these regions without encumbrance.  I could also see into his floor of mouth and saw no raw exposed bone or ulcers in that region either.  The patient tolerated the procedure well     Flexible nasopharyngoscopy images:     Images from this visit were not available at the time of this dictation           Laryngoscopy and esophagoscopy images from visit 2 months ago:                                                                                       Vitals reviewed.     No results found.         Lab Results   Component Value Date/Time      10/31/2024 02:46 PM      09/07/2024 04:25 PM      06/11/2024 06:15 AM     K 4.0 10/31/2024 02:46 PM     K 4.0 09/07/2024 04:25 PM     K 3.9 06/11/2024 06:15 AM     K 4.0 06/10/2024 06:17 AM     K 4.4 04/19/2024 05:48 AM     K 3.9 11/10/2023 03:20 PM      10/31/2024 02:46 PM      09/07/2024 04:25 PM      06/11/2024 06:15 AM

## 2025-03-14 ENCOUNTER — ANESTHESIA (OUTPATIENT)
Dept: OPERATING ROOM | Age: 70
End: 2025-03-14
Payer: MEDICARE

## 2025-03-14 ENCOUNTER — HOSPITAL ENCOUNTER (OUTPATIENT)
Age: 70
Setting detail: OUTPATIENT SURGERY
Discharge: HOME | End: 2025-03-14
Attending: OTOLARYNGOLOGY | Admitting: OTOLARYNGOLOGY
Payer: MEDICARE

## 2025-03-14 VITALS
OXYGEN SATURATION: 97 % | HEIGHT: 65 IN | DIASTOLIC BLOOD PRESSURE: 87 MMHG | BODY MASS INDEX: 23.89 KG/M2 | TEMPERATURE: 97.6 F | RESPIRATION RATE: 16 BRPM | WEIGHT: 143.4 LBS | SYSTOLIC BLOOD PRESSURE: 124 MMHG | HEART RATE: 74 BPM

## 2025-03-14 PROCEDURE — 6360000002 HC RX W HCPCS

## 2025-03-14 PROCEDURE — 2500000003 HC RX 250 WO HCPCS: Performed by: ANESTHESIOLOGY

## 2025-03-14 PROCEDURE — 7100000001 HC PACU RECOVERY - ADDTL 15 MIN: Performed by: OTOLARYNGOLOGY

## 2025-03-14 PROCEDURE — 2580000003 HC RX 258: Performed by: ANESTHESIOLOGY

## 2025-03-14 PROCEDURE — 2709999900 HC NON-CHARGEABLE SUPPLY: Performed by: OTOLARYNGOLOGY

## 2025-03-14 PROCEDURE — 7100000011 HC PHASE II RECOVERY - ADDTL 15 MIN: Performed by: OTOLARYNGOLOGY

## 2025-03-14 PROCEDURE — 3600000004 HC SURGERY LEVEL 4 BASE: Performed by: OTOLARYNGOLOGY

## 2025-03-14 PROCEDURE — 6360000002 HC RX W HCPCS: Performed by: NURSE PRACTITIONER

## 2025-03-14 PROCEDURE — C1726 CATH, BAL DIL, NON-VASCULAR: HCPCS | Performed by: OTOLARYNGOLOGY

## 2025-03-14 PROCEDURE — 7100000000 HC PACU RECOVERY - FIRST 15 MIN: Performed by: OTOLARYNGOLOGY

## 2025-03-14 PROCEDURE — 3600000014 HC SURGERY LEVEL 4 ADDTL 15MIN: Performed by: OTOLARYNGOLOGY

## 2025-03-14 PROCEDURE — 6360000002 HC RX W HCPCS: Performed by: ANESTHESIOLOGY

## 2025-03-14 PROCEDURE — 3700000000 HC ANESTHESIA ATTENDED CARE: Performed by: OTOLARYNGOLOGY

## 2025-03-14 PROCEDURE — APPNB45 APP NON BILLABLE 31-45 MINUTES: Performed by: NURSE PRACTITIONER

## 2025-03-14 PROCEDURE — 3700000001 HC ADD 15 MINUTES (ANESTHESIA): Performed by: OTOLARYNGOLOGY

## 2025-03-14 PROCEDURE — 2580000003 HC RX 258: Performed by: NURSE PRACTITIONER

## 2025-03-14 PROCEDURE — 7100000010 HC PHASE II RECOVERY - FIRST 15 MIN: Performed by: OTOLARYNGOLOGY

## 2025-03-14 PROCEDURE — 43195 ESOPHAGOSCOPY RIGID BALLOON: CPT | Performed by: OTOLARYNGOLOGY

## 2025-03-14 RX ORDER — LABETALOL HYDROCHLORIDE 5 MG/ML
10 INJECTION, SOLUTION INTRAVENOUS
Status: DISCONTINUED | OUTPATIENT
Start: 2025-03-14 | End: 2025-03-14 | Stop reason: HOSPADM

## 2025-03-14 RX ORDER — DEXTROSE MONOHYDRATE 100 MG/ML
INJECTION, SOLUTION INTRAVENOUS CONTINUOUS PRN
Status: DISCONTINUED | OUTPATIENT
Start: 2025-03-14 | End: 2025-03-14 | Stop reason: HOSPADM

## 2025-03-14 RX ORDER — LIDOCAINE HCL/PF 100 MG/5ML
SYRINGE (ML) INJECTION
Status: DISCONTINUED | OUTPATIENT
Start: 2025-03-14 | End: 2025-03-14 | Stop reason: SDUPTHER

## 2025-03-14 RX ORDER — PHENYLEPHRINE HCL IN 0.9% NACL 1 MG/10 ML
SYRINGE (ML) INTRAVENOUS
Status: DISCONTINUED | OUTPATIENT
Start: 2025-03-14 | End: 2025-03-14 | Stop reason: SDUPTHER

## 2025-03-14 RX ORDER — DROPERIDOL 2.5 MG/ML
0.62 INJECTION, SOLUTION INTRAMUSCULAR; INTRAVENOUS
Status: DISCONTINUED | OUTPATIENT
Start: 2025-03-14 | End: 2025-03-14 | Stop reason: HOSPADM

## 2025-03-14 RX ORDER — FENTANYL CITRATE 50 UG/ML
INJECTION, SOLUTION INTRAMUSCULAR; INTRAVENOUS
Status: COMPLETED
Start: 2025-03-14 | End: 2025-03-14

## 2025-03-14 RX ORDER — EPHEDRINE SULFATE/0.9% NACL/PF 25 MG/5 ML
SYRINGE (ML) INTRAVENOUS
Status: DISCONTINUED | OUTPATIENT
Start: 2025-03-14 | End: 2025-03-14 | Stop reason: SDUPTHER

## 2025-03-14 RX ORDER — SODIUM CHLORIDE 9 MG/ML
INJECTION, SOLUTION INTRAVENOUS PRN
Status: DISCONTINUED | OUTPATIENT
Start: 2025-03-14 | End: 2025-03-14 | Stop reason: HOSPADM

## 2025-03-14 RX ORDER — SODIUM CHLORIDE 0.9 % (FLUSH) 0.9 %
5-40 SYRINGE (ML) INJECTION PRN
Status: DISCONTINUED | OUTPATIENT
Start: 2025-03-14 | End: 2025-03-14 | Stop reason: HOSPADM

## 2025-03-14 RX ORDER — PROPOFOL 10 MG/ML
INJECTION, EMULSION INTRAVENOUS
Status: DISCONTINUED | OUTPATIENT
Start: 2025-03-14 | End: 2025-03-14 | Stop reason: SDUPTHER

## 2025-03-14 RX ORDER — DIPHENHYDRAMINE HYDROCHLORIDE 50 MG/ML
12.5 INJECTION, SOLUTION INTRAMUSCULAR; INTRAVENOUS
Status: DISCONTINUED | OUTPATIENT
Start: 2025-03-14 | End: 2025-03-14 | Stop reason: HOSPADM

## 2025-03-14 RX ORDER — DEXAMETHASONE SODIUM PHOSPHATE 4 MG/ML
10 INJECTION, SOLUTION INTRA-ARTICULAR; INTRALESIONAL; INTRAMUSCULAR; INTRAVENOUS; SOFT TISSUE ONCE
Status: COMPLETED | OUTPATIENT
Start: 2025-03-14 | End: 2025-03-14

## 2025-03-14 RX ORDER — HYDRALAZINE HYDROCHLORIDE 20 MG/ML
10 INJECTION INTRAMUSCULAR; INTRAVENOUS
Status: DISCONTINUED | OUTPATIENT
Start: 2025-03-14 | End: 2025-03-14 | Stop reason: HOSPADM

## 2025-03-14 RX ORDER — ACETAMINOPHEN 325 MG/1
650 TABLET ORAL ONCE
Status: DISCONTINUED | OUTPATIENT
Start: 2025-03-14 | End: 2025-03-14 | Stop reason: HOSPADM

## 2025-03-14 RX ORDER — FENTANYL CITRATE 50 UG/ML
INJECTION, SOLUTION INTRAMUSCULAR; INTRAVENOUS
Status: DISCONTINUED | OUTPATIENT
Start: 2025-03-14 | End: 2025-03-14 | Stop reason: SDUPTHER

## 2025-03-14 RX ORDER — ONDANSETRON 2 MG/ML
INJECTION INTRAMUSCULAR; INTRAVENOUS
Status: DISCONTINUED | OUTPATIENT
Start: 2025-03-14 | End: 2025-03-14 | Stop reason: SDUPTHER

## 2025-03-14 RX ORDER — NALOXONE HYDROCHLORIDE 0.4 MG/ML
INJECTION, SOLUTION INTRAMUSCULAR; INTRAVENOUS; SUBCUTANEOUS PRN
Status: DISCONTINUED | OUTPATIENT
Start: 2025-03-14 | End: 2025-03-14 | Stop reason: HOSPADM

## 2025-03-14 RX ORDER — OXYCODONE HYDROCHLORIDE 5 MG/1
5 TABLET ORAL
Status: DISCONTINUED | OUTPATIENT
Start: 2025-03-14 | End: 2025-03-14 | Stop reason: HOSPADM

## 2025-03-14 RX ORDER — FENTANYL CITRATE 50 UG/ML
50 INJECTION, SOLUTION INTRAMUSCULAR; INTRAVENOUS EVERY 5 MIN PRN
Status: DISCONTINUED | OUTPATIENT
Start: 2025-03-14 | End: 2025-03-14 | Stop reason: HOSPADM

## 2025-03-14 RX ORDER — SODIUM CHLORIDE 9 MG/ML
INJECTION, SOLUTION INTRAMUSCULAR; INTRAVENOUS; SUBCUTANEOUS
Status: DISCONTINUED | OUTPATIENT
Start: 2025-03-14 | End: 2025-03-14 | Stop reason: SDUPTHER

## 2025-03-14 RX ORDER — IPRATROPIUM BROMIDE AND ALBUTEROL SULFATE 2.5; .5 MG/3ML; MG/3ML
1 SOLUTION RESPIRATORY (INHALATION)
Status: DISCONTINUED | OUTPATIENT
Start: 2025-03-14 | End: 2025-03-14 | Stop reason: HOSPADM

## 2025-03-14 RX ORDER — MEPERIDINE HYDROCHLORIDE 25 MG/ML
12.5 INJECTION INTRAMUSCULAR; INTRAVENOUS; SUBCUTANEOUS ONCE
Status: DISCONTINUED | OUTPATIENT
Start: 2025-03-14 | End: 2025-03-14 | Stop reason: HOSPADM

## 2025-03-14 RX ORDER — ROCURONIUM BROMIDE 10 MG/ML
INJECTION, SOLUTION INTRAVENOUS
Status: DISCONTINUED | OUTPATIENT
Start: 2025-03-14 | End: 2025-03-14 | Stop reason: SDUPTHER

## 2025-03-14 RX ORDER — ONDANSETRON 2 MG/ML
4 INJECTION INTRAMUSCULAR; INTRAVENOUS
Status: DISCONTINUED | OUTPATIENT
Start: 2025-03-14 | End: 2025-03-14 | Stop reason: HOSPADM

## 2025-03-14 RX ORDER — SODIUM CHLORIDE 0.9 % (FLUSH) 0.9 %
5-40 SYRINGE (ML) INJECTION EVERY 12 HOURS SCHEDULED
Status: DISCONTINUED | OUTPATIENT
Start: 2025-03-14 | End: 2025-03-14 | Stop reason: HOSPADM

## 2025-03-14 RX ORDER — GLUCAGON 1 MG/ML
1 KIT INJECTION PRN
Status: DISCONTINUED | OUTPATIENT
Start: 2025-03-14 | End: 2025-03-14 | Stop reason: HOSPADM

## 2025-03-14 RX ORDER — ACETAMINOPHEN 500 MG
650 TABLET ORAL EVERY 6 HOURS PRN
COMMUNITY

## 2025-03-14 RX ORDER — MIDAZOLAM HYDROCHLORIDE 1 MG/ML
INJECTION, SOLUTION INTRAMUSCULAR; INTRAVENOUS
Status: DISCONTINUED | OUTPATIENT
Start: 2025-03-14 | End: 2025-03-14 | Stop reason: SDUPTHER

## 2025-03-14 RX ORDER — ESMOLOL HYDROCHLORIDE 10 MG/ML
INJECTION INTRAVENOUS
Status: DISCONTINUED | OUTPATIENT
Start: 2025-03-14 | End: 2025-03-14 | Stop reason: SDUPTHER

## 2025-03-14 RX ADMIN — ROCURONIUM BROMIDE 40 MG: 50 INJECTION INTRAVENOUS at 08:05

## 2025-03-14 RX ADMIN — DEXAMETHASONE SODIUM PHOSPHATE 10 MG: 4 INJECTION, SOLUTION INTRAMUSCULAR; INTRAVENOUS at 07:42

## 2025-03-14 RX ADMIN — SODIUM CHLORIDE 100 ML/HR: 0.9 INJECTION, SOLUTION INTRAVENOUS at 07:41

## 2025-03-14 RX ADMIN — SUGAMMADEX 130 MG: 100 INJECTION, SOLUTION INTRAVENOUS at 08:41

## 2025-03-14 RX ADMIN — Medication 40 MG: at 08:05

## 2025-03-14 RX ADMIN — ESMOLOL HYDROCHLORIDE 20 MG: 10 INJECTION, SOLUTION INTRAVENOUS at 08:27

## 2025-03-14 RX ADMIN — ONDANSETRON 4 MG: 2 INJECTION, SOLUTION INTRAMUSCULAR; INTRAVENOUS at 08:18

## 2025-03-14 RX ADMIN — HYDROMORPHONE HYDROCHLORIDE 0.25 MG: 1 INJECTION, SOLUTION INTRAMUSCULAR; INTRAVENOUS; SUBCUTANEOUS at 09:25

## 2025-03-14 RX ADMIN — PIPERACILLIN SODIUM AND TAZOBACTAM SODIUM 3375 MG: 3; .375 INJECTION, POWDER, LYOPHILIZED, FOR SOLUTION INTRAVENOUS at 08:02

## 2025-03-14 RX ADMIN — PROPOFOL 150 MG: 10 INJECTION, EMULSION INTRAVENOUS at 08:05

## 2025-03-14 RX ADMIN — PROPOFOL 20 MG: 10 INJECTION, EMULSION INTRAVENOUS at 08:24

## 2025-03-14 RX ADMIN — EPHEDRINE SULFATE 5 MG: 5 INJECTION INTRAVENOUS at 08:21

## 2025-03-14 RX ADMIN — MIDAZOLAM 2 MG: 1 INJECTION INTRAMUSCULAR; INTRAVENOUS at 08:02

## 2025-03-14 RX ADMIN — SODIUM CHLORIDE 20 ML: 9 INJECTION INTRAMUSCULAR; INTRAVENOUS; SUBCUTANEOUS at 08:05

## 2025-03-14 RX ADMIN — FENTANYL CITRATE 50 MCG: 50 INJECTION, SOLUTION INTRAMUSCULAR; INTRAVENOUS at 08:25

## 2025-03-14 RX ADMIN — FENTANYL CITRATE 50 MCG: 50 INJECTION, SOLUTION INTRAMUSCULAR; INTRAVENOUS at 09:05

## 2025-03-14 RX ADMIN — HYDROMORPHONE HYDROCHLORIDE 0.25 MG: 1 INJECTION, SOLUTION INTRAMUSCULAR; INTRAVENOUS; SUBCUTANEOUS at 09:15

## 2025-03-14 RX ADMIN — FENTANYL CITRATE 50 MCG: 50 INJECTION, SOLUTION INTRAMUSCULAR; INTRAVENOUS at 08:02

## 2025-03-14 RX ADMIN — HYDROMORPHONE HYDROCHLORIDE 0.5 MG: 1 INJECTION, SOLUTION INTRAMUSCULAR; INTRAVENOUS; SUBCUTANEOUS at 09:38

## 2025-03-14 RX ADMIN — Medication 200 MCG: at 08:14

## 2025-03-14 RX ADMIN — FENTANYL CITRATE 50 MCG: 50 INJECTION, SOLUTION INTRAMUSCULAR; INTRAVENOUS at 09:00

## 2025-03-14 ASSESSMENT — PAIN DESCRIPTION - ONSET
ONSET: ON-GOING
ONSET: ON-GOING

## 2025-03-14 ASSESSMENT — PAIN SCALES - GENERAL
PAINLEVEL_OUTOF10: 5
PAINLEVEL_OUTOF10: 3
PAINLEVEL_OUTOF10: 4
PAINLEVEL_OUTOF10: 2
PAINLEVEL_OUTOF10: 7
PAINLEVEL_OUTOF10: 5
PAINLEVEL_OUTOF10: 8
PAINLEVEL_OUTOF10: 5

## 2025-03-14 ASSESSMENT — PAIN DESCRIPTION - LOCATION
LOCATION: THROAT
LOCATION: THROAT
LOCATION: NECK

## 2025-03-14 ASSESSMENT — PAIN DESCRIPTION - PAIN TYPE
TYPE: CHRONIC PAIN
TYPE: SURGICAL PAIN

## 2025-03-14 ASSESSMENT — PAIN DESCRIPTION - DESCRIPTORS
DESCRIPTORS: ACHING;DISCOMFORT
DESCRIPTORS: ACHING
DESCRIPTORS: SORE

## 2025-03-14 ASSESSMENT — PAIN - FUNCTIONAL ASSESSMENT
PAIN_FUNCTIONAL_ASSESSMENT: ACTIVITIES ARE NOT PREVENTED
PAIN_FUNCTIONAL_ASSESSMENT: PREVENTS OR INTERFERES SOME ACTIVE ACTIVITIES AND ADLS

## 2025-03-14 ASSESSMENT — PAIN DESCRIPTION - FREQUENCY
FREQUENCY: CONTINUOUS
FREQUENCY: CONTINUOUS

## 2025-03-14 ASSESSMENT — PAIN DESCRIPTION - ORIENTATION
ORIENTATION: INNER
ORIENTATION: POSTERIOR

## 2025-03-14 ASSESSMENT — PAIN DESCRIPTION - DIRECTION: RADIATING_TOWARDS: HEADACHE

## 2025-03-14 NOTE — PROGRESS NOTES
Pt has met discharge criteria and states he is ready for discharge to home. IV removed, gauze and tape applied. Dressed in own clothes and personal belongings gathered. Discharge instructions (with opioid medication education information) given to pt and family; pt and family verbalized understanding of discharge instructions, prescriptions and follow up appointments. Pt transported to discharge lobby by [facility] staff.

## 2025-03-14 NOTE — PROGRESS NOTES
0853- pt to pacu. Pt drowsy. VSS. Pt appears in no acute distress. Oral airway removed per anesthesia upon arrival to pacu.     0900- pt reports significant pain with swallowing. Pt medicated per order    0910- pt reports pain decreasing, not yet tolerable. Tolerating ice chips. VSS    0921- pt resting in bed eyes closed. VSS    0925- pt continues to report pain not tolerable. Medicated per order. VSS    0938- pt continues to report pain, not yet tolerable. VSS pt continues to tolerate ice chips.     0942- pt request urinal. VSS.     0958- pt reports pain improving, tolerable at this time. VSS. Pt meets criteria for discharge from pacu.     1003- Pt returned to [facility] in stable condition. Spouse at bedside. Report given to Lily

## 2025-03-14 NOTE — ANESTHESIA POSTPROCEDURE EVALUATION
Department of Anesthesiology  Postprocedure Note    Patient: Javon Kenney  MRN: [id number]  YOB: 1955  Date of evaluation: 3/14/2025    Procedure Summary       Date: 03/14/25 Room / Location: [facility] OR  / 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    Gael Phase I: Gael Score: 8    Gael Phase II: Gael Score: 9    Anesthesia Post Evaluation    Patient location during evaluation: PACU  Patient participation: complete - patient participated  Level of consciousness: awake  Airway patency: patent  Nausea & Vomiting: no nausea  Cardiovascular status: hemodynamically stable  Respiratory status: acceptable  Hydration status: stable  Pain management: adequate    No notable events documented.

## 2025-03-14 NOTE — PROGRESS NOTES
Patient oriented to Same Day department and admitted to Same Day Surgery room 1.   Patient verbalized approval for first name, last initial with physician name on unit whiteboard.     Plan of care reviewed with patient.   Patient room whiteboard filled out and discussed with patient and responsible adult.   Patient and responsible adult offered Same Day Welcome Packet to review.    Call light in reach.   Bed in lowest position, locked, with one bed rail up.   SCDs and warming blanket in place.  Appropriate arm bands on patient.   Bathroom offered.   All questions and concerns of patient addressed.        Meds to Beds:   Patient informed of [facility] Meds to [facility]eds program during admission. Patient is agreeable to program.   Contact information for the pharmacy and the Meds to Beds program:   Name: Torrie   Relationship to patient:spouse/significant other   Phone number: [phone]

## 2025-03-14 NOTE — ANESTHESIA PRE PROCEDURE
Department of Anesthesiology  Preprocedure Note       Name:  Javon Kenney   Age:  69 y.o.  :  1955                                          MRN:  [id number]         Date:  3/14/2025      Surgeon: Surgeon(s):  Federico Gutierrez MD    Procedure: Procedure(s):  Esophagoscopy, rigid, with balloon dilation    Medications prior to admission:   Prior to Admission medications    Medication Sig Start Date End Date Taking? Authorizing Provider   acetaminophen (TYLENOL) 500 MG tablet Take 650 mg by mouth every 6 hours as needed for Pain   Yes Kirsten Shields MD   omeprazole (PRILOSEC) 40 MG delayed release capsule Take 1 capsule by mouth daily   Yes Kirsten Shields MD   levothyroxine (SYNTHROID) 50 MCG tablet Take 1 tablet by mouth daily 24  Yes Kasie Wilkes MD   DULoxetine (CYMBALTA) 30 MG extended release capsule Take 1 capsule by mouth daily 24  Yes Kasie Wilkes MD   aspirin 81 MG EC tablet Take 1 tablet by mouth daily   Yes ProviderKirsten MD   Multiple Vitamin (MULTIVITAMIN) TABS tablet Take 1 tablet by mouth daily   Yes Provider, MD Kirsten   Glucosamine-Chondroitin (MOVE FREE PO) Take 1 tablet by mouth daily   Yes ProviderKirsten MD   ondansetron (ZOFRAN-ODT) 4 MG disintegrating tablet Take 1 tablet by mouth every 8 hours as needed for Nausea or Vomiting   Yes ProviderKirsten MD       Current medications:    Current Facility-Administered Medications   Medication Dose Route Frequency Provider Last Rate Last Admin   • piperacillin-tazobactam (ZOSYN) 3,375 mg in sodium chloride 0.9 % 50 mL IVPB (addEASE)  3,375 mg IntraVENous Once Kathleen Salazar APRN - CNP       • dexAMETHasone Sodium Phosphate injection 10 mg  10 mg IntraVENous Once Kathleen Salazar APRN - CNP       • sodium chloride flush 0.9 % injection 5-40 mL  5-40 mL IntraVENous 2 times per day Kathleen Salazar APRN - CNP       • sodium chloride flush 0.9 % injection 5-40 mL  5-40

## 2025-03-14 NOTE — DISCHARGE INSTRUCTIONS
- Resume home medications  - Resume diet as tolerated  - Tylenol for discomfort  - No activity restrictions

## 2025-03-14 NOTE — PROGRESS NOTES
Pt returned to [facility] room 1. Vitals and assessment as charted. 0.9 infusing,  to count from PACU. Pt has jello and water. Family at the bedside. Pt and family verbalized understanding of discharge criteria and call light use. Call light in reach.

## 2025-03-18 NOTE — OP NOTE
the airway and extended the transoral system to splay these tissues further.  I then passed the 30 mm extra-large balloon into the esophageal inlet without resistance.  With direct visualization using a 30 degree rigid telescope, I had my assistant gradually inflate the balloon looking through the balloon has as a kind of liquid lens into the esophageal inlet mucosa with a very clear view.  He gradually increased the pressure to half an atmosphere and then to a full atmosphere which was clearly doing work as it would deflate at the beginning down to three quarters of an atmosphere.  I maintained extra volume into the balloon.  Turning to a single atmosphere each time and then it would deflate less each successive interval.  Once that inflated to a full atmosphere and stayed at that volume it was my plan to stop for the dilation.  It was only at this interval that I noticed a small amount of blood in the extra mucosal space.  As I deflated the balloon I carefully inspected the esophageal inlet and saw no signs of significant mucous membrane disruption.  I suctioned away the mucus and the minimal bleeding in the pharynx and remove the transoral heart return to the patient back to anesthesia for reversal extubation in the operating room.  This was carried out without incident and the patient was taken to recovery in satisfactory condition.    Estimated blood loss in the case was minuscule.  The patient received less than half a liter of intravenous lactated Ringer's intraoperatively.  No blood pressure transfused.  No intraoperative complications were detected.  Counts were considered accurate x 3.    I was present for and participated in the entirety of the patient's operation from start to finish as was my assistant surgery BRIAN Burleson.    Disposition: The patient be discharged to home following the second phase of recovery as long as he has no problems.  He will be given precautions to look for neck

## 2025-03-20 ENCOUNTER — TELEPHONE (OUTPATIENT)
Dept: INTERNAL MEDICINE CLINIC | Age: 70
End: 2025-03-20

## 2025-03-20 DIAGNOSIS — R53.83 FATIGUE, UNSPECIFIED TYPE: ICD-10-CM

## 2025-03-20 DIAGNOSIS — E03.9 ACQUIRED HYPOTHYROIDISM: Primary | ICD-10-CM

## 2025-03-20 NOTE — TELEPHONE ENCOUNTER
Patient's wife called stating that Chris is exhausted all the time and in bed a lot . Torrie was asking about increasing Chris's Levothyroxine dose , he is currently taking 50 mcg daily . Last TSH and FT4 was in October . Patient's next appointment is 5/8 in the resident clinic . Please advise .

## 2025-03-21 ENCOUNTER — RESULTS FOLLOW-UP (OUTPATIENT)
Dept: INTERNAL MEDICINE CLINIC | Age: 70
End: 2025-03-21

## 2025-03-21 ENCOUNTER — HOSPITAL ENCOUNTER (OUTPATIENT)
Age: 70
Discharge: HOME | End: 2025-03-21
Payer: MEDICARE

## 2025-03-21 DIAGNOSIS — R53.83 FATIGUE, UNSPECIFIED TYPE: ICD-10-CM

## 2025-03-21 DIAGNOSIS — E03.9 ACQUIRED HYPOTHYROIDISM: ICD-10-CM

## 2025-03-21 LAB
BASOPHILS ABSOLUTE: 0 THOU/MM3 (ref 0–0.1)
BASOPHILS NFR BLD AUTO: 0.7 %
DEPRECATED RDW RBC AUTO: 41.6 FL (ref 35–45)
EOSINOPHIL NFR BLD AUTO: 2 %
EOSINOPHILS ABSOLUTE: 0.1 THOU/MM3 (ref 0–0.4)
ERYTHROCYTE [DISTWIDTH] IN BLOOD BY AUTOMATED COUNT: 13.2 % (ref 11.5–14.5)
HCT VFR BLD AUTO: 44.5 % (ref 42–52)
HGB BLD-MCNC: 14.8 GM/DL (ref 14–18)
IMM GRANULOCYTES # BLD AUTO: 0.12 THOU/MM3 (ref 0–0.07)
IMM GRANULOCYTES NFR BLD AUTO: 2.1 %
LYMPHOCYTES ABSOLUTE: 0.8 THOU/MM3 (ref 1–4.8)
LYMPHOCYTES NFR BLD AUTO: 14.3 %
MCH RBC QN AUTO: 29.1 PG (ref 26–33)
MCHC RBC AUTO-ENTMCNC: 33.3 GM/DL (ref 32.2–35.5)
MCV RBC AUTO: 87.6 FL (ref 80–94)
MONOCYTES ABSOLUTE: 0.5 THOU/MM3 (ref 0.4–1.3)
MONOCYTES NFR BLD AUTO: 8.9 %
NEUTROPHILS ABSOLUTE: 4 THOU/MM3 (ref 1.8–7.7)
NEUTROPHILS NFR BLD AUTO: 72 %
NRBC BLD AUTO-RTO: 0 /100 WBC
PLATELET # BLD AUTO: 222 THOU/MM3 (ref 130–400)
PMV BLD AUTO: 8.7 FL (ref 9.4–12.4)
RBC # BLD AUTO: 5.08 MILL/MM3 (ref 4.7–6.1)
T4 FREE SERPL-MCNC: 1.1 NG/DL (ref 0.92–1.68)
TSH SERPL DL<=0.05 MIU/L-ACNC: 1.38 UIU/ML (ref 0.27–4.2)
WBC # BLD AUTO: 5.6 THOU/MM3 (ref 4.8–10.8)

## 2025-03-21 PROCEDURE — 84439 ASSAY OF FREE THYROXINE: CPT

## 2025-03-21 PROCEDURE — 85025 COMPLETE CBC W/AUTO DIFF WBC: CPT

## 2025-03-21 PROCEDURE — 36415 COLL VENOUS BLD VENIPUNCTURE: CPT

## 2025-03-21 PROCEDURE — 84443 ASSAY THYROID STIM HORMONE: CPT

## 2025-03-27 LAB — MISC. #1 REFERENCE GROUP TEST: NORMAL

## 2025-03-31 ASSESSMENT — PATIENT HEALTH QUESTIONNAIRE - PHQ9
SUM OF ALL RESPONSES TO PHQ QUESTIONS 1-9: 4
6. FEELING BAD ABOUT YOURSELF - OR THAT YOU ARE A FAILURE OR HAVE LET YOURSELF OR YOUR FAMILY DOWN: NOT AT ALL
4. FEELING TIRED OR HAVING LITTLE ENERGY: NEARLY EVERY DAY
10. IF YOU CHECKED OFF ANY PROBLEMS, HOW DIFFICULT HAVE THESE PROBLEMS MADE IT FOR YOU TO DO YOUR WORK, TAKE CARE OF THINGS AT HOME, OR GET ALONG WITH OTHER PEOPLE: NOT DIFFICULT AT ALL
2. FEELING DOWN, DEPRESSED OR HOPELESS: NOT AT ALL
8. MOVING OR SPEAKING SO SLOWLY THAT OTHER PEOPLE COULD HAVE NOTICED. OR THE OPPOSITE, BEING SO FIGETY OR RESTLESS THAT YOU HAVE BEEN MOVING AROUND A LOT MORE THAN USUAL: NOT AT ALL
5. POOR APPETITE OR OVEREATING: NOT AT ALL
SUM OF ALL RESPONSES TO PHQ QUESTIONS 1-9: 4
7. TROUBLE CONCENTRATING ON THINGS, SUCH AS READING THE NEWSPAPER OR WATCHING TELEVISION: NOT AT ALL
8. MOVING OR SPEAKING SO SLOWLY THAT OTHER PEOPLE COULD HAVE NOTICED. OR THE OPPOSITE - BEING SO FIDGETY OR RESTLESS THAT YOU HAVE BEEN MOVING AROUND A LOT MORE THAN USUAL: NOT AT ALL
9. THOUGHTS THAT YOU WOULD BE BETTER OFF DEAD, OR OF HURTING YOURSELF: NOT AT ALL
SUM OF ALL RESPONSES TO PHQ QUESTIONS 1-9: 4
6. FEELING BAD ABOUT YOURSELF - OR THAT YOU ARE A FAILURE OR HAVE LET YOURSELF OR YOUR FAMILY DOWN: NOT AT ALL
1. LITTLE INTEREST OR PLEASURE IN DOING THINGS: NOT AT ALL
SUM OF ALL RESPONSES TO PHQ QUESTIONS 1-9: 4
7. TROUBLE CONCENTRATING ON THINGS, SUCH AS READING THE NEWSPAPER OR WATCHING TELEVISION: NOT AT ALL
4. FEELING TIRED OR HAVING LITTLE ENERGY: NEARLY EVERY DAY
SUM OF ALL RESPONSES TO PHQ QUESTIONS 1-9: 4
3. TROUBLE FALLING OR STAYING ASLEEP: SEVERAL DAYS
9. THOUGHTS THAT YOU WOULD BE BETTER OFF DEAD, OR OF HURTING YOURSELF: NOT AT ALL
1. LITTLE INTEREST OR PLEASURE IN DOING THINGS: NOT AT ALL
5. POOR APPETITE OR OVEREATING: NOT AT ALL
2. FEELING DOWN, DEPRESSED OR HOPELESS: NOT AT ALL
3. TROUBLE FALLING OR STAYING ASLEEP: SEVERAL DAYS
10. IF YOU CHECKED OFF ANY PROBLEMS, HOW DIFFICULT HAVE THESE PROBLEMS MADE IT FOR YOU TO DO YOUR WORK, TAKE CARE OF THINGS AT HOME, OR GET ALONG WITH OTHER PEOPLE: NOT DIFFICULT AT ALL

## 2025-04-02 NOTE — PROGRESS NOTES
rigidity or tenderness.   Neurological:      General: No focal deficit present.      Mental Status: He is alert and oriented to person, place, and time.          Assessment and Plan:     Diagnosis Orders   1. Cervical pain (neck)  External Referral To Physical Therapy    XR CERVICAL SPINE (2-3 VIEWS)      2. Pharyngoesophageal dysphagia  DULoxetine (CYMBALTA) 30 MG extended release capsule      3. Depression, unspecified depression type  DULoxetine (CYMBALTA) 30 MG extended release capsule      4. Neuropathy  DULoxetine (CYMBALTA) 30 MG extended release capsule      5. Hypothyroidism, unspecified type          Cervical pain:  Advised to increase tylenol to 2 tablets up to 3 times a day as needed for pain  Short course of mobic prn  Xray cervical spine  Refer to physical therapy  Advised to follow up with surgeon for further evaluation and treatment    Hypothyroidism:  Last TSH 1.2, T4 1.1 3/25  Continue synthroid 50 mcg     Depression:   PHQ9 score is 4  Continue Cymbalta 30mg     Tonsillar SCC with dysphagia: Patient is following with ENT, underwent recent esophagoscopy with balloon dilation     Gastritis: s/p Nissen fundoplication. [facility]    Left Carotid artery stenosis : Dr. Wagner is following - they will order CTA neck 4/2025?     Cognitive decline/sequelae of cerebral infarction : Follows with Dr Wagner    Follow up visit 1 month with xray    Staffed with: Dr marlen Hernandez- IM PGY 2    [facility] PROFESSIONAL SERVS  [facility] INTERNAL MEDICINE  [address] [place], [place] [id number]  Dept: [phone]  Dept Fax: [phone]  Loc: [phone]

## 2025-04-03 ENCOUNTER — HOSPITAL ENCOUNTER (OUTPATIENT)
Dept: GENERAL RADIOLOGY | Age: 70
Discharge: HOME | End: 2025-04-03
Payer: MEDICARE

## 2025-04-03 ENCOUNTER — HOSPITAL ENCOUNTER (OUTPATIENT)
Age: 70
Discharge: HOME | End: 2025-04-03
Payer: MEDICARE

## 2025-04-03 ENCOUNTER — OFFICE VISIT (OUTPATIENT)
Dept: INTERNAL MEDICINE CLINIC | Age: 70
End: 2025-04-03
Payer: MEDICARE

## 2025-04-03 VITALS
HEART RATE: 80 BPM | TEMPERATURE: 98.4 F | HEIGHT: 65 IN | DIASTOLIC BLOOD PRESSURE: 72 MMHG | WEIGHT: 149.2 LBS | BODY MASS INDEX: 24.86 KG/M2 | SYSTOLIC BLOOD PRESSURE: 124 MMHG

## 2025-04-03 DIAGNOSIS — R13.14 PHARYNGOESOPHAGEAL DYSPHAGIA: ICD-10-CM

## 2025-04-03 DIAGNOSIS — G62.9 NEUROPATHY: ICD-10-CM

## 2025-04-03 DIAGNOSIS — E03.9 HYPOTHYROIDISM, UNSPECIFIED TYPE: ICD-10-CM

## 2025-04-03 DIAGNOSIS — M54.2 CERVICAL PAIN (NECK): ICD-10-CM

## 2025-04-03 DIAGNOSIS — F32.A DEPRESSION, UNSPECIFIED DEPRESSION TYPE: ICD-10-CM

## 2025-04-03 DIAGNOSIS — M54.2 CERVICAL PAIN (NECK): Primary | ICD-10-CM

## 2025-04-03 PROCEDURE — 72040 X-RAY EXAM NECK SPINE 2-3 VW: CPT

## 2025-04-03 PROCEDURE — 99212 OFFICE O/P EST SF 10 MIN: CPT

## 2025-04-03 PROCEDURE — 1159F MED LIST DOCD IN RCRD: CPT

## 2025-04-03 PROCEDURE — 1124F ACP DISCUSS-NO DSCNMKR DOCD: CPT

## 2025-04-03 RX ORDER — DULOXETIN HYDROCHLORIDE 30 MG/1
30 CAPSULE, DELAYED RELEASE ORAL DAILY
Qty: 90 CAPSULE | Refills: 1 | Status: SHIPPED | OUTPATIENT
Start: 2025-04-03

## 2025-04-03 RX ORDER — LEVOTHYROXINE SODIUM 50 UG/1
50 TABLET ORAL DAILY
Qty: 90 TABLET | Refills: 1 | Status: SHIPPED | OUTPATIENT
Start: 2025-04-03

## 2025-04-03 RX ORDER — MELOXICAM 7.5 MG/1
7.5 TABLET ORAL DAILY PRN
Qty: 15 TABLET | Refills: 0 | Status: SHIPPED | OUTPATIENT
Start: 2025-04-03

## 2025-04-16 RX ORDER — MELOXICAM 7.5 MG/1
7.5 TABLET ORAL DAILY PRN
Qty: 15 TABLET | Refills: 0 | OUTPATIENT
Start: 2025-04-16

## 2025-05-05 SDOH — HEALTH STABILITY: PHYSICAL HEALTH: ON AVERAGE, HOW MANY DAYS PER WEEK DO YOU ENGAGE IN MODERATE TO STRENUOUS EXERCISE (LIKE A BRISK WALK)?: 2 DAYS

## 2025-05-05 SDOH — HEALTH STABILITY: PHYSICAL HEALTH: ON AVERAGE, HOW MANY MINUTES DO YOU ENGAGE IN EXERCISE AT THIS LEVEL?: 20 MIN

## 2025-05-05 ASSESSMENT — LIFESTYLE VARIABLES
HOW OFTEN DO YOU HAVE A DRINK CONTAINING ALCOHOL: NEVER
HOW MANY STANDARD DRINKS CONTAINING ALCOHOL DO YOU HAVE ON A TYPICAL DAY: PATIENT DOES NOT DRINK
HOW OFTEN DO YOU HAVE A DRINK CONTAINING ALCOHOL: 1

## 2025-05-05 ASSESSMENT — PATIENT HEALTH QUESTIONNAIRE - PHQ9
SUM OF ALL RESPONSES TO PHQ QUESTIONS 1-9: 1
2. FEELING DOWN, DEPRESSED OR HOPELESS: NOT AT ALL
1. LITTLE INTEREST OR PLEASURE IN DOING THINGS: SEVERAL DAYS
SUM OF ALL RESPONSES TO PHQ QUESTIONS 1-9: 1

## 2025-05-08 ENCOUNTER — OFFICE VISIT (OUTPATIENT)
Dept: INTERNAL MEDICINE CLINIC | Age: 70
End: 2025-05-08
Payer: MEDICARE

## 2025-05-08 VITALS
DIASTOLIC BLOOD PRESSURE: 74 MMHG | SYSTOLIC BLOOD PRESSURE: 154 MMHG | WEIGHT: 149.8 LBS | OXYGEN SATURATION: 99 % | HEIGHT: 65 IN | HEART RATE: 70 BPM | BODY MASS INDEX: 24.96 KG/M2

## 2025-05-08 DIAGNOSIS — F32.A DEPRESSION, UNSPECIFIED DEPRESSION TYPE: ICD-10-CM

## 2025-05-08 DIAGNOSIS — G62.9 NEUROPATHY: ICD-10-CM

## 2025-05-08 DIAGNOSIS — R13.14 PHARYNGOESOPHAGEAL DYSPHAGIA: ICD-10-CM

## 2025-05-08 DIAGNOSIS — Z00.00 INITIAL MEDICARE ANNUAL WELLNESS VISIT: Primary | ICD-10-CM

## 2025-05-08 PROCEDURE — 1159F MED LIST DOCD IN RCRD: CPT

## 2025-05-08 PROCEDURE — G0438 PPPS, INITIAL VISIT: HCPCS

## 2025-05-08 PROCEDURE — 1124F ACP DISCUSS-NO DSCNMKR DOCD: CPT

## 2025-05-08 RX ORDER — DULOXETIN HYDROCHLORIDE 30 MG/1
30 CAPSULE, DELAYED RELEASE ORAL DAILY
Qty: 90 CAPSULE | Refills: 1 | Status: SHIPPED | OUTPATIENT
Start: 2025-05-08

## 2025-05-08 RX ORDER — LEVOTHYROXINE SODIUM 50 UG/1
50 TABLET ORAL DAILY
Qty: 90 TABLET | Refills: 1 | Status: SHIPPED | OUTPATIENT
Start: 2025-05-08

## 2025-05-08 RX ORDER — NYSTATIN 100000 [USP'U]/ML
500000 SUSPENSION ORAL 4 TIMES DAILY
Qty: 280 ML | Refills: 2 | Status: SHIPPED | OUTPATIENT
Start: 2025-05-08 | End: 2025-06-22

## 2025-05-08 ASSESSMENT — PATIENT HEALTH QUESTIONNAIRE - PHQ9
2. FEELING DOWN, DEPRESSED OR HOPELESS: NOT AT ALL
6. FEELING BAD ABOUT YOURSELF - OR THAT YOU ARE A FAILURE OR HAVE LET YOURSELF OR YOUR FAMILY DOWN: NOT AT ALL
SUM OF ALL RESPONSES TO PHQ QUESTIONS 1-9: 1
5. POOR APPETITE OR OVEREATING: NOT AT ALL
8. MOVING OR SPEAKING SO SLOWLY THAT OTHER PEOPLE COULD HAVE NOTICED. OR THE OPPOSITE, BEING SO FIGETY OR RESTLESS THAT YOU HAVE BEEN MOVING AROUND A LOT MORE THAN USUAL: NOT AT ALL
1. LITTLE INTEREST OR PLEASURE IN DOING THINGS: SEVERAL DAYS
7. TROUBLE CONCENTRATING ON THINGS, SUCH AS READING THE NEWSPAPER OR WATCHING TELEVISION: NOT AT ALL
1. LITTLE INTEREST OR PLEASURE IN DOING THINGS: SEVERAL DAYS
SUM OF ALL RESPONSES TO PHQ QUESTIONS 1-9: 1
SUM OF ALL RESPONSES TO PHQ QUESTIONS 1-9: 5
9. THOUGHTS THAT YOU WOULD BE BETTER OFF DEAD, OR OF HURTING YOURSELF: NOT AT ALL
4. FEELING TIRED OR HAVING LITTLE ENERGY: NEARLY EVERY DAY
SUM OF ALL RESPONSES TO PHQ QUESTIONS 1-9: 1
2. FEELING DOWN, DEPRESSED OR HOPELESS: NOT AT ALL
SUM OF ALL RESPONSES TO PHQ QUESTIONS 1-9: 5
SUM OF ALL RESPONSES TO PHQ QUESTIONS 1-9: 5
3. TROUBLE FALLING OR STAYING ASLEEP: SEVERAL DAYS
SUM OF ALL RESPONSES TO PHQ QUESTIONS 1-9: 1
10. IF YOU CHECKED OFF ANY PROBLEMS, HOW DIFFICULT HAVE THESE PROBLEMS MADE IT FOR YOU TO DO YOUR WORK, TAKE CARE OF THINGS AT HOME, OR GET ALONG WITH OTHER PEOPLE: SOMEWHAT DIFFICULT
SUM OF ALL RESPONSES TO PHQ QUESTIONS 1-9: 5

## 2025-05-08 NOTE — ACP (ADVANCE CARE PLANNING)
Advance Care Planning   General Advance Care Planning (ACP) Conversation    Date of Conversation: 5/8/2025  Conducted with: Patient with Decision Making Capacity  Other persons present: None    Healthcare Decision Maker:    Primary Decision Maker: Torrie Kenney - Spouse - [phone]    Secondary Decision Maker: Payt Luo - Child - [phone]    Supplemental (Other) Decision Maker: Violet Kohli - Child - [phone]      Content/Action Overview:  Has ACP document(s) on file - reflects the patient's care preferences  Reviewed DNR/DNI and patient elects Full Code (Attempt Resuscitation)          Length of Voluntary ACP Conversation in minutes:  16 minutes    Saurabh Hernandez MD

## 2025-05-08 NOTE — PATIENT INSTRUCTIONS
disease, depression, chronic pain, and cancer.  How do you practice mindfulness?  To be mindful is to pay attention, to be present, and to be accepting. Like any new skill or habit, being mindful can take practice.  When you're mindful, you do just one thing and you pay close attention to that one thing. For example, you may sit quietly and notice your emotions or how your food tastes and smells.  When you're present, you focus on the things that are happening right now. You let go of your thoughts about the past and the future. When you dwell on the past or the future, you miss moments that can heal and strengthen you. You may miss moments like hearing a child laugh or seeing a friendly face when you think you're all alone.  When you're accepting, you don't  the present moment. Instead you accept your thoughts and feelings as they come.  You can practice anytime, anywhere, and in any way you choose. You can practice in many ways. Here are a few ideas:  While doing your chores, like washing the dishes, let your mind focus on what's in your hand. What does the dish feel like? Is the water warm or cold?  Go outside and take a few deep breaths. What is the air like? Is it warm or cold?  When you can, take some time at the start of your day to sit alone and think.  Take a slow walk by yourself. Count your steps while you breathe in and out.  Try yoga breathing exercises, stretches, and poses to strengthen and relax your muscles.  At work, if you can, try to stop for a few moments each hour. Note how your body feels. Let yourself regroup and let your mind settle before you return to what you were doing.  If you struggle with anxiety or \"worry thoughts,\" imagine your mind as a blue anthony and your worry thoughts as clouds. Now imagine those worry thoughts floating across your mind's anthony. Just let them pass by as you watch.  Follow-up care is a key part of your treatment and safety. Be sure to make and go to all

## 2025-05-08 NOTE — PROGRESS NOTES
No  Interventions:  Has difficulty swallowing due to h/o tonsillar ca with dysphagia. Educated on healthy balanced diet      Hearing Screen:  Do you or your family notice any trouble with your hearing that hasn't been managed with hearing aids?: (!) Yes    Interventions:  Has hearing aids    Vision Screen:  Do you have difficulty driving, watching TV, or doing any of your daily activities because of your eyesight?: (!) Yes  Have you had an eye exam within the past year?: Yes  Interventions:    Glasses, eye check 1 year ago    Safety:  Do you have any tripping hazards - loose or unsecured carpets or rugs?: (!) Yes  Interventions:  See AVS for additional education material           Objective   Vitals:    05/08/25 1257   BP: (!) 154/74   BP Site: Left Upper Arm   Pulse: 70   SpO2: 99%   Weight: 67.9 kg (149 lb 12.8 oz)   Height: 1.651 m (5' 5\")      Body mass index is 24.93 kg/m².        General: No distress, appears stated age.    Eyes:  PERRL. Conjunctivae/corneas clear.  HENT: Head normal appearing. Nares normal. Oral mucosa moist.  Hearing intact.   Neck: Supple, with full range of motion. Trachea midline.  No gross JVD appreciated.  Respiratory:  Normal effort. Clear to auscultation, without rales or wheezes or rhonchi.  Cardiovascular: Normal rate, regular rhythm with normal S1/S2 without murmurs.    No lower extremity edema.   Abdomen: Soft, non-tender, non-distended with normal bowel sounds.  Musculoskeletal: No joint swelling or tenderness. Normal tone. No abnormal movements.   Skin: Warm and dry. No rashes or lesions.  Neurologic:  No focal sensory/motor deficits in the upper or lower extremities. Cranial nerves:  grossly non-focal 2-12.     Psychiatric: Alert and oriented, normal insight and thought content.   Capillary Refill: Brisk,< 3 seconds.  Peripheral Pulses: +2 palpable, equal bilaterally.       No Known Allergies  Prior to Visit Medications    Medication Sig Taking? Authorizing Provider

## 2025-05-19 ENCOUNTER — OFFICE VISIT (OUTPATIENT)
Dept: ENT CLINIC | Age: 70
End: 2025-05-19
Payer: MEDICARE

## 2025-05-19 ENCOUNTER — PREP FOR PROCEDURE (OUTPATIENT)
Dept: ENT CLINIC | Age: 70
End: 2025-05-19

## 2025-05-19 VITALS
WEIGHT: 150.4 LBS | BODY MASS INDEX: 25.06 KG/M2 | OXYGEN SATURATION: 99 % | DIASTOLIC BLOOD PRESSURE: 84 MMHG | SYSTOLIC BLOOD PRESSURE: 122 MMHG | TEMPERATURE: 98 F | HEIGHT: 65 IN | HEART RATE: 61 BPM

## 2025-05-19 DIAGNOSIS — G47.10 HYPERSOMNOLENCE: ICD-10-CM

## 2025-05-19 DIAGNOSIS — C09.9 PRIMARY TONSILLAR SQUAMOUS CELL CARCINOMA (HCC): ICD-10-CM

## 2025-05-19 DIAGNOSIS — K21.9 GASTROESOPHAGEAL REFLUX DISEASE WITHOUT ESOPHAGITIS: ICD-10-CM

## 2025-05-19 DIAGNOSIS — R13.14 PHARYNGOESOPHAGEAL DYSPHAGIA: Primary | ICD-10-CM

## 2025-05-19 DIAGNOSIS — R06.83 LOUD SNORING: ICD-10-CM

## 2025-05-19 DIAGNOSIS — R06.83 LOUD SNORING: Primary | ICD-10-CM

## 2025-05-19 DIAGNOSIS — K22.2 ESOPHAGEAL STRICTURE: ICD-10-CM

## 2025-05-19 DIAGNOSIS — Z98.890 S/P LAPAROSCOPIC FUNDOPLICATION: ICD-10-CM

## 2025-05-19 DIAGNOSIS — R13.14 PHARYNGOESOPHAGEAL DYSPHAGIA: ICD-10-CM

## 2025-05-19 DIAGNOSIS — Z01.818 PRE-OP TESTING: ICD-10-CM

## 2025-05-19 PROCEDURE — 1159F MED LIST DOCD IN RCRD: CPT | Performed by: OTOLARYNGOLOGY

## 2025-05-19 PROCEDURE — 99213 OFFICE O/P EST LOW 20 MIN: CPT | Performed by: OTOLARYNGOLOGY

## 2025-05-19 PROCEDURE — 1124F ACP DISCUSS-NO DSCNMKR DOCD: CPT | Performed by: OTOLARYNGOLOGY

## 2025-05-19 PROCEDURE — 92511 NASOPHARYNGOSCOPY: CPT | Performed by: OTOLARYNGOLOGY

## 2025-05-20 NOTE — PROGRESS NOTES
05/21/2024 05:29 PM    ALT 21 01/03/2025 01:47 PM    ALT 16 09/07/2024 04:25 PM    ALT 17 05/21/2024 05:29 PM       All of the past medical history, past surgical history, family history,social history, allergies and current medications were reviewed with the patient.  Assessment & Plan   Assessment & Plan   Diagnoses and all orders for this visit:     Diagnosis Orders   1. Pharyngoesophageal dysphagia  SCHEDULE SURGERY      2. Primary tonsillar squamous cell carcinoma (HCC)  SCHEDULE SURGERY      3. Gastroesophageal reflux disease without esophagitis  SCHEDULE SURGERY      4. Esophageal stricture  SCHEDULE SURGERY      5. S/P laparoscopic fundoplication  SCHEDULE SURGERY      6. Loud snoring  Miscellaneous Surgery      7. Hypersomnolence  Miscellaneous Surgery          The findings were explained and his questions were answered.     Assessment & Plan  1.  Cancer surveillance.  The patient continues to be without evidence of recurrent cancer now well into his third year post treatment with intent to cure.  He continues to have close surveillance and has been very cooperative with his care.  He continues to be without any cardinal signs of persistent/recurrent disease.    2.  Dysphagia.  Dysphagia is likely due to the accumulation of food in the lower esophagus, causing nausea. The fundoplication wrap may be contributing to this by causing air swallowing and inefficient esophageal pumping. Incorporate spicy foods into the diet to enhance proprioception of food. Ensure complete delivery of swallowed food into the stomach and perform a double swallow with water to facilitate this process. A dilation procedure will be scheduled, during which a drug-induced sleep endoscopy will be performed to identify the source of snoring.    3. Fatigue.  Persistent fatigue may be related to sleep apnea. A sleep endoscopy AKA DISE will be performed to assess whether an obstructive process likely to be sleep apnea exists and is

## 2025-05-21 ENCOUNTER — PREP FOR PROCEDURE (OUTPATIENT)
Dept: ENT CLINIC | Age: 70
End: 2025-05-21

## 2025-05-22 ENCOUNTER — LAB (OUTPATIENT)
Dept: LAB | Age: 70
End: 2025-05-22

## 2025-05-22 DIAGNOSIS — K22.2 ESOPHAGEAL STRICTURE: ICD-10-CM

## 2025-05-22 DIAGNOSIS — C09.9 PRIMARY TONSILLAR SQUAMOUS CELL CARCINOMA (HCC): ICD-10-CM

## 2025-05-22 DIAGNOSIS — Z00.00 INITIAL MEDICARE ANNUAL WELLNESS VISIT: ICD-10-CM

## 2025-05-22 DIAGNOSIS — R13.14 PHARYNGOESOPHAGEAL DYSPHAGIA: ICD-10-CM

## 2025-05-22 DIAGNOSIS — Z01.818 PRE-OP TESTING: ICD-10-CM

## 2025-05-22 DIAGNOSIS — G47.10 HYPERSOMNOLENCE: ICD-10-CM

## 2025-05-22 DIAGNOSIS — R06.83 LOUD SNORING: ICD-10-CM

## 2025-05-22 DIAGNOSIS — K21.9 GASTROESOPHAGEAL REFLUX DISEASE WITHOUT ESOPHAGITIS: ICD-10-CM

## 2025-05-22 LAB
ALBUMIN SERPL BCG-MCNC: 4.1 G/DL (ref 3.4–4.9)
ALP SERPL-CCNC: 75 U/L (ref 40–129)
ALT SERPL W/O P-5'-P-CCNC: 22 U/L (ref 10–50)
ANION GAP SERPL CALC-SCNC: 11 MEQ/L (ref 8–16)
AST SERPL-CCNC: 24 U/L (ref 10–50)
BASOPHILS ABSOLUTE: 0.1 THOU/MM3 (ref 0–0.1)
BASOPHILS NFR BLD AUTO: 0.8 %
BILIRUB SERPL-MCNC: 0.4 MG/DL (ref 0.3–1.2)
BUN SERPL-MCNC: 14 MG/DL (ref 8–23)
CALCIUM SERPL-MCNC: 9.3 MG/DL (ref 8.8–10.2)
CHLORIDE SERPL-SCNC: 104 MEQ/L (ref 98–111)
CHOLEST SERPL-MCNC: 201 MG/DL (ref 100–199)
CO2 SERPL-SCNC: 25 MEQ/L (ref 22–29)
CREAT SERPL-MCNC: 0.9 MG/DL (ref 0.7–1.2)
DEPRECATED RDW RBC AUTO: 41.1 FL (ref 35–45)
EOSINOPHIL NFR BLD AUTO: 4.5 %
EOSINOPHILS ABSOLUTE: 0.3 THOU/MM3 (ref 0–0.4)
ERYTHROCYTE [DISTWIDTH] IN BLOOD BY AUTOMATED COUNT: 12.8 % (ref 11.5–14.5)
GFR SERPL CREATININE-BSD FRML MDRD: > 90 ML/MIN/1.73M2
GLUCOSE SERPL-MCNC: 93 MG/DL (ref 74–109)
HCT VFR BLD AUTO: 48.2 % (ref 42–52)
HCV IGG SERPL QL IA: NONREACTIVE
HDLC SERPL-MCNC: 49 MG/DL
HGB BLD-MCNC: 16 GM/DL (ref 14–18)
HIV 1+2 AB+HIV1 P24 AG SERPL QL IA: NORMAL
IMM GRANULOCYTES # BLD AUTO: 0.06 THOU/MM3 (ref 0–0.07)
IMM GRANULOCYTES NFR BLD AUTO: 0.9 %
LDLC SERPL CALC-MCNC: 131 MG/DL
LYMPHOCYTES ABSOLUTE: 0.9 THOU/MM3 (ref 1–4.8)
LYMPHOCYTES NFR BLD AUTO: 14.5 %
MCH RBC QN AUTO: 29.3 PG (ref 26–33)
MCHC RBC AUTO-ENTMCNC: 33.2 GM/DL (ref 32.2–35.5)
MCV RBC AUTO: 88.3 FL (ref 80–94)
MONOCYTES ABSOLUTE: 0.6 THOU/MM3 (ref 0.4–1.3)
MONOCYTES NFR BLD AUTO: 8.5 %
NEUTROPHILS ABSOLUTE: 4.6 THOU/MM3 (ref 1.8–7.7)
NEUTROPHILS NFR BLD AUTO: 70.8 %
NRBC BLD AUTO-RTO: 0 /100 WBC
PLATELET # BLD AUTO: 242 THOU/MM3 (ref 130–400)
PMV BLD AUTO: 9.1 FL (ref 9.4–12.4)
POTASSIUM SERPL-SCNC: 4 MEQ/L (ref 3.5–5.2)
PROT SERPL-MCNC: 7.1 G/DL (ref 6.4–8.3)
RBC # BLD AUTO: 5.46 MILL/MM3 (ref 4.7–6.1)
SODIUM SERPL-SCNC: 140 MEQ/L (ref 135–145)
TRIGL SERPL-MCNC: 104 MG/DL (ref 0–199)
WBC # BLD AUTO: 6.5 THOU/MM3 (ref 4.8–10.8)

## 2025-05-27 RX ORDER — SODIUM CHLORIDE 9 MG/ML
INJECTION, SOLUTION INTRAVENOUS PRN
Status: CANCELLED | OUTPATIENT
Start: 2025-05-27

## 2025-05-27 RX ORDER — SODIUM CHLORIDE 0.9 % (FLUSH) 0.9 %
5-40 SYRINGE (ML) INJECTION EVERY 12 HOURS SCHEDULED
Status: CANCELLED | OUTPATIENT
Start: 2025-05-27

## 2025-05-27 RX ORDER — SODIUM CHLORIDE 0.9 % (FLUSH) 0.9 %
5-40 SYRINGE (ML) INJECTION PRN
Status: CANCELLED | OUTPATIENT
Start: 2025-05-27

## 2025-05-28 ENCOUNTER — HOSPITAL ENCOUNTER (OUTPATIENT)
Age: 70
Setting detail: OUTPATIENT SURGERY
Discharge: HOME | End: 2025-05-28
Attending: OTOLARYNGOLOGY | Admitting: OTOLARYNGOLOGY
Payer: MEDICARE

## 2025-05-28 ENCOUNTER — ANESTHESIA EVENT (OUTPATIENT)
Dept: OPERATING ROOM | Age: 70
End: 2025-05-28
Payer: MEDICARE

## 2025-05-28 ENCOUNTER — APPOINTMENT (OUTPATIENT)
Dept: GENERAL RADIOLOGY | Age: 70
End: 2025-05-28
Attending: OTOLARYNGOLOGY
Payer: MEDICARE

## 2025-05-28 ENCOUNTER — ANESTHESIA (OUTPATIENT)
Dept: OPERATING ROOM | Age: 70
End: 2025-05-28
Payer: MEDICARE

## 2025-05-28 VITALS
TEMPERATURE: 97.1 F | SYSTOLIC BLOOD PRESSURE: 147 MMHG | WEIGHT: 147.8 LBS | RESPIRATION RATE: 16 BRPM | HEIGHT: 65 IN | DIASTOLIC BLOOD PRESSURE: 75 MMHG | BODY MASS INDEX: 24.62 KG/M2 | HEART RATE: 99 BPM | OXYGEN SATURATION: 98 %

## 2025-05-28 PROCEDURE — 6360000002 HC RX W HCPCS: Performed by: OTOLARYNGOLOGY

## 2025-05-28 PROCEDURE — 6360000002 HC RX W HCPCS

## 2025-05-28 PROCEDURE — C1601 HC ENDOSCOPE, PULM, IMAGING/ILLUMINATION DEVICE: HCPCS | Performed by: OTOLARYNGOLOGY

## 2025-05-28 PROCEDURE — C1726 CATH, BAL DIL, NON-VASCULAR: HCPCS | Performed by: OTOLARYNGOLOGY

## 2025-05-28 PROCEDURE — 2580000003 HC RX 258: Performed by: OTOLARYNGOLOGY

## 2025-05-28 PROCEDURE — 6360000002 HC RX W HCPCS: Performed by: NURSE ANESTHETIST, CERTIFIED REGISTERED

## 2025-05-28 PROCEDURE — 7100000011 HC PHASE II RECOVERY - ADDTL 15 MIN: Performed by: OTOLARYNGOLOGY

## 2025-05-28 PROCEDURE — 7100000001 HC PACU RECOVERY - ADDTL 15 MIN: Performed by: OTOLARYNGOLOGY

## 2025-05-28 PROCEDURE — 43195 ESOPHAGOSCOPY RIGID BALLOON: CPT | Performed by: OTOLARYNGOLOGY

## 2025-05-28 PROCEDURE — 7100000010 HC PHASE II RECOVERY - FIRST 15 MIN: Performed by: OTOLARYNGOLOGY

## 2025-05-28 PROCEDURE — 2500000003 HC RX 250 WO HCPCS: Performed by: NURSE ANESTHETIST, CERTIFIED REGISTERED

## 2025-05-28 PROCEDURE — 2709999900 HC NON-CHARGEABLE SUPPLY: Performed by: OTOLARYNGOLOGY

## 2025-05-28 PROCEDURE — 3600000012 HC SURGERY LEVEL 2 ADDTL 15MIN: Performed by: OTOLARYNGOLOGY

## 2025-05-28 PROCEDURE — 3700000000 HC ANESTHESIA ATTENDED CARE: Performed by: OTOLARYNGOLOGY

## 2025-05-28 PROCEDURE — 3700000001 HC ADD 15 MINUTES (ANESTHESIA): Performed by: OTOLARYNGOLOGY

## 2025-05-28 PROCEDURE — 7100000000 HC PACU RECOVERY - FIRST 15 MIN: Performed by: OTOLARYNGOLOGY

## 2025-05-28 PROCEDURE — 42975 DISE EVAL SLP DO BRTH FLX DX: CPT | Performed by: OTOLARYNGOLOGY

## 2025-05-28 PROCEDURE — 3600000002 HC SURGERY LEVEL 2 BASE: Performed by: OTOLARYNGOLOGY

## 2025-05-28 PROCEDURE — 6370000000 HC RX 637 (ALT 250 FOR IP): Performed by: OTOLARYNGOLOGY

## 2025-05-28 PROCEDURE — 71045 X-RAY EXAM CHEST 1 VIEW: CPT

## 2025-05-28 RX ORDER — PROPOFOL 10 MG/ML
INJECTION, EMULSION INTRAVENOUS
Status: DISCONTINUED | OUTPATIENT
Start: 2025-05-28 | End: 2025-05-28 | Stop reason: SDUPTHER

## 2025-05-28 RX ORDER — ONDANSETRON 2 MG/ML
INJECTION INTRAMUSCULAR; INTRAVENOUS
Status: DISCONTINUED | OUTPATIENT
Start: 2025-05-28 | End: 2025-05-28 | Stop reason: SDUPTHER

## 2025-05-28 RX ORDER — SODIUM CHLORIDE 9 MG/ML
INJECTION, SOLUTION INTRAVENOUS PRN
Status: DISCONTINUED | OUTPATIENT
Start: 2025-05-28 | End: 2025-05-28 | Stop reason: HOSPADM

## 2025-05-28 RX ORDER — SODIUM CHLORIDE 0.9 % (FLUSH) 0.9 %
5-40 SYRINGE (ML) INJECTION EVERY 12 HOURS SCHEDULED
Status: DISCONTINUED | OUTPATIENT
Start: 2025-05-28 | End: 2025-05-28 | Stop reason: HOSPADM

## 2025-05-28 RX ORDER — DEXAMETHASONE SODIUM PHOSPHATE 10 MG/ML
10 INJECTION, EMULSION INTRAMUSCULAR; INTRAVENOUS ONCE
Status: DISCONTINUED | OUTPATIENT
Start: 2025-05-28 | End: 2025-05-28 | Stop reason: SDUPTHER

## 2025-05-28 RX ORDER — ROCURONIUM BROMIDE 10 MG/ML
INJECTION, SOLUTION INTRAVENOUS
Status: DISCONTINUED | OUTPATIENT
Start: 2025-05-28 | End: 2025-05-28 | Stop reason: SDUPTHER

## 2025-05-28 RX ORDER — PHENYLEPHRINE HCL IN 0.9% NACL 1 MG/10 ML
SYRINGE (ML) INTRAVENOUS
Status: DISCONTINUED | OUTPATIENT
Start: 2025-05-28 | End: 2025-05-28 | Stop reason: SDUPTHER

## 2025-05-28 RX ORDER — OXYMETAZOLINE HYDROCHLORIDE 0.05 G/100ML
SPRAY NASAL PRN
Status: DISCONTINUED | OUTPATIENT
Start: 2025-05-28 | End: 2025-05-28 | Stop reason: ALTCHOICE

## 2025-05-28 RX ORDER — FENTANYL CITRATE 50 UG/ML
50 INJECTION, SOLUTION INTRAMUSCULAR; INTRAVENOUS EVERY 5 MIN PRN
Status: DISCONTINUED | OUTPATIENT
Start: 2025-05-28 | End: 2025-05-28 | Stop reason: HOSPADM

## 2025-05-28 RX ORDER — ONDANSETRON 2 MG/ML
4 INJECTION INTRAMUSCULAR; INTRAVENOUS ONCE
Status: DISCONTINUED | OUTPATIENT
Start: 2025-05-28 | End: 2025-05-28 | Stop reason: HOSPADM

## 2025-05-28 RX ORDER — LIDOCAINE HYDROCHLORIDE 20 MG/ML
INJECTION, SOLUTION INTRAVENOUS
Status: DISCONTINUED | OUTPATIENT
Start: 2025-05-28 | End: 2025-05-28 | Stop reason: SDUPTHER

## 2025-05-28 RX ORDER — EPHEDRINE SULFATE/0.9% NACL/PF 25 MG/5 ML
SYRINGE (ML) INTRAVENOUS
Status: DISCONTINUED | OUTPATIENT
Start: 2025-05-28 | End: 2025-05-28 | Stop reason: SDUPTHER

## 2025-05-28 RX ORDER — FENTANYL CITRATE 50 UG/ML
INJECTION, SOLUTION INTRAMUSCULAR; INTRAVENOUS
Status: COMPLETED
Start: 2025-05-28 | End: 2025-05-28

## 2025-05-28 RX ORDER — FENTANYL CITRATE 50 UG/ML
INJECTION, SOLUTION INTRAMUSCULAR; INTRAVENOUS
Status: DISCONTINUED | OUTPATIENT
Start: 2025-05-28 | End: 2025-05-28 | Stop reason: SDUPTHER

## 2025-05-28 RX ORDER — SODIUM CHLORIDE 0.9 % (FLUSH) 0.9 %
5-40 SYRINGE (ML) INJECTION PRN
Status: DISCONTINUED | OUTPATIENT
Start: 2025-05-28 | End: 2025-05-28 | Stop reason: HOSPADM

## 2025-05-28 RX ORDER — DEXAMETHASONE SODIUM PHOSPHATE 10 MG/ML
10 INJECTION, SOLUTION INTRAMUSCULAR; INTRAVENOUS ONCE
Status: COMPLETED | OUTPATIENT
Start: 2025-05-28 | End: 2025-05-28

## 2025-05-28 RX ORDER — LIDOCAINE HYDROCHLORIDE 40 MG/ML
INJECTION, SOLUTION RETROBULBAR PRN
Status: DISCONTINUED | OUTPATIENT
Start: 2025-05-28 | End: 2025-05-28 | Stop reason: ALTCHOICE

## 2025-05-28 RX ADMIN — Medication 0.5 MG: at 14:02

## 2025-05-28 RX ADMIN — PROPOFOL 100 MG: 10 INJECTION, EMULSION INTRAVENOUS at 12:51

## 2025-05-28 RX ADMIN — Medication 100 MG: at 12:28

## 2025-05-28 RX ADMIN — Medication 100 MCG: at 13:13

## 2025-05-28 RX ADMIN — ONDANSETRON 4 MG: 2 INJECTION, SOLUTION INTRAMUSCULAR; INTRAVENOUS at 13:40

## 2025-05-28 RX ADMIN — EPHEDRINE SULFATE 25 MG: 5 INJECTION INTRAVENOUS at 13:13

## 2025-05-28 RX ADMIN — FENTANYL CITRATE 100 MCG: 50 INJECTION, SOLUTION INTRAMUSCULAR; INTRAVENOUS at 13:05

## 2025-05-28 RX ADMIN — DEXAMETHASONE SODIUM PHOSPHATE 10 MG: 10 INJECTION, SOLUTION INTRAMUSCULAR; INTRAVENOUS at 10:46

## 2025-05-28 RX ADMIN — PROPOFOL 350 MG: 10 INJECTION, EMULSION INTRAVENOUS at 12:28

## 2025-05-28 RX ADMIN — FENTANYL CITRATE 50 MCG: 50 INJECTION, SOLUTION INTRAMUSCULAR; INTRAVENOUS at 13:57

## 2025-05-28 RX ADMIN — SUGAMMADEX 200 MG: 100 INJECTION, SOLUTION INTRAVENOUS at 13:40

## 2025-05-28 RX ADMIN — SODIUM CHLORIDE 3000 MG: 9 INJECTION, SOLUTION INTRAVENOUS at 12:22

## 2025-05-28 RX ADMIN — FENTANYL CITRATE 50 MCG: 50 INJECTION INTRAMUSCULAR; INTRAVENOUS at 13:57

## 2025-05-28 RX ADMIN — ROCURONIUM BROMIDE 50 MG: 10 INJECTION INTRAVENOUS at 12:51

## 2025-05-28 RX ADMIN — SODIUM CHLORIDE 100 ML/HR: 0.9 INJECTION, SOLUTION INTRAVENOUS at 10:36

## 2025-05-28 RX ADMIN — HYDROMORPHONE HYDROCHLORIDE 0.5 MG: 1 INJECTION, SOLUTION INTRAMUSCULAR; INTRAVENOUS; SUBCUTANEOUS at 14:02

## 2025-05-28 ASSESSMENT — PAIN SCALES - GENERAL
PAINLEVEL_OUTOF10: 3
PAINLEVEL_OUTOF10: 3
PAINLEVEL_OUTOF10: 7
PAINLEVEL_OUTOF10: 7
PAINLEVEL_OUTOF10: 5

## 2025-05-28 ASSESSMENT — PAIN DESCRIPTION - LOCATION
LOCATION: THROAT
LOCATION: THROAT
LOCATION: OTHER (COMMENT)

## 2025-05-28 ASSESSMENT — PAIN - FUNCTIONAL ASSESSMENT
PAIN_FUNCTIONAL_ASSESSMENT: ADULT NONVERBAL PAIN SCALE (NPVS)
PAIN_FUNCTIONAL_ASSESSMENT: ACTIVITIES ARE NOT PREVENTED

## 2025-05-28 ASSESSMENT — PAIN DESCRIPTION - PAIN TYPE: TYPE: CHRONIC PAIN

## 2025-05-28 ASSESSMENT — PAIN DESCRIPTION - DESCRIPTORS
DESCRIPTORS: ACHING
DESCRIPTORS: ACHING;DISCOMFORT

## 2025-05-28 NOTE — PROGRESS NOTES
1349 Pt arrives to pacu at this time, Pt initially unresponsive but as we were placing pt on monitor, pt awakens on own. Able to open mouth so OPA removed at this time. Pt moving around in bed but does not answer questions or follow commands. Respirs unlabored on 3L NC. VSS.     1355 Pt more alert. C/o pain in throat 7/10. Denies nausea or feeling cold.     1357 Fentanyl given for pain.     1402 Pain unchanged, Dilaudid given.     1406 Dr. Gutierrez at bedside. Orders given for cxr.     1410 - Xray at bedside.     1420 - Pt states pain in throat has improved 5/10 and it tolerable. Ice chips given per request. Pt shivering. Warming device applied.     1430 - Shivering improved. Pt states pain is down to 3/10. Pt meets criteria to discharge from pacu at this time. Pt transported to [facility] in stable condition. Family at bedside.       
1650  seen pt and now said okay to discharge patient.   Pt has met discharge criteria and states he is ready for discharge to home. IV removed, gauze and tape applied. Dressed in own clothes and personal belongings gathered. Discharge instructions (with opioid medication education information) given to pt and family; pt and family verbalized understanding of discharge instructions, prescriptions and follow up appointments. Pt transported to discharge lobby by hospitals staff.      
PAT call attempted, patient unavailable, left message to please call us back at your earliest convenience; [phone]  
Patient oriented to Same Day department and admitted to Same Day Surgery room 7.   Patient verbalized approval for first name, last initial with physician name on unit whiteboard.     Plan of care reviewed with patient.   Patient room whiteboard filled out and discussed with patient and responsible adult.   Patient and responsible adult offered Same Day Welcome Packet to review.    Call light in reach.   Bed in lowest position, locked, with one bed rail up.   SCDs and warming blanket in place.  Appropriate arm bands on patient.   Bathroom offered.   All questions and concerns of patient addressed.        Meds to Beds:   Patient informed of [facility] Meds to [facility]eds program during admission. Patient is agreeable to program.   Contact information for the pharmacy and the Meds to Beds program:   Name: Torrie   Relationship to patient:spouse/significant other   Phone number: [phone]  
Pt returned to [facility] room 7. Vitals and assessment as charted. 0.9 infusing, @500ml to count from PACU. Pt has sherbert and water. Family at the bedside. Pt and family verbalized understanding of discharge criteria and call light use. Call light in reach.    
with you for 24 hours overnight (surgery may be cancelled if you don't have this)  Report to [facility] on 2nd floor  If you would become ill prior to surgery, please call the surgeon  May have a visitor with you, we request that you limit to 2 visitors in pre-op area  Masks are recommended but not required, new masks at entrance desk  Call [phone] for any questions

## 2025-05-28 NOTE — DISCHARGE INSTRUCTIONS
Discharge instructions for Mr. Javon Kenney from Dr. Federico Gutierrez:    1.  Rest; sleep propped up or in a easy chair for the next couple of nights  2.  Stay on clear liquids today through tomorrow afternoon and then you can start adding full liquids like sherbet and regular Ensure.  Starting on Friday you can advance your diet as tolerated.  Stay away from citrus and no carbonation whatsoever.  3.  Check your temperature twice a day and call me if you have a fever, neck swelling, or chest pain  4.  For emergencies:    Federico Gutierrez MD  Cell: [phone]  \

## 2025-05-28 NOTE — H&P
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  (No tx indicated.)        Past Surgical History     Past Surgical History:   Procedure Laterality Date    BRONCHOSCOPY N/A 06/21/2023    BRONCHOSCOPY performed by Federico Gutierrez MD at [facility] OR    COLONOSCOPY      ENDOSCOPY, COLON, DIAGNOSTIC      ESOPHAGEAL DILATATION N/A 1/10/2025    ESOPHAGOSCOPY, RIGID, WITH BALLOON DILATATION performed by Federico Gutierrez MD at [facility] OR    ESOPHAGOSCOPY N/A 07/30/2024    Esophagoscopy Rigid with balloon Dilation performed by Federico Gutierrez MD at [facility] OR    ESOPHAGOSCOPY N/A 11/6/2024    Esophagoscopy with balloon dilation performed by Federico Gutierrez MD at [facility] OR    EYE SURGERY  plate in right eye    GASTRIC FUNDOPLICATION N/A 05/08/2023    ROBOTIC HIATAL HERNIA REPAIR AND NISSEN FUNDOPLICATION performed by Med Collins MD at [facility] OR    LARYNGOSCOPY N/A 06/21/2023    Laryngoscopy with Biopsy, Flexible Esophagoscopy - Diagnostic Bronchoscopy with Broncheal Alveolar Lavage, Biopsy of Oropharynx performed by Federico Gutierrez MD at [facility] OR    LARYNGOSCOPY N/A 07/28/2023    Laser Resection of Positive Margin Of Tonsil Mass performed by Federico Gutierrez MD at [facility] OR    LARYNGOSCOPY N/A 3/14/2025    Esophagoscopy, rigid, with balloon dilation performed by Federico Gutierrez MD at [facility] OR    MANDIBLE SURGERY  01/01/1979    MOUTH SURGERY N/A 07/21/2023    Transoral Robotic Radical Tonsillectomy, Right Partial Tongue Base Resection with Closure, Palatoplasty, Pharyngoplasty performed by Federico Gutierrez MD at [facility] OR    NASAL SURG PROC UNLISTED N/A 04/18/2024    LImited Septoplasty for Resection of Septal Spur Biopsy of Nasopharynx Visible Lesion Simple performed by Federico Gutierrez MD at [facility] OR    NECK SURGERY  01/01/2001    plate in neck     NECK SURGERY N/A 07/28/2023    Selective Neck Dissection

## 2025-05-28 NOTE — ANESTHESIA PRE PROCEDURE
Date of last solid food consumption: 05/27/25    BMI:   Wt Readings from Last 3 Encounters:   05/28/25 67 kg (147 lb 12.8 oz)   05/19/25 68.2 kg (150 lb 6.4 oz)   05/08/25 67.9 kg (149 lb 12.8 oz)     Body mass index is 24.6 kg/m².    CBC:   Lab Results   Component Value Date/Time    WBC 6.5 05/22/2025 09:58 AM    RBC 5.46 05/22/2025 09:58 AM    RBC 5.20 07/09/2011 08:24 AM    HGB 16.0 05/22/2025 09:58 AM    HGB 16.0 07/09/2011 08:24 AM    HCT 48.2 05/22/2025 09:58 AM    MCV 88.3 05/22/2025 09:58 AM    RDW 13.0 12/01/2023 10:01 AM     05/22/2025 09:58 AM       CMP:   Lab Results   Component Value Date/Time     05/22/2025 09:58 AM    K 4.0 05/22/2025 09:58 AM    K 3.9 06/11/2024 06:15 AM     05/22/2025 09:58 AM    CO2 25 05/22/2025 09:58 AM    BUN 14 05/22/2025 09:58 AM    CREATININE 0.9 05/22/2025 09:58 AM    GFRAA >60 09/28/2020 09:31 AM    AGRATIO 1.3 01/24/2022 01:53 PM    LABGLOM > 90 05/22/2025 09:58 AM    LABGLOM >90 04/19/2024 05:48 AM    GLUCOSE 93 05/22/2025 09:58 AM    GLUCOSE 80 01/24/2022 01:53 PM    CALCIUM 9.3 05/22/2025 09:58 AM    BILITOT 0.4 05/22/2025 09:58 AM    ALKPHOS 75 05/22/2025 09:58 AM    AST 24 05/22/2025 09:58 AM    ALT 22 05/22/2025 09:58 AM       POC Tests: No results for input(s): \"POCGLU\", \"POCNA\", \"POCK\", \"POCCL\", \"POCBUN\", \"POCHEMO\", \"POCHCT\" in the last 72 hours.    Coags:   Lab Results   Component Value Date/Time    INR 1.02 02/09/2023 04:26 PM    APTT 33.3 02/09/2023 04:26 PM       HCG (If Applicable): No results found for: \"PREGTESTUR\", \"PREGSERUM\", \"HCG\", \"HCGQUANT\"     ABGs: No results found for: \"PHART\", \"PO2ART\", \"AOO5OQA\", \"MLA3UHV\", \"BEART\", \"V5XPBKZB\"     Type & Screen (If Applicable):  No results found for: \"ABORH\", \"LABANTI\"    Drug/Infectious Status (If Applicable):  Lab Results   Component Value Date/Time    HEPCAB Nonreactive 05/22/2025 09:58 AM       COVID-19 Screening (If Applicable):   Lab Results   Component Value Date/Time    COVID19

## 2025-05-28 NOTE — ANESTHESIA POSTPROCEDURE EVALUATION
Department of Anesthesiology  Postprocedure Note    Patient: Javon Kenney  MRN: [id number]  YOB: 1955  Date of evaluation: 5/28/2025    Procedure Summary       Date: 05/28/25 Room / [facility] 07 [facility]    Anesthesia Start: 1222 Anesthesia Stop: 1359    Procedure: DRUG INDUCED SLEEP ENDOSCOPY ESOPHAGOSCOPY DILATATION, Diagnosis:       Loud snoring      Hypersomnolence      Primary tonsillar squamous cell carcinoma (HCC)      Pharyngoesophageal dysphagia      Esophageal stricture      S/P laparoscopic fundoplication      (Loud snoring [R06.83])      (Hypersomnolence [G47.10])      (Primary tonsillar squamous cell carcinoma (HCC) [C09.9])      (Pharyngoesophageal dysphagia [R13.14])      (Esophageal stricture [K22.2])      (S/P laparoscopic fundoplication [Z98.890])    Surgeons: Federico Gutierrez MD Responsible Provider: Hola Xiao MD    Anesthesia Type: MAC ASA Status: 3            Anesthesia Type: No value filed.    Gael Phase I: Gael Score: 10    Gael Phase II: Gael Score: 10    Anesthesia Post Evaluation    Patient location during evaluation: PACU  Patient participation: complete - patient participated  Level of consciousness: awake and alert  Airway patency: patent  Nausea & Vomiting: no nausea  Cardiovascular status: blood pressure returned to baseline and hemodynamically stable  Respiratory status: acceptable and spontaneous ventilation  Hydration status: euvolemic  Pain management: adequate    No notable events documented.

## 2025-05-28 NOTE — BRIEF OP NOTE
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]     * Pharyngoesophageal dysphagia [R13.14]     * Esophageal stricture [K22.2]     * S/P laparoscopic fundoplication [Z98.890]    Post-Op Diagnosis: Same       Procedure(s):  DRUG INDUCED SLEEP ENDOSCOPY ESOPHAGOSCOPY DILATATION,    Surgeon(s):  Federico Gutierrez MD    Assistant:  * No surgical staff found *    Anesthesia: Monitor Anesthesia Care    Estimated Blood Loss (mL): Minimal    Complications: None    Specimens:   * No specimens in log *    Implants:  * No implants in log *      Drains: * No LDAs found *    Findings:  Infection Present At Time Of Surgery (PATOS) (choose all levels that have infection present):  No infection present  Other Findings: 1.  DISE demonstrated a narrow nasal airway bilaterally with no soft palate collapse.  Tongue base collapse over the larynx was complete and a reliable basis for obstructive apnea.  The patient's supraglottis was free of intrinsic malacia.  2.  The patient's esophageal inlet was markedly narrowed.  He had previously been dilated to 1 paulie of dilatation of force with the 37 mm balloon.  Today dilated to 1.5 paulie of same balloon until no additional work needed to be done to maintain the pressure.  Minimal bleeding was encountered following this dilation and no obvious full-thickness shearing of the mucosa was seen.  3.  The distal esophagus had mild esophagitis without tongues of salmon-colored mucosa.  The stomach had a shallow cardia ulcer without signs of recent bleeding.  There was a large volume of bile in the stomach that was highly aerosolized with extensive bubbles throughout the stomach which is suctioned away.  A clear swirl pattern of the fundoplication was not evident but

## 2025-05-29 ENCOUNTER — HOSPITAL ENCOUNTER (EMERGENCY)
Age: 70
Discharge: HOME | End: 2025-05-29
Attending: EMERGENCY MEDICINE
Payer: MEDICARE

## 2025-05-29 ENCOUNTER — APPOINTMENT (OUTPATIENT)
Dept: CT IMAGING | Age: 70
End: 2025-05-29
Payer: MEDICARE

## 2025-05-29 VITALS
DIASTOLIC BLOOD PRESSURE: 97 MMHG | OXYGEN SATURATION: 99 % | HEART RATE: 91 BPM | SYSTOLIC BLOOD PRESSURE: 155 MMHG | TEMPERATURE: 98.9 F | RESPIRATION RATE: 18 BRPM

## 2025-05-29 DIAGNOSIS — R10.9 ABDOMINAL PAIN, UNSPECIFIED ABDOMINAL LOCATION: ICD-10-CM

## 2025-05-29 DIAGNOSIS — K59.00 CONSTIPATION, UNSPECIFIED CONSTIPATION TYPE: Primary | ICD-10-CM

## 2025-05-29 LAB
ALBUMIN SERPL BCG-MCNC: 4.1 G/DL (ref 3.4–4.9)
ALP SERPL-CCNC: 70 U/L (ref 40–129)
ALT SERPL W/O P-5'-P-CCNC: 17 U/L (ref 10–50)
ANION GAP SERPL CALC-SCNC: 12 MEQ/L (ref 8–16)
AST SERPL-CCNC: 23 U/L (ref 10–50)
BASOPHILS ABSOLUTE: 0 THOU/MM3 (ref 0–0.1)
BASOPHILS NFR BLD AUTO: 0.2 %
BILIRUB CONJ SERPL-MCNC: 0.4 MG/DL (ref 0–0.2)
BILIRUB SERPL-MCNC: 1 MG/DL (ref 0.3–1.2)
BUN SERPL-MCNC: 18 MG/DL (ref 8–23)
CALCIUM SERPL-MCNC: 9 MG/DL (ref 8.8–10.2)
CHLORIDE SERPL-SCNC: 103 MEQ/L (ref 98–111)
CO2 SERPL-SCNC: 23 MEQ/L (ref 22–29)
CREAT SERPL-MCNC: 1 MG/DL (ref 0.7–1.2)
DEPRECATED RDW RBC AUTO: 40.2 FL (ref 35–45)
EKG ATRIAL RATE: 87 BPM
EKG P AXIS: 64 DEGREES
EKG P-R INTERVAL: 136 MS
EKG Q-T INTERVAL: 380 MS
EKG QRS DURATION: 90 MS
EKG QTC CALCULATION (BAZETT): 457 MS
EKG R AXIS: 29 DEGREES
EKG T AXIS: 92 DEGREES
EKG VENTRICULAR RATE: 87 BPM
EOSINOPHIL NFR BLD AUTO: 0 %
EOSINOPHILS ABSOLUTE: 0 THOU/MM3 (ref 0–0.4)
ERYTHROCYTE [DISTWIDTH] IN BLOOD BY AUTOMATED COUNT: 12.8 % (ref 11.5–14.5)
GFR SERPL CREATININE-BSD FRML MDRD: 81 ML/MIN/1.73M2
GLUCOSE SERPL-MCNC: 100 MG/DL (ref 74–109)
HCT VFR BLD AUTO: 44 % (ref 42–52)
HGB BLD-MCNC: 14.7 GM/DL (ref 14–18)
IMM GRANULOCYTES # BLD AUTO: 0.07 THOU/MM3 (ref 0–0.07)
IMM GRANULOCYTES NFR BLD AUTO: 0.6 %
LIPASE SERPL-CCNC: 14 U/L (ref 13–60)
LYMPHOCYTES ABSOLUTE: 0.9 THOU/MM3 (ref 1–4.8)
LYMPHOCYTES NFR BLD AUTO: 7.5 %
MCH RBC QN AUTO: 29.2 PG (ref 26–33)
MCHC RBC AUTO-ENTMCNC: 33.4 GM/DL (ref 32.2–35.5)
MCV RBC AUTO: 87.3 FL (ref 80–94)
MONOCYTES ABSOLUTE: 0.9 THOU/MM3 (ref 0.4–1.3)
MONOCYTES NFR BLD AUTO: 6.9 %
NEUTROPHILS ABSOLUTE: 10.6 THOU/MM3 (ref 1.8–7.7)
NEUTROPHILS NFR BLD AUTO: 84.8 %
NRBC BLD AUTO-RTO: 0 /100 WBC
OSMOLALITY SERPL CALC.SUM OF ELEC: 277.7 MOSMOL/KG (ref 275–300)
PLATELET # BLD AUTO: 214 THOU/MM3 (ref 130–400)
PMV BLD AUTO: 8.5 FL (ref 9.4–12.4)
POTASSIUM SERPL-SCNC: 3.5 MEQ/L (ref 3.5–5.2)
PROT SERPL-MCNC: 7 G/DL (ref 6.4–8.3)
RBC # BLD AUTO: 5.04 MILL/MM3 (ref 4.7–6.1)
SODIUM SERPL-SCNC: 138 MEQ/L (ref 135–145)
WBC # BLD AUTO: 12.5 THOU/MM3 (ref 4.8–10.8)

## 2025-05-29 PROCEDURE — 96375 TX/PRO/DX INJ NEW DRUG ADDON: CPT

## 2025-05-29 PROCEDURE — 93005 ELECTROCARDIOGRAM TRACING: CPT | Performed by: EMERGENCY MEDICINE

## 2025-05-29 PROCEDURE — 6360000002 HC RX W HCPCS

## 2025-05-29 PROCEDURE — 6360000004 HC RX CONTRAST MEDICATION: Performed by: EMERGENCY MEDICINE

## 2025-05-29 PROCEDURE — 6370000000 HC RX 637 (ALT 250 FOR IP): Performed by: EMERGENCY MEDICINE

## 2025-05-29 PROCEDURE — 85025 COMPLETE CBC W/AUTO DIFF WBC: CPT

## 2025-05-29 PROCEDURE — 82248 BILIRUBIN DIRECT: CPT

## 2025-05-29 PROCEDURE — 80053 COMPREHEN METABOLIC PANEL: CPT

## 2025-05-29 PROCEDURE — 99285 EMERGENCY DEPT VISIT HI MDM: CPT

## 2025-05-29 PROCEDURE — 2580000003 HC RX 258

## 2025-05-29 PROCEDURE — 96374 THER/PROPH/DIAG INJ IV PUSH: CPT

## 2025-05-29 PROCEDURE — 74177 CT ABD & PELVIS W/CONTRAST: CPT

## 2025-05-29 PROCEDURE — 36415 COLL VENOUS BLD VENIPUNCTURE: CPT

## 2025-05-29 PROCEDURE — 83690 ASSAY OF LIPASE: CPT

## 2025-05-29 RX ORDER — 0.9 % SODIUM CHLORIDE 0.9 %
1000 INTRAVENOUS SOLUTION INTRAVENOUS ONCE
Status: COMPLETED | OUTPATIENT
Start: 2025-05-29 | End: 2025-05-29

## 2025-05-29 RX ORDER — METOCLOPRAMIDE HYDROCHLORIDE 5 MG/ML
10 INJECTION INTRAMUSCULAR; INTRAVENOUS ONCE
Status: COMPLETED | OUTPATIENT
Start: 2025-05-29 | End: 2025-05-29

## 2025-05-29 RX ORDER — ONDANSETRON 2 MG/ML
4 INJECTION INTRAMUSCULAR; INTRAVENOUS ONCE
Status: COMPLETED | OUTPATIENT
Start: 2025-05-29 | End: 2025-05-29

## 2025-05-29 RX ORDER — LACTULOSE 10 G/15ML
20 SOLUTION ORAL ONCE
Status: COMPLETED | OUTPATIENT
Start: 2025-05-29 | End: 2025-05-29

## 2025-05-29 RX ORDER — POLYETHYLENE GLYCOL 3350 17 G/17G
17 POWDER, FOR SOLUTION ORAL DAILY
Qty: 238 G | Refills: 1 | Status: SHIPPED | OUTPATIENT
Start: 2025-05-29 | End: 2025-06-28

## 2025-05-29 RX ORDER — IOPAMIDOL 755 MG/ML
80 INJECTION, SOLUTION INTRAVASCULAR
Status: COMPLETED | OUTPATIENT
Start: 2025-05-29 | End: 2025-05-29

## 2025-05-29 RX ADMIN — IOPAMIDOL 80 ML: 755 INJECTION, SOLUTION INTRAVENOUS at 13:29

## 2025-05-29 RX ADMIN — HYDROMORPHONE HYDROCHLORIDE 1 MG: 1 INJECTION, SOLUTION INTRAMUSCULAR; INTRAVENOUS; SUBCUTANEOUS at 12:06

## 2025-05-29 RX ADMIN — METOCLOPRAMIDE HYDROCHLORIDE 10 MG: 5 INJECTION INTRAMUSCULAR; INTRAVENOUS at 14:16

## 2025-05-29 RX ADMIN — ONDANSETRON 4 MG: 2 INJECTION, SOLUTION INTRAMUSCULAR; INTRAVENOUS at 12:05

## 2025-05-29 RX ADMIN — SODIUM CHLORIDE 1000 ML: 0.9 INJECTION, SOLUTION INTRAVENOUS at 12:09

## 2025-05-29 RX ADMIN — LACTULOSE 20 G: 20 SOLUTION ORAL at 14:17

## 2025-05-29 ASSESSMENT — PAIN SCALES - GENERAL: PAINLEVEL_OUTOF10: 5

## 2025-05-29 NOTE — ED PROVIDER NOTES
I performed a history and physical examination of the patient and discussed management with the resident. I reviewed the resident’s note and agree with the documented findings and plan of care. Any areas of disagreement are noted on the chart. I was personally present for the key portions of any procedures. I have documented in the chart those procedures where I was not present during the key portions. I have reviewed the emergency nurses triage note. I agree with the chief complaint, past medical history, past surgical history, allergies, medications, social and family history as documented unless otherwise noted below. Documentation of the HPI, Physical Exam and Medical Decision Making performed by medical students or scribes is based on my personal performance of the HPI, PE and MDM. For Phys Assistant/ Nurse Practitioner cases/documentation I have personally evaluated this patient and have completed at least one if not all key elements of the E/M (history, physical exam, and MDM). My findings are as noted below.    In other words, I personally saw and examined the patient I have reviewed and agreed with the resident findings including all diagnostic interpretations and treatment plans as written.  I was present for the key portion of any procedures performed and the inclusive time noted in any critical care statement.    Patient presents  today for abdominal pain nausea and vomiting.  Patient states that he was to see ENT, was talking to him about all this as he was getting his treatment there.  They did do a scope and looked down in the belly they opined that the patient may have a small bowel obstruction this was yesterday.  Patient came in today for evaluation.  Patient does have a history of a Niesen fundoplication by Dr. Collins.  His gastroenterologist is Dr. Guerrero.  Patient states that he has not had a bowel movement in 3 to 4 days.  Patient states that his abdominal pain is generalized in nature

## 2025-05-29 NOTE — OP NOTE
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]     * Pharyngoesophageal dysphagia [R13.14]     * Esophageal stricture [K22.2]     * S/P laparoscopic fundoplication [Z98.890]    Post-Op Diagnosis: Same       Procedure(s):  DRUG INDUCED SLEEP ENDOSCOPY ESOPHAGOSCOPY DILATATION,    Surgeon(s):  Federico Gutierrez MD    Assistant:   * No surgical staff found *    Anesthesia: Monitor Anesthesia Care    Estimated Blood Loss (mL): Minimal    Complications: None    Specimens:   * No specimens in log *    Implants:  * No implants in log *      Drains: * No LDAs found *    Findings:  Infection Present At Time Of Surgery (PATOS) (choose all levels that have infection present):  No infection present  Other Findings: 1.  DISE demonstrated a narrow nasal airway bilaterally with no soft palate collapse.  Tongue base collapse over the larynx was complete and a reliable basis for obstructive apnea.  The patient's supraglottis was free of intrinsic malacia.  2.  The patient's esophageal inlet was markedly narrowed.  He had previously been dilated to 1 paulie of dilatation of force with the 37 mm balloon.  Today dilated to 1.5 paulie of same balloon until no additional work needed to be done to maintain the pressure.  Minimal bleeding was encountered following this dilation and no obvious full-thickness shearing of the mucosa was seen.  3.  The distal esophagus had mild esophagitis without tongues of salmon-colored mucosa.  The stomach had a shallow cardia ulcer without signs of recent bleeding.  There was a large volume of bile in the stomach that was highly aerosolized with extensive bubbles throughout the stomach which is suctioned away.  A clear swirl pattern of the fundoplication was not evident but because of the

## 2025-05-29 NOTE — ED TRIAGE NOTES
Pt presents to the ED from home with complaints of abdominal pain. Pt got his esophagus stretched yesterday and also got a scope done by Dr. Gutierrez and they found that his small intestine was clogged and that was pushing bile up. Pt is nauseous on arrival. EKG completed. VSS.

## 2025-05-29 NOTE — ED PROVIDER NOTES
[facility] [place] EMERGENCY DEPARTMENT  EMERGENCY DEPARTMENT ENCOUNTER          Pt Name: Javon Kenney  MRN: [id number]  Birthdate 1955  Date of evaluation: 5/29/2025  Physician: Mirna Mccarthy MD  Supervising Attending Physician: Roman Cortez DO       CHIEF COMPLAINT       Chief Complaint   Patient presents with    Abdominal Pain         HISTORY OF PRESENT ILLNESS    HPI  Javon Kenney is a 69 y.o. male with past medical history of peptic ulcer disease, cancer, neoplasm of tonsillar fossa, hypothyroidism, squamous cell carcinoma of the tonsil, GERD, anxiety, who presents to the emergency department from home for evaluation of abdominal pain.  The patient reported nausea, weakness, headache, back pain, and abdominal pain since yesterday.  Patient stated that he was seeing Dr. Gutierrez and completed the esophageal stretch yesterday.  And he got a scope done yesterday and found that his small intestine was clogged and that was pushed bile up.  Patient stated he felt very fatigued and unable to eat or drink since yesterday.  Patient states that he have constipation for the past 3 days.  Patient denies fever, chills, headache, lightheadedness, dizziness, vision changes, tinnitus, cough, congestion, sore throat, neck pain/stiffness, shortness of breath, vomiting, constipation, diarrhea, dysuria, blood in the urine or stool, numbness/tingling/weakness in extremities.    The patient has no other acute complaints at this time.      PAST MEDICAL AND SURGICAL HISTORY     Past Medical History:   Diagnosis Date    Anxiety     Arthritis     neck, back, hands bilat    Cancer (HCC) 2022    Melanoma removed from face    External hemorrhoid     bleeding from them and has internal    GERD (gastroesophageal reflux disease)     Hiatal hernia     EGD    History of carotid stenosis 11/05/2023    Hypothyroidism 06/10/2024    Neoplasm of tonsillar fossa 07/03/2023    R - Squamous Cell    PONV  radiologist.      EKG interpretation:  See ED course (if applicable) [none if blank]  All EKG results are individually reviewed and interpreted by me in the clinical context of this patient.  All EKGs are also interpreted by our Cardiology department, final interpretation may not be available as of the writing of this note.      PREVIOUS RECORDS  AND EXTERNAL INFORMATION REVIEWED   History obtained from: the patient.  Pertinent previous and/or external records reviewed:  I have reviewed the chart of the patient today, .  Case discussed with specialties other than Emergency Medicine: Not Applicable      MEDICAL DECISION MAKING   Differential Diagnosis includes but is not limited to:  GERD, PUD, pancreatitis, cholecystitis, GB colic, cholangitis, SBO, DKA, AAA, mesenteric ischemia, perforated viscous, acute gastroenteritis, pyelonephritis, kidney stone, appendicitis, hernia, diverticulitis, UTI, constipation, testicular torsion, epididymitis/ orchitis      Decision Rules/Clinical Scores utilized:    Not Applicable    MIPS documentation:  N/A    Medical Decision Making  69 years old male presents to the emergency department complaining of abdominal pain.  Evaluated patient in the emergency department I ordered a workup including CBC, BMP, liver function test, lipase, CT abdomen.  Patient states that he has a procedure with Dr. Gutierrez yesterday to stretch his esophagus as having stricture.  Patient states today he felt abdominal pain without radiation.  Dr. Gutierrez recommend the patient to come to the emergency department because he found out during the scope completed yesterday his small intestine pain obstructed and pushed him bile up to his throat.  His vital signs in the encounters were within normal limit. Physical exam reveals abdominal tenderness.   Labs CBC WBC 12.5, other than normal workup, EKG Normal sinus rhythm, regular rhythm, no ectopy, normal axis, ST & T wave abnormality without acute ischemia,

## 2025-06-05 ENCOUNTER — OFFICE VISIT (OUTPATIENT)
Dept: SURGERY | Age: 70
End: 2025-06-05
Payer: MEDICARE

## 2025-06-05 VITALS
BODY MASS INDEX: 24.83 KG/M2 | SYSTOLIC BLOOD PRESSURE: 118 MMHG | OXYGEN SATURATION: 97 % | WEIGHT: 149 LBS | RESPIRATION RATE: 18 BRPM | HEIGHT: 65 IN | DIASTOLIC BLOOD PRESSURE: 70 MMHG | TEMPERATURE: 97 F | HEART RATE: 74 BPM

## 2025-06-05 DIAGNOSIS — Z98.890 S/P NISSEN FUNDOPLICATION (WITHOUT GASTROSTOMY TUBE) PROCEDURE: ICD-10-CM

## 2025-06-05 DIAGNOSIS — R14.2 BELCHING: Primary | ICD-10-CM

## 2025-06-05 PROCEDURE — 1125F AMNT PAIN NOTED PAIN PRSNT: CPT | Performed by: SURGERY

## 2025-06-05 PROCEDURE — 99213 OFFICE O/P EST LOW 20 MIN: CPT | Performed by: SURGERY

## 2025-06-05 PROCEDURE — 1124F ACP DISCUSS-NO DSCNMKR DOCD: CPT | Performed by: SURGERY

## 2025-06-06 ASSESSMENT — ENCOUNTER SYMPTOMS
ALLERGIC/IMMUNOLOGIC NEGATIVE: 1
COLOR CHANGE: 0
NAUSEA: 0
SHORTNESS OF BREATH: 0
DIARRHEA: 0
STRIDOR: 0
VOMITING: 0
EYE DISCHARGE: 0
RECTAL PAIN: 0
EYE REDNESS: 0
CHEST TIGHTNESS: 0
CONSTIPATION: 0
ANAL BLEEDING: 0
SINUS PRESSURE: 0
BLOOD IN STOOL: 0
BACK PAIN: 0
WHEEZING: 0
VOICE CHANGE: 0
COUGH: 0
ABDOMINAL DISTENTION: 0
ABDOMINAL PAIN: 0
CHOKING: 0
TROUBLE SWALLOWING: 1
RHINORRHEA: 0
SORE THROAT: 1
EYE ITCHING: 0
PHOTOPHOBIA: 0
APNEA: 0
EYE PAIN: 0
FACIAL SWELLING: 0

## 2025-06-06 NOTE — PROGRESS NOTES
Javon Kenney (:  1955)     ASSESSMENT:  1.  Belching  2.  History of robotic repair moderate-sized paraesophageal hiatal hernia (6 cm defect) with Nissen fundoplication (May 8, 2023)  3.  History tonsillar carcinoma status post resection  4.  Dysphagia    PLAN:  1.  Encouraged Gas-X/simethicone as needed.  2.  Trial low-dose omeprazole daily  3.  Recommend possible follow-up with GI service for further evaluation and possible formal upper endoscopy  4.  Follow-up with ENT as directed  5.  Discussed option of further workup to evaluate prior repair versus conservative management.  He does not want to proceed with any further evaluation.  6.  Stool softeners as needed  7.  Dietary and lifestyle modifications discussed with patient given prior hernia repair and fundoplication.  All questions answered.  8.  Following up as needed at this time and states he will call if symptoms develop.  9.  Signs and symptoms reviewed with patient that would be concerning and need him to return to office for re-evaluation.  Patient states He will call if He has questions or concerns.    SUBJECTIVE/OBJECTIVE:    Chief Complaint   Patient presents with    Follow-up     Est pt last seen   - GERD, belching     HPI  Javon is a 69-year-old male who presents for evaluation due to increased belching.  He has a history of tonsillar carcinoma requiring resection.  He has significant GERD and is currently followed by ENT.  Underwent a robotic moderate-sized paraesophageal hiatal hernia repair with Nissen fundoplication May 2023.  He denies having any heartburn or GERD.  No abdominal pain.  Tolerating diet fairly well.  Admits that if he has any issues that she is in the oropharyngeal region.  Admits to increased belching up air.  No nausea or vomiting.  Recently had his esophagus stretched by ENT.  Denies hemoptysis.  No significant weight loss.  No hematochezia or melena.  No new urinary complaints.    Review of Systems

## 2025-06-17 ENCOUNTER — TRANSCRIBE ORDERS (OUTPATIENT)
Dept: ADMINISTRATIVE | Age: 70
End: 2025-06-17

## 2025-06-17 DIAGNOSIS — I66.09 STENOSIS OF MIDDLE CEREBRAL ARTERY, UNSPECIFIED LATERALITY: ICD-10-CM

## 2025-06-17 DIAGNOSIS — I65.23 BILATERAL CAROTID ARTERY STENOSIS: Primary | ICD-10-CM

## 2025-06-24 ENCOUNTER — OFFICE VISIT (OUTPATIENT)
Dept: ENT CLINIC | Age: 70
End: 2025-06-24
Payer: MEDICARE

## 2025-06-24 VITALS
DIASTOLIC BLOOD PRESSURE: 78 MMHG | BODY MASS INDEX: 24.79 KG/M2 | OXYGEN SATURATION: 96 % | TEMPERATURE: 97.4 F | HEART RATE: 66 BPM | WEIGHT: 148.8 LBS | SYSTOLIC BLOOD PRESSURE: 132 MMHG | RESPIRATION RATE: 17 BRPM | HEIGHT: 65 IN

## 2025-06-24 DIAGNOSIS — R13.13 PHARYNGEAL DYSPHAGIA: Primary | ICD-10-CM

## 2025-06-24 DIAGNOSIS — Z85.89 ENCOUNTER FOR FOLLOW-UP SURVEILLANCE OF HEAD AND NECK CANCER: ICD-10-CM

## 2025-06-24 DIAGNOSIS — K22.2 ESOPHAGEAL STRICTURE: ICD-10-CM

## 2025-06-24 DIAGNOSIS — C09.9 PRIMARY TONSILLAR SQUAMOUS CELL CARCINOMA (HCC): ICD-10-CM

## 2025-06-24 DIAGNOSIS — K22.0 ACHALASIA OF THE CRICOPHARYNGEUS MUSCLE: ICD-10-CM

## 2025-06-24 DIAGNOSIS — K22.4 CRICOPHARYNGEUS MUSCLE DYSFUNCTION: ICD-10-CM

## 2025-06-24 DIAGNOSIS — Z08 ENCOUNTER FOR FOLLOW-UP SURVEILLANCE OF HEAD AND NECK CANCER: ICD-10-CM

## 2025-06-24 PROCEDURE — 1124F ACP DISCUSS-NO DSCNMKR DOCD: CPT | Performed by: OTOLARYNGOLOGY

## 2025-06-24 PROCEDURE — 99212 OFFICE O/P EST SF 10 MIN: CPT | Performed by: OTOLARYNGOLOGY

## 2025-06-24 PROCEDURE — 1159F MED LIST DOCD IN RCRD: CPT | Performed by: OTOLARYNGOLOGY

## 2025-06-24 NOTE — PROGRESS NOTES
Electronically signed by Dr. Jacob Chowdary     Lab Results   Component Value Date/Time     05/29/2025 11:15 AM     05/22/2025 09:58 AM     01/03/2025 01:47 PM    K 3.5 05/29/2025 11:15 AM    K 4.0 05/22/2025 09:58 AM    K 4.4 01/03/2025 01:47 PM    K 3.9 06/11/2024 06:15 AM    K 4.4 04/19/2024 05:48 AM    K 3.9 11/10/2023 03:20 PM     05/29/2025 11:15 AM     05/22/2025 09:58 AM     01/03/2025 01:47 PM    CO2 23 05/29/2025 11:15 AM    CO2 25 05/22/2025 09:58 AM    CO2 29 01/03/2025 01:47 PM    BUN 18 05/29/2025 11:15 AM    BUN 14 05/22/2025 09:58 AM    BUN 17 01/03/2025 01:47 PM    CREATININE 1.0 05/29/2025 11:15 AM    CREATININE 0.9 05/22/2025 09:58 AM    CREATININE 0.9 01/03/2025 01:47 PM    CALCIUM 9.0 05/29/2025 11:15 AM    CALCIUM 9.3 05/22/2025 09:58 AM    CALCIUM 9.4 01/03/2025 01:47 PM    BILITOT 1.0 05/29/2025 11:15 AM    BILITOT 0.4 05/22/2025 09:58 AM    BILITOT 0.4 01/03/2025 01:47 PM    ALKPHOS 70 05/29/2025 11:15 AM    ALKPHOS 75 05/22/2025 09:58 AM    ALKPHOS 74 01/03/2025 01:47 PM    AST 23 05/29/2025 11:15 AM    AST 24 05/22/2025 09:58 AM    AST 20 01/03/2025 01:47 PM    ALT 17 05/29/2025 11:15 AM    ALT 22 05/22/2025 09:58 AM    ALT 21 01/03/2025 01:47 PM       All of the past medical history, past surgical history, family history,social history, allergies and current medications were reviewed with the patient.  Assessment & Plan   Assessment & Plan   Diagnoses and all orders for this visit:     Diagnosis Orders   1. Pharyngeal dysphagia  SCHEDULE SURGERY      2. Esophageal stricture  SCHEDULE SURGERY      3. Cricopharyngeus muscle dysfunction  SCHEDULE SURGERY      4. Achalasia of the cricopharyngeus muscle  SCHEDULE SURGERY      5. Primary tonsillar squamous cell carcinoma (HCC)  SCHEDULE SURGERY      6. Encounter for follow-up surveillance of head and neck cancer  SCHEDULE SURGERY          The findings were explained and his questions were answered.

## 2025-07-28 ENCOUNTER — TELEPHONE (OUTPATIENT)
Dept: ENT CLINIC | Age: 70
End: 2025-07-28

## 2025-07-28 NOTE — TELEPHONE ENCOUNTER
I left a voicemail for Chris to call me to schedule a surgery in December. He has a surveillance appt on 12/22 that will need cancelled as Dr Gutierrez will be out of town.

## 2025-08-12 ENCOUNTER — OFFICE VISIT (OUTPATIENT)
Dept: INTERNAL MEDICINE CLINIC | Age: 70
End: 2025-08-12

## 2025-08-12 VITALS
OXYGEN SATURATION: 96 % | SYSTOLIC BLOOD PRESSURE: 136 MMHG | DIASTOLIC BLOOD PRESSURE: 72 MMHG | WEIGHT: 148.8 LBS | BODY MASS INDEX: 24.79 KG/M2 | HEART RATE: 73 BPM | HEIGHT: 65 IN

## 2025-08-12 DIAGNOSIS — R13.14 PHARYNGOESOPHAGEAL DYSPHAGIA: ICD-10-CM

## 2025-08-12 DIAGNOSIS — F32.A DEPRESSION, UNSPECIFIED DEPRESSION TYPE: ICD-10-CM

## 2025-08-12 DIAGNOSIS — M79.644 FINGER PAIN, RIGHT: ICD-10-CM

## 2025-08-12 DIAGNOSIS — G62.9 NEUROPATHY: ICD-10-CM

## 2025-08-12 DIAGNOSIS — E03.9 HYPOTHYROIDISM, UNSPECIFIED TYPE: Primary | ICD-10-CM

## 2025-08-12 RX ORDER — DULOXETIN HYDROCHLORIDE 30 MG/1
30 CAPSULE, DELAYED RELEASE ORAL DAILY
Qty: 90 CAPSULE | Refills: 1 | Status: SHIPPED | OUTPATIENT
Start: 2025-08-12 | End: 2025-08-12

## 2025-08-12 RX ORDER — LEVOTHYROXINE SODIUM 50 UG/1
50 TABLET ORAL DAILY
Qty: 90 TABLET | Refills: 1 | Status: SHIPPED | OUTPATIENT
Start: 2025-08-12 | End: 2025-08-12

## 2025-08-12 RX ORDER — DULOXETIN HYDROCHLORIDE 30 MG/1
30 CAPSULE, DELAYED RELEASE ORAL DAILY
Qty: 90 CAPSULE | Refills: 3 | Status: SHIPPED | OUTPATIENT
Start: 2025-08-12

## 2025-08-12 RX ORDER — LEVOTHYROXINE SODIUM 50 UG/1
50 TABLET ORAL DAILY
Qty: 90 TABLET | Refills: 3 | Status: SHIPPED | OUTPATIENT
Start: 2025-08-12

## 2025-08-12 ASSESSMENT — PATIENT HEALTH QUESTIONNAIRE - PHQ9
8. MOVING OR SPEAKING SO SLOWLY THAT OTHER PEOPLE COULD HAVE NOTICED. OR THE OPPOSITE, BEING SO FIGETY OR RESTLESS THAT YOU HAVE BEEN MOVING AROUND A LOT MORE THAN USUAL: NOT AT ALL
SUM OF ALL RESPONSES TO PHQ QUESTIONS 1-9: 2
SUM OF ALL RESPONSES TO PHQ QUESTIONS 1-9: 2
3. TROUBLE FALLING OR STAYING ASLEEP: SEVERAL DAYS
2. FEELING DOWN, DEPRESSED OR HOPELESS: NOT AT ALL
9. THOUGHTS THAT YOU WOULD BE BETTER OFF DEAD, OR OF HURTING YOURSELF: NOT AT ALL
10. IF YOU CHECKED OFF ANY PROBLEMS, HOW DIFFICULT HAVE THESE PROBLEMS MADE IT FOR YOU TO DO YOUR WORK, TAKE CARE OF THINGS AT HOME, OR GET ALONG WITH OTHER PEOPLE: SOMEWHAT DIFFICULT
1. LITTLE INTEREST OR PLEASURE IN DOING THINGS: NOT AT ALL
5. POOR APPETITE OR OVEREATING: NOT AT ALL
6. FEELING BAD ABOUT YOURSELF - OR THAT YOU ARE A FAILURE OR HAVE LET YOURSELF OR YOUR FAMILY DOWN: NOT AT ALL
4. FEELING TIRED OR HAVING LITTLE ENERGY: SEVERAL DAYS
SUM OF ALL RESPONSES TO PHQ QUESTIONS 1-9: 2
7. TROUBLE CONCENTRATING ON THINGS, SUCH AS READING THE NEWSPAPER OR WATCHING TELEVISION: NOT AT ALL
SUM OF ALL RESPONSES TO PHQ QUESTIONS 1-9: 2

## 2025-08-12 ASSESSMENT — ENCOUNTER SYMPTOMS: RESPIRATORY NEGATIVE: 1

## 2025-08-19 ENCOUNTER — HOSPITAL ENCOUNTER (OUTPATIENT)
Dept: LAB | Age: 70
Discharge: HOME | End: 2025-08-19
Payer: MEDICARE

## 2025-08-19 ENCOUNTER — HOSPITAL ENCOUNTER (OUTPATIENT)
Dept: CT IMAGING | Age: 70
Discharge: HOME | End: 2025-08-21
Payer: MEDICARE

## 2025-08-19 DIAGNOSIS — I65.23 BILATERAL CAROTID ARTERY STENOSIS: ICD-10-CM

## 2025-08-19 DIAGNOSIS — I66.09 STENOSIS OF MIDDLE CEREBRAL ARTERY, UNSPECIFIED LATERALITY: ICD-10-CM

## 2025-08-19 LAB
CREAT SERPL-MCNC: 0.9 MG/DL (ref 0.7–1.2)
GFR, ESTIMATED: >90 ML/MIN/1.73M2

## 2025-08-19 PROCEDURE — 82565 ASSAY OF CREATININE: CPT

## 2025-08-19 PROCEDURE — 6360000004 HC RX CONTRAST MEDICATION: Performed by: PSYCHIATRY & NEUROLOGY

## 2025-08-19 PROCEDURE — 70496 CT ANGIOGRAPHY HEAD: CPT

## 2025-08-19 PROCEDURE — 36415 COLL VENOUS BLD VENIPUNCTURE: CPT

## 2025-08-19 RX ORDER — IOPAMIDOL 755 MG/ML
75 INJECTION, SOLUTION INTRAVASCULAR
Status: COMPLETED | OUTPATIENT
Start: 2025-08-19 | End: 2025-08-19

## 2025-08-19 RX ADMIN — IOPAMIDOL 75 ML: 755 INJECTION, SOLUTION INTRAVENOUS at 18:37

## 2025-08-22 ENCOUNTER — HOSPITAL ENCOUNTER (OUTPATIENT)
Dept: RADIATION ONCOLOGY | Age: 70
Discharge: HOME | End: 2025-08-22
Payer: MEDICARE

## 2025-08-22 VITALS
WEIGHT: 149.2 LBS | DIASTOLIC BLOOD PRESSURE: 71 MMHG | SYSTOLIC BLOOD PRESSURE: 134 MMHG | OXYGEN SATURATION: 97 % | HEART RATE: 67 BPM | BODY MASS INDEX: 24.83 KG/M2 | TEMPERATURE: 98.2 F | RESPIRATION RATE: 16 BRPM

## 2025-08-22 PROCEDURE — 99212 OFFICE O/P EST SF 10 MIN: CPT

## 2025-08-22 ASSESSMENT — ENCOUNTER SYMPTOMS
ABDOMINAL PAIN: 0
VOMITING: 0
NAUSEA: 0
VOICE CHANGE: 0
FACIAL SWELLING: 0
SINUS PAIN: 0
SINUS PRESSURE: 0
COUGH: 0
BLOOD IN STOOL: 0
TROUBLE SWALLOWING: 1
BACK PAIN: 1
SORE THROAT: 0
SHORTNESS OF BREATH: 0
RECTAL PAIN: 0

## (undated) DEVICE — BASIC SINGLE BASIN BTC-LF: Brand: MEDLINE INDUSTRIES, INC.

## (undated) DEVICE — SUTURE PERMAHAND SZ 2-0 L12X18IN NONABSORBABLE BLK SILK A185H

## (undated) DEVICE — GLOVE SURG SZ 75 L12IN FNGR THK94MIL TRNSLUC YEL LTX

## (undated) DEVICE — SUTURE MCRYL SZ 4-0 L18IN ABSRB VLT P-3 L13MM 3/8 CIR REV Y464G

## (undated) DEVICE — PACK SUCT CATHETER 14FR OPN WHSTL W NO VLV

## (undated) DEVICE — CODMAN® SURGICAL PATTIES 1/2" X 1/2" (1.27CM X 1.27CM): Brand: CODMAN®

## (undated) DEVICE — ARM DRAPE

## (undated) DEVICE — Device

## (undated) DEVICE — TUBING, SUCTION, 1/4" X 20', STRAIGHT: Brand: MEDLINE INDUSTRIES, INC.

## (undated) DEVICE — SUTURE PROL SZ 2-0 L36IN NONABSORBABLE BLU SH L26MM 1/2 CIR 8523H

## (undated) DEVICE — SYRINGE IRRIG 60ML SFT PLIABLE BLB EZ TO GRP 1 HND USE W/

## (undated) DEVICE — TUBING INSUFFLATOR HEAT HUMIDIFIED SMK EVAC SET PNEUMOCLEAR

## (undated) DEVICE — 3M™ STERI-DRAPE™ INSTRUMENT POUCH 1018: Brand: STERI-DRAPE™

## (undated) DEVICE — DISSECTOR ENDOSCP L21CM TIP CURVATURE 40DEG FN CRV JAW VES

## (undated) DEVICE — FLEXIBLE ENDOSCOPE AND SPECIMEN SAMPLING SYSTEM: Brand: ASCOPE™ 5 BRONCHO 4.2/2.2 SAMPLER SET

## (undated) DEVICE — SET UP: Brand: MEDLINE INDUSTRIES, INC.

## (undated) DEVICE — SUTURE MCRYL SZ 4-0 L27IN ABSRB UD L19MM PS-2 1/2 CIR PRIM Y426H

## (undated) DEVICE — CANNULA SEAL

## (undated) DEVICE — LIQUIBAND RAPID ADHESIVE 36/CS 0.8ML: Brand: MEDLINE

## (undated) DEVICE — DRAIN SURG 19FR 0.25IN SIL RND W/ TRCR INDIC DOT RADPQ FULL

## (undated) DEVICE — CODMAN® SURGICAL PATTIES 1/2" X 3" (1.27CM X 7.62CM): Brand: CODMAN®

## (undated) DEVICE — GLOVE SURG 7.5 PF POLYMER WHT STRL SIGN LTX ESSENTIAL LTX

## (undated) DEVICE — PACK-MAJOR

## (undated) DEVICE — UNIVERSAL MONOPOLAR LAPAROSCOPIC CABLE 10FT, 4MM PIN CONNECTOR: Brand: CONMED

## (undated) DEVICE — HERCULES 3 STAGE BALLOON ESOPHAGEAL: Brand: HERCULES

## (undated) DEVICE — DRAPE,EENT,SPLIT,STERILE: Brand: MEDLINE

## (undated) DEVICE — SUTURE PROL SZ 2-0 L18IN NONABSORBABLE BLU FS L26MM 3/8 CIR 8685H

## (undated) DEVICE — HYPODERMIC SAFETY NEEDLE: Brand: MAGELLAN

## (undated) DEVICE — SOLUTION ANTIFOG VIS SYS CLEARIFY LAPSCP

## (undated) DEVICE — EVACUATOR SURG 100CC SIL BLB SUCT RESVR FOR CLS WND DRNGE

## (undated) DEVICE — SPLINT 1524050 5PK PAIR DOYLE II AIRWAY: Brand: DOYLE II ™

## (undated) DEVICE — INJECTION THERAPY NEEDLE CATHETER: Brand: INTERJECT

## (undated) DEVICE — DEVICE INFL 60ML 15ATM PRSS COOK SPHR

## (undated) DEVICE — SINGLE-USE BIOPSY FORCEPS: Brand: RADIAL JAW 4

## (undated) DEVICE — SEALANT TISS 10ML FIBRIN PREFIL SYR PRIMA FRZN W 1 DUPLOJET

## (undated) DEVICE — LARYNGOSCOPY - BRONCHOSCOPY: Brand: MEDLINE INDUSTRIES, INC.

## (undated) DEVICE — SALEM SUMP DUAL LUMEN STOMACH TUBE MULTI-FUNCTIONAL PORT WITH ENFIT CONNECTION: Brand: SALEM SUMP

## (undated) DEVICE — SYRINGE MED 10ML LUERLOCK TIP W/O SFTY DISP

## (undated) DEVICE — WILSON-COOK ACHALASIA BALLOONS: Brand: COOK

## (undated) DEVICE — ENT: Brand: MEDLINE INDUSTRIES, INC.

## (undated) DEVICE — GLOVE SURG SZ 7.5 L11.73IN FNGR THK9.8MIL STRW LTX POLYMER

## (undated) DEVICE — COLUMN DRAPE

## (undated) DEVICE — SUTURE VCRL + SZ 0 L27IN ABSRB VLT L26MM UR-6 5/8 CIR VCP603H

## (undated) DEVICE — SOLUTION IRRIG 1000ML 0.9% SOD CHL USP POUR PLAS BTL

## (undated) DEVICE — KIT,ANTI FOG,W/SPONGE & FLUID,SOFT PACK: Brand: MEDLINE

## (undated) DEVICE — COMPLIANCE ENDO KIT

## (undated) DEVICE — APPLICATOR MEDICATED 5 MMX40 CM 2 LUMEN W/ SNAP LCK DUPLOTIP

## (undated) DEVICE — SPONGE,PEANUT,XRAY,ST,SM,3/8",5/CARD: Brand: MEDLINE INDUSTRIES, INC.

## (undated) DEVICE — SYRINGE CATH TIP 50ML

## (undated) DEVICE — AGENT HEMSTAT W2XL4IN OXIDIZED REGENERATED CELOS ABSRB SFT

## (undated) DEVICE — 3M™ IOBAN™ 2 ANTIMICROBIAL INCISE DRAPE 6650EZ: Brand: IOBAN™ 2

## (undated) DEVICE — DRAIN,WOUND,15FR,3/16,FULL-FLUTED: Brand: MEDLINE

## (undated) DEVICE — SUTURE MCRYL SZ 3-0 L27IN ABSRB VLT L17MM RB-1 1/2 CIR Y305H

## (undated) DEVICE — CONTAINER,SPECIMEN,PNEU TUBE,4OZ,OR STRL: Brand: MEDLINE

## (undated) DEVICE — APPLIER CLP L9.375IN APER 2.1MM CLS L3.8MM 20 SM TI CLP

## (undated) DEVICE — ESOPHAGEAL BALLOON DILATATION CATHETER: Brand: CRE FIXED WIRE

## (undated) DEVICE — TIP COVER ACCESSORY

## (undated) DEVICE — BANDAGE,GAUZE,4.5"X4.1YD,STERILE,LF: Brand: MEDLINE

## (undated) DEVICE — CORD,CAUTERY,BIPOLAR,STERILE: Brand: MEDLINE

## (undated) DEVICE — SYRINGE MED 30ML STD CLR PLAS LUERLOCK TIP N CTRL DISP

## (undated) DEVICE — APPLICATOR MEDICATED 10.5 CC SOLUTION CLR STRL CHLORAPREP

## (undated) DEVICE — SUTURE VCRL + SZ 3-0 L27IN ABSRB UD L26MM SH 1/2 CIR VCP416H

## (undated) DEVICE — INSUFFLATION NEEDLE TO ESTABLISH PNEUMOPERITONEUM.: Brand: INSUFFLATION NEEDLE

## (undated) DEVICE — BREAST HERNIA: Brand: MEDLINE INDUSTRIES, INC.

## (undated) DEVICE — CLIP LIG M TI 6 SIL HNDL FOR OPN AND ENDOSCP SGL APPL

## (undated) DEVICE — BLADELESS OBTURATOR: Brand: WECK VISTA

## (undated) DEVICE — ELECTRODE NDL 2.8IN COAT VALLEYLAB

## (undated) DEVICE — DRAPE,INSTRUMENT,MAGNETIC,10X16: Brand: MEDLINE

## (undated) DEVICE — DILATION SYRINGE: Brand: COOK

## (undated) DEVICE — BANDAGE ADH W1XL3IN NAT FAB WVN FLX DURABLE N ADH PD SEAL

## (undated) DEVICE — GOWN,SIRUS,NON REINFRCD,LARGE,SET IN SL: Brand: MEDLINE

## (undated) DEVICE — DRAPE,TOP,102X53,STERILE: Brand: MEDLINE

## (undated) DEVICE — SEALANT SURG TISSEEL 10 ML FROZEN PRIMA

## (undated) DEVICE — SUTURE PDS + SZ 3 0 L27IN ABSRB VLT L17MM RB 1 1 2 CIR PDP305H

## (undated) DEVICE — SUTURE ETHBND EXCEL SZ 0 L36IN NONABSORBABLE GRN SH L26MM X524H

## (undated) DEVICE — SUTURE PERMA-HAND SZ 2-0 L30IN NONABSORBABLE BLK L26MM SH K833H

## (undated) DEVICE — SUTURE MCRYL + SZ 3-0 L27IN ABSRB UD L26MM SH 1/2 CIR MCP416H

## (undated) DEVICE — TROCAR: Brand: KII FIOS FIRST ENTRY

## (undated) DEVICE — SUTURE MCRYL + SZ 3-0 L27IN ABSRB UD PS1 L24MM 3/8 CIR REV MCP936H

## (undated) DEVICE — GLOVE ORANGE PI 7   MSG9070

## (undated) DEVICE — SUTURE VCRL + SZ 2-0 L27IN ABSRB WHT SH 1/2 CIR TAPERCUT VCP417H

## (undated) DEVICE — SEAL

## (undated) DEVICE — COAGULATOR SUCT 8FR L6IN HNDL FOR ENT PROC

## (undated) DEVICE — 35 ML SYRINGE LUER-LOCK TIP: Brand: MONOJECT

## (undated) DEVICE — NEEDLE SPNL 22GA L7IN SPINOCAN

## (undated) DEVICE — FISH HOOKS, W/SUTURE, RUBBER BAND BLUNT, BARBLESS: Brand: DEROYAL

## (undated) DEVICE — GLOVE ORANGE PI 8   MSG9080

## (undated) DEVICE — BANDAGE COMPR W6INXL5YD WHT BGE POLY COT M E WRP WV HK AND

## (undated) DEVICE — SUTURE PERMAHAND SZ 3-0 L18IN NONABSORBABLE BLK SILK BRAID A184H

## (undated) DEVICE — APPLICATOR TISS 20 GAX32 CM W/ SNAP LCK SS DUPLOTIP DISP

## (undated) DEVICE — VESSEL SEALER EXTEND: Brand: ENDOWRIST

## (undated) DEVICE — APPLIER CLP L9.38IN M LIG TI DISP STR RNG HNDL LIGACLP

## (undated) DEVICE — ENTACT SEPTAL STAPLER 3 PACK: Brand: ENT SINUS

## (undated) DEVICE — 1LYRTR 16FR10ML100%SILTMPS SNP: Brand: MEDLINE INDUSTRIES, INC.

## (undated) DEVICE — T&A: Brand: MEDLINE INDUSTRIES, INC.

## (undated) DEVICE — GENERAL LAPAROSCOPY-LF: Brand: MEDLINE INDUSTRIES, INC.

## (undated) DEVICE — EMG TUBE 8229706 NIM TRIVANTAGE 6.0MM ID: Brand: NIM TRIVANTAGE™

## (undated) DEVICE — PROBE 8225101 5PK STD PRASS FL TIP ROHS

## (undated) DEVICE — PAD,NON-ADHERENT,3X8,STERILE,LF,1/PK: Brand: MEDLINE

## (undated) DEVICE — PUMP SUC IRR TBNG L10FT W/ HNDPC ASSEMB STRYKEFLOW 2

## (undated) DEVICE — SUTURE ETHBND EXCEL SZ 0 L30IN NONABSORBABLE GRN CT1 L36MM X424H

## (undated) DEVICE — APPLIER LIG CLP M L11IN TI STR RNG HNDL FOR 20 CLP DISP

## (undated) DEVICE — DRAIN SURG PENROSE 0.25X18 IN CLOSED WND DRAINAGE PREM SIL

## (undated) DEVICE — GLOVE ORTHO 8   MSG9480

## (undated) DEVICE — SPECIMEN ORIENTATION CHARMS, SIX DISTINCTLY SHAPED STERILE 10MM CHARMS: Brand: MARGINMAP

## (undated) DEVICE — SUTURE PERMAHAND SZ 0 L30IN NONABSORBABLE BLK SILK BRAID A306H

## (undated) DEVICE — SUTURE PDS II SZ 4-0 L27IN ABSRB UD FS-2 L19MM 3/8 CIR REV Z422H

## (undated) DEVICE — ELECTRO LUBE IS A SINGLE PATIENT USE DEVICE THAT IS INTENDED TO BE USED ON ELECTROSURGICAL ELECTRODES TO REDUCE STICKING.: Brand: KEY SURGICAL ELECTRO LUBE

## (undated) DEVICE — JELLY,LUBE,STERILE,FLIP TOP,TUBE,2-OZ: Brand: MEDLINE

## (undated) DEVICE — SUTURE ETHLN SZ 2-0 L30IN NONABSORBABLE BLK L36MM FSLX 3/8 1674H

## (undated) DEVICE — PENCIL SMK EVAC ALL IN 1 DSGN ENH VISIBILITY IMPROVED AIR

## (undated) DEVICE — GAUZE,SPONGE,8"X4",12PLY,XRAY,STRL,LF: Brand: MEDLINE